# Patient Record
Sex: MALE | Race: WHITE | NOT HISPANIC OR LATINO | URBAN - METROPOLITAN AREA
[De-identification: names, ages, dates, MRNs, and addresses within clinical notes are randomized per-mention and may not be internally consistent; named-entity substitution may affect disease eponyms.]

---

## 2017-02-23 ENCOUNTER — ALLSCRIPTS OFFICE VISIT (OUTPATIENT)
Dept: OTHER | Facility: OTHER | Age: 62
End: 2017-02-23

## 2017-02-23 DIAGNOSIS — E11.9 TYPE 2 DIABETES MELLITUS WITHOUT COMPLICATIONS (HCC): ICD-10-CM

## 2017-02-23 DIAGNOSIS — E66.01 MORBID (SEVERE) OBESITY DUE TO EXCESS CALORIES (HCC): ICD-10-CM

## 2017-02-23 DIAGNOSIS — G47.33 OBSTRUCTIVE SLEEP APNEA: ICD-10-CM

## 2017-02-23 DIAGNOSIS — E07.9 DISORDER OF THYROID: ICD-10-CM

## 2017-02-23 DIAGNOSIS — I10 ESSENTIAL (PRIMARY) HYPERTENSION: ICD-10-CM

## 2017-03-17 ENCOUNTER — GENERIC CONVERSION - ENCOUNTER (OUTPATIENT)
Dept: OTHER | Facility: OTHER | Age: 62
End: 2017-03-17

## 2017-03-28 ENCOUNTER — GENERIC CONVERSION - ENCOUNTER (OUTPATIENT)
Dept: OTHER | Facility: OTHER | Age: 62
End: 2017-03-28

## 2017-04-28 ENCOUNTER — ALLSCRIPTS OFFICE VISIT (OUTPATIENT)
Dept: OTHER | Facility: OTHER | Age: 62
End: 2017-04-28

## 2017-05-16 ENCOUNTER — ANESTHESIA EVENT (OUTPATIENT)
Dept: GASTROENTEROLOGY | Facility: HOSPITAL | Age: 62
End: 2017-05-16
Payer: COMMERCIAL

## 2017-05-16 RX ORDER — ASPIRIN 81 MG/1
81 TABLET ORAL DAILY
COMMUNITY

## 2017-05-16 RX ORDER — LISINOPRIL 10 MG/1
10 TABLET ORAL DAILY
COMMUNITY
End: 2018-07-20 | Stop reason: SDUPTHER

## 2017-05-16 RX ORDER — SIMVASTATIN 20 MG
20 TABLET ORAL
COMMUNITY
End: 2018-09-13 | Stop reason: SDUPTHER

## 2017-05-16 RX ORDER — SILDENAFIL 50 MG/1
50 TABLET, FILM COATED ORAL DAILY PRN
COMMUNITY
End: 2018-07-03 | Stop reason: SDUPTHER

## 2017-05-16 RX ORDER — LEVOTHYROXINE SODIUM 0.05 MG/1
50 TABLET ORAL DAILY
Status: ON HOLD | COMMUNITY
End: 2017-05-17 | Stop reason: DRUGHIGH

## 2017-05-16 RX ORDER — OXYCODONE HYDROCHLORIDE AND ACETAMINOPHEN 5; 325 MG/1; MG/1
1 TABLET ORAL EVERY 4 HOURS PRN
COMMUNITY
End: 2018-07-03

## 2017-05-16 RX ORDER — GLIMEPIRIDE 4 MG/1
4 TABLET ORAL 2 TIMES DAILY
COMMUNITY
End: 2018-06-27 | Stop reason: SDUPTHER

## 2017-05-17 ENCOUNTER — ANESTHESIA (OUTPATIENT)
Dept: GASTROENTEROLOGY | Facility: HOSPITAL | Age: 62
End: 2017-05-17
Payer: COMMERCIAL

## 2017-05-17 ENCOUNTER — HOSPITAL ENCOUNTER (OUTPATIENT)
Facility: HOSPITAL | Age: 62
Setting detail: OUTPATIENT SURGERY
Discharge: HOME | End: 2017-05-17
Attending: SURGERY | Admitting: SURGERY
Payer: COMMERCIAL

## 2017-05-17 VITALS
HEIGHT: 70 IN | BODY MASS INDEX: 40.8 KG/M2 | HEART RATE: 81 BPM | DIASTOLIC BLOOD PRESSURE: 85 MMHG | OXYGEN SATURATION: 97 % | RESPIRATION RATE: 16 BRPM | WEIGHT: 285 LBS | TEMPERATURE: 97.9 F | SYSTOLIC BLOOD PRESSURE: 164 MMHG

## 2017-05-17 DIAGNOSIS — E66.01 MORBID (SEVERE) OBESITY DUE TO EXCESS CALORIES (HCC): ICD-10-CM

## 2017-05-17 PROCEDURE — 88305 TISSUE EXAM BY PATHOLOGIST: CPT | Performed by: SURGERY

## 2017-05-17 PROCEDURE — 88342 IMHCHEM/IMCYTCHM 1ST ANTB: CPT | Performed by: SURGERY

## 2017-05-17 RX ORDER — SODIUM CHLORIDE 9 MG/ML
125 INJECTION, SOLUTION INTRAVENOUS CONTINUOUS
Status: DISCONTINUED | OUTPATIENT
Start: 2017-05-17 | End: 2017-05-17 | Stop reason: HOSPADM

## 2017-05-17 RX ORDER — LEVOTHYROXINE SODIUM 0.07 MG/1
75 TABLET ORAL DAILY
COMMUNITY
End: 2018-06-27 | Stop reason: SDUPTHER

## 2017-05-17 RX ORDER — PROPOFOL 10 MG/ML
INJECTION, EMULSION INTRAVENOUS AS NEEDED
Status: DISCONTINUED | OUTPATIENT
Start: 2017-05-17 | End: 2017-05-17 | Stop reason: SURG

## 2017-05-17 RX ORDER — FENOFIBRATE 160 MG/1
160 TABLET ORAL DAILY
COMMUNITY
End: 2018-06-27 | Stop reason: SDUPTHER

## 2017-05-17 RX ORDER — ONDANSETRON 2 MG/ML
4 INJECTION INTRAMUSCULAR; INTRAVENOUS EVERY 6 HOURS PRN
Status: DISCONTINUED | OUTPATIENT
Start: 2017-05-17 | End: 2017-05-17 | Stop reason: HOSPADM

## 2017-05-17 RX ADMIN — SODIUM CHLORIDE: 0.9 INJECTION, SOLUTION INTRAVENOUS at 13:15

## 2017-05-17 RX ADMIN — PROPOFOL 200 MG: 10 INJECTION, EMULSION INTRAVENOUS at 13:27

## 2017-10-18 ENCOUNTER — ALLSCRIPTS OFFICE VISIT (OUTPATIENT)
Dept: OTHER | Facility: OTHER | Age: 62
End: 2017-10-18

## 2017-10-18 ENCOUNTER — TRANSCRIBE ORDERS (OUTPATIENT)
Dept: ADMINISTRATIVE | Facility: HOSPITAL | Age: 62
End: 2017-10-18

## 2017-10-18 DIAGNOSIS — Z01.810 ENCOUNTER FOR PREPROCEDURAL CARDIOVASCULAR EXAMINATION: ICD-10-CM

## 2017-10-18 DIAGNOSIS — E78.5 HYPERLIPIDEMIA: ICD-10-CM

## 2017-10-18 DIAGNOSIS — Z01.810 PRE-OPERATIVE CARDIOVASCULAR EXAMINATION: Primary | ICD-10-CM

## 2017-10-19 NOTE — PROGRESS NOTES
Assessment  1  Preop cardiovascular exam (V72 81) (Z01 810)  2  Benign essential hypertension (401 1) (I10)  3  Hyperlipidemia (272 4) (E78 5)    Plan  1  Follow-up PRN Evaluation and Treatment  Follow-up  Status: Complete  Done:   74WMX2324  2  NM MYOCARDIAL PERFUSION SPECT (RX STRESS AND/OR REST); Status:Need   Information - Financial Authorization; Requested for:18Oct2017;   3  EKG/ECG- POC; Status:Complete;   Done: 50ZRW3457    Discussion/Summary  Patient Clearance: Surgical Clearance:1  He is at a LOW TO MODERATE risk from a cardiovascular standpoint at this time without any additional cardiac testing  Reevaluation needed, if he should present with symptoms prior to surgery/procedure1   Comments:1   Normal stress test1   Discussion Summary:   1  Preop cardiac clearance: Resting EKG is normal  check stress testing given his diabetes and dyspnea,   Hypertension: His BP is well controlled on current medical therapy  Hyperlipidemia: He is maintained on fenofibrate and simvastatin  Counseling Documentation With Imm: The patient was counseled regarding diagnostic results,-- instructions for management,-- risk factor reductions,-- impressions  total time of encounter was 40 minutes-- and-- 25 minutes was spent counseling  1 Amended By: Saira Hahn; Oct 26 2017 3:20 PM EST    Chief Complaint  Chief Complaint Free Text Note Form: Preop clearance for bariatric surgery with Dr Keke Murphy  History of Present Illness  Pre-Op Visit (Brief): The patient is being seen for a preoperative visit-- and-- bariatric surgery  Surgical Risk Assessment:   HPI: consult for preop cardiac clearance   year old male with HTN, diabetes and hyperlipidemia  DM is reasonably controlled  blood pressure has been well controlled  He has stable with moderate dyspnea, no angina, no orthopnea, no PND, and no edema  No hx of CAD or CHF  Hypertension (Follow-Up): The patient presents for follow-up of essential hypertension   The patient states he has been stable with his blood pressure control since the last visit  Symptoms: denies impaired vision,-- stable dyspnea,-- denies chest pain,-- denies intermittent leg claudication-- and-- denies lower extremity edema  Review of Systems  Cardiology Male ROS:     Cardiac: No complaints of chest pain, no palpitations, no fainiting  Skin: No complaints of nonhealing sores or skin rash  Genitourinary: No complaints of recurrent urinary tract infections, frequent urination at night, difficult urination, blood in urine, kidney stones, loss of bladder control, no kidney or prostate problems, no erectile dysfunction  Psychological: No complaints of feeling depressed, anxiety, panic attacks, or difficulty concentrating  General: No complaints of trouble sleeping, lack of energy, fatigue, appetite changes, weight changes, fever, frequent infections, or night sweats  Respiratory: shortness of breath  HEENT: No complaints of serious problems, hearing problems, nose problems, throat problems, or snoring  Gastrointestinal: No complaints of liver problems, nausea, vomiting, heartburn, constipation, bloody stools, diarrhea, problems swallowing, adbominal pain, or rectal bleeding  Hematologic: No complaints of bleeding disorders, anemia, blood clots, or excessive brusing  Neurological: No complaints of numbness, tingling, dizziness, weakness, seizures, headaches, syncope or fainting, AM fatigue, daytime sleepiness, no witnessed apnea episodes  Musculoskeletal: No complaints of arthritis, back pain, or painfull swelling  ROS Reviewed:   ROS reviewed  Active Problems  1  Back pain (724 5) (M54 9)  2  Benign essential hypertension (401 1) (I10)  3  Blood tests prior to treatment or procedure (V72 63) (Z01 812)  4  Diabetes mellitus (250 00) (E11 9)  5  Morbid obesity due to excess calories (278 01) (E66 01)  6  Obstructive sleep apnea on CPAP (327 23,V46 8) (G47 33,Z99 89)  7  Thyroid disease (246 9) (E07 9)    Past Medical History  1  No pertinent past medical history  Active Problems And Past Medical History Reviewed: The active problems and past medical history were reviewed and updated today  Surgical History  1  History of Tonsillectomy  Surgical History Reviewed: The surgical history was reviewed and updated today  Family History  Mother   1  Family history of   2  Family history of lung cancer (V16 1) (Z80 1)  Father   3  Family history of   Family History Reviewed: The family history was reviewed and updated today  Social History   · Former smoker (H77 28) (L25 735)   · No illicit drug use   · Social alcohol use (Z78 9)  Social History Reviewed: The social history was reviewed and updated today  Current Meds  1  Aspirin 81 MG CAPS; Therapy: (Recorded:40Kva5817) to Recorded  2  Fenofibrate 160 MG Oral Tablet; TAKE 1 TABLET DAILY; Therapy: (Recorded:65Sms3405) to Recorded  3  Glimepiride 4 MG Oral Tablet; TAKE 1 TABLET TWICE DAILY; Therapy: (Recorded:35Icw1832) to Recorded  4  Hydrocodone-Acetaminophen 5-325 MG Oral Tablet; Therapy: (Recorded:71Lwg6967) to Recorded  5  Kombiglyze XR 2 5-1000 MG Oral Tablet Extended Release 24 Hour; Take 1 tablet twice   daily; Therapy: (Recorded:27Qwj6548) to Recorded  6  Levothyroxine Sodium 75 MCG Oral Tablet; take 1 tablet by mouth daily; Therapy: (Recorded:57Tny8427) to Recorded  7  Lisinopril 10 MG Oral Tablet; TAKE 1 TABLET DAILY; Therapy: (Recorded:08Prm7244) to Recorded  8  Simvastatin 20 MG Oral Tablet; TAKE 1 TABLET AT BEDTIME; Therapy: (Recorded:46Kut1845) to Recorded  9  Viagra 50 MG Oral Tablet; Therapy: (Recorded:18Wxr2975) to Recorded  Medication List Reviewed: The medication list was reviewed and updated today  Allergies  1   No Known Drug Allergies    Vitals  Signs   Recorded: 59ZYM8420 02:00PM   Heart Rate: 90  Systolic: 345, RUE, Sitting  Diastolic: 74, RUE, Sitting  BP Cuff Size: Large  Height: 5 ft 8 in  Weight: 289 lb   BMI Calculated: 43 94  BSA Calculated: 2 39    Physical Exam    Constitutional   General appearance: No acute distress, well appearing and well nourished  Eyes   Conjunctiva and Sclera examination: Conjunctiva pink, sclera anicteric  Ears, Nose, Mouth, and Throat - Oropharynx: Clear, nares are clear, mucous membranes are moist    Neck   Neck and thyroid: Normal, supple, trachea midline, no thyromegaly  Pulmonary   Respiratory effort: No increased work of breathing or signs of respiratory distress  Auscultation of lungs: Clear to auscultation, no rales, no rhonchi, no wheezing, good air movement  Cardiovascular   Auscultation of heart: Normal rate and rhythm, normal S1 and S2, no murmurs  Carotid pulses: Normal, 2+ bilaterally  Peripheral vascular exam: Normal pulses throughout, no tenderness, erythema or swelling  Pedal pulses: Normal, 2+ bilaterally  Examination of extremities for edema and/or varicosities: Normal     Abdomen   Abdomen: Non-tender and no distention  Liver and spleen: No hepatomegaly or splenomegaly  Musculoskeletal Gait and station: Normal gait  -- Digits and nails: Normal without clubbing or cyanosis  -- Inspection/palpation of joints, bones, and muscles: Normal, ROM normal     Skin - Skin and subcutaneous tissue: Normal without rashes or lesions  Skin is warm and well perfused, normal turgor  Neurologic - Cranial nerves: II - XII intact  -- Speech: Normal     Psychiatric - Orientation to person, place, and time: Normal -- Mood and affect: Normal       Results/Data  ECG Report: A 12 lead ECG was performed and was normal    Rhythm and rate:  ventricular rate is 90 beats per minute  -- normal sinus rhythm        Signatures   Electronically signed by : MELISA Greenwood ; Oct 18 2017  2:58PM EST                       (Author)    Electronically signed by : MELISA Greenwood ; Oct 26 2017  3:20PM EST (Author)

## 2017-10-24 ENCOUNTER — HOSPITAL ENCOUNTER (OUTPATIENT)
Dept: NON INVASIVE DIAGNOSTICS | Facility: CLINIC | Age: 62
Discharge: HOME | End: 2017-10-24
Payer: COMMERCIAL

## 2017-10-24 DIAGNOSIS — Z01.810 PRE-OPERATIVE CARDIOVASCULAR EXAMINATION: ICD-10-CM

## 2017-10-24 LAB
CHEST PAIN STATEMENT: NORMAL
MAX DIASTOLIC BP: 82 MMHG
MAX HEART RATE: 105 BPM
MAX PREDICTED HEART RATE: 159 BPM
MAX. SYSTOLIC BP: 158 MMHG
PROTOCOL NAME: NORMAL
REASON FOR TERMINATION: NORMAL
TARGET HR FORMULA: NORMAL
TEST INDICATION: NORMAL
TIME IN EXERCISE PHASE: 180 S

## 2017-10-24 PROCEDURE — 78452 HT MUSCLE IMAGE SPECT MULT: CPT

## 2017-10-24 PROCEDURE — 93017 CV STRESS TEST TRACING ONLY: CPT

## 2017-10-24 PROCEDURE — A9502 TC99M TETROFOSMIN: HCPCS

## 2017-10-24 RX ADMIN — REGADENOSON 0.4 MG: 0.08 INJECTION, SOLUTION INTRAVENOUS at 10:12

## 2017-10-26 ENCOUNTER — GENERIC CONVERSION - ENCOUNTER (OUTPATIENT)
Dept: OTHER | Facility: OTHER | Age: 62
End: 2017-10-26

## 2018-01-11 NOTE — MISCELLANEOUS
Provider Comments  Provider Comments:     Dear Ana Maria Mi had a scheduled appointment at our office for 03/17/2017 but were unable to keep  We attempted to call you back but were unable to reach you  Please give our office a call if you wish to reschedule      Sincerely,     [facility]          Signatures   Electronically signed by : Kassie Apodaca, ; Mar 17 2017  9:21AM EST                       (Author)

## 2018-01-11 NOTE — PROGRESS NOTES
History of Present Illness  Bariatric Behavioral Health Evaluation [facility]:   He is here today because: Increase health, increase mobility and prevent family health issues  He is seeking a Bariatric surgery evaluation  He researched this option for: 3 years  He realizes the post-op requirements Yes, patient has researched procedure; patient has acquittances with bariaric surgery  His Psychiatric/Psychological diagnosis: He does not have an outpatient counselor  He does not have the counselor's number  He does not have a Psychiatrist  He does not have the Psychiatrist's number  He has not had Inpatient Treatment  (None reported )  Family Constellation: Family Constellation: Mother:  @ 79years old; cancer  Father: [de-identified] 79years old; stroke  Siblings: 2 brothers and 1 sister  Spouse:  44 years; spouse supports surgery  Children: 2 children  He lives withhis wife   Domestic Violence: has not happened  Abuse History: (None reported )   Physical/psychological assessment Appearance: appropriate   Sociability: average  Affect: appropriate  Mood: calm  Thought Process: coherent  Speech: normal  Content: no impairment  The patient was oriented to person, oriented to place, oriented to time and normal memory   normal attention span  no decreased concentration ability  normal judgment  His emotional insight was: good  His intellectual insight was: good  Risk assessment: Symptoms:  no suicidal ideation, no suicide plan, no suicide attempt, no homicidal thoughts, no hopelessness, no helplessness, no feelings of despair, no anxiety, no depressed mood, no loss of interests, no anhedonia and no sense of isolation  The patient is currently asymptomatic  Associated symptoms:  no aggressive behavior, no high risk behavior, no racing thoughts, no periods of excess energy, no periods of euphoria, no delusions, no command hallucinations, no auditory hallucinations and no visual hallucinations   No associated symptoms are reported  The patient is not currently being treated for this problem  Pertinent medical history:  chronic pain, but no depression, no seasonal affective disorder, no premenstrual dysphoric disorder, no postpartum depression, no anxiety disorder, no bipolar disorder, no post traumatic stress disorder, no eating disorder, no personality disorder, no schizophrenia, no chronic headaches, no dementia, no malignancy and no HIV infection  Risk factors:  financial stress and job loss, but no bereavement, no relationship problems, no social isolation, no access to lethal means, no alcohol abuse, no substance abuse, no physical abuse, no sexual abuse, no emotional abuse, no bullying, no previous suicide attempt, no recent psychiatric hospitalization, no recent family suicide and no recent friend suicide  No risk factors have been identified  Family history:  no suicide, no depression, no bipolar disorder and no substance abuse  He was previously evaluated by me  Sexual history: He is not pregnant  He does not have a history of   He does not have a history of miscarriage  He does not have a history of hypersexuality  Recommendations: He is recommended for surgery  He states adequate knowledge of nutrition, exercise, and behavior modification  The Following Ratings are based on my: Obsevation of this individual over the last  Risk of Harm to Self or Others: The following are demographic risk factors associated with harm to self: , [place], or   The following are demographic risk factors associated with harm to others: male and unemployed  (None reported )  Recent Specific Risk Factors: The patient is currently asymptomatic  No associated symptoms are reported  Summary and Recommendations:   Low: No thoughts or occasional thoughts of suicide, but no intent or actions   Self inflicted scratches, abrasions, or other self- injurious of behavior where medical attention is typically not warranted  No elements of homicidality or occasional thoughts but no plan, intent, or actions  Active Problems    1  Back pain (724 5) (M54 9)   2  Benign essential hypertension (401 1) (I10)   3  Diabetes mellitus (250 00) (E11 9)   4  Obstructive sleep apnea on CPAP (327 23) (G47 33)   5  Thyroid disease (246 9) (E07 9)    Surgical History    1  History of Tonsillectomy    Family History  Mother    1  Family history of    2  Family history of lung cancer (V16 1) (Z80 1)  Father    3  Family history of     Social History    · Former smoker (V13 04) (O94 252)   · No illicit drug use   · Social alcohol use (Z78 9)    Current Meds   1  Aspirin 81 MG CAPS; Therapy: (Recorded:72Lwc2211) to Recorded   2  Glimepiride 4 MG Oral Tablet; Therapy: (Recorded:56Hpe9423) to Recorded   3  Hydrocodone-Acetaminophen 5-325 MG Oral Tablet; Therapy: (Recorded:12Suj6918) to Recorded   4  Kombiglyze XR 5-1000 MG Oral Tablet Extended Release 24 Hour; Therapy: (Recorded:47Lak2523) to Recorded   5  Levothyroxine Sodium 50 MCG Oral Tablet; Therapy: (Recorded:68Xqx6910) to Recorded   6  Lisinopril 10 MG Oral Tablet; Therapy: (Recorded:25Kxt8403) to Recorded   7  Simvastatin 20 MG Oral Tablet; Therapy: (Recorded:58Kxy0333) to Recorded   8  Viagra 50 MG Oral Tablet; Therapy: (Recorded:73Sgo6765) to Recorded    Allergies    1  No Known Drug Allergies    Vitals  Signs   Recorded: 10AFR5906 08:58AM   Height: 5 ft 8 5 in  Weight: 289 lb   BMI Calculated: 43 3  BSA Calculated: 2 4     Note   Note:   Completed Behavioral Health Assessment  Provided patient, education as needed  Patient denies to Damar I diagnosis   Patient will work on the following; increase physical activity and cut food into small pieces  Patient is knowledgeable of pre and post op requirements and meets criteria for [facility]' bariatric surgery program  Patient is referred to physician        Future Appointments    Date/Time Provider Specialty Site   01/20/2017 10:45 AM MELISA Ellington  General Surgery [facility]   02/23/2017 09:30 AM MELISA Ordonez  Bariatric Medicine [place] [id number] [facility]     Signatures   Electronically signed by : TRINA PorterLCSW; Dec 19 2016  9:50AM EST                       (Author)    Electronically signed by :  MELISA Garcia ; Jan 12 2017  1:23PM EST

## 2018-01-12 NOTE — PROGRESS NOTES
Discussion/Summary  Discussion Summary:   Assess: Pt here for monthly weigh in  Pt has been maintaining his weight  Pt does not have any significant changes in diagnosis or medication  Patient is doing the following activity/exercise: he has been walking with his wife 1 5 miles 3 to 4 times per week  He joined [employer] and plans to go to the gym several times per week  He has been unemployed since last April and is starting to struggle financially, so he was not as focused on preparing on surgery as he should have been  He is refocusing and trying to get back on track with eating and activity Per 24 hour recall: B: 2 egss with 1/2 English muffin OR cottage cheese and fruit OR PB with 1/2 English muffin L: Eating on the run, frequently eating out- fried chicken, etc D: Meat, vegetable starch, sometimes cottage cheese and fruit Nutrition Diagnosis: Obesity related to sedentary lifestyle and excess energy intake as evidenced by BMI Intervention: Reviewed work flow  Provided with template for what pcp should write in letter of support  Patient will schedule cardiology, attended support group  Recently had blood work done by Davian Bautista which he will forward to our program  Reviewed use of baritastic and provided with goals - 1800 calories, 20% protein, 50% carb and 30% fat  Encouraged completion of lesson plan number 2 and 3 in bariatric booklet  Pt was receptive and verbalized understanding  Pt will work on the following goals: Activity /exercise goal: either walk or go to [employer] - 3 to 4 x per week Food journal in baritastic 1800 calories or less Schedule cardiology appointment and get pcp letter have labs forwarded to our office Monitoring/evaluation: Pt will lose 2 to 4 pounds by next appointment Follow up scheduled for : one month with surgeon  Active Problems    1  Back pain (724 5) (M54 9)   2  Benign essential hypertension (401 1) (I10)   3   Blood tests prior to treatment or procedure (A18 63) (X72 730)   4  Diabetes mellitus (250 00) (E11 9)   5  Morbid obesity due to excess calories (278 01) (E66 01)   6  Obstructive sleep apnea on CPAP (327 23,V46 8) (G47 33,Z99 89)   7  Thyroid disease (246 9) (E07 9)    Past Medical History    1  No pertinent past medical history    Surgical History    1  History of Tonsillectomy    Family History  Mother    1  Family history of    2  Family history of lung cancer (V16 1) (Z80 1)  Father    3  Family history of     Social History    · Former smoker (G15 69) (Z11 725)   · No illicit drug use   · Social alcohol use (Z78 9)    Current Meds   1  Aspirin 81 MG CAPS; Therapy: (Recorded:87Xeh0167) to Recorded   2  Glimepiride 4 MG Oral Tablet; Therapy: (Recorded:39Vqx1221) to Recorded   3  Hydrocodone-Acetaminophen 5-325 MG Oral Tablet; Therapy: (Recorded:08Rlc0047) to Recorded   4  Kombiglyze XR 5-1000 MG Oral Tablet Extended Release 24 Hour; Therapy: (Recorded:90Www3578) to Recorded   5  Levothyroxine Sodium 50 MCG Oral Tablet; Therapy: (Recorded:15Nnd2942) to Recorded   6  Lisinopril 10 MG Oral Tablet; Therapy: (Recorded:70Vhl0172) to Recorded   7  Simvastatin 20 MG Oral Tablet; Therapy: (Recorded:66Fyv0078) to Recorded   8  Viagra 50 MG Oral Tablet; Therapy: (Recorded:59Ido6164) to Recorded    Allergies    1  No Known Drug Allergies    Vitals  Signs   Recorded: 80JAJ2785 03:25PM   Height: 5 ft 8 5 in  Weight: 289 lb 8 0 oz  BMI Calculated: 43 38  BSA Calculated: 2 4    Future Appointments    Date/Time Provider Specialty Site   2017 01:45 PM MELISA Claros  General Surgery [facility]     Signatures   Electronically signed by : MATTHEW Penn; Mar 28 2017  4:42PM EST                       (Author)    Electronically signed by :  MELISA Murillo Si ; Mar 29 2017  8:22AM EST

## 2018-01-12 NOTE — PROGRESS NOTES
History of Present Illness  Bariatric MNT [facility] Surgery Screening Preop [facility]:   the appointment lasted: 60 minutes  His surgeon is Dr Damon Tidwell  The patient was present at the session  The diagnosis/reason for the appointment is: He has Grade III Obesity with a BMI of 43 3  He has the following comorbidities: diabetes type 2, hypertension and JOSE RAUL, hypothyroid  Labs: Hollie Malone He was reminded to have his labs drawn   He does not need to monitor his glucose  Relationship to food: He eats when he is bored, grazes and eats large portions  He knows the difference between being comfortably full and uncomfortably full and knows the difference between being stuffed and comfortably full  He felt/thought they felt his best at 180 pounds he last weighed this when he was 22  He did not complete a food journal 24-Hour Food Recall Breakfast frequently skips breakfast  Lunch: leftovers- sometimes skips  Dinner: eats out frequently, 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- eats at [employer] such as [place], etc  He drinks 6-8 cups of water daily He drinks 4 caffienated beverages daily His motivators are:wants to be healthy for [facility]  His obesity/being overweight is related to excess energy intake and sedentary activity level and is evidenced by: BMI 43 3  Knowledge Deficit Prior to Education  He is new to the diet  Barriers to education:  He has no barriers to education  Medical Nutrition Therapy Intervention: Daily Food /Exercise Diary, Lifestyle /Behavior Modification Techniques, Basic Pathophysiology of Obesity, Label Reading, Checklist of the Overview of Lesson Plans and Surgical changes to Stomach/GI  Area's Reviewed: Lifestyle [place]ie [employer] Modification Techniques, Lesson Plans in [place] and Pre- surgery goals /rules  Brief Review of Vitamins, diet progression for post-op and Pathophysiology of Obesity       His comprehension of the presented material was good His receptivity to the presented material was good  His motivation was good  Provided: Nutrition Guidelines for Gastric Surgery, Bariatric Program Pre- Surgery Nutrition and Goals  Goals: His set goal for physical activity is track steps , 5000 to 6000 steps per day days a week  Review [employer] in [place]   Post Surgery: He will adhere to the diet progression: remain hydrated, consume adequate protein; and take vitamins as outlined in guidelines  Patient is a 62year old who is here for nutritional evaluation for weight loss surgery  I reviewed co-morbidities and medications prior to session  He first recalls having problems with weight gain as a child   He has dieted in the past with variable success but would regain the weight back  (refer to diet history for details)  For his personal pre-op goals he will: eat 3 meals per day, and decrease his portions by eating off of a smaller plate  He will complete all 6 lesson plans in his bariatric booklet  He was instructed on the importance of consistent vitamin and mineral intake after his surgery to prevent deficiencies  He currently does not take any vitamins or minerals   Recommended that he take an adult multivitamin/mineral supplement plus 2000 IU of vitamin D3 Reviewed importance of support after surgery and discussed the web forum, pep rally and support group  Patient states adequate knowledge of nutrition, exercise and behavior modification required for long term success  Pt  is recommended for surgery and is aware that he will be required to follow the 2 weeks pre operative liver shrinking diet prior to surgery  Pt is also understands that he needs to implement lifestyle changes in preparation for surgery  Patient is aware that he has 3 months of weigh ins required by his insurance  Recommendations: He was provided contact information for any questions  He is recommended for surgery   He states adequate knowledge of nutrition, exercise, and behavior modification  He will attend a Team Meeting approximately 2-3 weeks prior to surgery  Active Problems    1  Back pain (724 5) (M54 9)   2  Benign essential hypertension (401 1) (I10)   3  Diabetes mellitus (250 00) (E11 9)   4  Obstructive sleep apnea on CPAP (327 23) (G47 33)   5  Thyroid disease (246 9) (E07 9)    Surgical History    1  History of Tonsillectomy    Family History  Mother    1  Family history of    2  Family history of lung cancer (V16 1) (Z80 1)  Father    3  Family history of     Social History    · Former smoker (V45 99) (N55 422)   · No illicit drug use   · Social alcohol use (Z78 9)    Current Meds   1  Aspirin 81 MG CAPS; Therapy: (Recorded:72Giy6817) to Recorded   2  Glimepiride 4 MG Oral Tablet; Therapy: (Recorded:78Pnh3232) to Recorded   3  Hydrocodone-Acetaminophen 5-325 MG Oral Tablet; Therapy: (Recorded:56Stb8998) to Recorded   4  Kombiglyze XR 5-1000 MG Oral Tablet Extended Release 24 Hour; Therapy: (Recorded:00Cyr1327) to Recorded   5  Levothyroxine Sodium 50 MCG Oral Tablet; Therapy: (Recorded:43Txl6804) to Recorded   6  Lisinopril 10 MG Oral Tablet; Therapy: (Recorded:03Lpy3693) to Recorded   7  Simvastatin 20 MG Oral Tablet; Therapy: (Recorded:10Vfz5448) to Recorded   8  Viagra 50 MG Oral Tablet; Therapy: (Recorded:65Rpb7308) to Recorded    Allergies    1  No Known Drug Allergies    Future Appointments    Date/Time Provider Specialty Site   2017 10:45 AM Dionicia Severin, M D  General Surgery [facility]   2017 09:30 AM [facility] Pulliam  [facility]     Signatures   Electronically signed by : MATTHEW Harris; Dec 19 2016  3:07PM EST                       (Author)    Electronically signed by :  MELISA Wilcox ; 2017  1:23PM EST

## 2018-01-13 VITALS
WEIGHT: 291 LBS | BODY MASS INDEX: 43.1 KG/M2 | TEMPERATURE: 98.3 F | SYSTOLIC BLOOD PRESSURE: 122 MMHG | HEART RATE: 80 BPM | RESPIRATION RATE: 20 BRPM | HEIGHT: 69 IN | DIASTOLIC BLOOD PRESSURE: 70 MMHG

## 2018-01-13 VITALS — HEIGHT: 69 IN | BODY MASS INDEX: 42.88 KG/M2 | WEIGHT: 289.5 LBS

## 2018-01-13 VITALS
SYSTOLIC BLOOD PRESSURE: 124 MMHG | BODY MASS INDEX: 43.8 KG/M2 | WEIGHT: 289 LBS | HEART RATE: 90 BPM | HEIGHT: 68 IN | DIASTOLIC BLOOD PRESSURE: 74 MMHG

## 2018-01-14 VITALS
HEIGHT: 69 IN | WEIGHT: 288.6 LBS | SYSTOLIC BLOOD PRESSURE: 138 MMHG | BODY MASS INDEX: 42.75 KG/M2 | HEART RATE: 80 BPM | RESPIRATION RATE: 16 BRPM | TEMPERATURE: 97.3 F | DIASTOLIC BLOOD PRESSURE: 80 MMHG

## 2018-01-16 NOTE — RESULT NOTES
Verified Results  * NM MYOCARDIAL PERFUSION SPECT (STRESS AND/OR REST) [id number] 08:23AM Kasey Anne     Test Name Result Flag Reference   NM MYOCARDIAL PERFUSION SPECT (STRESS AND/OR REST) (Report)     [address]   [phone]     Rest/Stress Gated SPECT Myocardial Perfusion Imaging After Regadenoson     Patient: Jordon Dey   MR number: [id number]   Account number: [de-identified]   : 1955   Age: 65 years   Gender: Male   Status: Outpatient   Location: [address] [place] [address] and [facility] [place]   Height: 70 in   Weight: 289 lb   BP: 137/ 72 mmHg     Allergies: NO KNOWN ALLERGIES     Diagnosis: V72 81 - PREOP CARDIOVSCLR EXAM     Referring Physician: Radha Hairston MD   Primary Physician: Quirino Parra MD   Technician: Deanna Carlos   RN: Morenita Mejia RN   Group: [facility]   Report Prepared by[de-identified] Morenita Mejia RN   Interpreting Physician: Aura De Leon MD     INDICATIONS: Detection of coronary artery disease  HISTORY: The patient is a 65year old  male  Chest pain status: no chest pain  Coronary artery disease risk factors: dyslipidemia, hypertension, and diabetes mellitus  Cardiovascular history: none significant  Co-morbidity:   obesity  Medications: an ACE inhibitor/ARB, aspirin, a lipid lowering agent, diabetic medications, and thyroid medications  PHYSICAL EXAM: Baseline physical exam screening: no wheezes audible  REST ECG: Normal sinus rhythm  Normal baseline ECG  PROCEDURE: The study was performed at the [address]  A regadenoson infusion pharmacologic stress test was performed  Gated SPECT myocardial perfusion imaging was performed after stress  Systolic blood pressure was   137 mmHg, at the start of the study  Diastolic blood pressure was 72 mmHg, at the start of the study  The heart rate was 80 bpm, at the start of the study  IV double checked  Regadenoson protocol:   HR bpm SBP mmHg DBP mmHg Symptoms   Baseline 80 137 72 none   Immediate 105 158 82 flushing, nausea   1 min 85 144 74 subsiding   No medications or fluids given  STRESS SUMMARY: Duration of pharmacologic stress was 3 min and 0 sec  Maximal heart rate during stress was 105 bpm ( 66 % of maximal predicted heart rate)  The rate-pressure product for the peak heart rate and blood pressure was 48921  There was no chest pain during stress  The stress test was terminated due to protocol completion  Pre oxygen saturation: 98 %  Peak oxygen saturation: 98 %  There were no stress arrhythmias or conduction abnormalities  The stress ECG was   non-diagnostic  ISOTOPE ADMINISTRATION:   Resting isotope administration Stress isotope administration   Agent Tetrofosmin Tetrofosmin   Dose 15 7 mCi 49 mCi   Date 10/24/2017 10/24/2017   Injection time 08:35 10:10   Injection-image interval 51 min 55 min     The radiopharmaceutical was injected at the peak effect of pharmacologic stress  MYOCARDIAL PERFUSION IMAGING:   The image quality was good  Rotating projection images reveal mild diaphragmatic attenuation  Left ventricular size was normal  The TID ratio was 1 05  PERFUSION DEFECTS:   - There was a small to moderate, mildly severe, fixed myocardial perfusion defect of the entire inferior wall definitely due to diaphragm attenuation  GATED SPECT:   The calculated left ventricular ejection fraction was 54 %  There was no left ventricular regional abnormality  SUMMARY:   - Stress results: Target heart rate was not achieved  There was no chest pain during stress  - ECG conclusions: The stress ECG was non-diagnostic    - Perfusion imaging: There was a small to moderate, mildly severe, fixed myocardial perfusion defect of the entire inferior wall definitely due to diaphragm attenuation    - Gated SPECT: The calculated left ventricular ejection fraction was 54 %   There was no left ventricular regional abnormality  IMPRESSIONS: Normal study after pharmacologic vasodilation  Inferior fixed defect due to diaphragmatic attenuation       Prepared and signed by     Rasta Dudley MD   Signed 10/24/2017 14:56:25

## 2018-01-17 NOTE — CONSULTS
Chief Complaint  Chief Complaint Free Text Note Form: Preop clearance for bariatric surgery with Dr Emily Wray  History of Present Illness  Pre-Op Visit (Brief): The patient is being seen for a preoperative visit and bariatric surgery  Surgical Risk Assessment:   HPI: consult for preop cardiac clearance     65year old male with HTN, diabetes and hyperlipidemia  DM is reasonably controlled  blood pressure has been well controlled  He has stable with moderate dyspnea, no angina, no orthopnea, no PND, and no edema  No hx of CAD or CHF  Hypertension (Follow-Up): The patient presents for follow-up of essential hypertension  The patient states he has been stable with his blood pressure control since the last visit  Symptoms: denies impaired vision, stable dyspnea, denies chest pain, denies intermittent leg claudication and denies lower extremity edema  Review of Systems  Cardiology Male ROS:     Cardiac: No complaints of chest pain, no palpitations, no fainiting  Skin: No complaints of nonhealing sores or skin rash  Genitourinary: No complaints of recurrent urinary tract infections, frequent urination at night, difficult urination, blood in urine, kidney stones, loss of bladder control, no kidney or prostate problems, no erectile dysfunction  Psychological: No complaints of feeling depressed, anxiety, panic attacks, or difficulty concentrating  General: No complaints of trouble sleeping, lack of energy, fatigue, appetite changes, weight changes, fever, frequent infections, or night sweats  Respiratory: shortness of breath  HEENT: No complaints of serious problems, hearing problems, nose problems, throat problems, or snoring  Gastrointestinal: No complaints of liver problems, nausea, vomiting, heartburn, constipation, bloody stools, diarrhea, problems swallowing, adbominal pain, or rectal bleeding  Hematologic: No complaints of bleeding disorders, anemia, blood clots, or excessive brusing  Neurological: No complaints of numbness, tingling, dizziness, weakness, seizures, headaches, syncope or fainting, AM fatigue, daytime sleepiness, no witnessed apnea episodes  Musculoskeletal: No complaints of arthritis, back pain, or painfull swelling  ROS Reviewed:   ROS reviewed  Active Problems    1  Back pain (724 5) (M54 9)   2  Benign essential hypertension (401 1) (I10)   3  Blood tests prior to treatment or procedure (V72 63) (Z01 812)   4  Diabetes mellitus (250 00) (E11 9)   5  Morbid obesity due to excess calories (278 01) (E66 01)   6  Obstructive sleep apnea on CPAP (327 23,V46 8) (G47 33,Z99 89)   7  Thyroid disease (246 9) (E07 9)    Past Medical History    1  No pertinent past medical history  Active Problems And Past Medical History Reviewed: The active problems and past medical history were reviewed and updated today  Surgical History    1  History of Tonsillectomy  Surgical History Reviewed: The surgical history was reviewed and updated today  Family History    1  Family history of    2  Family history of lung cancer (V16 1) (Z80 1)    3  Family history of   Family History Reviewed: The family history was reviewed and updated today  Social History    · Former smoker (E94 42) (F41 733)   · No illicit drug use   · Social alcohol use (Z78 9)  Social History Reviewed: The social history was reviewed and updated today  Current Meds   1  Aspirin 81 MG CAPS; Therapy: (Recorded:00Xat5309) to Recorded   2  Fenofibrate 160 MG Oral Tablet; TAKE 1 TABLET DAILY; Therapy: (Recorded:00Kkk1606) to Recorded   3  Glimepiride 4 MG Oral Tablet; TAKE 1 TABLET TWICE DAILY; Therapy: (Recorded:38Vzz8229) to Recorded   4  Hydrocodone-Acetaminophen 5-325 MG Oral Tablet; Therapy: (Recorded:91Vwr9821) to Recorded   5  Kombiglyze XR 2 5-1000 MG Oral Tablet Extended Release 24 Hour; Take 1 tablet twice   daily; Therapy: (Recorded:10Its7282) to Recorded   6  Levothyroxine Sodium 75 MCG Oral Tablet; take 1 tablet by mouth daily; Therapy: (Recorded:78Lsi8729) to Recorded   7  Lisinopril 10 MG Oral Tablet; TAKE 1 TABLET DAILY; Therapy: (Recorded:63Qcn9264) to Recorded   8  Simvastatin 20 MG Oral Tablet; TAKE 1 TABLET AT BEDTIME; Therapy: (Recorded:65Mth5651) to Recorded   9  Viagra 50 MG Oral Tablet; Therapy: (Recorded:65Vho2412) to Recorded  Medication List Reviewed: The medication list was reviewed and updated today  Allergies    1  No Known Drug Allergies    Vitals  Signs   Recorded: 31CXN4251 02:00PM   Heart Rate: 90  Systolic: 536, RUE, Sitting  Diastolic: 74, RUE, Sitting  BP Cuff Size: Large  Height: 5 ft 8 in  Weight: 289 lb   BMI Calculated: 43 94  BSA Calculated: 2 39    Physical Exam    Constitutional   General appearance: No acute distress, well appearing and well nourished  Eyes   Conjunctiva and Sclera examination: Conjunctiva pink, sclera anicteric  Ears, Nose, Mouth, and Throat - Oropharynx: Clear, nares are clear, mucous membranes are moist    Neck   Neck and thyroid: Normal, supple, trachea midline, no thyromegaly  Pulmonary   Respiratory effort: No increased work of breathing or signs of respiratory distress  Auscultation of lungs: Clear to auscultation, no rales, no rhonchi, no wheezing, good air movement  Cardiovascular   Auscultation of heart: Normal rate and rhythm, normal S1 and S2, no murmurs  Carotid pulses: Normal, 2+ bilaterally  Peripheral vascular exam: Normal pulses throughout, no tenderness, erythema or swelling  Pedal pulses: Normal, 2+ bilaterally  Examination of extremities for edema and/or varicosities: Normal     Abdomen   Abdomen: Non-tender and no distention  Liver and spleen: No hepatomegaly or splenomegaly  Musculoskeletal Gait and station: Normal gait  Digits and nails: Normal without clubbing or cyanosis   Inspection/palpation of joints, bones, and muscles: Normal, ROM normal     Skin - Skin and subcutaneous tissue: Normal without rashes or lesions  Skin is warm and well perfused, normal turgor  Neurologic - Cranial nerves: II - XII intact  Speech: Normal     Psychiatric - Orientation to person, place, and time: Normal  Mood and affect: Normal       Results/Data  ECG Report: A 12 lead ECG was performed and was normal    Rhythm and rate:  ventricular rate is 90 beats per minute  normal sinus rhythm  Assessment    1  Preop cardiovascular exam (V72 81) (Z01 810)   2  Benign essential hypertension (401 1) (I10)   3  Hyperlipidemia (272 4) (E78 5)    Plan    1  Follow-up PRN Evaluation and Treatment  Follow-up  Status: Complete  Done:   [id number]    2  NM MYOCARDIAL PERFUSION SPECT (RX STRESS AND/OR REST); Status:Need   Information - Financial Authorization; Requested for:18Oct2017;     3  EKG/ECG- POC; Status:Complete;   Done: 32MOH9600    Discussion/Summary  Patient Clearance: Surgical Clearance: He is at a LOW TO MODERATE risk from a cardiovascular standpoint at this time without any additional cardiac testing  Reevaluation needed, if he should present with symptoms prior to surgery/procedure  Comments:  Normal stress test    Discussion Summary:   1  Preop cardiac clearance: Resting EKG is normal  check stress testing given his diabetes and dyspnea,     2  Hypertension: His BP is well controlled on current medical therapy  3  Hyperlipidemia: He is maintained on fenofibrate and simvastatin  Counseling Documentation With Imm: The patient was counseled regarding diagnostic results, instructions for management, risk factor reductions, impressions  total time of encounter was 40 minutes and 25 minutes was spent counseling        Signatures   Electronically signed by : MELISA Pastrana ; Oct 18 2017  2:58PM EST                       (Author)    Electronically signed by : MELISA Pastrana ; Oct 26 2017  3:20PM EST                       (Author)

## 2018-01-22 ENCOUNTER — GENERIC CONVERSION - ENCOUNTER (OUTPATIENT)
Dept: OTHER | Facility: OTHER | Age: 63
End: 2018-01-22

## 2018-01-24 NOTE — PROGRESS NOTES
History of Present Illness  Bariatric Behavioral Health Evaluation [facility]:   He is here today because: Increase health, increase mobility and prevent family health issues  He is seeking a Bariatric surgery evaluation  He researched this option for: 1 year  He realizes the post-op requirements Yes, patient has researched procedure; patient has acquaintances with bariatric surgery  His Psychiatric/Psychological diagnosis: He does not have an outpatient counselor  He does not have the counselor's number  He does not have a Psychiatrist  He does not have the Psychiatrist's number  He has not had Inpatient Treatment  (None reported )  Family Constellation: Family Constellation: Mother:  @ 63's  Father:  @ 76years old  Siblings: 1 sister and 2 brothers  Spouse:  36 years; spouse supports surgery  Children: 2 children  He lives withhis spouse   Domestic Violence: has not happened  Abuse History: (None reported )   Physical/psychological assessment Appearance: appropriate   Sociability: average  Affect: appropriate  Mood: calm  Thought Process: coherent  Speech: normal  Content: no impairment  The patient was oriented to person, oriented to place, oriented to time and normal memory   normal attention span  no decreased concentration ability  normal judgment  His emotional insight was: good  His intellectual insight was: good  Risk assessment: Symptoms:  no suicidal ideation, no suicide plan, no suicide attempt, no homicidal thoughts, no hopelessness, no helplessness, no feelings of despair, no anxiety, no depressed mood, no loss of interests, no anhedonia and no sense of isolation  The patient is currently asymptomatic  Associated symptoms:  no aggressive behavior, no high risk behavior, no racing thoughts, no periods of excess energy, no periods of euphoria, no delusions, no command hallucinations, no auditory hallucinations and no visual hallucinations  No associated symptoms are reported   The patient is not currently being treated for this problem  Pertinent medical history:  no depression, no seasonal affective disorder, no premenstrual dysphoric disorder, no postpartum depression, no anxiety disorder, no bipolar disorder, no post traumatic stress disorder, no eating disorder, no personality disorder, no schizophrenia, no chronic pain, no chronic headaches, no dementia, no malignancy and no HIV infection  Risk factors:  no bereavement, no financial stress, no relationship problems, no job loss, no social isolation, no access to lethal means, no alcohol abuse, no substance abuse, no physical abuse, no sexual abuse, no emotional abuse, no bullying, no previous suicide attempt, no recent psychiatric hospitalization, no recent family suicide and no recent friend suicide  No risk factors have been identified  Family history:  no suicide, no depression, no bipolar disorder and no substance abuse  He was previously evaluated by me  Sexual history: He is not pregnant  He does not have a history of   He does not have a history of miscarriage  He does not have a history of hypersexuality  He does not have a history of STD's  Recommendations: He is recommended for surgery  He states adequate knowledge of nutrition, exercise, and behavior modification  The Following Ratings are based on my: Obsevation of this individual over the last  Risk of Harm to Self or Others: The following are demographic risk factors associated with harm to self: , [place], or   The following are demographic risk factors associated with harm to others: male  (None -reported)  Recent Specific Risk Factors: The patient is currently asymptomatic  No associated symptoms are reported  Summary and Recommendations:   Low: No thoughts or occasional thoughts of suicide, but no intent or actions   Self inflicted scratches, abrasions, or other self- injurious of behavior where medical attention is typically not warranted  No elements of homicidality or occasional thoughts but no plan, intent, or actions  Active Problems    1  Back pain (724 5) (M54 9)   2  Benign essential hypertension (401 1) (I10)   3  Blood tests prior to treatment or procedure (V72 63) (Z01 812)   4  Diabetes mellitus (250 00) (E11 9)   5  Hyperlipidemia (272 4) (E78 5)   6  Morbid obesity due to excess calories (278 01) (E66 01)   7  Obstructive sleep apnea on CPAP (327 23,V46 8) (G47 33,Z99 89)   8  Preop cardiovascular exam (V72 81) (Z01 810)   9  Thyroid disease (246 9) (E07 9)    Past Medical History    1  No pertinent past medical history    Surgical History    1  History of Tonsillectomy    Family History  Mother    1  Family history of    2  Family history of lung cancer (V16 1) (Z80 1)  Father    3  Family history of     Social History    · Former smoker (O33 55) (A41 010)   · No illicit drug use   · Social alcohol use (Z78 9)    Current Meds   1  Aspirin 81 MG CAPS; Therapy: (Recorded:20Hpg8966) to Recorded   2  Fenofibrate 160 MG Oral Tablet; TAKE 1 TABLET DAILY; Therapy: (Recorded:70Pdi2851) to Recorded   3  Glimepiride 4 MG Oral Tablet; TAKE 1 TABLET TWICE DAILY; Therapy: (Recorded:2017) to Recorded   4  Hydrocodone-Acetaminophen 5-325 MG Oral Tablet; Therapy: (Recorded:52Qii0440) to Recorded   5  Kombiglyze XR 2 5-1000 MG Oral Tablet Extended Release 24 Hour; Take 1 tablet twice   daily; Therapy: (Recorded:78Bgr1425) to Recorded   6  Levothyroxine Sodium 75 MCG Oral Tablet; take 1 tablet by mouth daily; Therapy: (Recorded:20Cat6030) to Recorded   7  Lisinopril 10 MG Oral Tablet; TAKE 1 TABLET DAILY; Therapy: (Recorded:24Gpu4055) to Recorded   8  Simvastatin 20 MG Oral Tablet; TAKE 1 TABLET AT BEDTIME; Therapy: (Recorded:90Xkb6358) to Recorded   9  Viagra 50 MG Oral Tablet (Sildenafil Citrate); Therapy: (Recorded:01Gde1857) to Recorded    Allergies    1   No Known Drug Allergies    Vitals  Signs   Recorded: 24ORV9413 02:19PM   Height: 5 ft 9 in  Weight: 290 lb 2 oz  BMI Calculated: 42 84  BSA Calculated: 2 42     Note   Note:   Completed [address] [place] [address]  Provided patient education as needed  Patient denies to [place] I diagnosis  Patient meets criteria for [facility]'s bariatric surgery program and is therefore referred to physician  PABLO MENA LCSW      Signatures   Electronically signed by : ABRAHAM Zuluaga; Jan 22 2018  2:47PM EST                       (Author)    Electronically signed by :  MELISA Irving ; Jan 23 2018  9:03AM EST

## 2018-01-24 NOTE — PROGRESS NOTES
History of Present Illness  Bariatric MNT [facility] Screening Preop [facility]:     He was on time  the appointment lasted: 45 minutes  His surgeon is Dr Ephraim Painting  The patient was present at the session  The diagnosis/reason for the appointment is: He has Grade III Obesity with a BMI of 43 5  He has the following comorbidities: diabetes type 2, mixed hyperlipidemia, hypertension and JOSE RAUL  Labs: Marcelle Jenny He was reminded to have his labs drawn   He does not need to monitor his glucose  Exercise Frequency:  He does not exercise  Relationship to food: He eats when he is bored, grazes and eats large portions  He knows the difference between being comfortably full and uncomfortably full and knows the difference between being stuffed and comfortably full  He felt/thought they felt his best at 180 pounds he last weighed this when he was 25years of age  He did not complete a food journal 24-Hour Food Recall Breakfast 6:30 am - sometimes 1/2 English muffin with coffee OR cereal and milk Or skip breakfast    9am snack: PB crackers  Lunch: 12 noon, pack left overs, sometimes soup and sandwich  Dinner: lean protein, vegetables, sometimes a starch  Snacks: snacks on desserts, cakes/pies but has decreased how often he eats these foods  Eats sugar free jello sometimes for evening snack  He drinks 6-8 cups of water daily He drinks 2 caffienated beverages daily His motivators are:wants resolution of diabetes  His obesity/being overweight is related to excess energy intake and sedentary lifestyle  and is evidenced by: BMI 43 5  Knowledge Deficit Prior to Education  He previously educated 12/2016  Barriers to education:  He has no barriers to education  Medical Nutrition Therapy Intervention: Individualized Meal Plan, Daily Food /Exercise Diary, Lifestyle /Behavior Modification Techniques, Basic Pathophysiology of Obesity, Label Reading, Checklist of the Overview of Lesson Plans and Surgical changes to Stomach/GI  Area's Reviewed: Lifestyle Migue Ledesma Modification Techniques, [facility] [place] [facility] [place], [employer] ( hand out for [employer]) and Pre- surgery goals Chel Kumar  Brief Review of Vitamins, diet progression for post-op and Pathophysiology of Obesity  Provided: Nutrition Guidelines for Gastric Surgery, Food Journaling Application handout, Bariatric Program Pre- Surgery Nutrition and Goals  Goals: His set goal for physical activity is walk , for 10 minutes, 3-4 days a week  Review [employer] in [place]   Post Surgery: He will adhere to the diet progression: remain hydrated, consume adequate protein; and take vitamins as outlined in guidelines  Patient is a 65year old who is here for nutritional evaluation for weight loss surgery  I reviewed co-morbidities and medications prior to session  Patient completed the program last year, but had issues with his insurance approval and is here today to be re evaluated  He first recalls having problems with weight gain as a young adult   He has dieted in the past with variable success but would regain the weight back  (refer to diet history for details)  For his personal pre-op goals he will: consistently eat 3 meals per day, include protein at each meal and keep a food log on baritastic He will complete all 6 lesson plans in his bariatric booklet  He was instructed on the importance of consistent vitamin and mineral intake after his surgery to prevent deficiencies  He currently takes an adult multivitamin and mineral  Recommended that he take an adult multivitamin/mineral supplement plus 2000 IU of vitamin D3 Reviewed importance of support after surgery and discussed the web forum, pep rally and support group  Patient states adequate knowledge of nutrition, exercise and behavior modification required for long term success   Pt  is recommended for surgery and is aware that he will be required to follow the 2 weeks pre operative liver shrinking diet prior to surgery  Pt is also understands that he needs to implement lifestyle changes in preparation for surgery  Patient provided with samples of Bariatric products to try prior to surgery  Recommendations: He was provided contact information for any questions  He is recommended for surgery  He states adequate knowledge of nutrition, exercise, and behavior modification  He will attend a Team Meeting approximately 2-3 weeks prior to surgery  Active Problems    1  Back pain (724 5) (M54 9)   2  Benign essential hypertension (401 1) (I10)   3  Blood tests prior to treatment or procedure (V72 63) (Z01 812)   4  Diabetes mellitus (250 00) (E11 9)   5  Hyperlipidemia (272 4) (E78 5)   6  Morbid obesity due to excess calories (278 01) (E66 01)   7  Obstructive sleep apnea on CPAP (327 23,V46 8) (G47 33,Z99 89)   8  Preop cardiovascular exam (V72 81) (Z01 810)   9  Thyroid disease (246 9) (E07 9)    Past Medical History    1  No pertinent past medical history    Surgical History    1  History of Tonsillectomy    Family History  Mother    1  Family history of    2  Family history of lung cancer (V16 1) (Z80 1)  Father    3  Family history of     Social History    · Former smoker (X03 25) (P38 892)   · No illicit drug use   · Social alcohol use (Z78 9)    Current Meds   1  Aspirin 81 MG CAPS; Therapy: (Recorded:2017) to Recorded   2  Fenofibrate 160 MG Oral Tablet; TAKE 1 TABLET DAILY; Therapy: (Recorded:2017) to Recorded   3  Glimepiride 4 MG Oral Tablet; TAKE 1 TABLET TWICE DAILY; Therapy: (Recorded:[id number]) to Recorded   4  Hydrocodone-Acetaminophen 5-325 MG Oral Tablet; Therapy: (Recorded:[id number]) to Recorded   5  Kombiglyze XR 2 5-1000 MG Oral Tablet Extended Release 24 Hour; Take 1 tablet twice   daily; Therapy: (Recorded:[id number]) to Recorded   6  Levothyroxine Sodium 75 MCG Oral Tablet; take 1 tablet by mouth daily; Therapy: (Recorded:[id number]) to Recorded   7   Lisinopril 10 MG Oral Tablet; TAKE 1 TABLET DAILY; Therapy: (Recorded:[id number]) to Recorded   8  Simvastatin 20 MG Oral Tablet; TAKE 1 TABLET AT BEDTIME; Therapy: (Recorded:[id number]) to Recorded   9  Viagra 50 MG Oral Tablet (Sildenafil Citrate); Therapy: (Recorded:[id number]) to Recorded    Allergies    1  No Known Drug Allergies    Signatures   Electronically signed by : MATTHEW Rahman; Jan 22 2018  4:14PM EST                       (Author)    Electronically signed by :  MELISA Briscoe ; Jan 23 2018  9:03AM EST

## 2018-04-04 ENCOUNTER — OFFICE VISIT (OUTPATIENT)
Dept: INTERNAL MEDICINE CLINIC | Facility: CLINIC | Age: 63
End: 2018-04-04
Payer: COMMERCIAL

## 2018-04-04 VITALS
RESPIRATION RATE: 18 BRPM | HEART RATE: 71 BPM | HEIGHT: 69 IN | WEIGHT: 286.4 LBS | BODY MASS INDEX: 42.42 KG/M2 | TEMPERATURE: 97.1 F | SYSTOLIC BLOOD PRESSURE: 144 MMHG | OXYGEN SATURATION: 98 % | DIASTOLIC BLOOD PRESSURE: 80 MMHG

## 2018-04-04 DIAGNOSIS — G47.33 OBSTRUCTIVE SLEEP APNEA SYNDROME: ICD-10-CM

## 2018-04-04 DIAGNOSIS — E11.42 TYPE 2 DIABETES MELLITUS WITH DIABETIC POLYNEUROPATHY, WITHOUT LONG-TERM CURRENT USE OF INSULIN (HCC): Primary | ICD-10-CM

## 2018-04-04 DIAGNOSIS — I10 ESSENTIAL HYPERTENSION: ICD-10-CM

## 2018-04-04 DIAGNOSIS — E03.9 HYPOTHYROIDISM, UNSPECIFIED TYPE: ICD-10-CM

## 2018-04-04 PROBLEM — G47.30 SLEEP APNEA: Status: ACTIVE | Noted: 2018-04-04

## 2018-04-04 PROBLEM — E11.9 DIABETES MELLITUS (HCC): Status: ACTIVE | Noted: 2018-04-04

## 2018-04-04 LAB — SL AMB POCT HEMOGLOBIN AIC: 9.9

## 2018-04-04 PROCEDURE — 99203 OFFICE O/P NEW LOW 30 MIN: CPT | Performed by: INTERNAL MEDICINE

## 2018-04-04 PROCEDURE — 83036 HEMOGLOBIN GLYCOSYLATED A1C: CPT | Performed by: INTERNAL MEDICINE

## 2018-04-04 RX ORDER — METFORMIN HYDROCHLORIDE 500 MG/1
1000 TABLET, EXTENDED RELEASE ORAL 2 TIMES DAILY WITH MEALS
Qty: 60 TABLET | Refills: 3 | Status: SHIPPED | OUTPATIENT
Start: 2018-04-04 | End: 2018-08-10 | Stop reason: SDUPTHER

## 2018-04-04 NOTE — PROGRESS NOTES
Assessment/Plan:     Diagnoses and all orders for this visit:    Type 2 diabetes mellitus with diabetic polyneuropathy, without long-term current use of insulin (HCC)  Comments:  elevated A1C, pt reports BS doing well, not checked in days however  convert kombiglyze to formulary, check BS BID and call me with BS readings next week  Orders:  -     POCT hemoglobin A1c  -     metFORMIN (GLUCOPHAGE-XR) 500 mg 24 hr tablet; Take 2 tablets (1,000 mg total) by mouth 2 (two) times a day with meals  -     sitaGLIPtin (JANUVIA) 50 mg tablet; Take 1 tablet (50 mg total) by mouth daily  -     Comprehensive metabolic panel; Future  -     Microalbumin / creatinine urine ratio  -     Lipid Panel with Direct LDL reflex; Future  -     CBC and differential    Essential hypertension  Comments:  BP elevated, continue ACEI and re-check at f/u visit in 3 weeks    Hypothyroidism, unspecified type  Comments:  taking TRH, no side effects  Due for TFTs  Orders:  -     TSH, 3rd generation with T4 reflex    Obstructive sleep apnea syndrome  Comments:  doesn't use CPAP, advised to have his equipment checked by DME supplier          Subjective:      Patient ID: Mahogany Bartlett is a 59 y o  male  HPI    New to practice here to establish care  Reviewed meds with Andre Connor today  Hx of DM - takes amaryl, kombiglyze for DM & reports BS In AM are in 'low 100s'  Had low BS readings in past but none now  HGA1C in office is 9 9%, cannot take kombiglyze due to insurance issues  No elevated BS reading at home but hasn't checked for 'a few days'  Ran out of kombiglyze 1 week ago  Has adeqaute supply of all of his other medications  No BW in sometime  Sees bariatric surgeon in hopes of having surgery this year(was denied coverage last year by insurance)  Hx of JOSE RAUL but doesn't use CPAP, had air coming out of his right orbit(lacrimal gland area)when he had CPAP on  Denies any other complaints      Past Medical History:   Diagnosis Date    BPH (benign prostatic hyperplasia)     CPAP (continuous positive airway pressure) dependence     Diabetes mellitus ([facility] [place] [address] )     Disease of thyroid gland     Sleep apnea      Vitals:    04/04/18 1607 04/04/18 1636   BP: 150/78 144/80   Pulse: 71    Resp: 18    Temp: (!) 97 1 °F (36 2 °C)    SpO2: 98%    Weight: 130 kg (286 lb 6 4 oz)    Height: 5' 9" (1 753 m)      Body mass index is 42 29 kg/m²  Current Outpatient Prescriptions:     aspirin (ECOTRIN LOW STRENGTH) 81 mg EC tablet, Take 81 mg by mouth daily, Disp: , Rfl:     fenofibrate (TRIGLIDE) 160 MG tablet, Take 160 mg by mouth daily, Disp: , Rfl:     glimepiride (AMARYL) 4 mg tablet, Take 4 mg by mouth 2 (two) times a day  , Disp: , Rfl:     levothyroxine 75 mcg tablet, Take 75 mcg by mouth daily, Disp: , Rfl:     lisinopril (ZESTRIL) 10 mg tablet, Take 10 mg by mouth daily, Disp: , Rfl:     oxyCODONE-acetaminophen (PERCOCET) 5-325 mg per tablet, Take 1 tablet by mouth every 4 (four) hours as needed for moderate pain, Disp: , Rfl:     sildenafil (VIAGRA) 50 MG tablet, Take 50 mg by mouth daily as needed for erectile dysfunction, Disp: , Rfl:     simvastatin (ZOCOR) 20 mg tablet, Take 20 mg by mouth daily at bedtime, Disp: , Rfl:     metFORMIN (GLUCOPHAGE-XR) 500 mg 24 hr tablet, Take 2 tablets (1,000 mg total) by mouth 2 (two) times a day with meals, Disp: 60 tablet, Rfl: 3    sitaGLIPtin (JANUVIA) 50 mg tablet, Take 1 tablet (50 mg total) by mouth daily, Disp: 30 tablet, Rfl: 3  Allergies   Allergen Reactions    Penicillins      SYNCOPE, but has taken amoxicillin/augmentin in past         Review of Systems   Constitutional: Negative for fever  HENT: Negative for congestion  Respiratory: Negative for shortness of breath  Cardiovascular: Negative for chest pain  Gastrointestinal: Negative for abdominal pain  Genitourinary: Negative for difficulty urinating     Musculoskeletal: Positive for arthralgias (low back - herniated disc in past which occ flares)  Neurological: Negative for headaches  Psychiatric/Behavioral: Negative for dysphoric mood  Objective:      /80   Pulse 71   Temp (!) 97 1 °F (36 2 °C)   Resp 18   Ht 5' 9" (1 753 m)   Wt 130 kg (286 lb 6 4 oz)   SpO2 98%   BMI 42 29 kg/m²          Physical Exam   Constitutional: He is oriented to person, place, and time  He appears well-developed and well-nourished  Morbidly obese   HENT:   Head: Normocephalic and atraumatic  Right Ear: External ear normal    Left Ear: External ear normal    Eyes: Conjunctivae are normal  Pupils are equal, round, and reactive to light  Cardiovascular: Normal rate, regular rhythm and normal heart sounds  No murmur heard  Pulmonary/Chest: Effort normal and breath sounds normal  He has no wheezes  He has no rales  Abdominal: Soft  Bowel sounds are normal  There is no tenderness  Musculoskeletal: He exhibits no edema  Neurological: He is alert and oriented to person, place, and time  Psychiatric: He has a normal mood and affect  His behavior is normal    Vitals reviewed

## 2018-04-04 NOTE — PATIENT INSTRUCTIONS
1  Fasting blood work  2  Stop kombiglyze and start metformin XR 1,000mg twice a day with 50 mg [place] and [place] once a day  3  Monitor blood sugars and call me with readings next week/tuesday April 10th  4  Return in 3 weeks  5  Transfer previous records    Basic Carbohydrate Counting   AMBULATORY CARE:   Carbohydrate counting  is a way to plan your meals by counting the amount of carbohydrate in foods  Carbohydrates are the sugars, starches, and fiber found in fruit, grains, vegetables, and milk products  Carbohydrates increase your blood sugar levels  Carbohydrate counting can help you eat the right amount of carbohydrate to keep your blood sugar levels under control  What you need to know about planning meals using carbohydrate counting:  · A dietitian or healthcare provider will help you develop a healthy meal plan that works best for you  You will be taught how much carbohydrate to eat or drink for each meal and snack  Your meal plan will be based on your age, weight, usual food intake, and physical activity level  If you have diabetes, it will also include your blood sugar levels and diabetes medicine  Once you know how much carbohydrate you should eat, you can decide what type of food you want to eat  · You will need to know what foods contain carbohydrate and how much they contain  Keep track of the amount of carbohydrate in meals and snacks in order to follow your meal plan  Do not avoid carbohydrates or skip meals  Your blood sugar may fall too low if you do not eat enough carbohydrate or you skip meals  Foods that contain carbohydrate:   · Breads:  Each serving of food listed below contains about 15 g of carbohydrate   ¨ 1 slice of bread (1 ounce) or 1 flour or corn tortilla (6 inch)    ¨ ½ of a hamburger bun or ¼ of a large bagel (about 1 ounce)    ¨ 1 pancake (about 4 inches across and ¼ inch thick)    · Cereals and grains:  Serving sizes of ready-to-eat cereals vary   Look at the serving size and the total carbohydrate amount listed on the food label  Each serving of food listed below contains about 15 g of carbohydrate   ¨ ¾ cup of dry, unsweetened, ready-to-eat cereal or ¼ cup of low-fat granola     ¨ ½ cup of oatmeal or other cooked cereal     ¨ ? cup of cooked rice or pasta    · Starchy vegetables and beans:  Each serving of food listed below contains about 15 g of carbohydrate   ¨ ½ cup of corn, green peas, sweet potatoes, or mashed potatoes    ¨ ¼ of a large baked potato    ¨ ½ cup of beans, lentils, and peas (garbanzo, amador, kidney, white, split, black-eyed)    · Crackers and snacks:  Each serving of food listed below contains about 15 g of carbohydrate   ¨ 3 albert cracker squares or 8 animal crackers     ¨ 6 saltine-type crackers    ¨ 3 cups of popcorn or ¾ ounce of pretzels, potato chips, or tortilla chips    · Fruit:  Each serving of food listed below contains about 15 g of carbohydrate   ¨ 1 small (4 ounce) piece of fresh fruit or ¾ to 1 cup of fresh fruit    ¨ ½ cup of canned or frozen fruit, packed in natural juice    ¨ ½ cup (4 ounces) of unsweetened fruit juice    ¨ 2 tablespoons of dried fruit    · Desserts or sugary foods:  Each serving of food listed below contains about 15 g of carbohydrate   ¨ 2-inch square unfrosted cake or brownie     ¨ 2 small cookies    ¨ ½ cup of ice cream, frozen yogurt, or nondairy frozen yogurt    ¨ ¼ cup of sherbet or sorbet    ¨ 1 tablespoon of regular syrup, jam, or jelly    ¨ 2 tablespoons of light syrup    · Milk and yogurt:  Foods from the milk group contain about 12 g of carbohydrate per serving  ¨ 1 cup of fat-free or low-fat milk    ¨ 1 cup of soy milk    ¨ ? cup of fat-free, yogurt sweetened with artificial sweetener    · Non-starchy vegetables:  Each serving contains about 5 g of carbohydrate   Three servings of non-starch vegetables count as 1 carbohydrate serving  ¨ ½ cup of cooked vegetables or 1 cup of raw vegetables   This includes beets, broccoli, cabbage, cauliflower, cucumber, mushrooms, tomatoes, and zucchini    ¨ ½ cup of vegetable juice  How to use carbohydrate counting to plan meals:   · Count carbohydrate amounts using serving sizes:      ¨ Pasta dinner example: You plan to have pasta, tossed salad, and an 8-ounce glass of milk  Your healthcare provider tells you that you may have 4 carbohydrate servings for dinner  One carbohydrate serving of pasta is ? cup  One cup of pasta will equal 3 carbohydrate servings  An 8-ounce glass of milk will count as 1 carbohydrate serving  These amounts of food would equal 4 carbohydrate servings  One cup of tossed salad does not count toward your carbohydrate servings  · Count carbohydrate amounts using food labels:  Find the total amount of carbohydrate in a packaged food by reading the food label  Food labels tell you the serving size of the food and the total carbohydrate amount in each serving  Find the serving size on the food label and then decide how many servings you will eat  Multiply the number of servings you plan to eat by the carbohydrate amount per serving  ¨ Granola bar snack example: Your meal plan allows you to have 2 carbohydrate servings (30 grams) of carbohydrate for a snack  You plan to eat 1 package of granola bars, which contains 2 bars  According to the food label, the serving size of food in this package is 1 bar  Each serving (1 bar) contains 25 grams of carbohydrate  The total amount of carbohydrate in this package of granola bars would be 50 g  Based on your meal plan, you should eat only 1 bar  Follow up with your healthcare provider as directed:  Write down your questions so you remember to ask them during your visits  © 2017 [employer]  Information is for End User's use only and may not be sold, redistributed or otherwise used for commercial purposes   All illustrations and images included in CareNotes® are the copyrighted property of [employer]  or [employer]  The above information is an  only  It is not intended as medical advice for individual conditions or treatments  Talk to your doctor, nurse or pharmacist before following any medical regimen to see if it is safe and effective for you

## 2018-04-24 ENCOUNTER — OFFICE VISIT (OUTPATIENT)
Dept: INTERNAL MEDICINE CLINIC | Facility: CLINIC | Age: 63
End: 2018-04-24
Payer: COMMERCIAL

## 2018-04-24 VITALS
WEIGHT: 289 LBS | BODY MASS INDEX: 42.8 KG/M2 | RESPIRATION RATE: 16 BRPM | DIASTOLIC BLOOD PRESSURE: 84 MMHG | HEIGHT: 69 IN | SYSTOLIC BLOOD PRESSURE: 134 MMHG | OXYGEN SATURATION: 98 % | HEART RATE: 72 BPM

## 2018-04-24 DIAGNOSIS — E11.42 TYPE 2 DIABETES MELLITUS WITH DIABETIC POLYNEUROPATHY, WITHOUT LONG-TERM CURRENT USE OF INSULIN (HCC): ICD-10-CM

## 2018-04-24 DIAGNOSIS — N52.9 ERECTILE DYSFUNCTION, UNSPECIFIED ERECTILE DYSFUNCTION TYPE: ICD-10-CM

## 2018-04-24 DIAGNOSIS — M54.41 CHRONIC MIDLINE LOW BACK PAIN WITH RIGHT-SIDED SCIATICA: Primary | ICD-10-CM

## 2018-04-24 DIAGNOSIS — G89.29 CHRONIC MIDLINE LOW BACK PAIN WITH RIGHT-SIDED SCIATICA: Primary | ICD-10-CM

## 2018-04-24 DIAGNOSIS — R09.82 POSTNASAL DRIP: ICD-10-CM

## 2018-04-24 PROCEDURE — 82948 REAGENT STRIP/BLOOD GLUCOSE: CPT | Performed by: INTERNAL MEDICINE

## 2018-04-24 PROCEDURE — 99214 OFFICE O/P EST MOD 30 MIN: CPT | Performed by: INTERNAL MEDICINE

## 2018-04-24 RX ORDER — BLOOD-GLUCOSE METER
KIT MISCELLANEOUS 2 TIMES DAILY
Qty: 1 EACH | Refills: 0 | Status: SHIPPED | OUTPATIENT
Start: 2018-04-24 | End: 2021-09-28

## 2018-04-24 RX ORDER — FLUTICASONE PROPIONATE 50 MCG
2 SPRAY, SUSPENSION (ML) NASAL DAILY
Qty: 16 G | Refills: 2 | Status: SHIPPED | OUTPATIENT
Start: 2018-04-24 | End: 2019-03-28

## 2018-04-24 RX ORDER — LANCETS 33 GAUGE
EACH MISCELLANEOUS 2 TIMES DAILY
Qty: 100 EACH | Refills: 3 | Status: SHIPPED | OUTPATIENT
Start: 2018-04-24 | End: 2021-09-28

## 2018-04-24 RX ORDER — SILDENAFIL CITRATE 20 MG/1
40 TABLET ORAL DAILY
Qty: 20 TABLET | Refills: 1 | Status: SHIPPED | OUTPATIENT
Start: 2018-04-24 | End: 2020-05-21 | Stop reason: SDUPTHER

## 2018-04-24 RX ORDER — HYDROCODONE BITARTRATE AND ACETAMINOPHEN 5; 325 MG/1; MG/1
1 TABLET ORAL DAILY PRN
Qty: 30 TABLET | Refills: 0 | Status: SHIPPED | OUTPATIENT
Start: 2018-04-24 | End: 2018-07-20 | Stop reason: SDUPTHER

## 2018-04-24 NOTE — PROGRESS NOTES
Assessment/Plan:       Diagnoses and all orders for this visit:    Chronic midline low back pain with right-sided sciatica  Comments:  takes hydrocodone with relief and no SE  [place] reviewed & rx re-ordered  Orders:  -     HYDROcodone-acetaminophen (NORCO) 5-325 mg per tablet; Take 1 tablet by mouth daily as needed for pain Max Daily Amount: 1 tablet    Type 2 diabetes mellitus with diabetic polyneuropathy, without long-term current use of insulin (HCC)  Comments:  not checking BS, A1C 9 9% at last OV   new glucometer ordered and emphasized importance of checking BS & call with home readings next week  Orders:  -     POCT blood glucose  -     glucose blood (ONE TOUCH ULTRA TEST) test strip; 1 each by Other route 2 (two) times a day Use as instructed  -     ONETOUCH DELICA LANCETS 55O MISC; by Does not apply route 2 (two) times a day  -     Blood Glucose Monitoring Suppl (ONE TOUCH ULTRA MINI) w/Device KIT; by Does not apply route 2 (two) times a day    Postnasal drip  Comments:  with occ cough, continue zyrtec and add flonase, avoiding allergens discussed  Orders:  -     fluticasone (FLONASE) 50 mcg/act nasal spray; 2 sprays into each nostril daily    Erectile dysfunction, unspecified erectile dysfunction type  Comments:  take viagra with relief & no side effects, rx reordered  Orders:  -     sildenafil (REVATIO) 20 mg tablet; Take 2 tablets (40 mg total) by mouth daily    Other orders  -     sitaGLIPtin-metFORMIN (JANUMET)  MG per tablet; Take 1 tablet by mouth          Subjective:      Patient ID: Maya Borges is a 59 y o  male  HPI    Here for follow up, reviewed meds with patient  Made medication change for diabetes per insurance formulary but Liliam Mcclure is not checking his BS and hasn't completed BW yet  No Side effects from rx  Having more low back pain lately due to doing more yard work and activity this spring    Takes hydrocodone 1 tablet every few days with relief of pain, prescribed by prior PCP and running low, needs refill  occ has radicular pain down legs, hx of disc problems in lower back from past   Reports his home glucometer is not working & needs new rx  Having occ cough and postnasal drip, no fever, sore throat, chills or SOB  Taking OTC antihistamine with limited benefit  denies any other complaints    Past Medical History:   Diagnosis Date    BPH (benign prostatic hyperplasia)     CPAP (continuous positive airway pressure) dependence     [facility] ([facility] [address] )     Disease of thyroid gland     Sleep apnea      Vitals:    04/24/18 1558   BP: 134/84   Pulse: 72   Resp: 16   SpO2: 98%   Weight: 131 kg (289 lb)   Height: 5' 9" (1 753 m)     Body mass index is 42 68 kg/m²      Current Outpatient Prescriptions:     aspirin (ECOTRIN LOW STRENGTH) 81 mg EC tablet, Take 81 mg by mouth daily, Disp: , Rfl:     fenofibrate (TRIGLIDE) 160 MG tablet, Take 160 mg by mouth daily, Disp: , Rfl:     glimepiride (AMARYL) 4 mg tablet, Take 4 mg by mouth 2 (two) times a day  , Disp: , Rfl:     levothyroxine 75 mcg tablet, Take 75 mcg by mouth daily, Disp: , Rfl:     lisinopril (ZESTRIL) 10 mg tablet, Take 10 mg by mouth daily, Disp: , Rfl:     metFORMIN (GLUCOPHAGE-XR) 500 mg 24 hr tablet, Take 2 tablets (1,000 mg total) by mouth 2 (two) times a day with meals, Disp: 60 tablet, Rfl: 3    oxyCODONE-acetaminophen (PERCOCET) 5-325 mg per tablet, Take 1 tablet by mouth every 4 (four) hours as needed for moderate pain, Disp: , Rfl:     sildenafil (VIAGRA) 50 MG tablet, Take 50 mg by mouth daily as needed for erectile dysfunction, Disp: , Rfl:     simvastatin (ZOCOR) 20 mg tablet, Take 20 mg by mouth daily at bedtime, Disp: , Rfl:     sitaGLIPtin (JANUVIA) 50 mg tablet, Take 1 tablet (50 mg total) by mouth daily, Disp: 30 tablet, Rfl: 3    sitaGLIPtin-metFORMIN (JANUMET)  MG per tablet, Take 1 tablet by mouth, Disp: , Rfl:     Blood Glucose Monitoring Suppl (ONE TOUCH ULTRA MINI) w/Device KIT, by Does not apply route 2 (two) times a day, Disp: 1 each, Rfl: 0    fluticasone (FLONASE) 50 mcg/act nasal spray, 2 sprays into each nostril daily, Disp: 16 g, Rfl: 2    glucose blood (ONE TOUCH ULTRA TEST) test strip, 1 each by Other route 2 (two) times a day Use as instructed, Disp: 100 each, Rfl: 3    HYDROcodone-acetaminophen (NORCO) 5-325 mg per tablet, Take 1 tablet by mouth daily as needed for pain Max Daily Amount: 1 tablet, Disp: 30 tablet, Rfl: 0    ONETOUCH DELICA LANCETS 24O MISC, by Does not apply route 2 (two) times a day, Disp: 100 each, Rfl: 3    sildenafil (REVATIO) 20 mg tablet, Take 2 tablets (40 mg total) by mouth daily, Disp: 20 tablet, Rfl: 1  Allergies   Allergen Reactions    Penicillins      SYNCOPE, but has taken amoxicillin/augmentin in past         Review of Systems   Constitutional: Negative for fever  HENT: Negative for congestion  Respiratory: Negative for shortness of breath  Cardiovascular: Negative for chest pain  Gastrointestinal: Negative for abdominal pain  Genitourinary: Negative for difficulty urinating  Musculoskeletal: Positive for back pain  Neurological: Negative for headaches  Psychiatric/Behavioral: Negative for dysphoric mood  Objective:      /84   Pulse 72   Resp 16   Ht 5' 9" (1 753 m)   Wt 131 kg (289 lb)   SpO2 98%   BMI 42 68 kg/m²          Physical Exam   Constitutional: He is oriented to person, place, and time  He appears well-developed and well-nourished  HENT:   Head: Normocephalic and atraumatic  Right Ear: External ear normal    Left Ear: External ear normal    Mouth/Throat: Oropharynx is clear and moist    Eyes: Conjunctivae are normal  Pupils are equal, round, and reactive to light  Cardiovascular: Normal rate, regular rhythm and normal heart sounds  No murmur heard  Pulmonary/Chest: Effort normal and breath sounds normal  He has no wheezes  He has no rales  Abdominal: Soft   Bowel sounds are normal  There is no tenderness  Musculoskeletal: He exhibits no edema  Neurological: He is alert and oriented to person, place, and time  Psychiatric: He has a normal mood and affect  His behavior is normal    Vitals reviewed

## 2018-04-24 NOTE — PATIENT INSTRUCTIONS
1  Blood work  2  Check blood sugars twice a day  3  Call me with blood sugar readings next week - VERY importance  4  Return in 4 weeks    Basic Carbohydrate Counting   AMBULATORY CARE:   Carbohydrate counting  is a way to plan your meals by counting the amount of carbohydrate in foods  Carbohydrates are the sugars, starches, and fiber found in fruit, grains, vegetables, and milk products  Carbohydrates increase your blood sugar levels  Carbohydrate counting can help you eat the right amount of carbohydrate to keep your blood sugar levels under control  What you need to know about planning meals using carbohydrate counting:  · A dietitian or healthcare provider will help you develop a healthy meal plan that works best for you  You will be taught how much carbohydrate to eat or drink for each meal and snack  Your meal plan will be based on your age, weight, usual food intake, and physical activity level  If you have diabetes, it will also include your blood sugar levels and diabetes medicine  Once you know how much carbohydrate you should eat, you can decide what type of food you want to eat  · You will need to know what foods contain carbohydrate and how much they contain  Keep track of the amount of carbohydrate in meals and snacks in order to follow your meal plan  Do not avoid carbohydrates or skip meals  Your blood sugar may fall too low if you do not eat enough carbohydrate or you skip meals  Foods that contain carbohydrate:   · Breads:  Each serving of food listed below contains about 15 g of carbohydrate   ¨ 1 slice of bread (1 ounce) or 1 flour or corn tortilla (6 inch)    ¨ ½ of a hamburger bun or ¼ of a large bagel (about 1 ounce)    ¨ 1 pancake (about 4 inches across and ¼ inch thick)    · Cereals and grains:  Serving sizes of ready-to-eat cereals vary  Look at the serving size and the total carbohydrate amount listed on the food label   Each serving of food listed below contains about 15 g of carbohydrate   ¨ ¾ cup of dry, unsweetened, ready-to-eat cereal or ¼ cup of low-fat granola     ¨ ½ cup of oatmeal or other cooked cereal     ¨ ? cup of cooked rice or pasta    · Starchy vegetables and beans:  Each serving of food listed below contains about 15 g of carbohydrate   ¨ ½ cup of corn, green peas, sweet potatoes, or mashed potatoes    ¨ ¼ of a large baked potato    ¨ ½ cup of beans, lentils, and peas (garbanzo, amador, kidney, white, split, black-eyed)    · Crackers and snacks:  Each serving of food listed below contains about 15 g of carbohydrate   ¨ 3 albert cracker squares or 8 animal crackers     ¨ 6 saltine-type crackers    ¨ 3 cups of popcorn or ¾ ounce of pretzels, potato chips, or tortilla chips    · Fruit:  Each serving of food listed below contains about 15 g of carbohydrate   ¨ 1 small (4 ounce) piece of fresh fruit or ¾ to 1 cup of fresh fruit    ¨ ½ cup of canned or frozen fruit, packed in natural juice    ¨ ½ cup (4 ounces) of unsweetened fruit juice    ¨ 2 tablespoons of dried fruit    · Desserts or sugary foods:  Each serving of food listed below contains about 15 g of carbohydrate   ¨ 2-inch square unfrosted cake or brownie     ¨ 2 small cookies    ¨ ½ cup of ice cream, frozen yogurt, or nondairy frozen yogurt    ¨ ¼ cup of sherbet or sorbet    ¨ 1 tablespoon of regular syrup, jam, or jelly    ¨ 2 tablespoons of light syrup    · Milk and yogurt:  Foods from the milk group contain about 12 g of carbohydrate per serving  ¨ 1 cup of fat-free or low-fat milk    ¨ 1 cup of soy milk    ¨ ? cup of fat-free, yogurt sweetened with artificial sweetener    · Non-starchy vegetables:  Each serving contains about 5 g of carbohydrate   Three servings of non-starch vegetables count as 1 carbohydrate serving  ¨ ½ cup of cooked vegetables or 1 cup of raw vegetables   This includes beets, broccoli, cabbage, cauliflower, cucumber, mushrooms, tomatoes, and zucchini    ¨ ½ cup of vegetable juice  How to use carbohydrate counting to plan meals:   · Count carbohydrate amounts using serving sizes:      ¨ Pasta dinner example: You plan to have pasta, tossed salad, and an 8-ounce glass of milk  Your healthcare provider tells you that you may have 4 carbohydrate servings for dinner  One carbohydrate serving of pasta is ? cup  One cup of pasta will equal 3 carbohydrate servings  An 8-ounce glass of milk will count as 1 carbohydrate serving  These amounts of food would equal 4 carbohydrate servings  One cup of tossed salad does not count toward your carbohydrate servings  · Count carbohydrate amounts using food labels:  Find the total amount of carbohydrate in a packaged food by reading the food label  Food labels tell you the serving size of the food and the total carbohydrate amount in each serving  Find the serving size on the food label and then decide how many servings you will eat  Multiply the number of servings you plan to eat by the carbohydrate amount per serving  ¨ Granola bar snack example: Your meal plan allows you to have 2 carbohydrate servings (30 grams) of carbohydrate for a snack  You plan to eat 1 package of granola bars, which contains 2 bars  According to the food label, the serving size of food in this package is 1 bar  Each serving (1 bar) contains 25 grams of carbohydrate  The total amount of carbohydrate in this package of granola bars would be 50 g  Based on your meal plan, you should eat only 1 bar  Follow up with your healthcare provider as directed:  Write down your questions so you remember to ask them during your visits  © 2017 [employer] Information is for End User's use only and may not be sold, redistributed or otherwise used for commercial purposes  All illustrations and images included in CareNotes® are the copyrighted property of [employer]  or [employer]  The above information is an  only   It is not intended as medical advice for individual conditions or treatments  Talk to your doctor, nurse or pharmacist before following any medical regimen to see if it is safe and effective for you

## 2018-06-27 DIAGNOSIS — E11.42 TYPE 2 DIABETES MELLITUS WITH DIABETIC POLYNEUROPATHY, WITHOUT LONG-TERM CURRENT USE OF INSULIN (HCC): Primary | ICD-10-CM

## 2018-06-27 DIAGNOSIS — E03.9 HYPOTHYROIDISM, UNSPECIFIED TYPE: ICD-10-CM

## 2018-06-27 DIAGNOSIS — E78.2 MIXED HYPERLIPIDEMIA: ICD-10-CM

## 2018-06-27 PROBLEM — E78.5 HYPERLIPIDEMIA: Status: ACTIVE | Noted: 2017-10-18

## 2018-06-27 RX ORDER — LEVOTHYROXINE SODIUM 0.07 MG/1
75 TABLET ORAL DAILY
Qty: 30 TABLET | Refills: 1 | Status: SHIPPED | OUTPATIENT
Start: 2018-06-27 | End: 2018-09-13 | Stop reason: SDUPTHER

## 2018-06-27 RX ORDER — FENOFIBRATE 160 MG/1
160 TABLET ORAL DAILY
Qty: 30 TABLET | Refills: 1 | Status: SHIPPED | OUTPATIENT
Start: 2018-06-27 | End: 2018-08-03 | Stop reason: SDUPTHER

## 2018-06-27 RX ORDER — GLIMEPIRIDE 4 MG/1
4 TABLET ORAL 2 TIMES DAILY
Qty: 60 TABLET | Refills: 1 | Status: SHIPPED | OUTPATIENT
Start: 2018-06-27 | End: 2018-09-14 | Stop reason: SDUPTHER

## 2018-07-03 ENCOUNTER — OFFICE VISIT (OUTPATIENT)
Dept: INTERNAL MEDICINE CLINIC | Facility: CLINIC | Age: 63
End: 2018-07-03
Payer: COMMERCIAL

## 2018-07-03 VITALS
OXYGEN SATURATION: 98 % | TEMPERATURE: 98 F | RESPIRATION RATE: 18 BRPM | SYSTOLIC BLOOD PRESSURE: 124 MMHG | DIASTOLIC BLOOD PRESSURE: 80 MMHG | HEIGHT: 69 IN | BODY MASS INDEX: 42.12 KG/M2 | WEIGHT: 284.4 LBS | HEART RATE: 78 BPM

## 2018-07-03 DIAGNOSIS — I10 BENIGN ESSENTIAL HYPERTENSION: Primary | ICD-10-CM

## 2018-07-03 DIAGNOSIS — N52.9 ERECTILE DYSFUNCTION, UNSPECIFIED ERECTILE DYSFUNCTION TYPE: ICD-10-CM

## 2018-07-03 DIAGNOSIS — E03.9 HYPOTHYROIDISM, UNSPECIFIED TYPE: ICD-10-CM

## 2018-07-03 DIAGNOSIS — E11.65 TYPE 2 DIABETES MELLITUS WITH HYPERGLYCEMIA, WITHOUT LONG-TERM CURRENT USE OF INSULIN (HCC): ICD-10-CM

## 2018-07-03 DIAGNOSIS — B35.1 ONYCHOMYCOSIS OF TOENAIL: ICD-10-CM

## 2018-07-03 DIAGNOSIS — E78.2 MIXED HYPERLIPIDEMIA: ICD-10-CM

## 2018-07-03 DIAGNOSIS — B35.3 TINEA PEDIS OF BOTH FEET: ICD-10-CM

## 2018-07-03 DIAGNOSIS — Z11.59 NEED FOR HEPATITIS C SCREENING TEST: ICD-10-CM

## 2018-07-03 DIAGNOSIS — B35.4 TINEA CORPORIS: ICD-10-CM

## 2018-07-03 PROBLEM — G47.33 OSA (OBSTRUCTIVE SLEEP APNEA): Status: ACTIVE | Noted: 2018-04-04

## 2018-07-03 PROCEDURE — 99215 OFFICE O/P EST HI 40 MIN: CPT | Performed by: INTERNAL MEDICINE

## 2018-07-03 RX ORDER — SILDENAFIL 50 MG/1
50 TABLET, FILM COATED ORAL DAILY PRN
Qty: 10 TABLET | Refills: 0 | Status: SHIPPED | OUTPATIENT
Start: 2018-07-03 | End: 2018-11-20 | Stop reason: SDUPTHER

## 2018-07-03 RX ORDER — KETOCONAZOLE 20 MG/G
CREAM TOPICAL DAILY
Qty: 60 G | Refills: 1 | Status: SHIPPED | OUTPATIENT
Start: 2018-07-03

## 2018-07-03 NOTE — PROGRESS NOTES
Assessment/Plan:     Diagnoses and all orders for this visit:    Benign essential hypertension  Comments:  BP controlled, c/w ACEI    Mixed hyperlipidemia  Comments:  c/w statin, re-check FLP -> overdue    Type 2 diabetes mellitus with hyperglycemia, without long-term current use of insulin (HCC)  Comments:  not checking BS, overdue for BW, missing doses of DM meds  Hx of medical non-compliance, emphasized importance of treating DM properly    Need for hepatitis C screening test  Comments:  never had before, ordered  Orders:  -     Hepatitis C antibody; Future    Erectile dysfunction, unspecified erectile dysfunction type  Comments:  has taken viagra in past and without SE  Orders:  -     sildenafil (VIAGRA) 50 MG tablet; Take 1 tablet (50 mg total) by mouth daily as needed for erectile dysfunction    Hypothyroidism, unspecified type  Comments:  c/w TRH and re-check TFTs    Tinea pedis of both feet  Comments:  topical ketoconazole for 6 weeks and podiatry eval  Orders:  -     Ambulatory referral to Podiatry; Future  -     ketoconazole (NIZORAL) 2 % cream; Apply topically daily    Tinea corporis  Comments:  ketoconazole for 2 weeks to right forearm  Orders:  -     ketoconazole (NIZORAL) 2 % cream; Apply topically daily    Onychomycosis of toenail  Comments:  treat tinea pedis and pt requests podiatry eval, ref provided  Orders:  -     Ambulatory referral to Podiatry; Future          Subjective:      Patient ID: Jefferson Soni is a 59 y o  male  HPI    Here for follow up  Here with wife during appt  Not checking BS, misses 30-35% of DM med doses throughout the week  Hasn't completed BW yet, ordered 4/2018  Last HGA1C was 9 9%  Emphasized importance of medical compliance, pt verbalized understanding and will work to improve this  Having rash on feet b/l with onychomycosis of toenails, requests podiatry eval   Also with occ itchy rash on legs and elsewhere, occurs every summer and is not painful    He sweats a lot in summer, declines to treat with oral steroid 2nd to diabetes  No other complaints  Past Medical History:   Diagnosis Date    BPH (benign prostatic hyperplasia)     CPAP (continuous positive airway pressure) dependence     Sleep apnea      Vitals:    07/03/18 1819   BP: 124/80   Pulse: 78   Resp: 18   Temp: 98 °F (36 7 °C)   SpO2: 98%   Weight: 129 kg (284 lb 6 4 oz)   Height: 5' 9" (1 753 m)     Body mass index is 42 kg/m²      Current Outpatient Prescriptions:     aspirin (ECOTRIN LOW STRENGTH) 81 mg EC tablet, Take 81 mg by mouth daily, Disp: , Rfl:     Blood Glucose Monitoring Suppl (ONE TOUCH ULTRA MINI) w/Device KIT, by Does not apply route 2 (two) times a day, Disp: 1 each, Rfl: 0    fenofibrate (TRIGLIDE) 160 MG tablet, Take 1 tablet (160 mg total) by mouth daily for 90 days, Disp: 30 tablet, Rfl: 1    glimepiride (AMARYL) 4 mg tablet, Take 1 tablet (4 mg total) by mouth 2 (two) times a day, Disp: 60 tablet, Rfl: 1    glucose blood (ONE TOUCH ULTRA TEST) test strip, 1 each by Other route 2 (two) times a day Use as instructed, Disp: 100 each, Rfl: 3    HYDROcodone-acetaminophen (NORCO) 5-325 mg per tablet, Take 1 tablet by mouth daily as needed for pain Max Daily Amount: 1 tablet, Disp: 30 tablet, Rfl: 0    levothyroxine 75 mcg tablet, Take 1 tablet (75 mcg total) by mouth daily, Disp: 30 tablet, Rfl: 1    lisinopril (ZESTRIL) 10 mg tablet, Take 10 mg by mouth daily, Disp: , Rfl:     metFORMIN (GLUCOPHAGE-XR) 500 mg 24 hr tablet, Take 2 tablets (1,000 mg total) by mouth 2 (two) times a day with meals (Patient taking differently: Take 500 mg by mouth 2 (two) times a day with meals  ), Disp: 60 tablet, Rfl: 3    ONETOUCH DELICA LANCETS 02O MISC, by Does not apply route 2 (two) times a day, Disp: 100 each, Rfl: 3    sildenafil (REVATIO) 20 mg tablet, Take 2 tablets (40 mg total) by mouth daily, Disp: 20 tablet, Rfl: 1    simvastatin (ZOCOR) 20 mg tablet, Take 20 mg by mouth daily at bedtime, Disp: , Rfl:     sitaGLIPtin (JANUVIA) 50 mg tablet, Take 1 tablet (50 mg total) by mouth daily, Disp: 30 tablet, Rfl: 3    fluticasone (FLONASE) 50 mcg/act nasal spray, 2 sprays into each nostril daily, Disp: 16 g, Rfl: 2    ketoconazole (NIZORAL) 2 % cream, Apply topically daily, Disp: 60 g, Rfl: 1    sildenafil (VIAGRA) 50 MG tablet, Take 1 tablet (50 mg total) by mouth daily as needed for erectile dysfunction, Disp: 10 tablet, Rfl: 0  Allergies   Allergen Reactions    Penicillins      SYNCOPE, but has taken amoxicillin/augmentin in past         Review of Systems   Constitutional: Negative for fever  HENT: Negative for congestion  Eyes: Negative for visual disturbance  Respiratory: Negative for shortness of breath  Cardiovascular: Negative for chest pain  Gastrointestinal: Negative for abdominal pain  Genitourinary: Negative for difficulty urinating  Musculoskeletal: Negative for arthralgias  Skin: Positive for rash  Neurological: Negative for headaches  Psychiatric/Behavioral: Negative for dysphoric mood  Objective:      /80   Pulse 78   Temp 98 °F (36 7 °C)   Resp 18   Ht 5' 9" (1 753 m)   Wt 129 kg (284 lb 6 4 oz)   SpO2 98%   BMI 42 00 kg/m²          Physical Exam   Constitutional: He appears well-developed and well-nourished  +obese   HENT:   Head: Normocephalic and atraumatic  Mouth/Throat: Oropharynx is clear and moist    Eyes: Conjunctivae are normal  Pupils are equal, round, and reactive to light  Cardiovascular: Normal rate, regular rhythm and normal heart sounds  No murmur heard  Pulmonary/Chest: Effort normal and breath sounds normal  He has no wheezes  He has no rales  Abdominal: Soft  Bowel sounds are normal  There is no tenderness  Musculoskeletal: He exhibits no edema  Neurological: He is alert  Skin: Rash (tinea pedis b/l with onychomycosis of toenails b/l) noted  Psychiatric: He has a normal mood and affect   His behavior is normal    Vitals reviewed        Lab Results   Component Value Date    HGBA1C 9 9 04/04/2018

## 2018-07-03 NOTE — PATIENT INSTRUCTIONS
1  Fasting blood work  2  Check blood sugars twice a day: morning OR before dinner(goal<130) & 2 hours after meals(goal <150)  3  Podiatry appointment  4  Ketoconazole cream to feet for 6 weeks and to right forearm for 2 weeks    Athlete's Foot   AMBULATORY CARE:   Athlete's foot  is a foot infection caused by a fungus  Common signs and symptoms include the following:   · Cracks or blisters    · Redness, swelling, itching, or burning    · Scaly or peeling skin    · Bad smelling feet    · Thick, dark skin on the bottoms or sides of your feet    · Thick, abnormal toenails  Seek care immediately if:   · You have a fever or chills  · You have red streaks going up your leg  Contact your healthcare provider if:   · Your infection spreads or you have a rash on other parts of your body  · Your infection is not better in 14 days or completely gone in 90 days  · The skin on your foot or leg is red and hot  · You have questions or concerns about your condition or care  Treatment:  Athlete's foot is usually treated with an antifungal medicine  This medicine may be given as a cream or pill  You may need a doctor's order for this medicine  Take the medicine until it is gone, even if your feet look like they are healed  Prevent the spread of athlete's foot:   · Prevent the spread of this infection to other parts of your body  When you shower, dry your groin area and other parts of your body before you dry your feet  · Keep your feet clean and dry  Wash your feet each day and dry them well, especially between your toes  After your feet are dry, put powder on your feet and between your toes  Wear clean cotton or wool socks each day  Put your socks on first so you do not spread the infection to other areas of your body  Wear sandals, canvas tennis shoes, or other shoes that allow air to flow to your feet  This helps keep your feet dry  Do not use shoes that are tight, or made of plastic or rubber       · Soak your feet in an astringent (drying) solution as directed  if you have blisters  You may need to do this for 20 to 30 minutes, 2 times each day to help dry out the blisters  · Wear shoes in public areas  Wear shower shoes or sandals in warm, damp areas  This includes shower stalls, near swimming pools, and locker rooms  Do not share socks or shoes  Do not use public swimming pools  Follow up with your healthcare provider as directed:  Write down your questions so you remember to ask them during your visits  © 2017 [facility] Information is for End User's use only and may not be sold, redistributed or otherwise used for commercial purposes  All illustrations and images included in CareNotes® are the copyrighted property of [employer]  or [employer]  The above information is an  only  It is not intended as medical advice for individual conditions or treatments  Talk to your doctor, nurse or pharmacist before following any medical regimen to see if it is safe and effective for you

## 2018-07-03 NOTE — PROGRESS NOTES
Diabetic Foot Exam    Patient's shoes and socks removed  Right Foot/Ankle   Right Foot Inspection  Skin Exam: skin normal, skin intact and dry skin no warmth, no callus, no erythema, no maceration, no abnormal color, no pre-ulcer, no ulcer and no callus                          Toe Exam: ROM and strength within normal limits  Sensory       Monofilament testing: intact  Vascular    The right DP pulse is 2+  The right PT pulse is 2+  Left Foot/Ankle  Left Foot Inspection  Skin Exam: skin normal, skin intact and dry skinno warmth, no erythema, no maceration, normal color, no pre-ulcer, no ulcer and no callus                         Toe Exam: ROM and strength within normal limits                   Sensory       Monofilament: intact  Vascular    The left DP pulse is 2+  The left PT pulse is 2+  Assign Risk Category:  No deformity present; No loss of protective sensation;  No weak pulses       Risk: 0

## 2018-07-09 ENCOUNTER — TELEPHONE (OUTPATIENT)
Dept: INTERNAL MEDICINE CLINIC | Facility: CLINIC | Age: 63
End: 2018-07-09

## 2018-07-09 PROBLEM — N52.8 OTHER MALE ERECTILE DYSFUNCTION: Status: ACTIVE | Noted: 2018-07-09

## 2018-07-12 NOTE — TELEPHONE ENCOUNTER
Pharmacy states patient already picked up sildenafil generic 50 mg on 7/6, quantity of 7 tablets  Paid Out of pocket  Pharmacy states PCP can enter in code (DAW1) so patient can receive brand name only for his next refill  States there is also another order in for sildenafil 20 mg (from 4/24/18)  Pharmacy would like to know if you would like that canceled

## 2018-07-12 NOTE — TELEPHONE ENCOUNTER
Pharmacy states under generic it states it still needs a PA  Pharmacy would like to know if the PA was approved for the brand name Viagra  If so, will need to put in a new order for brand name and they will be able to put Rx through

## 2018-07-12 NOTE — TELEPHONE ENCOUNTER
Prior Dashawnisaiah was approved for brand name, 50mg dose, which is the dose I ordered on 7/3 to pharmacy    pls notify pharmacy    thx

## 2018-07-12 NOTE — TELEPHONE ENCOUNTER
rec'd message that viagra has been approved    pls notify pharmacy & they can contact Millicent Rizvi

## 2018-07-12 NOTE — TELEPHONE ENCOUNTER
Pharmacy notified  States there is 3 tablets left of the sildenafil 50 mg  Patient picked up the 7 tablets on 7/6

## 2018-07-20 DIAGNOSIS — M54.41 CHRONIC MIDLINE LOW BACK PAIN WITH RIGHT-SIDED SCIATICA: ICD-10-CM

## 2018-07-20 DIAGNOSIS — I10 BENIGN ESSENTIAL HYPERTENSION: Primary | ICD-10-CM

## 2018-07-20 DIAGNOSIS — G89.29 CHRONIC MIDLINE LOW BACK PAIN WITH RIGHT-SIDED SCIATICA: ICD-10-CM

## 2018-07-20 PROCEDURE — 4010F ACE/ARB THERAPY RXD/TAKEN: CPT | Performed by: INTERNAL MEDICINE

## 2018-07-20 RX ORDER — HYDROCODONE BITARTRATE AND ACETAMINOPHEN 5; 325 MG/1; MG/1
1 TABLET ORAL DAILY PRN
Qty: 30 TABLET | Refills: 0 | Status: CANCELLED | OUTPATIENT
Start: 2018-07-20

## 2018-07-20 RX ORDER — LISINOPRIL 10 MG/1
10 TABLET ORAL DAILY
Qty: 90 TABLET | Refills: 0 | Status: SHIPPED | OUTPATIENT
Start: 2018-07-20 | End: 2019-05-16 | Stop reason: SDUPTHER

## 2018-07-20 NOTE — TELEPHONE ENCOUNTER
I am out of office & cannot refill hydrocodone till next week when I return    Is Sascha Hernandez checking his blood sugars? If yes, what readings is he getting at home? When rx re-ordered next week, his insurance formulary has changed and rx will be for hydrocodone 2 5mg dose which should work for his chronic back pain and with less side effects        Please remind pt to complete BW that was ordered in nearly 4 mos ago or he will have to re-schedule his appt with me(currently 8/3)

## 2018-07-20 NOTE — TELEPHONE ENCOUNTER
Patient notified  States he has enough hydrocodone to last him until next week when PCP returns  States his sugars have been on avg AM-  and PM- 130's-141  The highest it has been was 171-PM     States he has been on the road a lot and has been eating out a lot more than usual  So sugars have been slightly higher than usual     Patient states he will get BW done tomorrow

## 2018-07-21 ENCOUNTER — APPOINTMENT (OUTPATIENT)
Dept: LAB | Facility: CLINIC | Age: 63
End: 2018-07-21
Payer: COMMERCIAL

## 2018-07-21 DIAGNOSIS — Z11.59 NEED FOR HEPATITIS C SCREENING TEST: ICD-10-CM

## 2018-07-21 DIAGNOSIS — E11.42 TYPE 2 DIABETES MELLITUS WITH DIABETIC POLYNEUROPATHY, WITHOUT LONG-TERM CURRENT USE OF INSULIN (HCC): ICD-10-CM

## 2018-07-21 DIAGNOSIS — E11.65 TYPE 2 DIABETES MELLITUS WITH HYPERGLYCEMIA, WITHOUT LONG-TERM CURRENT USE OF INSULIN (HCC): ICD-10-CM

## 2018-07-21 LAB
ALBUMIN SERPL BCP-MCNC: 3.9 G/DL (ref 3.5–5)
ALP SERPL-CCNC: 60 U/L (ref 46–116)
ALT SERPL W P-5'-P-CCNC: 34 U/L (ref 12–78)
ANION GAP SERPL CALCULATED.3IONS-SCNC: 6 MMOL/L (ref 4–13)
AST SERPL W P-5'-P-CCNC: 23 U/L (ref 5–45)
BASOPHILS # BLD AUTO: 0.03 THOUSANDS/ΜL (ref 0–0.1)
BASOPHILS NFR BLD AUTO: 0 % (ref 0–1)
BILIRUB SERPL-MCNC: 0.4 MG/DL (ref 0.2–1)
BUN SERPL-MCNC: 16 MG/DL (ref 5–25)
CALCIUM SERPL-MCNC: 9.1 MG/DL (ref 8.3–10.1)
CHLORIDE SERPL-SCNC: 104 MMOL/L (ref 100–108)
CHOLEST SERPL-MCNC: 161 MG/DL (ref 50–200)
CO2 SERPL-SCNC: 28 MMOL/L (ref 21–32)
CREAT SERPL-MCNC: 1.05 MG/DL (ref 0.6–1.3)
CREAT UR-MCNC: 191 MG/DL
EOSINOPHIL # BLD AUTO: 0.31 THOUSAND/ΜL (ref 0–0.61)
EOSINOPHIL NFR BLD AUTO: 4 % (ref 0–6)
ERYTHROCYTE [DISTWIDTH] IN BLOOD BY AUTOMATED COUNT: 12.7 % (ref 11.6–15.1)
GFR SERPL CREATININE-BSD FRML MDRD: 76 ML/MIN/1.73SQ M
GLUCOSE P FAST SERPL-MCNC: 114 MG/DL (ref 65–99)
HCT VFR BLD AUTO: 45.4 % (ref 36.5–49.3)
HCV AB SER QL: NORMAL
HDLC SERPL-MCNC: 39 MG/DL (ref 40–60)
HGB BLD-MCNC: 15 G/DL (ref 12–17)
LDLC SERPL CALC-MCNC: 86 MG/DL (ref 0–100)
LYMPHOCYTES # BLD AUTO: 2.38 THOUSANDS/ΜL (ref 0.6–4.47)
LYMPHOCYTES NFR BLD AUTO: 31 % (ref 14–44)
MCH RBC QN AUTO: 29.7 PG (ref 26.8–34.3)
MCHC RBC AUTO-ENTMCNC: 33 G/DL (ref 31.4–37.4)
MCV RBC AUTO: 90 FL (ref 82–98)
MICROALBUMIN UR-MCNC: 625 MG/L (ref 0–20)
MICROALBUMIN/CREAT 24H UR: 327 MG/G CREATININE (ref 0–30)
MONOCYTES # BLD AUTO: 0.45 THOUSAND/ΜL (ref 0.17–1.22)
MONOCYTES NFR BLD AUTO: 6 % (ref 4–12)
NEUTROPHILS # BLD AUTO: 4.41 THOUSANDS/ΜL (ref 1.85–7.62)
NEUTS SEG NFR BLD AUTO: 58 % (ref 43–75)
PLATELET # BLD AUTO: 217 THOUSANDS/UL (ref 149–390)
PMV BLD AUTO: 9.6 FL (ref 8.9–12.7)
POTASSIUM SERPL-SCNC: 4.1 MMOL/L (ref 3.5–5.3)
PROT SERPL-MCNC: 7.1 G/DL (ref 6.4–8.2)
RBC # BLD AUTO: 5.05 MILLION/UL (ref 3.88–5.62)
SODIUM SERPL-SCNC: 138 MMOL/L (ref 136–145)
TRIGL SERPL-MCNC: 179 MG/DL
TSH SERPL DL<=0.05 MIU/L-ACNC: 3.1 UIU/ML (ref 0.36–3.74)
WBC # BLD AUTO: 7.58 THOUSAND/UL (ref 4.31–10.16)

## 2018-07-21 PROCEDURE — 82043 UR ALBUMIN QUANTITATIVE: CPT | Performed by: INTERNAL MEDICINE

## 2018-07-21 PROCEDURE — 80053 COMPREHEN METABOLIC PANEL: CPT

## 2018-07-21 PROCEDURE — 80061 LIPID PANEL: CPT

## 2018-07-21 PROCEDURE — 86803 HEPATITIS C AB TEST: CPT

## 2018-07-21 PROCEDURE — 84443 ASSAY THYROID STIM HORMONE: CPT | Performed by: INTERNAL MEDICINE

## 2018-07-21 PROCEDURE — 83036 HEMOGLOBIN GLYCOSYLATED A1C: CPT

## 2018-07-21 PROCEDURE — 3062F POS MACROALBUMINURIA REV: CPT | Performed by: INTERNAL MEDICINE

## 2018-07-21 PROCEDURE — 82570 ASSAY OF URINE CREATININE: CPT | Performed by: INTERNAL MEDICINE

## 2018-07-21 PROCEDURE — 36415 COLL VENOUS BLD VENIPUNCTURE: CPT | Performed by: INTERNAL MEDICINE

## 2018-07-21 PROCEDURE — 85025 COMPLETE CBC W/AUTO DIFF WBC: CPT | Performed by: INTERNAL MEDICINE

## 2018-07-22 DIAGNOSIS — E11.65 TYPE 2 DIABETES MELLITUS WITH HYPERGLYCEMIA, WITHOUT LONG-TERM CURRENT USE OF INSULIN (HCC): Primary | ICD-10-CM

## 2018-07-22 LAB
EST. AVERAGE GLUCOSE BLD GHB EST-MCNC: 226 MG/DL
HBA1C MFR BLD: 9.5 % (ref 4.2–6.3)

## 2018-07-23 RX ORDER — HYDROCODONE BITARTRATE, ACETAMINOPHEN 2.5; 325 MG/1; MG/1
1 TABLET ORAL DAILY PRN
Qty: 30 TABLET | Refills: 0 | Status: SHIPPED | OUTPATIENT
Start: 2018-07-23 | End: 2018-07-30

## 2018-07-23 NOTE — TELEPHONE ENCOUNTER
Hydrocodone rx ready to be picked up    Also, BW Is back   Raphael's A1C is high at 9 5%(goal <7%)  There is protein in urine due to high blood sugars  Diana Martins should start a medication to help protect his kidneys known as losartan due to this  Hep C test is negative    Is he doing better with his DM medication/no missed doses?

## 2018-07-23 NOTE — TELEPHONE ENCOUNTER
Pt notified and states he would like more information regarding the losartan(what are the drawbacks and would this be only temporary?)  States he is doing pretty good with the DM medications, forgot 1 day but otherwise took some a little later  Otherwise home BS are getting better   Will pick Rx tomorrow

## 2018-07-27 ENCOUNTER — TELEPHONE (OUTPATIENT)
Dept: INTERNAL MEDICINE CLINIC | Facility: CLINIC | Age: 63
End: 2018-07-27

## 2018-07-27 NOTE — TELEPHONE ENCOUNTER
Patient called and went to  his Hydrocodone at the pharmacy  They do not carry 2 5-325mgs      Please advise

## 2018-07-27 NOTE — TELEPHONE ENCOUNTER
Got a message that insurance won't cover 5mg dose    I can change the rx back to 5mg but it may not be covered    If he would like to make this change, needs to return 2 5mg dose written prescription to office and  new 5mg prescription    thx

## 2018-07-27 NOTE — TELEPHONE ENCOUNTER
The pharmacy can order rx or they can look into another [facility] pharmacy that carries the medication    Or he can try calling [facility] pharmacy here at [address]

## 2018-07-30 ENCOUNTER — TELEPHONE (OUTPATIENT)
Dept: INTERNAL MEDICINE CLINIC | Facility: CLINIC | Age: 63
End: 2018-07-30

## 2018-07-30 DIAGNOSIS — G89.29 CHRONIC MIDLINE LOW BACK PAIN WITH RIGHT-SIDED SCIATICA: ICD-10-CM

## 2018-07-30 DIAGNOSIS — M54.41 CHRONIC MIDLINE LOW BACK PAIN WITH RIGHT-SIDED SCIATICA: ICD-10-CM

## 2018-07-30 RX ORDER — HYDROCODONE BITARTRATE AND ACETAMINOPHEN 5; 325 MG/1; MG/1
1 TABLET ORAL DAILY PRN
Qty: 30 TABLET | Refills: 0 | Status: SHIPPED | OUTPATIENT
Start: 2018-07-30 | End: 2018-11-26 | Stop reason: SDUPTHER

## 2018-07-30 NOTE — TELEPHONE ENCOUNTER
rx ordered    pls notify patient but he cannot  new rx without dropping off hydrocodone 2 5mg dose rx first    thx

## 2018-07-30 NOTE — TELEPHONE ENCOUNTER
Pt calling to have Rx changed to correct dosage  Would like the 5 mg - even if he has to pay out-of-pocket  Will  today if ready and exchange for old script

## 2018-07-31 NOTE — TELEPHONE ENCOUNTER
Patient notified  States he will come by later today and drop of hydrocodone 2 5 mg Rx and then  new Rx

## 2018-08-03 ENCOUNTER — OFFICE VISIT (OUTPATIENT)
Dept: INTERNAL MEDICINE CLINIC | Facility: CLINIC | Age: 63
End: 2018-08-03
Payer: COMMERCIAL

## 2018-08-03 VITALS
HEIGHT: 69 IN | HEART RATE: 75 BPM | SYSTOLIC BLOOD PRESSURE: 128 MMHG | BODY MASS INDEX: 42.39 KG/M2 | OXYGEN SATURATION: 98 % | TEMPERATURE: 99.5 F | RESPIRATION RATE: 18 BRPM | DIASTOLIC BLOOD PRESSURE: 78 MMHG | WEIGHT: 286.2 LBS

## 2018-08-03 DIAGNOSIS — E78.2 MIXED HYPERLIPIDEMIA: ICD-10-CM

## 2018-08-03 DIAGNOSIS — I10 BENIGN ESSENTIAL HYPERTENSION: ICD-10-CM

## 2018-08-03 DIAGNOSIS — E11.65 TYPE 2 DIABETES MELLITUS WITH HYPERGLYCEMIA, WITHOUT LONG-TERM CURRENT USE OF INSULIN (HCC): Primary | ICD-10-CM

## 2018-08-03 PROCEDURE — 99213 OFFICE O/P EST LOW 20 MIN: CPT | Performed by: INTERNAL MEDICINE

## 2018-08-03 RX ORDER — FENOFIBRATE 160 MG/1
160 TABLET ORAL DAILY
Qty: 90 TABLET | Refills: 1 | Status: SHIPPED | OUTPATIENT
Start: 2018-08-03 | End: 2019-03-28

## 2018-08-03 NOTE — PROGRESS NOTES
Assessment/Plan:       Diagnoses and all orders for this visit:    Type 2 diabetes mellitus with hyperglycemia, without long-term current use of insulin (HCC)  Comments:  not checking BS often enough but improved, c/w amaryl, januvia/metformin  call or email me with BS readings next week  DM educator ref provided  Orders:  -     Ambulatory referral to medical nutrition therapy for diabetes; Future  -     Ambulatory referral to Diabetic Education; Future    Mixed hyperlipidemia  Comments:  c/w statin & fibrate, no side effects -> rx re-ordered  Orders:  -     fenofibrate (TRIGLIDE) 160 MG tablet; Take 1 tablet (160 mg total) by mouth daily    Benign essential hypertension  Comments:  BP controlled, c/w ACEI for now/no changes today          Subjective:      Patient ID: Mandy Parmar is a 59 y o  male  HPI    Here for follow up  Checking BS but only in AM and readings are in 80s-120s  No low BS readings and more compliant with medications for DM  Would like to meet with diabetes educator and nutritionist   Understands risks of uncontrolled diabetes  Will check blood sugars more often in future  Takes hydrocodone prn for low back pain with relief and no side effects  We discussed no etoh and no driving or operating heavy equipment while taking hydrocodone  No other complaints  Past Medical History:   Diagnosis Date    BPH (benign prostatic hyperplasia)     CPAP (continuous positive airway pressure) dependence     Sleep apnea      Vitals:    08/03/18 1556   BP: 128/78   Pulse: 75   Resp: 18   Temp: 99 5 °F (37 5 °C)   TempSrc: Oral   SpO2: 98%   Weight: 130 kg (286 lb 3 2 oz)   Height: 5' 9" (1 753 m)     Body mass index is 42 26 kg/m²      Current Outpatient Prescriptions:     aspirin (ECOTRIN LOW STRENGTH) 81 mg EC tablet, Take 81 mg by mouth daily, Disp: , Rfl:     Blood Glucose Monitoring Suppl (ONE TOUCH ULTRA MINI) w/Device KIT, by Does not apply route 2 (two) times a day, Disp: 1 each, Rfl: 0    fluticasone (FLONASE) 50 mcg/act nasal spray, 2 sprays into each nostril daily, Disp: 16 g, Rfl: 2    glimepiride (AMARYL) 4 mg tablet, Take 1 tablet (4 mg total) by mouth 2 (two) times a day, Disp: 60 tablet, Rfl: 1    glucose blood (ONE TOUCH ULTRA TEST) test strip, 1 each by Other route 2 (two) times a day Use as instructed, Disp: 100 each, Rfl: 3    HYDROcodone-acetaminophen (NORCO) 5-325 mg per tablet, Take 1 tablet by mouth daily as needed for pain Max Daily Amount: 1 tablet, Disp: 30 tablet, Rfl: 0    ketoconazole (NIZORAL) 2 % cream, Apply topically daily, Disp: 60 g, Rfl: 1    levothyroxine 75 mcg tablet, Take 1 tablet (75 mcg total) by mouth daily, Disp: 30 tablet, Rfl: 1    lisinopril (ZESTRIL) 10 mg tablet, Take 1 tablet (10 mg total) by mouth daily, Disp: 90 tablet, Rfl: 0    metFORMIN (GLUCOPHAGE-XR) 500 mg 24 hr tablet, Take 2 tablets (1,000 mg total) by mouth 2 (two) times a day with meals (Patient taking differently: Take 500 mg by mouth 2 (two) times a day with meals  ), Disp: 60 tablet, Rfl: 3    ONETOUCH DELICA LANCETS 29X MISC, by Does not apply route 2 (two) times a day, Disp: 100 each, Rfl: 3    sildenafil (REVATIO) 20 mg tablet, Take 2 tablets (40 mg total) by mouth daily, Disp: 20 tablet, Rfl: 1    sildenafil (VIAGRA) 50 MG tablet, Take 1 tablet (50 mg total) by mouth daily as needed for erectile dysfunction, Disp: 10 tablet, Rfl: 0    simvastatin (ZOCOR) 20 mg tablet, Take 20 mg by mouth daily at bedtime, Disp: , Rfl:     sitaGLIPtin (JANUVIA) 50 mg tablet, Take 1 tablet (50 mg total) by mouth daily, Disp: 30 tablet, Rfl: 3    fenofibrate (TRIGLIDE) 160 MG tablet, Take 1 tablet (160 mg total) by mouth daily, Disp: 90 tablet, Rfl: 1  Allergies   Allergen Reactions    Penicillins      SYNCOPE, but has taken amoxicillin/augmentin in past         Review of Systems   Constitutional: Negative for fever  HENT: Negative for congestion  Eyes: Negative for visual disturbance  Respiratory: Negative for shortness of breath  Cardiovascular: Negative for chest pain  Gastrointestinal: Negative for abdominal pain  Genitourinary: Negative for difficulty urinating  Musculoskeletal: Positive for back pain  Neurological: Negative for headaches  Psychiatric/Behavioral: Negative for dysphoric mood  Objective:      /78   Pulse 75   Temp 99 5 °F (37 5 °C) (Oral)   Resp 18   Ht 5' 9" (1 753 m)   Wt 130 kg (286 lb 3 2 oz)   SpO2 98%   BMI 42 26 kg/m²          Physical Exam   Constitutional: He appears well-developed and well-nourished  HENT:   Head: Normocephalic and atraumatic  Mouth/Throat: Oropharynx is clear and moist    Eyes: Conjunctivae are normal  Pupils are equal, round, and reactive to light  Cardiovascular: Normal rate, regular rhythm and normal heart sounds  No murmur heard  Pulmonary/Chest: Effort normal and breath sounds normal  He has no wheezes  He has no rales  Abdominal: Soft  Bowel sounds are normal  There is no tenderness  Musculoskeletal: He exhibits no edema  Neurological: He is alert  Psychiatric: He has a normal mood and affect  His behavior is normal    Vitals reviewed        Lab Results   Component Value Date    HGBA1C 9 5 (H) 07/21/2018

## 2018-08-03 NOTE — PATIENT INSTRUCTIONS
1  email or call me with blood sugars next week  2  Check blood sugars, best times to check --> in morning, before meals, 2 hours after meals & bedtime  3  Return in 6 weeks  4  Nutritionist appointment  5  Call me if finger bothers you -> x-ray and ortho appointment    Trigger Finger   WHAT YOU NEED TO KNOW:   Trigger finger is when your finger or thumb gets stuck in a bent position and snaps, pops, or clicks when you straighten it  DISCHARGE INSTRUCTIONS:   Medicines:   · NSAIDs:  These medicines decrease pain and swelling  NSAIDs can be bought without a doctor's order  Ask which medicine is right for you and how much to take  Take as directed  NSAIDs can cause stomach bleeding or kidney problems if not taken correctly  · Take your medicine as directed  Contact your healthcare provider if you think your medicine is not helping or if you have side effects  Tell him or her if you are allergic to any medicine  Keep a list of the medicines, vitamins, and herbs you take  Include the amounts, and when and why you take them  Bring the list or the pill bottles to follow-up visits  Carry your medicine list with you in case of an emergency  Follow up with your healthcare provider or hand specialist as directed:  Write down your questions so you remember to ask them during your visits  Physical therapy:  A physical therapist teaches you exercises to help improve movement and strength, and to decrease pain  Splint:  You may need to wear a splint to keep your finger straight for up to 6 weeks  This will help your finger joints rest and prevent you from bending your finger while you sleep  Wound care:  When you are allowed to bathe, carefully wash the incision with soap and water  Dry the area and put on a new, clean bandage as directed  Change your bandage any time it gets wet or dirty     Contact your healthcare provider or hand specialist if:   · Your symptoms do not go away or they return, even after treatment  · The pain, swelling, or stiffness interferes with your daily activities  · You have more trouble moving your finger  · Your finger is tingling  · You have questions or concerns about your condition or care  Seek care immediately or call 911 if:   · You cannot move your finger at all  · Your finger is numb  © 2017 [employer] Information is for End User's use only and may not be sold, redistributed or otherwise used for commercial purposes  All illustrations and images included in CareNotes® are the copyrighted property of [employer]  or [employer]  The above information is an  only  It is not intended as medical advice for individual conditions or treatments  Talk to your doctor, nurse or pharmacist before following any medical regimen to see if it is safe and effective for you

## 2018-08-10 DIAGNOSIS — E11.42 TYPE 2 DIABETES MELLITUS WITH DIABETIC POLYNEUROPATHY, WITHOUT LONG-TERM CURRENT USE OF INSULIN (HCC): ICD-10-CM

## 2018-08-10 RX ORDER — METFORMIN HYDROCHLORIDE 500 MG/1
TABLET, EXTENDED RELEASE ORAL
Qty: 180 TABLET | Refills: 1 | Status: SHIPPED | OUTPATIENT
Start: 2018-08-10 | End: 2018-11-20 | Stop reason: SDUPTHER

## 2018-09-02 ENCOUNTER — HOSPITAL ENCOUNTER (EMERGENCY)
Facility: HOSPITAL | Age: 63
Discharge: HOME | End: 2018-09-02
Attending: EMERGENCY MEDICINE | Admitting: EMERGENCY MEDICINE
Payer: COMMERCIAL

## 2018-09-02 ENCOUNTER — APPOINTMENT (EMERGENCY)
Dept: ULTRASOUND IMAGING | Facility: HOSPITAL | Age: 63
End: 2018-09-02
Payer: COMMERCIAL

## 2018-09-02 VITALS
RESPIRATION RATE: 18 BRPM | SYSTOLIC BLOOD PRESSURE: 130 MMHG | WEIGHT: 286.3 LBS | OXYGEN SATURATION: 96 % | TEMPERATURE: 98.9 F | DIASTOLIC BLOOD PRESSURE: 64 MMHG | HEART RATE: 94 BPM | BODY MASS INDEX: 42.28 KG/M2

## 2018-09-02 DIAGNOSIS — L03.90 CELLULITIS: Primary | ICD-10-CM

## 2018-09-02 LAB
BACTERIA UR QL AUTO: ABNORMAL /HPF
BILIRUB UR QL STRIP: NEGATIVE
CLARITY UR: CLEAR
COLOR UR: YELLOW
GLUCOSE UR STRIP-MCNC: ABNORMAL MG/DL
HGB UR QL STRIP.AUTO: NEGATIVE
KETONES UR STRIP-MCNC: NEGATIVE MG/DL
LEUKOCYTE ESTERASE UR QL STRIP: NEGATIVE
NITRITE UR QL STRIP: NEGATIVE
NON-SQ EPI CELLS URNS QL MICRO: ABNORMAL /HPF
PH UR STRIP.AUTO: 5.5 [PH] (ref 4.5–8)
PROT UR STRIP-MCNC: ABNORMAL MG/DL
RBC #/AREA URNS AUTO: ABNORMAL /HPF
SP GR UR STRIP.AUTO: 1.02 (ref 1–1.03)
UROBILINOGEN UR QL STRIP.AUTO: 0.2 E.U./DL
WBC #/AREA URNS AUTO: ABNORMAL /HPF

## 2018-09-02 PROCEDURE — 99284 EMERGENCY DEPT VISIT MOD MDM: CPT

## 2018-09-02 PROCEDURE — 81001 URINALYSIS AUTO W/SCOPE: CPT

## 2018-09-02 PROCEDURE — 76870 US EXAM SCROTUM: CPT

## 2018-09-02 RX ORDER — CLINDAMYCIN HYDROCHLORIDE 150 MG/1
300 CAPSULE ORAL EVERY 6 HOURS
Qty: 80 CAPSULE | Refills: 0 | Status: SHIPPED | OUTPATIENT
Start: 2018-09-02 | End: 2018-09-12

## 2018-09-02 RX ORDER — ONDANSETRON 4 MG/1
4 TABLET, ORALLY DISINTEGRATING ORAL ONCE
Status: DISCONTINUED | OUTPATIENT
Start: 2018-09-02 | End: 2018-09-03 | Stop reason: HOSPADM

## 2018-09-02 RX ORDER — CLINDAMYCIN HYDROCHLORIDE 150 MG/1
300 CAPSULE ORAL ONCE
Status: COMPLETED | OUTPATIENT
Start: 2018-09-02 | End: 2018-09-02

## 2018-09-02 RX ADMIN — CLINDAMYCIN HYDROCHLORIDE 300 MG: 150 CAPSULE ORAL at 22:32

## 2018-09-02 NOTE — ED PROVIDER NOTES
History  Chief Complaint   Patient presents with    Groin Swelling     sent from [facility] for ultrasound to r/o infection of testicles,  +nausea     Patient referred to the emergency department from a local urgent care facility for evaluation of scrotal swelling with concern about infection  Patient noticed this morning when he woke up that his scrotum was significantly enlarged and swollen and erythematous  He denies trauma fall or injury  Denies fever chills  Denies abdominal back pain  Denies urinary symptoms  States he has some nausea but no vomiting or diarrhea  He also denies penile or meatal discharge  Prior to Admission Medications   Prescriptions Last Dose Informant Patient Reported? Taking?    Blood Glucose Monitoring Suppl (ONE TOUCH ULTRA MINI) w/Device KIT  Self No No   Sig: by Does not apply route 2 (two) times a day   HYDROcodone-acetaminophen (NORCO) 5-325 mg per tablet  Self No No   Sig: Take 1 tablet by mouth daily as needed for pain Max Daily Amount: 1 tablet   ONETOUCH DELICA LANCETS 87Y MISC  Self No No   Sig: by Does not apply route 2 (two) times a day   aspirin (ECOTRIN LOW STRENGTH) 81 mg EC tablet  Self Yes No   Sig: Take 81 mg by mouth daily   fenofibrate (TRIGLIDE) 160 MG tablet   No No   Sig: Take 1 tablet (160 mg total) by mouth daily   fluticasone (FLONASE) 50 mcg/act nasal spray  Self No No   Si sprays into each nostril daily   glimepiride (AMARYL) 4 mg tablet  Self No No   Sig: Take 1 tablet (4 mg total) by mouth 2 (two) times a day   glucose blood (ONE TOUCH ULTRA TEST) test strip  Self No No   Si each by Other route 2 (two) times a day Use as instructed   ketoconazole (NIZORAL) 2 % cream  Self No No   Sig: Apply topically daily   levothyroxine 75 mcg tablet  Self No No   Sig: Take 1 tablet (75 mcg total) by mouth daily   lisinopril (ZESTRIL) 10 mg tablet  Self No No   Sig: Take 1 tablet (10 mg total) by mouth daily   metFORMIN (GLUCOPHAGE-XR) 500 mg 24 hr tablet   No No   Sig: TAKE 2 TABLETS(1000 MG TOTAL) BY MOUTH TWICE DAILY WITH MEALS   sildenafil (REVATIO) 20 mg tablet  Self No No   Sig: Take 2 tablets (40 mg total) by mouth daily   sildenafil (VIAGRA) 50 MG tablet  Self No No   Sig: Take 1 tablet (50 mg total) by mouth daily as needed for erectile dysfunction   simvastatin (ZOCOR) 20 mg tablet  Self Yes No   Sig: Take 20 mg by mouth daily at bedtime   sitaGLIPtin (JANUVIA) 50 mg tablet  Self No No   Sig: Take 1 tablet (50 mg total) by mouth daily      Facility-Administered Medications: None       Past Medical History:   Diagnosis Date    BPH (benign prostatic hyperplasia)     CPAP (continuous positive airway pressure) dependence     Diabetes mellitus (HCC)     Disease of thyroid gland     Hyperlipidemia     Hypertension     Sleep apnea        Past Surgical History:   Procedure Laterality Date    COLONOSCOPY      2016    GA EGD TRANSORAL BIOPSY SINGLE/MULTIPLE N/A 5/17/2017    Procedure: ESOPHAGOGASTRODUODENOSCOPY (EGD) with bx;  Surgeon: Tita Ramirez MD;  Location: [facility]; Service: Bariatrics    TONSILLECTOMY         Family History   Problem Relation Age of Onset    Lung cancer Mother     Diabetes Father     Alcohol abuse Neg Hx     Substance Abuse Neg Hx     Mental illness Neg Hx     Depression Neg Hx      I have reviewed and agree with the history as documented  Social History   Substance Use Topics    Smoking status: Former Smoker     Packs/day: 2 00     Years: 0 00     Types: Cigarettes     Quit date: 1980    Smokeless tobacco: Never Used    Alcohol use Yes      Comment: social        Review of Systems   Constitutional: Negative  Negative for activity change, appetite change, chills, diaphoresis, fatigue and fever  HENT: Negative  Negative for congestion, drooling, rhinorrhea, sinus pain, sinus pressure, trouble swallowing and voice change  Eyes: Negative  Negative for photophobia and visual disturbance  Respiratory: Negative  Negative for chest tightness, shortness of breath, wheezing and stridor  Cardiovascular: Negative  Negative for chest pain, palpitations and leg swelling  Gastrointestinal: Negative  Negative for abdominal pain, blood in stool, constipation, diarrhea, nausea and vomiting  Endocrine: Negative  Genitourinary: Positive for scrotal swelling  Negative for decreased urine volume, difficulty urinating, discharge, dysuria, flank pain, frequency, genital sores, hematuria, penile pain, penile swelling, testicular pain and urgency  Musculoskeletal: Negative  Negative for back pain, gait problem, neck pain and neck stiffness  Skin: Negative  Negative for rash and wound  Allergic/Immunologic: Negative  Neurological: Negative  Negative for dizziness, seizures, syncope, weakness, light-headedness and headaches  Hematological: Negative  Does not bruise/bleed easily  Psychiatric/Behavioral: Negative  Negative for confusion  Physical Exam  Physical Exam   Constitutional: He is oriented to person, place, and time  He appears well-developed and well-nourished  HENT:   Head: Normocephalic and atraumatic  Eyes: Conjunctivae and EOM are normal  Pupils are equal, round, and reactive to light  Neck: Normal range of motion  Neck supple  Cardiovascular: Normal rate, regular rhythm, normal heart sounds and intact distal pulses  Pulmonary/Chest: Effort normal and breath sounds normal  No respiratory distress  He has no wheezes  He has no rales  He exhibits no tenderness  Abdominal: Soft  Bowel sounds are normal  He exhibits no distension and no mass  There is no tenderness  There is no rebound and no guarding  No hernia  Genitourinary: Penis normal  No penile tenderness  Genitourinary Comments: Normal exam of the penis  His scrotum is significantly enlarged with erythema and obvious fluid within the scrotum  His testicles appear of normal in size and are nontender  There are no open lesions  No penile discharge  Musculoskeletal: Normal range of motion  He exhibits no edema, tenderness or deformity  Neurological: He is alert and oriented to person, place, and time  He has normal reflexes  He displays normal reflexes  No cranial nerve deficit or sensory deficit  He exhibits normal muscle tone  Coordination normal    Skin: Skin is warm and dry  No rash noted  Psychiatric: He has a normal mood and affect  His behavior is normal  Judgment and thought content normal    Nursing note and vitals reviewed        Vital Signs  ED Triage Vitals   Temperature Pulse Respirations Blood Pressure SpO2   09/02/18 1850 09/02/18 1850 09/02/18 1850 09/02/18 1850 09/02/18 1850   98 9 °F (37 2 °C) 88 20 122/58 98 %      Temp Source Heart Rate Source Patient Position - Orthostatic VS BP Location FiO2 (%)   09/02/18 1850 09/02/18 2226 09/02/18 1850 09/02/18 1850 --   Oral Monitor Sitting Left arm       Pain Score       09/02/18 1850       No Pain           Vitals:    09/02/18 1850 09/02/18 2226   BP: 122/58 130/64   Pulse: 88 94   Patient Position - Orthostatic VS: Sitting Sitting       Visual Acuity      ED Medications  Medications   ondansetron (ZOFRAN-ODT) dispersible tablet 4 mg (not administered)   clindamycin (CLEOCIN) capsule 300 mg (not administered)       Diagnostic Studies  Results Reviewed     Procedure Component Value Units Date/Time    Urine Microscopic [[id number]]  (Abnormal) Collected:  09/02/18 2053    Lab Status:  Final result Specimen:  Urine from Urine, Clean Catch Updated:  09/02/18 2123     RBC, UA None Seen /hpf      WBC, UA 0-1 (A) /hpf      Epithelial Cells None Seen /hpf      Bacteria, UA None Seen /hpf     ED Urine Macroscopic [[id number]]  (Abnormal) Collected:  09/02/18 2053    Lab Status:  Final result Specimen:  Urine Updated:  09/02/18 2042     Color, UA Yellow     Clarity, UA Clear     pH, UA 5 5     Leukocytes, UA Negative     Nitrite, UA Negative     Protein,  (2+) (A) mg/dl      Glucose,  (1/2%) (A) mg/dl      Ketones, UA Negative mg/dl      Urobilinogen, UA 0 2 E U /dl      Bilirubin, UA Negative     Blood, UA Negative     Specific Gravity, UA 1 025    Narrative:       CLINITEK RESULT                 US scrotum and testicles   Final Result by Jonah Lainez MD (09/02 2147)       Symmetric testes and epididymides with symmetric testicular flow bilaterally  Scrotal wall thickening/edema and complex bilateral hydroceles are noted          Workstation performed: [id number]                    Procedures  Procedures       Phone Contacts  ED Phone Contact    ED Course  ED Course as of Sep 02 2229   Julita Pitch Sep 02, 2018   2226 This case was discussed with Dale Lagunas of Urology who was comfortable with outpatient oral antibiotics and subsequent follow-up  I discussed with the patient signs and symptoms that would require return to the emergency department  MDM  CritCare Time    Disposition  Final diagnoses:   Cellulitis     Time reflects when diagnosis was documented in both MDM as applicable and the Disposition within this note     Time User Action Codes Description Comment    9/2/2018 10:27 PM Tabatha Simon [[id number]] Cellulitis       ED Disposition     ED Disposition Condition Comment    Discharge  Lan Vanda discharge to home/self care      Condition at discharge: Stable        Follow-up Information     Follow up With Specialties Details Why Contact Info    Dilip Zavaleta MD Urology Schedule an appointment as soon as possible for a visit  [address] [place] [id number]  [phone]            Patient's Medications   Discharge Prescriptions    CLINDAMYCIN (CLEOCIN) 150 MG CAPSULE    Take 2 capsules (300 mg total) by mouth every 6 (six) hours for 10 days       Start Date: 9/2/2018  End Date: 9/12/2018       Order Dose: 300 mg       Quantity: 80 capsule    Refills: 0     No discharge procedures on file     ED Provider  Electronically Signed by           Evelin Munoz MD  09/02/18 6421

## 2018-09-03 NOTE — DISCHARGE INSTRUCTIONS
Cellulitis   WHAT YOU NEED TO KNOW:   What is cellulitis? Cellulitis is a skin infection caused by bacteria  Cellulitis usually appears on the legs and feet, arms and hands, or face  What increases my risk for cellulitis? · An injury that breaks the skin, such as a bite, scratch, or cut    · Sores or injuries exposed to hot tub water or water in ponds, streams, or oceans    · Shared belongings, such as towels or exercise equipment    · Drugs that are injected    · A weak immune system or diabetes    · Lymphedema, chronic venous insufficiency, peripheral vascular disease, or deep vein thrombosis  What are the signs and symptoms of cellulitis? · A red, warm, swollen area on your skin    · Pain when the area is touched    · Bumps or blisters (abscess) that may drain pus    · Bumpy, raised skin that feels like an orange peel  How is cellulitis diagnosed? Your healthcare provider may know you have cellulitis by looking at and feeling your skin  Tell him or her how long you have had symptoms, and if anything helps decrease your symptoms  Tell your healthcare provider if you have ever had a cellulitis infection  He or she may not know which kind of bacteria caused your cellulitis  You may  need any of the following tests:  · Blood tests  may show which kind of bacteria is causing your infection  Blood tests may also show if the infection is in your blood  · A sample of tissue or fluid from your infected skin  may show what is causing your infection  The sample may also show if your infection is caused by another kind of skin disorder  · An x-ray, ultrasound, CT, or MRI  may show if the infection has spread  You may be given contrast liquid to help the infection show up better in the pictures  Tell the healthcare provider if you have ever had an allergic reaction to contrast liquid  Do not enter the MRI room with anything metal  Metal can cause serious injury   Tell the healthcare provider if you have any metal in or on your body  How is cellulitis treated? Treatment may decrease symptoms, stop the infection from spreading, and cure the infection  Treatment depends on how severe your cellulitis is  Cellulitis may go away on its own  You may  instead need antibiotics to help treat the bacterial infection  Your healthcare provider may draw a [place] around the edges of your cellulitis  If your cellulitis spreads, your healthcare provider will see it outside of the [place]  How can I manage my symptoms? · Elevate the area above the level of your heart  as often as you can  This will help decrease swelling and pain  Prop the area on pillows or blankets to keep it elevated comfortably  · Clean the area daily until the wound scabs over  Gently wash the area with soap and water  Pat dry  Use dressings as directed  · Place cool or warm, wet cloths on the area as directed  Use clean cloths and clean water  Leave it on the area until the cloth is room temperature  Pat the area dry with a clean, dry cloth  The cloths may help decrease pain  How can I prevent cellulitis? · Do not scratch bug bites or areas of injury  You increase your risk for cellulitis by scratching these areas  · Do not share personal items, such as towels, clothing, and razors  · Clean exercise equipment  with germ-killing  before and after you use it  · Wash your hands often  Use soap and water  Wash your hands after you use the bathroom, change a child's diapers, or sneeze  Wash your hands before you prepare or eat food  Use lotion to prevent dry, cracked skin  · Wear pressure stockings as directed  You may be told to wear the stockings if you have peripheral edema  Peripheral edema is swelling in your legs  The stockings improve blood flow and decrease swelling  · Treat athlete's foot  This can help prevent the spread of a bacterial skin infection    Call 911 if:   · You have sudden trouble breathing or chest pain  When should I seek immediate care? · Your wound gets larger and more painful  · You feel a crackling under your skin when you touch it  · You have purple dots or bumps on your skin, or you see bleeding under your skin  · You have new swelling and pain in your legs  · The red, warm, swollen area gets larger  · You see red streaks coming from the infected area  When should I contact my healthcare provider? · You have a fever  · Your fever or pain does not go away or gets worse  · The area does not get smaller after 2 days of antibiotics  · You have questions or concerns about your condition or care  CARE AGREEMENT:   You have the right to help plan your care  Learn about your health condition and how it may be treated  Discuss treatment options with your caregivers to decide what care you want to receive  You always have the right to refuse treatment  The above information is an  only  It is not intended as medical advice for individual conditions or treatments  Talk to your doctor, nurse or pharmacist before following any medical regimen to see if it is safe and effective for you  © 2017 [facility] Information is for End User's use only and may not be sold, redistributed or otherwise used for commercial purposes  All illustrations and images included in CareNotes® are the copyrighted property of [employer]  or [employer]  Cellulitis   AMBULATORY CARE:   Cellulitis  is a skin infection caused by bacteria  Cellulitis usually appears on the legs and feet, arms and hands, or face  Common signs and symptoms include the following:   · A red, warm, swollen area on your skin    · Pain when the area is touched    · Bumps or blisters (abscess) that may drain pus    · Bumpy, raised skin that feels like an orange peel  Call 911 if:   · You have sudden trouble breathing or chest pain      Seek care immediately if:   · Your wound gets larger and more painful  · You feel a crackling under your skin when you touch it  · You have purple dots or bumps on your skin, or you see bleeding under your skin  · You have new swelling and pain in your legs  · The red, warm, swollen area gets larger  · You see red streaks coming from the infected area  Contact your healthcare provider if:   · You have a fever  · Your fever or pain does not go away or gets worse  · The area does not get smaller after 2 days of antibiotics  · Your skin is flaking or peeling off  · You have questions or concerns about your condition or care  Treatment for cellulitis  may decrease symptoms, stop the infection from spreading, and cure the infection  Treatment depends on how severe your cellulitis is  Cellulitis may go away on its own  You may  instead need antibiotics to help treat the bacterial infection and medicines for pain  Your healthcare provider may draw a [place] around the edges of your cellulitis  If your cellulitis spreads, your healthcare provider will see it outside of the [place]  Manage your symptoms:   · Elevate the area above the level of your heart  as often as you can  This will help decrease swelling and pain  Prop the area on pillows or blankets to keep it elevated comfortably  · Clean the area daily until the wound scabs over  Gently wash the area with soap and water  Pat dry  Use dressings as directed  · Place cool or warm, wet cloths on the area as directed  Use clean cloths and clean water  Leave it on the area until the cloth is room temperature  Pat the area dry with a clean, dry cloth  The cloths may help decrease pain  Prevent cellulitis:   · Do not scratch bug bites or areas of injury  You increase your risk for cellulitis by scratching these areas  · Do not share personal items, such as towels, clothing, and razors       · Clean exercise equipment  with germ-killing  before and after you use it     · Wash your hands often  Use soap and water  Wash your hands after you use the bathroom, change a child's diapers, or sneeze  Wash your hands before you prepare or eat food  Use lotion to prevent dry, cracked skin  · Wear pressure stockings as directed  You may be told to wear the stockings if you have peripheral edema  The stockings improve blood flow and decrease swelling  · Treat athlete's foot  This can help prevent the spread of a bacterial skin infection  Follow up with your healthcare provider within 3 days, or as directed: Your healthcare provider will check if your cellulitis is getting better  You may need different medicine  Write down your questions so you remember to ask them during your visits  © 2017 [facility] Information is for End User's use only and may not be sold, redistributed or otherwise used for commercial purposes  All illustrations and images included in CareNotes® are the copyrighted property of [employer]  or [employer]  The above information is an  only  It is not intended as medical advice for individual conditions or treatments  Talk to your doctor, nurse or pharmacist before following any medical regimen to see if it is safe and effective for you

## 2018-09-05 DIAGNOSIS — E11.42 TYPE 2 DIABETES MELLITUS WITH DIABETIC POLYNEUROPATHY, WITHOUT LONG-TERM CURRENT USE OF INSULIN (HCC): ICD-10-CM

## 2018-09-05 RX ORDER — SITAGLIPTIN 50 MG/1
TABLET, FILM COATED ORAL
Qty: 90 TABLET | Refills: 1 | Status: SHIPPED | OUTPATIENT
Start: 2018-09-05 | End: 2018-11-27 | Stop reason: SDUPTHER

## 2018-09-05 NOTE — TELEPHONE ENCOUNTER
pls call Blas Barrios to find out what his home BS readings have been over last 1 week:    Morning? Before dinner? 2 hrs after meals? Also, is he feeling better since ER visit?  Did he schedule an appt with urologist?

## 2018-09-06 NOTE — TELEPHONE ENCOUNTER
Okay, clindamycin can cause stomach upset  He can try taking an over the counter probiotic while he is taking clindamycin to see if this helps    if he wants to be seen -> let me know    Also, he can try using a discount card(we have at the office) to lower cost of Januvia   can pick it up or we can mail to him    thx

## 2018-09-06 NOTE — TELEPHONE ENCOUNTER
Blood sugars look better but I have kevin taking [place] and [place] not [place] was ordered for 90 day supply    If he is SOB from abx, he should stop taking  Can he come see me today?     He also needs to see urology regardless, due to having scrotal swelling and hydroceles

## 2018-09-06 NOTE — TELEPHONE ENCOUNTER
Patient states BS readings in:    Morning- 107-120  Before dinner- 110-150    Yesterday AM- 107    States he is not taking sugars 2 hours after meals  Patient states he is out of jardiance and was told by [employer] it may need a PA  Would like to know if he can get a 90 day supply? Will call pharmacy and find out more info about PA  States he went to ER Sunday night for skin infection around scrotum area and filled up with fluid  As well as drainage/discharge on pimple (back area)  States he also is experiencing side effects from Clindamycin given during ER visit- not eating well, nausea, some sob  Patient states he is not better because of side effects to clindamycin and would like the medicine to clear up infection before he schedules appt with Urology

## 2018-09-06 NOTE — TELEPHONE ENCOUNTER
Patient notified  Patient states he double checked and states it was [place] and [place] not jardiance  Patient states he does not have SOB now and will not be able to come in today  States the only sx he is feeling now is nausea and not eating well  Patient states he will call Urology and make an appt right away  *Will call pharmacy and see if Januvia needs a PA

## 2018-09-06 NOTE — TELEPHONE ENCOUNTER
[address] [address] [address]  States Gloria Little is covered by pt insurance but will be $120 for the 90 day supply

## 2018-09-07 NOTE — TELEPHONE ENCOUNTER
Patient notified  States he has been eating Activia probiotic yogurt but feel it has not been helping  States he feels fine besides the nausea from the clindamycin  Patient would like Januvia discount cards  States he can pick them up from office

## 2018-09-07 NOTE — TELEPHONE ENCOUNTER
He can try taking clindamycin at same/300mg dose but every 8 hours(instead of every 6 hours) for 10 days to see if this helps nausea   as long as he follows up with urology

## 2018-09-13 DIAGNOSIS — E11.65 TYPE 2 DIABETES MELLITUS WITH HYPERGLYCEMIA, WITHOUT LONG-TERM CURRENT USE OF INSULIN (HCC): Primary | ICD-10-CM

## 2018-09-13 DIAGNOSIS — E03.9 HYPOTHYROIDISM, UNSPECIFIED TYPE: ICD-10-CM

## 2018-09-13 DIAGNOSIS — E78.2 MIXED HYPERLIPIDEMIA: ICD-10-CM

## 2018-09-13 RX ORDER — LEVOTHYROXINE SODIUM 0.07 MG/1
75 TABLET ORAL DAILY
Qty: 90 TABLET | Refills: 1 | Status: SHIPPED | OUTPATIENT
Start: 2018-09-13 | End: 2019-01-02 | Stop reason: SDUPTHER

## 2018-09-13 RX ORDER — SIMVASTATIN 20 MG
20 TABLET ORAL
Qty: 90 TABLET | Refills: 1 | Status: SHIPPED | OUTPATIENT
Start: 2018-09-13 | End: 2019-03-28

## 2018-09-14 DIAGNOSIS — E11.42 TYPE 2 DIABETES MELLITUS WITH DIABETIC POLYNEUROPATHY, WITHOUT LONG-TERM CURRENT USE OF INSULIN (HCC): ICD-10-CM

## 2018-09-14 RX ORDER — GLIMEPIRIDE 4 MG/1
4 TABLET ORAL 2 TIMES DAILY
Qty: 60 TABLET | Refills: 0 | Status: SHIPPED | OUTPATIENT
Start: 2018-09-14 | End: 2018-11-20 | Stop reason: SDUPTHER

## 2018-09-17 ENCOUNTER — OFFICE VISIT (OUTPATIENT)
Dept: INTERNAL MEDICINE CLINIC | Facility: CLINIC | Age: 63
End: 2018-09-17
Payer: COMMERCIAL

## 2018-09-17 VITALS
TEMPERATURE: 98 F | BODY MASS INDEX: 41.83 KG/M2 | WEIGHT: 282.4 LBS | DIASTOLIC BLOOD PRESSURE: 70 MMHG | RESPIRATION RATE: 18 BRPM | HEART RATE: 84 BPM | SYSTOLIC BLOOD PRESSURE: 114 MMHG | OXYGEN SATURATION: 98 % | HEIGHT: 69 IN

## 2018-09-17 DIAGNOSIS — G89.29 CHRONIC BILATERAL LOW BACK PAIN WITH RIGHT-SIDED SCIATICA: ICD-10-CM

## 2018-09-17 DIAGNOSIS — E03.9 HYPOTHYROIDISM, UNSPECIFIED TYPE: ICD-10-CM

## 2018-09-17 DIAGNOSIS — E11.65 TYPE 2 DIABETES MELLITUS WITH HYPERGLYCEMIA, WITHOUT LONG-TERM CURRENT USE OF INSULIN (HCC): Primary | ICD-10-CM

## 2018-09-17 DIAGNOSIS — M54.41 CHRONIC BILATERAL LOW BACK PAIN WITH RIGHT-SIDED SCIATICA: ICD-10-CM

## 2018-09-17 DIAGNOSIS — I10 BENIGN ESSENTIAL HYPERTENSION: ICD-10-CM

## 2018-09-17 DIAGNOSIS — M51.26 LUMBAGO DUE TO DISPLACEMENT OF INTERVERTEBRAL DISC: ICD-10-CM

## 2018-09-17 DIAGNOSIS — E66.01 OBESITY, MORBID, BMI 40.0-49.9 (HCC): ICD-10-CM

## 2018-09-17 DIAGNOSIS — Z23 FLU VACCINE NEED: ICD-10-CM

## 2018-09-17 LAB — SL AMB POCT GLUCOSE BLD: 215

## 2018-09-17 PROCEDURE — 90471 IMMUNIZATION ADMIN: CPT | Performed by: INTERNAL MEDICINE

## 2018-09-17 PROCEDURE — 3008F BODY MASS INDEX DOCD: CPT | Performed by: INTERNAL MEDICINE

## 2018-09-17 PROCEDURE — 82948 REAGENT STRIP/BLOOD GLUCOSE: CPT | Performed by: INTERNAL MEDICINE

## 2018-09-17 PROCEDURE — 3078F DIAST BP <80 MM HG: CPT | Performed by: INTERNAL MEDICINE

## 2018-09-17 PROCEDURE — 99214 OFFICE O/P EST MOD 30 MIN: CPT | Performed by: INTERNAL MEDICINE

## 2018-09-17 PROCEDURE — 3074F SYST BP LT 130 MM HG: CPT | Performed by: INTERNAL MEDICINE

## 2018-09-17 PROCEDURE — 90682 RIV4 VACC RECOMBINANT DNA IM: CPT | Performed by: INTERNAL MEDICINE

## 2018-09-17 RX ORDER — BLOOD-GLUCOSE CONTROL, NORMAL
EACH MISCELLANEOUS AS NEEDED
Qty: 1 EACH | Refills: 1 | Status: SHIPPED | OUTPATIENT
Start: 2018-09-17

## 2018-09-17 NOTE — PROGRESS NOTES
Assessment/Plan:     Diagnoses and all orders for this visit:    Type 2 diabetes mellitus with hyperglycemia, without long-term current use of insulin (HCC)  Comments:  re-calibrate BS meter and re-check A1C next week  if still elevated -> t/c increase januvia vs addition of lantus  Orders:  -     POCT blood glucose  -     Hemoglobin A1C; Future  -     Blood Glucose Calibration (OT ULTRA/FASTTK CNTRL SOLN) SOLN; by In Vitro route as needed (to calibrate blood sugar meter)    Benign essential hypertension  Comments:  BP controlled, c/w ACEI    Hypothyroidism, unspecified type  Comments:  TSH is 3, c/w Thyroid med and at current dose    Obesity, morbid, BMI 40 0-49 9 ([facility] [address] )    Flu vaccine need  -     influenza vaccine, 2067-2750, quadrivalent, recombinant, PF, 0 5 mL, for patients 18 yr+ (FLUBLOK)    Lumbago due to displacement of intervertebral disc  Comments:  aquatic therapy, ref provided  Orders:  -     Ambulatory referral to Physical Therapy; Future    Chronic bilateral low back pain with right-sided sciatica  -     Ambulatory referral to Physical Therapy; Future          Subjective:      Patient ID: Mandy Parmar is a 59 y o  male  HPI    Here for follow up  Reviewed meds with patient  Feeling better from scrotal swelling/pain - s/p ER eval and clindamycin abx course  Doesn't want to see urologist   Ranjana Knowles lower dose of abx (Q8 hours) per my advice and had less GI side effects  Blood sugars have gone up over last 1 5 weeks since having  infection/symptoms, was 80-120s in AM before this  Now they are in higher 100s(pt did not clarify exact numbers)  Eating snacks at work including pretzels and declines to see DM educator  Not exercising, 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[employer])  Went to Aquatic therapy in past and helped low back pain  BS checked in office was 215(post lunch ~2 hours)  No other complaints      Past Medical History:   Diagnosis Date    BPH (benign prostatic hyperplasia)     CPAP (continuous positive airway pressure) dependence     Diabetes mellitus ([facility] [address] )     Disease of thyroid gland     Hyperlipidemia     Hypertension     Sleep apnea      Vitals:    09/17/18 1558   BP: 114/70   Pulse: 84   Resp: 18   Temp: 98 °F (36 7 °C)   SpO2: 98%   Weight: 128 kg (282 lb 6 4 oz)   Height: 5' 9" (1 753 m)     Body mass index is 41 7 kg/m²      Current Outpatient Prescriptions:     aspirin (ECOTRIN LOW STRENGTH) 81 mg EC tablet, Take 81 mg by mouth daily, Disp: , Rfl:     Blood Glucose Monitoring Suppl (ONE TOUCH ULTRA MINI) w/Device KIT, by Does not apply route 2 (two) times a day, Disp: 1 each, Rfl: 0    fenofibrate (TRIGLIDE) 160 MG tablet, Take 1 tablet (160 mg total) by mouth daily, Disp: 90 tablet, Rfl: 1    glimepiride (AMARYL) 4 mg tablet, Take 1 tablet (4 mg total) by mouth 2 (two) times a day, Disp: 60 tablet, Rfl: 0    glucose blood (ONE TOUCH ULTRA TEST) test strip, 1 each by Other route 2 (two) times a day Use as instructed, Disp: 100 each, Rfl: 3    HYDROcodone-acetaminophen (NORCO) 5-325 mg per tablet, Take 1 tablet by mouth daily as needed for pain Max Daily Amount: 1 tablet, Disp: 30 tablet, Rfl: 0    JANUVIA 50 MG tablet, TAKE 1 TABLET BY MOUTH ONCE DAILY, Disp: 90 tablet, Rfl: 1    ketoconazole (NIZORAL) 2 % cream, Apply topically daily, Disp: 60 g, Rfl: 1    levothyroxine 75 mcg tablet, Take 1 tablet (75 mcg total) by mouth daily, Disp: 90 tablet, Rfl: 1    lisinopril (ZESTRIL) 10 mg tablet, Take 1 tablet (10 mg total) by mouth daily, Disp: 90 tablet, Rfl: 0    metFORMIN (GLUCOPHAGE-XR) 500 mg 24 hr tablet, TAKE 2 TABLETS(1000 MG TOTAL) BY MOUTH TWICE DAILY WITH MEALS, Disp: 180 tablet, Rfl: 1    ONETOUCH DELICA LANCETS 78Y MISC, by Does not apply route 2 (two) times a day, Disp: 100 each, Rfl: 3    sildenafil (VIAGRA) 50 MG tablet, Take 1 tablet (50 mg total) by mouth daily as needed for erectile dysfunction, Disp: 10 tablet, Rfl: 0   simvastatin (ZOCOR) 20 mg tablet, Take 1 tablet (20 mg total) by mouth daily at bedtime, Disp: 90 tablet, Rfl: 1    Blood Glucose Calibration (OT ULTRA/FASTTK CNTRL SOLN) SOLN, by In Vitro route as needed (to calibrate blood sugar meter), Disp: 1 each, Rfl: 1    fluticasone (FLONASE) 50 mcg/act nasal spray, 2 sprays into each nostril daily (Patient not taking: Reported on 9/17/2018 ), Disp: 16 g, Rfl: 2    sildenafil (REVATIO) 20 mg tablet, Take 2 tablets (40 mg total) by mouth daily (Patient not taking: Reported on 9/17/2018 ), Disp: 20 tablet, Rfl: 1  Allergies   Allergen Reactions    Penicillins      SYNCOPE, but has taken amoxicillin/augmentin in past         Review of Systems   Constitutional: Negative for fever  HENT: Negative for congestion  Eyes: Negative for visual disturbance  Respiratory: Negative for shortness of breath  Cardiovascular: Negative for chest pain  Gastrointestinal: Negative for abdominal pain  Genitourinary: Negative for dysuria  Musculoskeletal: Negative for arthralgias  Neurological: Negative for headaches  Psychiatric/Behavioral: Negative for dysphoric mood  Objective:      /70   Pulse 84   Temp 98 °F (36 7 °C)   Resp 18   Ht 5' 9" (1 753 m)   Wt 128 kg (282 lb 6 4 oz)   SpO2 98%   BMI 41 70 kg/m²          Physical Exam   Constitutional: He appears well-developed and well-nourished  +obese   HENT:   Head: Normocephalic and atraumatic  Mouth/Throat: Oropharynx is clear and moist    Cardiovascular: Normal rate, regular rhythm and normal heart sounds  No murmur heard  Pulmonary/Chest: Effort normal and breath sounds normal  He has no wheezes  He has no rales  Abdominal: Soft  Bowel sounds are normal  There is no tenderness  Musculoskeletal: He exhibits no edema  SLR neg for LBP b/l, no spinal but mild paraspinal tenderness in lumbar musculature b/l   Neurological: He is alert     Motor 5/5 LE b/l   Psychiatric: He has a normal mood and affect  His behavior is normal    Vitals reviewed

## 2018-10-17 LAB
LEFT EYE DIABETIC RETINOPATHY: NORMAL
RIGHT EYE DIABETIC RETINOPATHY: NORMAL

## 2018-11-20 DIAGNOSIS — E11.42 TYPE 2 DIABETES MELLITUS WITH DIABETIC POLYNEUROPATHY, WITHOUT LONG-TERM CURRENT USE OF INSULIN (HCC): ICD-10-CM

## 2018-11-20 DIAGNOSIS — N52.9 ERECTILE DYSFUNCTION, UNSPECIFIED ERECTILE DYSFUNCTION TYPE: ICD-10-CM

## 2018-11-20 RX ORDER — GLIMEPIRIDE 4 MG/1
4 TABLET ORAL 2 TIMES DAILY
Qty: 180 TABLET | Refills: 1 | Status: SHIPPED | OUTPATIENT
Start: 2018-11-20 | End: 2019-03-26 | Stop reason: SDUPTHER

## 2018-11-20 RX ORDER — METFORMIN HYDROCHLORIDE 500 MG/1
1000 TABLET, EXTENDED RELEASE ORAL
Qty: 180 TABLET | Refills: 1 | Status: SHIPPED | OUTPATIENT
Start: 2018-11-20 | End: 2019-01-03

## 2018-11-20 RX ORDER — SILDENAFIL 50 MG/1
50 TABLET, FILM COATED ORAL DAILY PRN
Qty: 10 TABLET | Refills: 2 | Status: SHIPPED | OUTPATIENT
Start: 2018-11-20 | End: 2019-06-11 | Stop reason: SDUPTHER

## 2018-11-26 DIAGNOSIS — M54.41 CHRONIC MIDLINE LOW BACK PAIN WITH RIGHT-SIDED SCIATICA: ICD-10-CM

## 2018-11-26 DIAGNOSIS — G89.29 CHRONIC MIDLINE LOW BACK PAIN WITH RIGHT-SIDED SCIATICA: ICD-10-CM

## 2018-11-26 RX ORDER — HYDROCODONE BITARTRATE AND ACETAMINOPHEN 5; 325 MG/1; MG/1
1 TABLET ORAL DAILY PRN
Qty: 30 TABLET | Refills: 0 | Status: SHIPPED | OUTPATIENT
Start: 2018-11-26 | End: 2019-03-28 | Stop reason: SDUPTHER

## 2018-11-27 ENCOUNTER — TELEPHONE (OUTPATIENT)
Dept: INTERNAL MEDICINE CLINIC | Facility: CLINIC | Age: 63
End: 2018-11-27

## 2018-11-27 ENCOUNTER — APPOINTMENT (OUTPATIENT)
Dept: LAB | Facility: CLINIC | Age: 63
End: 2018-11-27
Payer: COMMERCIAL

## 2018-11-27 ENCOUNTER — TRANSCRIBE ORDERS (OUTPATIENT)
Dept: LAB | Facility: CLINIC | Age: 63
End: 2018-11-27

## 2018-11-27 ENCOUNTER — OFFICE VISIT (OUTPATIENT)
Dept: INTERNAL MEDICINE CLINIC | Facility: CLINIC | Age: 63
End: 2018-11-27
Payer: COMMERCIAL

## 2018-11-27 VITALS
RESPIRATION RATE: 18 BRPM | SYSTOLIC BLOOD PRESSURE: 134 MMHG | TEMPERATURE: 97.9 F | OXYGEN SATURATION: 98 % | BODY MASS INDEX: 41.65 KG/M2 | HEART RATE: 76 BPM | WEIGHT: 281.2 LBS | HEIGHT: 69 IN | DIASTOLIC BLOOD PRESSURE: 90 MMHG

## 2018-11-27 DIAGNOSIS — M54.41 CHRONIC MIDLINE LOW BACK PAIN WITH RIGHT-SIDED SCIATICA: ICD-10-CM

## 2018-11-27 DIAGNOSIS — E11.65 TYPE 2 DIABETES MELLITUS WITH HYPERGLYCEMIA, WITHOUT LONG-TERM CURRENT USE OF INSULIN (HCC): ICD-10-CM

## 2018-11-27 DIAGNOSIS — E78.2 MIXED HYPERLIPIDEMIA: ICD-10-CM

## 2018-11-27 DIAGNOSIS — G89.29 CHRONIC MIDLINE LOW BACK PAIN WITH RIGHT-SIDED SCIATICA: ICD-10-CM

## 2018-11-27 DIAGNOSIS — E11.65 TYPE 2 DIABETES MELLITUS WITH HYPERGLYCEMIA, WITHOUT LONG-TERM CURRENT USE OF INSULIN (HCC): Primary | ICD-10-CM

## 2018-11-27 DIAGNOSIS — I10 BENIGN ESSENTIAL HYPERTENSION: ICD-10-CM

## 2018-11-27 LAB
ANION GAP SERPL CALCULATED.3IONS-SCNC: 10 MMOL/L (ref 4–13)
BUN SERPL-MCNC: 19 MG/DL (ref 5–25)
CALCIUM SERPL-MCNC: 9.5 MG/DL (ref 8.3–10.1)
CHLORIDE SERPL-SCNC: 100 MMOL/L (ref 100–108)
CO2 SERPL-SCNC: 25 MMOL/L (ref 21–32)
CREAT SERPL-MCNC: 0.98 MG/DL (ref 0.6–1.3)
GFR SERPL CREATININE-BSD FRML MDRD: 82 ML/MIN/1.73SQ M
GLUCOSE SERPL-MCNC: 287 MG/DL (ref 65–140)
POTASSIUM SERPL-SCNC: 4.7 MMOL/L (ref 3.5–5.3)
SL AMB POCT HEMOGLOBIN AIC: 10.7
SODIUM SERPL-SCNC: 135 MMOL/L (ref 136–145)

## 2018-11-27 PROCEDURE — 3046F HEMOGLOBIN A1C LEVEL >9.0%: CPT | Performed by: INTERNAL MEDICINE

## 2018-11-27 PROCEDURE — 99214 OFFICE O/P EST MOD 30 MIN: CPT | Performed by: INTERNAL MEDICINE

## 2018-11-27 PROCEDURE — 3008F BODY MASS INDEX DOCD: CPT | Performed by: INTERNAL MEDICINE

## 2018-11-27 PROCEDURE — 36415 COLL VENOUS BLD VENIPUNCTURE: CPT

## 2018-11-27 PROCEDURE — 83036 HEMOGLOBIN GLYCOSYLATED A1C: CPT | Performed by: INTERNAL MEDICINE

## 2018-11-27 PROCEDURE — 80048 BASIC METABOLIC PNL TOTAL CA: CPT

## 2018-11-27 PROCEDURE — 1036F TOBACCO NON-USER: CPT | Performed by: INTERNAL MEDICINE

## 2018-11-27 RX ORDER — HYDROCODONE BITARTRATE AND ACETAMINOPHEN 5; 325 MG/1; MG/1
1 TABLET ORAL DAILY PRN
Qty: 30 TABLET | Refills: 0 | Status: CANCELLED | OUTPATIENT
Start: 2018-11-27

## 2018-11-27 RX ORDER — SILDENAFIL 50 MG/1
TABLET, FILM COATED ORAL
COMMUNITY
Start: 2018-11-20 | End: 2022-01-31 | Stop reason: SDUPTHER

## 2018-11-27 NOTE — PATIENT INSTRUCTIONS
1  Increase januvia to 100mg once a day  2  Continue glimepiride and metformin twice a day  3  Diabetic nutritionist  4  You need to start insulin/lantus once a day  5   Return in 2 weeks with home blood sugar readings    Your A1C is 10 7%

## 2018-11-27 NOTE — TELEPHONE ENCOUNTER
----- Message from Oneida Rosa DO sent at 11/27/2018  5:36 PM EST -----  BW is back and electrolytes/kidney function look okay  Blood sugar is 287    Continue with januvia dose increase & our current treatment plan, thx

## 2018-11-27 NOTE — PROGRESS NOTES
Assessment/Plan:     Diagnoses and all orders for this visit:    Type 2 diabetes mellitus with hyperglycemia, without long-term current use of insulin ([facility])  Comments:  blood sugars going up, non-compliance with diet and treatment plan(declines to start insulin)  increase januvia for now, BW today and f/u in 2 wks  Orders:  -     POCT hemoglobin A1c  -     sitaGLIPtin (JANUVIA) 100 mg tablet; Take 1 tablet (100 mg total) by mouth daily  -     Basic metabolic panel; Future  -     Ambulatory referral to medical nutrition therapy for diabetes; Future    Chronic midline low back pain with right-sided sciatica  Comments:  hydrocodone refilled, [place]  if pain worsens -> pain management eval(has had steroid injections in back in past)    Mixed hyperlipidemia  Comments:  taking statin/fenofibrate and no SE   t/c switching to atorvastatin or crestor monotherapy once blood sugars controlled    Benign essential hypertension  Comments:  BP borderline elevated, c/w ACEI and re-check at f/u visit in 2 weeks  Orders:  -     Basic metabolic panel; Future    Other orders  -     sildenafil (VIAGRA) 50 MG tablet;   -     Cancel: HYDROcodone-acetaminophen (NORCO) 5-325 mg per tablet; Take 1 tablet by mouth daily as needed for pain Max Daily Amount: 1 tablet      advised to see DM educator as well, ref provided  Spent >25 mins with patient including >50% of time counseling/coordination of care      Subjective:      Patient ID: Julio Barrios is a 59 y o  male  HPI    Here for follow up, reviewed meds with Tyesha Li today  He ran out of glimepiride last week, didn't contact pharmacy for refill(ordered promptly on 11/20) & resumed last night  Blood sugars off glimepiride has been in low 200s in morning and evening, but while taking rx have still been elevated in 180s  Did not complete his BW, has been busy with work and 2nd job, eating more fast foods    A1C completed today has gone up and is 10 7%    He was educated on use of insulin(lantus) pen today but declines to use  He requests refill of hydrocodone for LBP with sciatica with flares occ with prolonged standing/walking  He understands role of obesity has on back pain and [place] as well today  We discussed risk/benefit of uncontrolled DM including risk of heart disease/heart attack/hospitalization or other complications and he verbalized understanding 'I'm not doing what I should be doing '  No other complaints  Past Medical History:   Diagnosis Date    BPH (benign prostatic hyperplasia)     CPAP (continuous positive airway pressure) dependence     Diabetes mellitus ([facility] [address] )     Disease of thyroid gland     Hyperlipidemia     Hypertension     Sleep apnea      Vitals:    11/27/18 1559   BP: 134/90   Pulse: 76   Resp: 18   Temp: 97 9 °F (36 6 °C)   TempSrc: Oral   SpO2: 98%   Weight: 128 kg (281 lb 3 2 oz)   Height: 5' 9" (1 753 m)     Body mass index is 41 53 kg/m²      Current Outpatient Prescriptions:     aspirin (ECOTRIN LOW STRENGTH) 81 mg EC tablet, Take 81 mg by mouth daily, Disp: , Rfl:     Blood Glucose Calibration (OT ULTRA/FASTTK CNTRL SOLN) SOLN, by In Vitro route as needed (to calibrate blood sugar meter), Disp: 1 each, Rfl: 1    Blood Glucose Monitoring Suppl (ONE TOUCH ULTRA MINI) w/Device KIT, by Does not apply route 2 (two) times a day, Disp: 1 each, Rfl: 0    fenofibrate (TRIGLIDE) 160 MG tablet, Take 1 tablet (160 mg total) by mouth daily, Disp: 90 tablet, Rfl: 1    fluticasone (FLONASE) 50 mcg/act nasal spray, 2 sprays into each nostril daily, Disp: 16 g, Rfl: 2    glimepiride (AMARYL) 4 mg tablet, Take 1 tablet (4 mg total) by mouth 2 (two) times a day, Disp: 180 tablet, Rfl: 1    glucose blood (ONE TOUCH ULTRA TEST) test strip, 1 each by Other route 2 (two) times a day Use as instructed, Disp: 100 each, Rfl: 3    HYDROcodone-acetaminophen (NORCO) 5-325 mg per tablet, Take 1 tablet by mouth daily as needed for pain Earliest Fill Date: 11/26/18 Max Daily Amount: 1 tablet, Disp: 30 tablet, Rfl: 0    ketoconazole (NIZORAL) 2 % cream, Apply topically daily, Disp: 60 g, Rfl: 1    levothyroxine 75 mcg tablet, Take 1 tablet (75 mcg total) by mouth daily, Disp: 90 tablet, Rfl: 1    lisinopril (ZESTRIL) 10 mg tablet, Take 1 tablet (10 mg total) by mouth daily, Disp: 90 tablet, Rfl: 0    metFORMIN (GLUCOPHAGE-XR) 500 mg 24 hr tablet, Take 2 tablets (1,000 mg total) by mouth daily with breakfast, Disp: 180 tablet, Rfl: 1    ONETOUCH DELICA LANCETS 09I MISC, by Does not apply route 2 (two) times a day, Disp: 100 each, Rfl: 3    sildenafil (REVATIO) 20 mg tablet, Take 2 tablets (40 mg total) by mouth daily, Disp: 20 tablet, Rfl: 1    sildenafil (VIAGRA) 50 MG tablet, Take 1 tablet (50 mg total) by mouth daily as needed for erectile dysfunction, Disp: 10 tablet, Rfl: 2    simvastatin (ZOCOR) 20 mg tablet, Take 1 tablet (20 mg total) by mouth daily at bedtime, Disp: 90 tablet, Rfl: 1    sitaGLIPtin (JANUVIA) 100 mg tablet, Take 1 tablet (100 mg total) by mouth daily, Disp: 90 tablet, Rfl: 1    sildenafil (VIAGRA) 50 MG tablet, , Disp: , Rfl:   Allergies   Allergen Reactions    Penicillins      SYNCOPE, but has taken amoxicillin/augmentin in past         Review of Systems   Constitutional: Negative for fever  HENT: Negative for congestion  Eyes: Negative for visual disturbance  Respiratory: Negative for shortness of breath  Cardiovascular: Negative for chest pain  Gastrointestinal: Negative for abdominal pain  Genitourinary: Negative for dysuria  Musculoskeletal: Positive for back pain  Skin: Negative for rash  Neurological: Negative for headaches  Psychiatric/Behavioral: Negative for dysphoric mood           Objective:      /90   Pulse 76   Temp 97 9 °F (36 6 °C) (Oral)   Resp 18   Ht 5' 9" (1 753 m)   Wt 128 kg (281 lb 3 2 oz)   SpO2 98%   BMI 41 53 kg/m²          Physical Exam   Constitutional: He appears well-developed and well-nourished  +obese   HENT:   Head: Normocephalic and atraumatic  Mouth/Throat: Oropharynx is clear and moist    Eyes: Pupils are equal, round, and reactive to light  Conjunctivae are normal    Cardiovascular: Normal rate, regular rhythm and normal heart sounds  No murmur heard  Pulmonary/Chest: Effort normal and breath sounds normal  He has no wheezes  He has no rales  Abdominal: Soft  Bowel sounds are normal  There is no tenderness  Musculoskeletal: He exhibits no edema  Neurological: He is alert  Psychiatric: He has a normal mood and affect  His behavior is normal    Vitals reviewed          Results for orders placed or performed in visit on 11/27/18   POCT hemoglobin A1c   Result Value Ref Range    Hemoglobin A1C 10 7

## 2018-11-30 ENCOUNTER — TELEPHONE (OUTPATIENT)
Dept: INTERNAL MEDICINE CLINIC | Facility: CLINIC | Age: 63
End: 2018-11-30

## 2018-11-30 NOTE — TELEPHONE ENCOUNTER
Monday BS over 200 got to 250-260 in the afternoon , took medication Monday afternoon that was prescribed and BS started going down    Middle of the week Morning 170-180 afternoon went down   Today Morning 120 afternoon 180    Said he would call again next week

## 2018-12-11 ENCOUNTER — OFFICE VISIT (OUTPATIENT)
Dept: INTERNAL MEDICINE CLINIC | Facility: CLINIC | Age: 63
End: 2018-12-11
Payer: COMMERCIAL

## 2018-12-11 VITALS
HEART RATE: 76 BPM | SYSTOLIC BLOOD PRESSURE: 130 MMHG | BODY MASS INDEX: 42.06 KG/M2 | TEMPERATURE: 96.8 F | HEIGHT: 69 IN | RESPIRATION RATE: 18 BRPM | OXYGEN SATURATION: 98 % | WEIGHT: 284 LBS | DIASTOLIC BLOOD PRESSURE: 80 MMHG

## 2018-12-11 DIAGNOSIS — E66.01 OBESITY, MORBID, BMI 40.0-49.9 (HCC): ICD-10-CM

## 2018-12-11 DIAGNOSIS — E11.65 TYPE 2 DIABETES MELLITUS WITH HYPERGLYCEMIA, WITHOUT LONG-TERM CURRENT USE OF INSULIN (HCC): Primary | ICD-10-CM

## 2018-12-11 DIAGNOSIS — E78.2 MIXED HYPERLIPIDEMIA: ICD-10-CM

## 2018-12-11 DIAGNOSIS — I10 BENIGN ESSENTIAL HYPERTENSION: ICD-10-CM

## 2018-12-11 PROCEDURE — 1036F TOBACCO NON-USER: CPT | Performed by: INTERNAL MEDICINE

## 2018-12-11 PROCEDURE — 3079F DIAST BP 80-89 MM HG: CPT | Performed by: INTERNAL MEDICINE

## 2018-12-11 PROCEDURE — 3008F BODY MASS INDEX DOCD: CPT | Performed by: INTERNAL MEDICINE

## 2018-12-11 PROCEDURE — 99213 OFFICE O/P EST LOW 20 MIN: CPT | Performed by: INTERNAL MEDICINE

## 2018-12-11 PROCEDURE — 3075F SYST BP GE 130 - 139MM HG: CPT | Performed by: INTERNAL MEDICINE

## 2018-12-11 RX ORDER — ATORVASTATIN CALCIUM 20 MG/1
20 TABLET, FILM COATED ORAL DAILY
Qty: 90 TABLET | Refills: 0 | Status: SHIPPED | OUTPATIENT
Start: 2018-12-11 | End: 2018-12-31 | Stop reason: SDUPTHER

## 2018-12-11 NOTE — PROGRESS NOTES
Assessment/Plan:     Diagnoses and all orders for this visit:    Type 2 diabetes mellitus with hyperglycemia, without long-term current use of insulin ([facility])  Comments:  c/w januvia, amaryl, metformin and DM diet  F/u in 2 mos and for A1C    Benign essential hypertension  Comments:  BP stable, c/w ACEI    Obesity, morbid, BMI 40 0-49 9 ([facility])  Comments:  weight loss counseling, DM diet discussed    Mixed hyperlipidemia  Comments:  stop zocor and fenofibrate and start atorvastatin 20mg  printed rx handed to patient to check different pharmacies for pricing  Orders:  -     atorvastatin (LIPITOR) 20 mg tablet; Take 1 tablet (20 mg total) by mouth daily          Subjective:      Patient ID: Iwona Cuellar is a 59 y o  male  HPI     Here for follow up  BP doing better today and Hilary Avina increased dose of his [facility] and [employer] and Blood sugars improved after 1 week of doing this  He modified his diet as well and is trying to follow DM diet, avoiding restaurant or fast foods  Blood sugars have been in 90-120s in AM and 120s after meals  No low or high BS readings otherwise  Understands benefit of better BS control for DM and would like to avoid insulin or non-insulin injectable medications for DM  He has been taking zocor and fenofibrate for high chol/DM/senthil TG  His last TG were 179 in July  He has not had complications from elev TG in past and was related to elevated BS before  We discussed SE of statin and fibrate including muscle aches/joint pains and he would like to try generic atorvastatin instead of the combination therapy  No other complaints      Past Medical History:   Diagnosis Date    BPH (benign prostatic hyperplasia)     CPAP (continuous positive airway pressure) dependence     [facility] mellitus ([facility] [address] [facility]     Disease of thyroid gland     Hyperlipidemia     Hypertension     Sleep apnea      Vitals:    12/11/18 1604   BP: 130/80   Pulse: 76   Resp: 18   Temp: (!) 96 8 °F (36 °C)   TempSrc: Oral SpO2: 98%   Weight: 129 kg (284 lb)   Height: 5' 9" (1 753 m)     Body mass index is 41 94 kg/m²      Current Outpatient Prescriptions:     aspirin (ECOTRIN LOW STRENGTH) 81 mg EC tablet, Take 81 mg by mouth daily, Disp: , Rfl:     Blood Glucose Calibration (OT ULTRA/FASTTK CNTRL SOLN) SOLN, by In Vitro route as needed (to calibrate blood sugar meter), Disp: 1 each, Rfl: 1    Blood Glucose Monitoring Suppl (ONE TOUCH ULTRA MINI) w/Device KIT, by Does not apply route 2 (two) times a day, Disp: 1 each, Rfl: 0    fenofibrate (TRIGLIDE) 160 MG tablet, Take 1 tablet (160 mg total) by mouth daily, Disp: 90 tablet, Rfl: 1    fluticasone (FLONASE) 50 mcg/act nasal spray, 2 sprays into each nostril daily, Disp: 16 g, Rfl: 2    glimepiride (AMARYL) 4 mg tablet, Take 1 tablet (4 mg total) by mouth 2 (two) times a day, Disp: 180 tablet, Rfl: 1    glucose blood (ONE TOUCH ULTRA TEST) test strip, 1 each by Other route 2 (two) times a day Use as instructed, Disp: 100 each, Rfl: 3    HYDROcodone-acetaminophen (NORCO) 5-325 mg per tablet, Take 1 tablet by mouth daily as needed for pain Earliest Fill Date: 11/26/18 Max Daily Amount: 1 tablet, Disp: 30 tablet, Rfl: 0    ketoconazole (NIZORAL) 2 % cream, Apply topically daily, Disp: 60 g, Rfl: 1    levothyroxine 75 mcg tablet, Take 1 tablet (75 mcg total) by mouth daily, Disp: 90 tablet, Rfl: 1    lisinopril (ZESTRIL) 10 mg tablet, Take 1 tablet (10 mg total) by mouth daily, Disp: 90 tablet, Rfl: 0    metFORMIN (GLUCOPHAGE-XR) 500 mg 24 hr tablet, Take 2 tablets (1,000 mg total) by mouth daily with breakfast, Disp: 180 tablet, Rfl: 1    ONETOUCH DELICA LANCETS 11K MISC, by Does not apply route 2 (two) times a day, Disp: 100 each, Rfl: 3    sildenafil (REVATIO) 20 mg tablet, Take 2 tablets (40 mg total) by mouth daily, Disp: 20 tablet, Rfl: 1    sildenafil (VIAGRA) 50 MG tablet, Take 1 tablet (50 mg total) by mouth daily as needed for erectile dysfunction, Disp: 10 tablet, Rfl: 2    sildenafil (VIAGRA) 50 MG tablet, , Disp: , Rfl:     simvastatin (ZOCOR) 20 mg tablet, Take 1 tablet (20 mg total) by mouth daily at bedtime, Disp: 90 tablet, Rfl: 1    sitaGLIPtin (JANUVIA) 100 mg tablet, Take 1 tablet (100 mg total) by mouth daily, Disp: 90 tablet, Rfl: 1    atorvastatin (LIPITOR) 20 mg tablet, Take 1 tablet (20 mg total) by mouth daily, Disp: 90 tablet, Rfl: 0  Allergies   Allergen Reactions    Penicillins      SYNCOPE, but has taken amoxicillin/augmentin in past         Review of Systems   Constitutional: Negative for fever  HENT: Negative for congestion  Eyes: Negative for visual disturbance  Respiratory: Negative for shortness of breath  Cardiovascular: Negative for chest pain  Gastrointestinal: Negative for abdominal pain  Genitourinary: Negative for dysuria  Musculoskeletal: Positive for back pain (but stable & with as needed pain medication)  Skin: Negative for rash  Neurological: Negative for headaches  Psychiatric/Behavioral: Negative for dysphoric mood  Objective:      /80   Pulse 76   Temp (!) 96 8 °F (36 °C) (Oral)   Resp 18   Ht 5' 9" (1 753 m)   Wt 129 kg (284 lb)   SpO2 98%   BMI 41 94 kg/m²          Physical Exam   Constitutional: He appears well-developed and well-nourished  +obese   HENT:   Head: Normocephalic and atraumatic  Eyes: Pupils are equal, round, and reactive to light  Conjunctivae are normal    Cardiovascular: Normal rate, regular rhythm and normal heart sounds  No murmur heard  Pulmonary/Chest: Effort normal and breath sounds normal  He has no wheezes  He has no rales  Abdominal: Soft  Bowel sounds are normal  There is no tenderness  Musculoskeletal: He exhibits no edema  Neurological: He is alert  Psychiatric: He has a normal mood and affect  His behavior is normal    Vitals reviewed          Lab Results   Component Value Date    HGBA1C 10 7 11/27/2018

## 2018-12-31 DIAGNOSIS — E78.2 MIXED HYPERLIPIDEMIA: ICD-10-CM

## 2018-12-31 RX ORDER — ATORVASTATIN CALCIUM 20 MG/1
20 TABLET, FILM COATED ORAL DAILY
Qty: 90 TABLET | Refills: 0 | Status: SHIPPED | OUTPATIENT
Start: 2018-12-31 | End: 2019-04-11 | Stop reason: SDUPTHER

## 2019-01-02 DIAGNOSIS — E11.65 TYPE 2 DIABETES MELLITUS WITH HYPERGLYCEMIA, WITHOUT LONG-TERM CURRENT USE OF INSULIN (HCC): ICD-10-CM

## 2019-01-02 DIAGNOSIS — E03.9 HYPOTHYROIDISM, UNSPECIFIED TYPE: ICD-10-CM

## 2019-01-02 RX ORDER — LEVOTHYROXINE SODIUM 0.07 MG/1
75 TABLET ORAL DAILY
Qty: 90 TABLET | Refills: 0 | Status: SHIPPED | OUTPATIENT
Start: 2019-01-02 | End: 2019-08-13 | Stop reason: SDUPTHER

## 2019-01-03 ENCOUNTER — TELEPHONE (OUTPATIENT)
Dept: INTERNAL MEDICINE CLINIC | Facility: CLINIC | Age: 64
End: 2019-01-03

## 2019-01-03 DIAGNOSIS — E11.65 TYPE 2 DIABETES MELLITUS WITH HYPERGLYCEMIA, WITHOUT LONG-TERM CURRENT USE OF INSULIN (HCC): Primary | ICD-10-CM

## 2019-01-03 NOTE — TELEPHONE ENCOUNTER
Please let patient know Madrigal Kirill was not covered by patient's insurance  Ask patient if he would like to start kombiglyze again?

## 2019-01-03 NOTE — TELEPHONE ENCOUNTER
Pt here at office and [facility] to pharmacy now    Plan - rx sent

## 2019-02-13 ENCOUNTER — TELEPHONE (OUTPATIENT)
Dept: INTERNAL MEDICINE CLINIC | Facility: CLINIC | Age: 64
End: 2019-02-13

## 2019-02-13 NOTE — TELEPHONE ENCOUNTER
Notified pharmacy who states patient needs to bring in new insurance card prior to processing this       Notified patient who states he will take card in and have this done

## 2019-02-13 NOTE — TELEPHONE ENCOUNTER
[facility]             Sildenafil (viagra) medication is approved by insurance     pls notify pharmacy & they can contact kevin when rx refilled     thx

## 2019-03-26 DIAGNOSIS — G89.29 CHRONIC MIDLINE LOW BACK PAIN WITH RIGHT-SIDED SCIATICA: ICD-10-CM

## 2019-03-26 DIAGNOSIS — M54.41 CHRONIC MIDLINE LOW BACK PAIN WITH RIGHT-SIDED SCIATICA: ICD-10-CM

## 2019-03-26 DIAGNOSIS — E11.42 TYPE 2 DIABETES MELLITUS WITH DIABETIC POLYNEUROPATHY, WITHOUT LONG-TERM CURRENT USE OF INSULIN (HCC): ICD-10-CM

## 2019-03-26 RX ORDER — HYDROCODONE BITARTRATE AND ACETAMINOPHEN 5; 325 MG/1; MG/1
1 TABLET ORAL DAILY PRN
Qty: 30 TABLET | Refills: 0 | Status: CANCELLED | OUTPATIENT
Start: 2019-03-26

## 2019-03-26 RX ORDER — GLIMEPIRIDE 4 MG/1
4 TABLET ORAL 2 TIMES DAILY
Qty: 180 TABLET | Refills: 0 | Status: SHIPPED | OUTPATIENT
Start: 2019-03-26 | End: 2019-06-11 | Stop reason: SDUPTHER

## 2019-03-26 NOTE — TELEPHONE ENCOUNTER
Glimepiride patient is out of and gets sent to [facility]     Hydrocodone gets sent to [facility]

## 2019-03-26 NOTE — TELEPHONE ENCOUNTER
Pt missed his last 2 appts    Glimepiride ordered but needs to be seen before can refill hydrocodone    pls schedule    thx

## 2019-03-28 ENCOUNTER — OFFICE VISIT (OUTPATIENT)
Dept: INTERNAL MEDICINE CLINIC | Facility: CLINIC | Age: 64
End: 2019-03-28
Payer: COMMERCIAL

## 2019-03-28 VITALS
OXYGEN SATURATION: 97 % | HEART RATE: 85 BPM | SYSTOLIC BLOOD PRESSURE: 124 MMHG | BODY MASS INDEX: 42.06 KG/M2 | TEMPERATURE: 98.2 F | HEIGHT: 69 IN | WEIGHT: 284 LBS | RESPIRATION RATE: 18 BRPM | DIASTOLIC BLOOD PRESSURE: 74 MMHG

## 2019-03-28 DIAGNOSIS — E66.01 OBESITY, MORBID, BMI 40.0-49.9 (HCC): ICD-10-CM

## 2019-03-28 DIAGNOSIS — I10 BENIGN ESSENTIAL HYPERTENSION: ICD-10-CM

## 2019-03-28 DIAGNOSIS — E78.2 MIXED HYPERLIPIDEMIA: ICD-10-CM

## 2019-03-28 DIAGNOSIS — G89.29 CHRONIC MIDLINE LOW BACK PAIN WITH RIGHT-SIDED SCIATICA: ICD-10-CM

## 2019-03-28 DIAGNOSIS — E03.9 HYPOTHYROIDISM, UNSPECIFIED TYPE: ICD-10-CM

## 2019-03-28 DIAGNOSIS — E11.65 TYPE 2 DIABETES MELLITUS WITH HYPERGLYCEMIA, WITHOUT LONG-TERM CURRENT USE OF INSULIN (HCC): Primary | ICD-10-CM

## 2019-03-28 DIAGNOSIS — M54.41 CHRONIC MIDLINE LOW BACK PAIN WITH RIGHT-SIDED SCIATICA: ICD-10-CM

## 2019-03-28 LAB — SL AMB POCT HEMOGLOBIN AIC: 10.3 (ref ?–6.5)

## 2019-03-28 PROCEDURE — 3074F SYST BP LT 130 MM HG: CPT | Performed by: INTERNAL MEDICINE

## 2019-03-28 PROCEDURE — 3078F DIAST BP <80 MM HG: CPT | Performed by: INTERNAL MEDICINE

## 2019-03-28 PROCEDURE — 99215 OFFICE O/P EST HI 40 MIN: CPT | Performed by: INTERNAL MEDICINE

## 2019-03-28 PROCEDURE — 3046F HEMOGLOBIN A1C LEVEL >9.0%: CPT | Performed by: INTERNAL MEDICINE

## 2019-03-28 PROCEDURE — 83036 HEMOGLOBIN GLYCOSYLATED A1C: CPT | Performed by: INTERNAL MEDICINE

## 2019-03-28 RX ORDER — HYDROCODONE BITARTRATE AND ACETAMINOPHEN 5; 325 MG/1; MG/1
1 TABLET ORAL DAILY PRN
Qty: 30 TABLET | Refills: 0 | Status: SHIPPED | OUTPATIENT
Start: 2019-03-28 | End: 2019-04-17 | Stop reason: SDUPTHER

## 2019-03-28 NOTE — PATIENT INSTRUCTIONS
1  Diabetes educator and nutritionist appointment  2  Medication refilled  3  CALL ME WITH BLOOD SUGAR READINGS TOMORROW  4  Check blood sugars in morning or before dinner(goal<130) and 2 hours after meals(goal<150)  5   WE WILL NEED TO START INSULIN -> SUCH AS GLARGINE ONCE A DAY    A1C is 10 3%

## 2019-03-28 NOTE — PROGRESS NOTES
Assessment/Plan:     Diagnoses and all orders for this visit:    Type 2 diabetes mellitus with hyperglycemia, without long-term current use of insulin ([facility])  Comments:  BS quite high, pt not checking/missing doses of medications  discussed adherence and need for insulin(see HPI)  call tmrw with BS readings & f/u in 1 week  Orders:  -     POCT hemoglobin A1c  -     Ambulatory referral to Diabetic Education; Future  -     Ambulatory referral to medical nutrition therapy for diabetes; Future  -     Ambulatory referral to Weight Management; Future  -     glucose blood ([employer] ADVANCED GLUCOSE TEST) test strip; 1 each by Other route 2 (two) times a day    Benign essential hypertension  Comments:  BP doing well, c/w ACEI    Hypothyroidism, unspecified type  Comments:  taking TRH & last TSH at goal, re-check TFTs with next BW    Obesity, morbid, BMI 40 0-49 9 ([facility] [address] )  Comments:  weight loss counseling discussed and weight management ref provided  Orders:  -     Ambulatory referral to Weight Management; Future    Mixed hyperlipidemia  Comments:  taking statin and no SE, c/w rx    Chronic midline low back pain with right-sided sciatica  Comments:  [place] refilled  pain medication agreement reviewed and signed today  Orders:  -     HYDROcodone-acetaminophen (NORCO) 5-325 mg per tablet; Take 1 tablet by mouth daily as needed for painMax Daily Amount: 1 tablet      He agreed to consider insulin, call me tmrw with BS readings and was educated on use of glargine(lantus) pen  [place] today and pain contract discussed/signed  Pt agreed to return in 1 week for follow up as well and take his medications as prescribed  Spent >40 mins with patient including >50% of time counseling with the above and on pathophysiology of diabetes and for coordination of care    Robert Hartley was scheduling for bariatric surgery 2-3 years ago but near the time of surgery, his insurance denied coverage of the procedure and it was canceled  He wants to go back to weight management clinic to discuss this again  Subjective:      Patient ID: Jesi Cancino is a 62 y o  male  HPI    Here for follow up, overall feeling well  He has not been checking his BS over last 1 week, ran out of glimepiride 1 week ago and called for refill 3/26(sent same day)  Taking hydrocodone prn for back pain, we discussed pain medication agreement and Janet Pollard agreed to sign this today  Working 2 jobs, last A1C was 10+%, most recent BS readings in 100-120s in AM and over 200 after meals  Janet Pollard reports not eating the 'right foods'  He refuses to take insulin at this time and wants to work on his diet and losing weight  Near end of the appt, wife arrived and we discussed the above again including need for insulin due to high BS(A1C >10% again today)  We discussed risk of not complying with treatment plan including neuropathy, kidney damage, vision loss, hospitalization or worse outcome  Past Medical History:   Diagnosis Date    BPH (benign prostatic hyperplasia)     CPAP (continuous positive airway pressure) dependence     Diabetes mellitus (HCC)     Disease of thyroid gland     Hyperlipidemia     Hypertension     Sleep apnea      Vitals:    03/28/19 1558   BP: 124/74   Pulse: 85   Resp: 18   Temp: 98 2 °F (36 8 °C)   TempSrc: Oral   SpO2: 97%   Weight: 129 kg (284 lb)   Height: 5' 9" (1 753 m)     Body mass index is 41 94 kg/m²      Current Outpatient Medications:     aspirin (ECOTRIN LOW STRENGTH) 81 mg EC tablet, Take 81 mg by mouth daily, Disp: , Rfl:     atorvastatin (LIPITOR) 20 mg tablet, Take 1 tablet (20 mg total) by mouth daily, Disp: 90 tablet, Rfl: 0    Blood Glucose Calibration (OT ULTRA/FASTTK CNTRL SOLN) SOLN, by In Vitro route as needed (to calibrate blood sugar meter), Disp: 1 each, Rfl: 1    Blood Glucose Monitoring Suppl (ONE TOUCH ULTRA MINI) w/Device KIT, by Does not apply route 2 (two) times a day, Disp: 1 each, Rfl: 0    glimepiride (AMARYL) 4 mg tablet, Take 1 tablet (4 mg total) by mouth 2 (two) times a day, Disp: 180 tablet, Rfl: 0    glucose blood (ONE TOUCH ULTRA TEST) test strip, 1 each by Other route 2 (two) times a day Use as instructed, Disp: 100 each, Rfl: 3    HYDROcodone-acetaminophen (NORCO) 5-325 mg per tablet, Take 1 tablet by mouth daily as needed for painMax Daily Amount: 1 tablet, Disp: 30 tablet, Rfl: 0    ketoconazole (NIZORAL) 2 % cream, Apply topically daily, Disp: 60 g, Rfl: 1    levothyroxine 75 mcg tablet, Take 1 tablet (75 mcg total) by mouth daily, Disp: 90 tablet, Rfl: 0    lisinopril (ZESTRIL) 10 mg tablet, Take 1 tablet (10 mg total) by mouth daily, Disp: 90 tablet, Rfl: 0    ONETOUCH DELICA LANCETS 18Z MISC, by Does not apply route 2 (two) times a day, Disp: 100 each, Rfl: 3    SAXagliptin-MetFORMIN ER 2 5-1000 MG TB24, Take 1 tablet by mouth every 12 (twelve) hours, Disp: 180 tablet, Rfl: 1    sildenafil (REVATIO) 20 mg tablet, Take 2 tablets (40 mg total) by mouth daily, Disp: 20 tablet, Rfl: 1    sildenafil (VIAGRA) 50 MG tablet, Take 1 tablet (50 mg total) by mouth daily as needed for erectile dysfunction, Disp: 10 tablet, Rfl: 2    sildenafil (VIAGRA) 50 MG tablet, , Disp: , Rfl:     glucose blood ([employer]S ADVANCED GLUCOSE TEST) test strip, 1 each by Other route 2 (two) times a day, Disp: 100 each, Rfl: 3  Allergies   Allergen Reactions    Penicillins      SYNCOPE, but has taken amoxicillin/augmentin in past         Review of Systems   Constitutional: Negative for fever  HENT: Negative for congestion  Eyes: Negative for visual disturbance  Respiratory: Negative for shortness of breath  Cardiovascular: Negative for chest pain  Gastrointestinal: Negative for abdominal pain  Genitourinary: Negative for dysuria  Musculoskeletal: Positive for back pain  Skin: Negative for rash  Neurological: Negative for headaches     Psychiatric/Behavioral: Negative for dysphoric mood  Objective:      /74   Pulse 85   Temp 98 2 °F (36 8 °C) (Oral)   Resp 18   Ht 5' 9" (1 753 m)   Wt 129 kg (284 lb)   SpO2 97%   BMI 41 94 kg/m²          Physical Exam   Constitutional: He appears well-developed and well-nourished  HENT:   Head: Normocephalic and atraumatic  Cardiovascular: Normal rate, regular rhythm and normal heart sounds  No murmur heard  Pulmonary/Chest: Effort normal and breath sounds normal  He has no wheezes  He has no rales  Abdominal: Soft  Bowel sounds are normal  There is no tenderness  Musculoskeletal: He exhibits no edema  Neurological: He is alert  Psychiatric: He has a normal mood and affect  His behavior is normal    Vitals reviewed        Results for orders placed or performed in visit on 03/28/19   POCT hemoglobin A1c   Result Value Ref Range    Hemoglobin A1C 10 3 (A) 6 5

## 2019-03-29 ENCOUNTER — TELEPHONE (OUTPATIENT)
Dept: INTERNAL MEDICINE CLINIC | Facility: CLINIC | Age: 64
End: 2019-03-29

## 2019-04-04 ENCOUNTER — OFFICE VISIT (OUTPATIENT)
Dept: INTERNAL MEDICINE CLINIC | Facility: CLINIC | Age: 64
End: 2019-04-04
Payer: COMMERCIAL

## 2019-04-04 VITALS
HEART RATE: 71 BPM | SYSTOLIC BLOOD PRESSURE: 126 MMHG | DIASTOLIC BLOOD PRESSURE: 82 MMHG | WEIGHT: 283.8 LBS | BODY MASS INDEX: 42.03 KG/M2 | RESPIRATION RATE: 18 BRPM | TEMPERATURE: 98.5 F | HEIGHT: 69 IN | OXYGEN SATURATION: 98 %

## 2019-04-04 DIAGNOSIS — S46.912A SHOULDER STRAIN, LEFT, INITIAL ENCOUNTER: ICD-10-CM

## 2019-04-04 DIAGNOSIS — E11.65 TYPE 2 DIABETES MELLITUS WITH HYPERGLYCEMIA, WITHOUT LONG-TERM CURRENT USE OF INSULIN (HCC): Primary | ICD-10-CM

## 2019-04-04 PROCEDURE — 3008F BODY MASS INDEX DOCD: CPT | Performed by: INTERNAL MEDICINE

## 2019-04-04 PROCEDURE — 1036F TOBACCO NON-USER: CPT | Performed by: INTERNAL MEDICINE

## 2019-04-04 PROCEDURE — 99213 OFFICE O/P EST LOW 20 MIN: CPT | Performed by: INTERNAL MEDICINE

## 2019-04-11 DIAGNOSIS — E78.2 MIXED HYPERLIPIDEMIA: ICD-10-CM

## 2019-04-12 RX ORDER — ATORVASTATIN CALCIUM 20 MG/1
TABLET, FILM COATED ORAL
Qty: 90 TABLET | Refills: 0 | Status: SHIPPED | OUTPATIENT
Start: 2019-04-12 | End: 2019-10-18 | Stop reason: SDUPTHER

## 2019-04-17 ENCOUNTER — TELEPHONE (OUTPATIENT)
Dept: INTERNAL MEDICINE CLINIC | Facility: CLINIC | Age: 64
End: 2019-04-17

## 2019-04-17 DIAGNOSIS — M54.41 CHRONIC MIDLINE LOW BACK PAIN WITH RIGHT-SIDED SCIATICA: ICD-10-CM

## 2019-04-17 DIAGNOSIS — G89.29 CHRONIC MIDLINE LOW BACK PAIN WITH RIGHT-SIDED SCIATICA: ICD-10-CM

## 2019-04-17 RX ORDER — HYDROCODONE BITARTRATE AND ACETAMINOPHEN 5; 325 MG/1; MG/1
1 TABLET ORAL DAILY PRN
Qty: 30 TABLET | Refills: 0 | Status: SHIPPED | OUTPATIENT
Start: 2019-04-17 | End: 2019-06-11 | Stop reason: SDUPTHER

## 2019-05-16 DIAGNOSIS — I10 BENIGN ESSENTIAL HYPERTENSION: ICD-10-CM

## 2019-05-16 RX ORDER — LISINOPRIL 10 MG/1
TABLET ORAL
Qty: 90 TABLET | Refills: 0 | Status: SHIPPED | OUTPATIENT
Start: 2019-05-16 | End: 2019-08-13 | Stop reason: SDUPTHER

## 2019-06-04 ENCOUNTER — CLINICAL SUPPORT (OUTPATIENT)
Dept: INTERNAL MEDICINE CLINIC | Facility: CLINIC | Age: 64
End: 2019-06-04
Payer: COMMERCIAL

## 2019-06-04 DIAGNOSIS — E11.65 TYPE 2 DIABETES MELLITUS WITH HYPERGLYCEMIA, WITHOUT LONG-TERM CURRENT USE OF INSULIN (HCC): Primary | ICD-10-CM

## 2019-06-04 LAB — SL AMB POCT HEMOGLOBIN AIC: 9.4 (ref ?–6.5)

## 2019-06-04 PROCEDURE — 83036 HEMOGLOBIN GLYCOSYLATED A1C: CPT

## 2019-06-11 DIAGNOSIS — M54.41 CHRONIC MIDLINE LOW BACK PAIN WITH RIGHT-SIDED SCIATICA: ICD-10-CM

## 2019-06-11 DIAGNOSIS — E11.65 TYPE 2 DIABETES MELLITUS WITH HYPERGLYCEMIA, WITHOUT LONG-TERM CURRENT USE OF INSULIN (HCC): ICD-10-CM

## 2019-06-11 DIAGNOSIS — G89.29 CHRONIC MIDLINE LOW BACK PAIN WITH RIGHT-SIDED SCIATICA: ICD-10-CM

## 2019-06-11 DIAGNOSIS — E11.42 TYPE 2 DIABETES MELLITUS WITH DIABETIC POLYNEUROPATHY, WITHOUT LONG-TERM CURRENT USE OF INSULIN (HCC): ICD-10-CM

## 2019-06-11 DIAGNOSIS — N52.9 ERECTILE DYSFUNCTION, UNSPECIFIED ERECTILE DYSFUNCTION TYPE: ICD-10-CM

## 2019-06-11 RX ORDER — GLIMEPIRIDE 4 MG/1
4 TABLET ORAL 2 TIMES DAILY
Qty: 180 TABLET | Refills: 0 | Status: SHIPPED | OUTPATIENT
Start: 2019-06-11 | End: 2019-07-09 | Stop reason: SDUPTHER

## 2019-06-11 RX ORDER — HYDROCODONE BITARTRATE AND ACETAMINOPHEN 5; 325 MG/1; MG/1
1 TABLET ORAL DAILY PRN
Qty: 30 TABLET | Refills: 0 | Status: SHIPPED | OUTPATIENT
Start: 2019-06-11 | End: 2019-08-13 | Stop reason: SDUPTHER

## 2019-06-11 RX ORDER — HYDROCODONE BITARTRATE AND ACETAMINOPHEN 5; 325 MG/1; MG/1
1 TABLET ORAL DAILY PRN
Qty: 30 TABLET | Refills: 0 | Status: CANCELLED | OUTPATIENT
Start: 2019-06-11

## 2019-06-11 RX ORDER — SILDENAFIL 50 MG/1
50 TABLET, FILM COATED ORAL DAILY PRN
Qty: 10 TABLET | Refills: 2 | Status: SHIPPED | OUTPATIENT
Start: 2019-06-11 | End: 2019-11-13 | Stop reason: SDUPTHER

## 2019-07-09 DIAGNOSIS — E11.42 TYPE 2 DIABETES MELLITUS WITH DIABETIC POLYNEUROPATHY, WITHOUT LONG-TERM CURRENT USE OF INSULIN (HCC): ICD-10-CM

## 2019-07-09 RX ORDER — GLIMEPIRIDE 4 MG/1
4 TABLET ORAL 2 TIMES DAILY
Qty: 180 TABLET | Refills: 0 | Status: SHIPPED | OUTPATIENT
Start: 2019-07-09 | End: 2019-11-07 | Stop reason: SDUPTHER

## 2019-07-24 ENCOUNTER — TELEPHONE (OUTPATIENT)
Dept: INTERNAL MEDICINE CLINIC | Facility: CLINIC | Age: 64
End: 2019-07-24

## 2019-07-24 NOTE — TELEPHONE ENCOUNTER
----- Message from Hayley Hinojosa DO sent at 7/24/2019  2:20 PM EDT -----  Elia Sandoval    Please send no show letter #2 for Ness Sánchez, missed his appt yesterday    thx

## 2019-08-13 ENCOUNTER — OFFICE VISIT (OUTPATIENT)
Dept: INTERNAL MEDICINE CLINIC | Facility: CLINIC | Age: 64
End: 2019-08-13
Payer: COMMERCIAL

## 2019-08-13 VITALS
TEMPERATURE: 96.9 F | BODY MASS INDEX: 42.51 KG/M2 | WEIGHT: 287 LBS | DIASTOLIC BLOOD PRESSURE: 78 MMHG | RESPIRATION RATE: 16 BRPM | SYSTOLIC BLOOD PRESSURE: 128 MMHG | HEART RATE: 71 BPM | HEIGHT: 69 IN | OXYGEN SATURATION: 98 %

## 2019-08-13 DIAGNOSIS — M54.41 CHRONIC MIDLINE LOW BACK PAIN WITH RIGHT-SIDED SCIATICA: ICD-10-CM

## 2019-08-13 DIAGNOSIS — E66.01 OBESITY, MORBID, BMI 40.0-49.9 (HCC): ICD-10-CM

## 2019-08-13 DIAGNOSIS — E11.65 TYPE 2 DIABETES MELLITUS WITH HYPERGLYCEMIA, WITHOUT LONG-TERM CURRENT USE OF INSULIN (HCC): Primary | ICD-10-CM

## 2019-08-13 DIAGNOSIS — E03.9 HYPOTHYROIDISM, UNSPECIFIED TYPE: ICD-10-CM

## 2019-08-13 DIAGNOSIS — G89.29 CHRONIC MIDLINE LOW BACK PAIN WITH RIGHT-SIDED SCIATICA: ICD-10-CM

## 2019-08-13 DIAGNOSIS — I10 BENIGN ESSENTIAL HYPERTENSION: ICD-10-CM

## 2019-08-13 DIAGNOSIS — E78.2 MIXED HYPERLIPIDEMIA: ICD-10-CM

## 2019-08-13 PROCEDURE — 1036F TOBACCO NON-USER: CPT | Performed by: NURSE PRACTITIONER

## 2019-08-13 PROCEDURE — 3074F SYST BP LT 130 MM HG: CPT | Performed by: NURSE PRACTITIONER

## 2019-08-13 PROCEDURE — 3008F BODY MASS INDEX DOCD: CPT | Performed by: NURSE PRACTITIONER

## 2019-08-13 PROCEDURE — 4010F ACE/ARB THERAPY RXD/TAKEN: CPT | Performed by: INTERNAL MEDICINE

## 2019-08-13 PROCEDURE — 99214 OFFICE O/P EST MOD 30 MIN: CPT | Performed by: NURSE PRACTITIONER

## 2019-08-13 RX ORDER — LISINOPRIL 10 MG/1
10 TABLET ORAL DAILY
Qty: 90 TABLET | Refills: 1 | Status: SHIPPED | OUTPATIENT
Start: 2019-08-13 | End: 2019-11-14 | Stop reason: SDUPTHER

## 2019-08-13 RX ORDER — SIMVASTATIN 20 MG
TABLET ORAL
COMMUNITY
Start: 2018-04-09 | End: 2019-08-13

## 2019-08-13 RX ORDER — HYDROCODONE BITARTRATE AND ACETAMINOPHEN 5; 325 MG/1; MG/1
1 TABLET ORAL DAILY PRN
Qty: 30 TABLET | Refills: 0 | Status: SHIPPED | OUTPATIENT
Start: 2019-08-13 | End: 2019-08-13 | Stop reason: SDUPTHER

## 2019-08-13 RX ORDER — HYDROCODONE BITARTRATE AND ACETAMINOPHEN 5; 325 MG/1; MG/1
1 TABLET ORAL DAILY PRN
Qty: 30 TABLET | Refills: 0 | Status: SHIPPED | OUTPATIENT
Start: 2019-08-13 | End: 2019-10-18 | Stop reason: SDUPTHER

## 2019-08-13 RX ORDER — FENOFIBRATE 160 MG/1
TABLET ORAL
COMMUNITY
Start: 2018-06-26 | End: 2019-08-13

## 2019-08-13 RX ORDER — LEVOTHYROXINE SODIUM 0.07 MG/1
75 TABLET ORAL DAILY
Qty: 90 TABLET | Refills: 0 | Status: SHIPPED | OUTPATIENT
Start: 2019-08-13 | End: 2020-01-10 | Stop reason: SDUPTHER

## 2019-08-13 NOTE — ASSESSMENT & PLAN NOTE
BMI Counseling: Body mass index is 42 38 kg/m²  Discussed the patient's BMI with him  The BMI is above average  BMI counseling and education was provided to the patient  Nutrition recommendations include reducing portion sizes, decreasing overall calorie intake, 3-5 servings of fruits/vegetables daily, reducing fast food intake, consuming healthier snacks, decreasing soda and/or juice intake and moderation in carbohydrate intake  Exercise recommendations include exercising 3-5 times per week

## 2019-08-13 NOTE — PROGRESS NOTES
Assessment/Plan:    Type 2 diabetes mellitus with hyperglycemia, without long-term current use of insulin (HCC)  Recheck A1C  continue saxagliptin-metformin and amaryl   Foot exam at next visit  Due for urine microalbumin     Hypothyroid  Recheck TFT's   On levothyroxine 75 mcg    Benign essential hypertension  Controlled  Continue lisinopril    Chronic midline low back pain with right-sided sciatica  norco sparingly , refilled today, PDMP checked and appropriate    Obesity, morbid, BMI 40 0-49 9 ([facility] [address] )  BMI Counseling: Body mass index is 42 38 kg/m²  Discussed the patient's BMI with him  The BMI is above average  BMI counseling and education was provided to the patient  Nutrition recommendations include reducing portion sizes, decreasing overall calorie intake, 3-5 servings of fruits/vegetables daily, reducing fast food intake, consuming healthier snacks, decreasing soda and/or juice intake and moderation in carbohydrate intake  Exercise recommendations include exercising 3-5 times per week  Mixed hyperlipidemia  Recheck lipid panel   On statin        Diagnoses and all orders for this visit:    Type 2 diabetes mellitus with hyperglycemia, without long-term current use of insulin (HCC)  -     Hemoglobin A1C; Future  -     Comprehensive metabolic panel; Future  -     Microalbumin / creatinine urine ratio    Benign essential hypertension  -     lisinopril (ZESTRIL) 10 mg tablet; Take 1 tablet (10 mg total) by mouth daily    Mixed hyperlipidemia  -     Lipid Panel with Direct LDL reflex; Future    Chronic midline low back pain with right-sided sciatica  -     Discontinue: HYDROcodone-acetaminophen (NORCO) 5-325 mg per tablet; Take 1 tablet by mouth daily as needed for painMax Daily Amount: 1 tablet  -     HYDROcodone-acetaminophen (NORCO) 5-325 mg per tablet;  Take 1 tablet by mouth daily as needed for painMax Daily Amount: 1 tablet    Hypothyroidism, unspecified type  -     TSH, 3rd generation with Free T4 reflex; Future  -     levothyroxine 75 mcg tablet; Take 1 tablet (75 mcg total) by mouth daily    Obesity, morbid, BMI 40 0-49 9 ([facility])    Other orders  -     Discontinue: fenofibrate (TRIGLIDE) 160 MG tablet; TAKE 1 TABLET(160 MG) BY MOUTH DAILY  -     Discontinue: simvastatin (ZOCOR) 20 mg tablet; TAKE ONE TABLET BY MOUTH ONCE DAILY          Subjective:      Patient ID: Hugh Novoa is a 62 y o  male  Here for follow up  Maria D Fairbanks is doing well, no concerns today     Diabetes- FBS , 200's throughout the day  Mostly higher if he doesn't watch what he eats    Chronic back pain, takes vicodin occasionally     Hypertension, blood pressure good in office today, compliant with medication     Hyperlipidemia , compliant with statin, denies myalgias  Denies chest pain or shortness of breath       The following portions of the patient's history were reviewed and updated as appropriate: allergies, current medications, past family history, past medical history, past social history, past surgical history and problem list     Review of Systems   Constitutional: Negative for activity change, appetite change, fatigue and unexpected weight change  Eyes: Negative for visual disturbance  Respiratory: Negative for cough, chest tightness, shortness of breath and wheezing  Cardiovascular: Negative for chest pain, palpitations and leg swelling  Gastrointestinal: Negative for abdominal pain, blood in stool, constipation and diarrhea  Genitourinary: Negative for difficulty urinating  Musculoskeletal: Negative for arthralgias  Skin: Negative for rash  Neurological: Negative for dizziness, weakness, light-headedness and headaches  Psychiatric/Behavioral: Negative for sleep disturbance           Objective:      /78   Pulse 71   Temp (!) 96 9 °F (36 1 °C)   Resp 16   Ht 5' 9" (1 753 m)   Wt 130 kg (287 lb)   SpO2 98%   BMI 42 38 kg/m²          Physical Exam   Constitutional: He is oriented to person, place, and time  He appears well-developed and well-nourished  HENT:   Head: Normocephalic and atraumatic  Mouth/Throat: Oropharynx is clear and moist    Eyes: Pupils are equal, round, and reactive to light  Conjunctivae are normal    Neck: No thyromegaly present  Cardiovascular: Normal rate, regular rhythm, normal heart sounds and intact distal pulses  Pulmonary/Chest: Effort normal and breath sounds normal    Abdominal: Soft  Bowel sounds are normal    Musculoskeletal: Normal range of motion  He exhibits edema  Mild pitting edema bilateral lower extremities    Lymphadenopathy:     He has no cervical adenopathy  Neurological: He is alert and oriented to person, place, and time  Skin: Skin is warm and dry  Psychiatric: He has a normal mood and affect  His behavior is normal    Vitals reviewed

## 2019-08-13 NOTE — ASSESSMENT & PLAN NOTE
Recheck A1C  continue saxagliptin-metformin and amaryl   Foot exam at next visit  Due for urine microalbumin

## 2019-09-11 DIAGNOSIS — E11.42 TYPE 2 DIABETES MELLITUS WITH DIABETIC POLYNEUROPATHY, WITHOUT LONG-TERM CURRENT USE OF INSULIN (HCC): ICD-10-CM

## 2019-09-11 NOTE — TELEPHONE ENCOUNTER
Patient called and states he got new insurance under [employer]  States he was told by [address] that he will need a PA on his Saxagliptin-Metformin ER 2 5-100 mg  Can call [phone] to start PA  Ok to start PA?

## 2019-09-12 NOTE — TELEPHONE ENCOUNTER
PA approved  PA approval #: [id number]    States if patient goes to [facility] he can only get 30 days supply at a time  If patient goes to [facility] he can get a 90 day supply

## 2019-09-12 NOTE — TELEPHONE ENCOUNTER
Left message for patient to call back  Please relay message below      Unsure if patient would like Saxagliptin to go to [facility] or [facility]

## 2019-09-16 NOTE — TELEPHONE ENCOUNTER
I spoke w/ pt  He would like med sent to [place]  They also called to day for a script for this med for #180

## 2019-09-23 ENCOUNTER — TELEPHONE (OUTPATIENT)
Dept: INTERNAL MEDICINE CLINIC | Facility: CLINIC | Age: 64
End: 2019-09-23

## 2019-09-23 NOTE — TELEPHONE ENCOUNTER
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 I can give him a note for this week, he does not need to be seen  Let me know if that's ok and I'll put it in for him

## 2019-09-23 NOTE — TELEPHONE ENCOUNTER
Patient notified states they only gave him 3 days worth of Prednisone and only hs a day and a half left  Patient does not need a note for work, just needs to know how long he should be out for? 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

## 2019-09-23 NOTE — TELEPHONE ENCOUNTER
He should stay out of work this week  This is viral and takes awhile to run its course  Its best he doesn't have an extended period on steroids due to being diabetic and it could raise his sugars  If his wife is concerned about his blisters, he can schedule an appointment to be evaluated

## 2019-09-23 NOTE — TELEPHONE ENCOUNTER
Patient was seen at [address] over the weekend and they state patient may have hand, foot and mouth disease  They did give him magic mouthwash and prednisone  He would like to know if he needs to be seen in order to get a doctors note and how long will be out of work

## 2019-09-27 ENCOUNTER — TELEPHONE (OUTPATIENT)
Dept: INTERNAL MEDICINE CLINIC | Facility: CLINIC | Age: 64
End: 2019-09-27

## 2019-09-27 NOTE — TELEPHONE ENCOUNTER
He should be ok as long as he's been fever free  I would avoid kissing, sharing utensils, or too close of contact especially with any open areas on the skin  It can be contagious for 7-10 days

## 2019-09-27 NOTE — TELEPHONE ENCOUNTER
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

## 2019-10-18 DIAGNOSIS — E78.2 MIXED HYPERLIPIDEMIA: ICD-10-CM

## 2019-10-18 DIAGNOSIS — G89.29 CHRONIC MIDLINE LOW BACK PAIN WITH RIGHT-SIDED SCIATICA: ICD-10-CM

## 2019-10-18 DIAGNOSIS — M54.41 CHRONIC MIDLINE LOW BACK PAIN WITH RIGHT-SIDED SCIATICA: ICD-10-CM

## 2019-10-18 RX ORDER — ATORVASTATIN CALCIUM 20 MG/1
20 TABLET, FILM COATED ORAL DAILY
Qty: 90 TABLET | Refills: 0 | Status: SHIPPED | OUTPATIENT
Start: 2019-10-18 | End: 2020-03-12

## 2019-10-21 RX ORDER — HYDROCODONE BITARTRATE AND ACETAMINOPHEN 5; 325 MG/1; MG/1
1 TABLET ORAL DAILY PRN
Qty: 30 TABLET | Refills: 0 | Status: SHIPPED | OUTPATIENT
Start: 2019-10-21 | End: 2020-01-10 | Stop reason: SDUPTHER

## 2019-11-07 DIAGNOSIS — E11.42 TYPE 2 DIABETES MELLITUS WITH DIABETIC POLYNEUROPATHY, WITHOUT LONG-TERM CURRENT USE OF INSULIN (HCC): ICD-10-CM

## 2019-11-07 RX ORDER — GLIMEPIRIDE 4 MG/1
4 TABLET ORAL 2 TIMES DAILY
Qty: 180 TABLET | Refills: 0 | Status: SHIPPED | OUTPATIENT
Start: 2019-11-07 | End: 2020-05-21 | Stop reason: SDUPTHER

## 2019-11-13 ENCOUNTER — OFFICE VISIT (OUTPATIENT)
Dept: INTERNAL MEDICINE CLINIC | Facility: CLINIC | Age: 64
End: 2019-11-13
Payer: COMMERCIAL

## 2019-11-13 VITALS
SYSTOLIC BLOOD PRESSURE: 128 MMHG | WEIGHT: 283 LBS | RESPIRATION RATE: 16 BRPM | HEART RATE: 97 BPM | OXYGEN SATURATION: 97 % | HEIGHT: 69 IN | BODY MASS INDEX: 41.92 KG/M2 | DIASTOLIC BLOOD PRESSURE: 80 MMHG | TEMPERATURE: 97.9 F

## 2019-11-13 DIAGNOSIS — Z11.4 SCREENING FOR HIV (HUMAN IMMUNODEFICIENCY VIRUS): ICD-10-CM

## 2019-11-13 DIAGNOSIS — N52.9 ERECTILE DYSFUNCTION, UNSPECIFIED ERECTILE DYSFUNCTION TYPE: ICD-10-CM

## 2019-11-13 DIAGNOSIS — E66.01 OBESITY, MORBID, BMI 40.0-49.9 (HCC): ICD-10-CM

## 2019-11-13 DIAGNOSIS — E11.42 TYPE 2 DIABETES MELLITUS WITH DIABETIC POLYNEUROPATHY, WITHOUT LONG-TERM CURRENT USE OF INSULIN (HCC): Primary | ICD-10-CM

## 2019-11-13 DIAGNOSIS — I10 BENIGN ESSENTIAL HYPERTENSION: ICD-10-CM

## 2019-11-13 DIAGNOSIS — E78.2 MIXED HYPERLIPIDEMIA: ICD-10-CM

## 2019-11-13 DIAGNOSIS — Z23 NEED FOR VACCINATION: ICD-10-CM

## 2019-11-13 LAB
CREAT UR-MCNC: 59.5 MG/DL
MICROALBUMIN UR-MCNC: 984 MG/L (ref 0–20)
MICROALBUMIN/CREAT 24H UR: 1654 MG/G CREATININE (ref 0–30)
SL AMB POCT HEMOGLOBIN AIC: 9.5 (ref ?–6.5)

## 2019-11-13 PROCEDURE — 83036 HEMOGLOBIN GLYCOSYLATED A1C: CPT

## 2019-11-13 PROCEDURE — 82043 UR ALBUMIN QUANTITATIVE: CPT | Performed by: NURSE PRACTITIONER

## 2019-11-13 PROCEDURE — 90471 IMMUNIZATION ADMIN: CPT

## 2019-11-13 PROCEDURE — 3062F POS MACROALBUMINURIA REV: CPT

## 2019-11-13 PROCEDURE — 99214 OFFICE O/P EST MOD 30 MIN: CPT

## 2019-11-13 PROCEDURE — 3046F HEMOGLOBIN A1C LEVEL >9.0%: CPT

## 2019-11-13 PROCEDURE — 1036F TOBACCO NON-USER: CPT

## 2019-11-13 PROCEDURE — 82570 ASSAY OF URINE CREATININE: CPT | Performed by: NURSE PRACTITIONER

## 2019-11-13 PROCEDURE — 90682 RIV4 VACC RECOMBINANT DNA IM: CPT

## 2019-11-13 RX ORDER — SILDENAFIL 50 MG/1
50 TABLET, FILM COATED ORAL DAILY PRN
Qty: 10 TABLET | Refills: 2 | Status: SHIPPED | OUTPATIENT
Start: 2019-11-13 | End: 2021-10-05 | Stop reason: SDUPTHER

## 2019-11-13 NOTE — PROGRESS NOTES
Assessment/Plan:    Type 2 diabetes mellitus with hyperglycemia, without long-term current use of insulin (HCC)    Lab Results   Component Value Date    HGBA1C 9 5 (A) 11/13/2019   A1C done in office today, no change from previous  Not sure if this is due to not taking his medications the last few weeks  Recommend to add basal insulin or once weekly injection like ozempic  Patient does not wish to add anything at this time and would like to take his medications correctly and see weight management for weight loss options  Discussed risks of uncontrolled glucose levels including heart attack  Also strongly advised him to get blood work done to check his kidneys, which he has not done in about 1 year  Foot exam done today  Eye exam done by Dr Lubna Lepe, will obtain records  Urine collected in office     Benign essential hypertension  Sub optimal control  Again might be due to lack of medication compliance over the last few weeks  Continue current medications    Mixed hyperlipidemia  On statin   Check lipid panel     Obesity, morbid, BMI 40 0-49 9 ([facility] [address] )  BMI Counseling: Body mass index is 41 79 kg/m²  The BMI is above normal  Patient referred to weight management due to patient being morbidly obese  Diagnoses and all orders for this visit:    Type 2 diabetes mellitus with diabetic polyneuropathy, without long-term current use of insulin (HCC)  -     POCT hemoglobin A1c  -     Microalbumin / creatinine urine ratio  -     Cancel: Hemoglobin A1C; Future  -     Comprehensive metabolic panel; Future  -     Hemoglobin A1C; Future  -     Comprehensive metabolic panel; Future    Benign essential hypertension    Mixed hyperlipidemia  -     Lipid Panel with Direct LDL reflex; Future    Obesity, morbid, BMI 40 0-49 9 ([facility] [address] )  -     Ambulatory referral to Weight Management;  Future    Need for vaccination  -     influenza vaccine, 2829-1112, quadrivalent, recombinant, PF, 0 5 mL, for patients 18 yr+ (FLUBLOK)    Screening for HIV (human immunodeficiency virus)  -     HIV 1/2 AG-AB combo; Future    Erectile dysfunction, unspecified erectile dysfunction type  -     sildenafil (VIAGRA) 50 MG tablet; Take 1 tablet (50 mg total) by mouth daily as needed for erectile dysfunction          Subjective:      Patient ID: Nisha Cardenas is a 62 y o  male  Here today for follow up   Clementina Sands is doing ok   He does admit to not taking his medications correctly over the last few weeks because of issues with his pharmacy and refills   He has not been watching his diet   His blood sugars at home have been "all over the place"   He is interested in weight loss surgery and requesting referral   He was scheduled for surgery 2 years ago but had issues with his insurance                 The following portions of the patient's history were reviewed and updated as appropriate: allergies, current medications, past family history, past medical history, past social history, past surgical history and problem list     Review of Systems   Constitutional: Negative for activity change, appetite change, fatigue and unexpected weight change  Eyes: Negative for visual disturbance  Respiratory: Negative for cough, chest tightness, shortness of breath and wheezing  Cardiovascular: Negative for chest pain, palpitations and leg swelling  Gastrointestinal: Negative for abdominal pain, blood in stool, constipation and diarrhea  Genitourinary: Negative for difficulty urinating  Musculoskeletal: Positive for back pain  Skin: Negative for rash  Neurological: Negative for dizziness, weakness, light-headedness and headaches  Psychiatric/Behavioral: Negative for sleep disturbance  Objective:      /80   Pulse 97   Temp 97 9 °F (36 6 °C)   Resp 16   Ht 5' 9" (1 753 m)   Wt 128 kg (283 lb)   SpO2 97%   BMI 41 79 kg/m²          Physical Exam   Constitutional: He is oriented to person, place, and time   He appears well-developed and well-nourished  HENT:   Head: Normocephalic and atraumatic  Right Ear: External ear normal    Left Ear: External ear normal    Mouth/Throat: Oropharynx is clear and moist    Eyes: Pupils are equal, round, and reactive to light  Conjunctivae are normal    Neck: No thyromegaly present  Cardiovascular: Normal rate, regular rhythm, normal heart sounds and intact distal pulses  Pulmonary/Chest: Effort normal and breath sounds normal    Abdominal: Soft  Bowel sounds are normal    Musculoskeletal: Normal range of motion  He exhibits no edema  Lymphadenopathy:     He has no cervical adenopathy  Neurological: He is alert and oriented to person, place, and time  Skin: Skin is warm and dry  Psychiatric: He has a normal mood and affect  His behavior is normal    Vitals reviewed

## 2019-11-13 NOTE — ASSESSMENT & PLAN NOTE
Sub optimal control  Again might be due to lack of medication compliance over the last few weeks  Continue current medications

## 2019-11-13 NOTE — PROGRESS NOTES
Diabetic Foot Exam    Patient's shoes and socks removed  Right Foot/Ankle   Right Foot Inspection  Skin Exam: skin normal and skin intact no dry skin, no warmth, no callus, no erythema, no maceration, no abnormal color, no pre-ulcer, no ulcer and no callus                          Toe Exam: ROM and strength within normal limits  Sensory   Vibration: intact  Proprioception: intact   Monofilament testing: diminished  Vascular  Capillary refills: < 3 seconds  The right DP pulse is 2+  The right PT pulse is 2+  Left Foot/Ankle  Left Foot Inspection  Skin Exam: skin normal and skin intactno dry skin, no warmth, no erythema, no maceration, normal color, no pre-ulcer, no ulcer and no callus                         Toe Exam: ROM and strength within normal limits                   Sensory   Vibration: intact  Proprioception: intact  Monofilament: diminished  Vascular  Capillary refills: < 3 seconds  The left DP pulse is 2+  The left PT pulse is 2+  Assign Risk Category:  No deformity present; Loss of protective sensation;  No weak pulses       Risk: 1

## 2019-11-13 NOTE — ASSESSMENT & PLAN NOTE
BMI Counseling: Body mass index is 41 79 kg/m²  The BMI is above normal  Patient referred to weight management due to patient being morbidly obese

## 2019-11-13 NOTE — ASSESSMENT & PLAN NOTE
Lab Results   Component Value Date    HGBA1C 9 5 (A) 11/13/2019   A1C done in office today, no change from previous  Not sure if this is due to not taking his medications the last few weeks  Recommend to add basal insulin or once weekly injection like ozempic  Patient does not wish to add anything at this time and would like to take his medications correctly and see weight management for weight loss options  Discussed risks of uncontrolled glucose levels including heart attack  Also strongly advised him to get blood work done to check his kidneys, which he has not done in about 1 year     Foot exam done today  Eye exam done by Dr Bridget Kidd, will obtain records  Urine collected in office

## 2019-11-14 DIAGNOSIS — I10 BENIGN ESSENTIAL HYPERTENSION: ICD-10-CM

## 2019-11-14 DIAGNOSIS — R80.9 MICROALBUMINURIA: Primary | ICD-10-CM

## 2019-11-14 PROCEDURE — 4010F ACE/ARB THERAPY RXD/TAKEN: CPT | Performed by: INTERNAL MEDICINE

## 2019-11-14 RX ORDER — LISINOPRIL 20 MG/1
20 TABLET ORAL DAILY
Qty: 90 TABLET | Refills: 0 | Status: SHIPPED | OUTPATIENT
Start: 2019-11-14 | End: 2020-09-28 | Stop reason: SDUPTHER

## 2020-01-10 ENCOUNTER — OFFICE VISIT (OUTPATIENT)
Dept: INTERNAL MEDICINE CLINIC | Facility: CLINIC | Age: 65
End: 2020-01-10
Payer: COMMERCIAL

## 2020-01-10 VITALS
BODY MASS INDEX: 42.27 KG/M2 | RESPIRATION RATE: 18 BRPM | SYSTOLIC BLOOD PRESSURE: 138 MMHG | DIASTOLIC BLOOD PRESSURE: 88 MMHG | TEMPERATURE: 98.3 F | WEIGHT: 285.4 LBS | OXYGEN SATURATION: 98 % | HEIGHT: 69 IN | HEART RATE: 88 BPM

## 2020-01-10 DIAGNOSIS — J06.9 ACUTE UPPER RESPIRATORY INFECTION: Primary | ICD-10-CM

## 2020-01-10 DIAGNOSIS — M54.41 CHRONIC MIDLINE LOW BACK PAIN WITH RIGHT-SIDED SCIATICA: ICD-10-CM

## 2020-01-10 DIAGNOSIS — G89.29 CHRONIC MIDLINE LOW BACK PAIN WITH RIGHT-SIDED SCIATICA: ICD-10-CM

## 2020-01-10 DIAGNOSIS — E03.9 HYPOTHYROIDISM, UNSPECIFIED TYPE: ICD-10-CM

## 2020-01-10 PROCEDURE — 99213 OFFICE O/P EST LOW 20 MIN: CPT | Performed by: NURSE PRACTITIONER

## 2020-01-10 PROCEDURE — 1036F TOBACCO NON-USER: CPT | Performed by: NURSE PRACTITIONER

## 2020-01-10 PROCEDURE — 3079F DIAST BP 80-89 MM HG: CPT | Performed by: NURSE PRACTITIONER

## 2020-01-10 PROCEDURE — 3008F BODY MASS INDEX DOCD: CPT | Performed by: NURSE PRACTITIONER

## 2020-01-10 PROCEDURE — 3075F SYST BP GE 130 - 139MM HG: CPT | Performed by: NURSE PRACTITIONER

## 2020-01-10 RX ORDER — AZITHROMYCIN 250 MG/1
TABLET, FILM COATED ORAL
Qty: 6 TABLET | Refills: 0 | Status: SHIPPED | OUTPATIENT
Start: 2020-01-10 | End: 2020-01-14

## 2020-01-10 RX ORDER — HYDROCODONE BITARTRATE AND ACETAMINOPHEN 5; 325 MG/1; MG/1
1 TABLET ORAL DAILY PRN
Qty: 30 TABLET | Refills: 0 | Status: SHIPPED | OUTPATIENT
Start: 2020-01-10 | End: 2020-03-13 | Stop reason: SDUPTHER

## 2020-01-10 RX ORDER — LEVOTHYROXINE SODIUM 0.07 MG/1
75 TABLET ORAL DAILY
Qty: 90 TABLET | Refills: 0 | Status: SHIPPED | OUTPATIENT
Start: 2020-01-10 | End: 2020-05-13

## 2020-01-10 RX ORDER — HYDROCODONE BITARTRATE AND ACETAMINOPHEN 5; 325 MG/1; MG/1
1 TABLET ORAL DAILY PRN
Qty: 30 TABLET | Refills: 0 | Status: CANCELLED | OUTPATIENT
Start: 2020-01-10

## 2020-01-10 RX ORDER — BENZONATATE 100 MG/1
100 CAPSULE ORAL 3 TIMES DAILY PRN
Qty: 30 CAPSULE | Refills: 0 | Status: SHIPPED | OUTPATIENT
Start: 2020-01-10 | End: 2021-02-09

## 2020-01-10 RX ORDER — LEVOTHYROXINE SODIUM 0.07 MG/1
75 TABLET ORAL DAILY
Qty: 90 TABLET | Refills: 0 | Status: CANCELLED | OUTPATIENT
Start: 2020-01-10

## 2020-01-10 NOTE — PROGRESS NOTES
Assessment/Plan:    Take antibiotic as prescribed  Increase oral fluids  Tessalon as needed for cough  Start flonase 1 spray in each nostril daily  Call if not improving over the next week  Diagnoses and all orders for this visit:    Acute upper respiratory infection  -     azithromycin (ZITHROMAX) 250 mg tablet; Take 2 tablets daily on day 1 then 1 tablet daily for the next 4 days  -     benzonatate (TESSALON PERLES) 100 mg capsule; Take 1 capsule (100 mg total) by mouth 3 (three) times a day as needed for cough    Chronic midline low back pain with right-sided sciatica  Comments:  pain agreement signed at today's visit   Orders:  -     HYDROcodone-acetaminophen ([address]) 5-325 mg per tablet; Take 1 tablet by mouth daily as needed for painMax Daily Amount: 1 tablet    Hypothyroidism, unspecified type  -     levothyroxine 75 mcg tablet; Take 1 tablet (75 mcg total) by mouth daily    Other orders  -     Cancel: HYDROcodone-acetaminophen (NORCO) 5-325 mg per tablet; Take 1 tablet by mouth daily as needed for painMax Daily Amount: 1 tablet  -     Cancel: levothyroxine 75 mcg tablet; Take 1 tablet (75 mcg total) by mouth daily          Subjective:      Patient ID: Phoenix Ramirez is a 62 y o  male  Here today for cough and post nasal drip   Started 3 weeks ago   Willard Beth has had a productive cough with yellow sputum along with post nasal drip   He denies any fevers, sore throat, nasal congestion, wheezing/shortness of breath  Cough has become more productive recently   He has tried nyquil, sudafed, mucinex, and saline nasal spray           The following portions of the patient's history were reviewed and updated as appropriate: allergies, current medications, past family history, past medical history, past social history, past surgical history and problem list     Review of Systems   Constitutional: Positive for fatigue  Negative for activity change, appetite change, chills and fever     HENT: Positive for postnasal drip  Negative for congestion, ear pain, rhinorrhea, sinus pressure and sore throat  Respiratory: Positive for cough  Negative for chest tightness, shortness of breath and wheezing  Cardiovascular: Negative for chest pain, palpitations and leg swelling  Gastrointestinal: Negative for abdominal pain, diarrhea, nausea and vomiting  Musculoskeletal: Negative for myalgias  Skin: Negative for rash  Neurological: Negative for dizziness, weakness, light-headedness and headaches  Objective:      /88   Pulse 88   Temp 98 3 °F (36 8 °C)   Resp 18   Ht 5' 9" (1 753 m)   Wt 129 kg (285 lb 6 4 oz)   SpO2 98%   BMI 42 15 kg/m²          Physical Exam   Constitutional: He appears well-developed and well-nourished  HENT:   Head: Normocephalic  Right Ear: External ear normal    Left Ear: External ear normal    Mouth/Throat: Oropharynx is clear and moist    Eyes: Pupils are equal, round, and reactive to light  Cardiovascular: Normal rate, regular rhythm and normal heart sounds  Pulmonary/Chest: Effort normal and breath sounds normal  No respiratory distress  Lymphadenopathy:     He has no cervical adenopathy  Neurological: He is alert  Skin: Skin is warm and dry  Psychiatric: He has a normal mood and affect  His behavior is normal    Vitals reviewed

## 2020-02-04 DIAGNOSIS — E11.42 TYPE 2 DIABETES MELLITUS WITH DIABETIC POLYNEUROPATHY, WITHOUT LONG-TERM CURRENT USE OF INSULIN (HCC): ICD-10-CM

## 2020-02-04 NOTE — TELEPHONE ENCOUNTER
Pt  Called  He said he has been waiting for his Comiglide? Metformin? From his Pharmacy to be filled  Pharmacy said they called us to say that this needs auth  I called [facility] and they said they faxed something yesterday and will fax it again

## 2020-02-10 DIAGNOSIS — E11.42 TYPE 2 DIABETES MELLITUS WITH DIABETIC POLYNEUROPATHY, WITHOUT LONG-TERM CURRENT USE OF INSULIN (HCC): Primary | ICD-10-CM

## 2020-02-10 NOTE — TELEPHONE ENCOUNTER
Patient insurance states they will cover Janumet XR  Pended refill request     Sample is ready for   Pt wife will be coming by office  Re-scheduled appt

## 2020-02-11 ENCOUNTER — TELEPHONE (OUTPATIENT)
Dept: INTERNAL MEDICINE CLINIC | Facility: CLINIC | Age: 65
End: 2020-02-11

## 2020-02-11 NOTE — TELEPHONE ENCOUNTER
Patient said go ahead and do the 90 day supply for now and they will look into getting a coupon for it       Patient also states [facility] called him and they did fill the metformin and insurance covered it

## 2020-02-11 NOTE — TELEPHONE ENCOUNTER
Spoke with pharmacy:    Clomiglide XR 2 2-1,000 was called into the pharmacy, ran through insurance and patient was left with a $70 copay for a 30 day supply  Spoke with patient:    Patient used a coupon for the Clomiglide that he has been using for years and picked up the medication for $0       Advised patient to make a decision weather he wanted to stay with the clomiglide or switch to the [place] which was called to [facility] yesterday  ( was only called in because we were told comiglide was not covered)      Patient is going to go to [employer] after work discuss pricing with them and then make a decision on which medication he will stick with  Patient also confirmed he has appointment with PCP next wetong

## 2020-02-11 NOTE — TELEPHONE ENCOUNTER
Left message for pt to cb  Please let patient know I called [address] to make sure his Janumet did not need an [facility]  Pharmacy states it did not need an auth but the co payment would be $90 for 90 days  30 days supply would be $45  Ask him if 90 days is okay? If not well have to call back and change order, relay to PCP

## 2020-03-11 DIAGNOSIS — E78.2 MIXED HYPERLIPIDEMIA: ICD-10-CM

## 2020-03-12 DIAGNOSIS — I10 BENIGN ESSENTIAL HYPERTENSION: ICD-10-CM

## 2020-03-12 RX ORDER — ATORVASTATIN CALCIUM 20 MG/1
TABLET, FILM COATED ORAL
Qty: 90 TABLET | Refills: 0 | Status: SHIPPED | OUTPATIENT
Start: 2020-03-12 | End: 2020-03-16

## 2020-03-12 RX ORDER — LISINOPRIL 10 MG/1
TABLET ORAL
Qty: 90 TABLET | Refills: 0 | Status: SHIPPED | OUTPATIENT
Start: 2020-03-12 | End: 2020-05-21 | Stop reason: SDUPTHER

## 2020-03-13 DIAGNOSIS — M54.41 CHRONIC MIDLINE LOW BACK PAIN WITH RIGHT-SIDED SCIATICA: ICD-10-CM

## 2020-03-13 DIAGNOSIS — E11.42 TYPE 2 DIABETES MELLITUS WITH DIABETIC POLYNEUROPATHY, WITHOUT LONG-TERM CURRENT USE OF INSULIN (HCC): ICD-10-CM

## 2020-03-13 DIAGNOSIS — G89.29 CHRONIC MIDLINE LOW BACK PAIN WITH RIGHT-SIDED SCIATICA: ICD-10-CM

## 2020-03-13 RX ORDER — HYDROCODONE BITARTRATE AND ACETAMINOPHEN 5; 325 MG/1; MG/1
1 TABLET ORAL DAILY PRN
Qty: 30 TABLET | Refills: 0 | Status: SHIPPED | OUTPATIENT
Start: 2020-03-13 | End: 2020-05-21 | Stop reason: SDUPTHER

## 2020-03-14 DIAGNOSIS — E78.2 MIXED HYPERLIPIDEMIA: ICD-10-CM

## 2020-03-16 RX ORDER — ATORVASTATIN CALCIUM 20 MG/1
TABLET, FILM COATED ORAL
Qty: 90 TABLET | Refills: 0 | Status: SHIPPED | OUTPATIENT
Start: 2020-03-16 | End: 2020-05-21 | Stop reason: SDUPTHER

## 2020-05-13 DIAGNOSIS — E03.9 HYPOTHYROIDISM, UNSPECIFIED TYPE: ICD-10-CM

## 2020-05-13 RX ORDER — LEVOTHYROXINE SODIUM 0.07 MG/1
TABLET ORAL
Qty: 90 TABLET | Refills: 0 | Status: SHIPPED | OUTPATIENT
Start: 2020-05-13 | End: 2020-09-10 | Stop reason: SDUPTHER

## 2020-05-21 DIAGNOSIS — I10 BENIGN ESSENTIAL HYPERTENSION: ICD-10-CM

## 2020-05-21 DIAGNOSIS — E78.2 MIXED HYPERLIPIDEMIA: ICD-10-CM

## 2020-05-21 DIAGNOSIS — G89.29 CHRONIC MIDLINE LOW BACK PAIN WITH RIGHT-SIDED SCIATICA: ICD-10-CM

## 2020-05-21 DIAGNOSIS — N52.9 ERECTILE DYSFUNCTION, UNSPECIFIED ERECTILE DYSFUNCTION TYPE: ICD-10-CM

## 2020-05-21 DIAGNOSIS — M54.41 CHRONIC MIDLINE LOW BACK PAIN WITH RIGHT-SIDED SCIATICA: ICD-10-CM

## 2020-05-21 DIAGNOSIS — E11.42 TYPE 2 DIABETES MELLITUS WITH DIABETIC POLYNEUROPATHY, WITHOUT LONG-TERM CURRENT USE OF INSULIN (HCC): ICD-10-CM

## 2020-05-21 PROCEDURE — 4010F ACE/ARB THERAPY RXD/TAKEN: CPT | Performed by: NURSE PRACTITIONER

## 2020-05-21 RX ORDER — HYDROCODONE BITARTRATE AND ACETAMINOPHEN 5; 325 MG/1; MG/1
1 TABLET ORAL DAILY PRN
Qty: 30 TABLET | Refills: 0 | Status: SHIPPED | OUTPATIENT
Start: 2020-05-21 | End: 2020-08-05 | Stop reason: SDUPTHER

## 2020-05-21 RX ORDER — LISINOPRIL 10 MG/1
10 TABLET ORAL DAILY
Qty: 90 TABLET | Refills: 0 | Status: SHIPPED | OUTPATIENT
Start: 2020-05-21 | End: 2021-02-09

## 2020-05-21 RX ORDER — GLIMEPIRIDE 4 MG/1
4 TABLET ORAL 2 TIMES DAILY
Qty: 180 TABLET | Refills: 0 | Status: SHIPPED | OUTPATIENT
Start: 2020-05-21 | End: 2020-08-17 | Stop reason: SDUPTHER

## 2020-05-21 RX ORDER — SILDENAFIL CITRATE 20 MG/1
40 TABLET ORAL DAILY
Qty: 20 TABLET | Refills: 1 | Status: SHIPPED | OUTPATIENT
Start: 2020-05-21 | End: 2020-05-27 | Stop reason: SDUPTHER

## 2020-05-21 RX ORDER — ATORVASTATIN CALCIUM 20 MG/1
20 TABLET, FILM COATED ORAL DAILY
Qty: 90 TABLET | Refills: 0 | Status: SHIPPED | OUTPATIENT
Start: 2020-05-21 | End: 2020-09-28 | Stop reason: SDUPTHER

## 2020-05-22 ENCOUNTER — TELEPHONE (OUTPATIENT)
Dept: INTERNAL MEDICINE CLINIC | Facility: CLINIC | Age: 65
End: 2020-05-22

## 2020-05-27 DIAGNOSIS — N52.9 ERECTILE DYSFUNCTION, UNSPECIFIED ERECTILE DYSFUNCTION TYPE: ICD-10-CM

## 2020-05-27 RX ORDER — SILDENAFIL CITRATE 20 MG/1
40 TABLET ORAL DAILY
Qty: 20 TABLET | Refills: 1 | Status: SHIPPED | OUTPATIENT
Start: 2020-05-27 | End: 2020-12-15

## 2020-08-05 DIAGNOSIS — G89.29 CHRONIC MIDLINE LOW BACK PAIN WITH RIGHT-SIDED SCIATICA: ICD-10-CM

## 2020-08-05 DIAGNOSIS — M54.41 CHRONIC MIDLINE LOW BACK PAIN WITH RIGHT-SIDED SCIATICA: ICD-10-CM

## 2020-08-05 RX ORDER — HYDROCODONE BITARTRATE AND ACETAMINOPHEN 5; 325 MG/1; MG/1
1 TABLET ORAL DAILY PRN
Qty: 30 TABLET | Refills: 0 | Status: SHIPPED | OUTPATIENT
Start: 2020-08-05 | End: 2020-09-28 | Stop reason: SDUPTHER

## 2020-08-05 NOTE — TELEPHONE ENCOUNTER
Patient states he could really use this  They spent hour getting water out of their basement and his back is killing him

## 2020-08-17 DIAGNOSIS — E11.42 TYPE 2 DIABETES MELLITUS WITH DIABETIC POLYNEUROPATHY, WITHOUT LONG-TERM CURRENT USE OF INSULIN (HCC): ICD-10-CM

## 2020-08-17 RX ORDER — GLIMEPIRIDE 4 MG/1
4 TABLET ORAL 2 TIMES DAILY
Qty: 180 TABLET | Refills: 0 | Status: SHIPPED | OUTPATIENT
Start: 2020-08-17 | End: 2020-11-16

## 2020-08-17 NOTE — TELEPHONE ENCOUNTER
Just an FYI-  Patient lost his job and will not longer have insurance  He is waiting to see what his wifes part time job offers  He will no longer be able to afford janument  He has been taking metformin since he has left overs      He is aware you are out of the office

## 2020-09-10 ENCOUNTER — TELEPHONE (OUTPATIENT)
Dept: INTERNAL MEDICINE CLINIC | Facility: CLINIC | Age: 65
End: 2020-09-10

## 2020-09-10 DIAGNOSIS — E03.9 HYPOTHYROIDISM, UNSPECIFIED TYPE: ICD-10-CM

## 2020-09-10 RX ORDER — LEVOTHYROXINE SODIUM 0.07 MG/1
75 TABLET ORAL DAILY
Qty: 30 TABLET | Refills: 0 | Status: SHIPPED | OUTPATIENT
Start: 2020-09-10 | End: 2020-09-28 | Stop reason: SDUPTHER

## 2020-09-10 NOTE — TELEPHONE ENCOUNTER
Patient called he can not afford the janumet  He does have some metformin left  Can he start back on the metformin?

## 2020-09-10 NOTE — TELEPHONE ENCOUNTER
Yes he can start back on metformin but he should get blood work done, it has been almost a year, and schedule a follow up, he is overdue

## 2020-09-10 NOTE — TELEPHONE ENCOUNTER
Scheduled f/u appt with PCP on 9/28  Pt would like to know if metformin will still be sent after completing BW?

## 2020-09-23 ENCOUNTER — APPOINTMENT (OUTPATIENT)
Dept: LAB | Facility: CLINIC | Age: 65
End: 2020-09-23
Payer: COMMERCIAL

## 2020-09-23 ENCOUNTER — TRANSCRIBE ORDERS (OUTPATIENT)
Dept: LAB | Facility: CLINIC | Age: 65
End: 2020-09-23

## 2020-09-23 DIAGNOSIS — E11.42 TYPE 2 DIABETES MELLITUS WITH DIABETIC POLYNEUROPATHY, WITHOUT LONG-TERM CURRENT USE OF INSULIN (HCC): ICD-10-CM

## 2020-09-23 LAB
EST. AVERAGE GLUCOSE BLD GHB EST-MCNC: 252 MG/DL
HBA1C MFR BLD: 10.4 %

## 2020-09-23 PROCEDURE — 36415 COLL VENOUS BLD VENIPUNCTURE: CPT

## 2020-09-23 PROCEDURE — 83036 HEMOGLOBIN GLYCOSYLATED A1C: CPT

## 2020-09-28 ENCOUNTER — OFFICE VISIT (OUTPATIENT)
Dept: INTERNAL MEDICINE CLINIC | Facility: CLINIC | Age: 65
End: 2020-09-28

## 2020-09-28 VITALS
OXYGEN SATURATION: 98 % | WEIGHT: 280 LBS | TEMPERATURE: 97 F | SYSTOLIC BLOOD PRESSURE: 126 MMHG | DIASTOLIC BLOOD PRESSURE: 80 MMHG | BODY MASS INDEX: 42.44 KG/M2 | HEIGHT: 68 IN | HEART RATE: 70 BPM

## 2020-09-28 DIAGNOSIS — G89.29 CHRONIC MIDLINE LOW BACK PAIN WITH RIGHT-SIDED SCIATICA: ICD-10-CM

## 2020-09-28 DIAGNOSIS — I10 BENIGN ESSENTIAL HYPERTENSION: ICD-10-CM

## 2020-09-28 DIAGNOSIS — R80.9 MICROALBUMINURIA: ICD-10-CM

## 2020-09-28 DIAGNOSIS — E78.2 MIXED HYPERLIPIDEMIA: ICD-10-CM

## 2020-09-28 DIAGNOSIS — E03.9 HYPOTHYROIDISM, UNSPECIFIED TYPE: ICD-10-CM

## 2020-09-28 DIAGNOSIS — E11.42 TYPE 2 DIABETES MELLITUS WITH DIABETIC POLYNEUROPATHY, WITHOUT LONG-TERM CURRENT USE OF INSULIN (HCC): Primary | ICD-10-CM

## 2020-09-28 DIAGNOSIS — M54.41 CHRONIC MIDLINE LOW BACK PAIN WITH RIGHT-SIDED SCIATICA: ICD-10-CM

## 2020-09-28 PROCEDURE — 99214 OFFICE O/P EST MOD 30 MIN: CPT | Performed by: NURSE PRACTITIONER

## 2020-09-28 RX ORDER — TIZANIDINE HYDROCHLORIDE 2 MG/1
2 CAPSULE, GELATIN COATED ORAL
Qty: 30 CAPSULE | Refills: 0 | Status: SHIPPED | OUTPATIENT
Start: 2020-09-28 | End: 2021-02-09

## 2020-09-28 RX ORDER — HYDROCODONE BITARTRATE AND ACETAMINOPHEN 5; 325 MG/1; MG/1
1 TABLET ORAL DAILY PRN
Qty: 30 TABLET | Refills: 0 | Status: SHIPPED | OUTPATIENT
Start: 2020-09-28 | End: 2020-11-16 | Stop reason: SDUPTHER

## 2020-09-28 RX ORDER — LEVOTHYROXINE SODIUM 0.07 MG/1
75 TABLET ORAL DAILY
Qty: 30 TABLET | Refills: 0 | Status: SHIPPED | OUTPATIENT
Start: 2020-09-28 | End: 2020-11-16

## 2020-09-28 NOTE — PROGRESS NOTES
Assessment/Plan:    Type 2 diabetes mellitus with diabetic polyneuropathy, without long-term current use of insulin ([facility])    Lab Results   Component Value Date    HGBA1C 10 4 (H) 09/23/2020   recently restarted on Glimepiride and metformin  Briefly discussed adding insulin, possibly lantus, but he would like to check the price on it first   Advised to get labs asap to check renal function  Again reviewed risks of uncontrolled diabetes, including heart attack or stroke  Hypothyroid  Overdue for labs, advised to get thyroid levels checked asap      Benign essential hypertension  Stable  Continue lisinopril, check labs  Chronic midline low back pain with right-sided sciatica  Try muscle relaxer at HS  Stop using aleve, overdue for labs, check asap  Norco sparingly  Refilled today  PDMP appropriate  Mixed hyperlipidemia  On statin  Overdue for lipids  Diagnoses and all orders for this visit:    Type 2 diabetes mellitus with diabetic polyneuropathy, without long-term current use of insulin ([facility])  -     Basic metabolic panel; Future    Benign essential hypertension    Mixed hyperlipidemia  -     Lipid Panel with Direct LDL reflex; Future    Chronic midline low back pain with right-sided sciatica  -     HYDROcodone-acetaminophen (NORCO) 5-325 mg per tablet; Take 1 tablet by mouth daily as needed for painMax Daily Amount: 1 tablet  -     TiZANidine (ZANAFLEX) 2 MG capsule; Take 1 capsule (2 mg total) by mouth daily at bedtime as needed for muscle spasms    Hypothyroidism, unspecified type  -     levothyroxine 75 mcg tablet; Take 1 tablet (75 mcg total) by mouth daily  -     TSH, 3rd generation with Free T4 reflex; Future          Subjective:      Patient ID: Iwona Cuellar is a 60 y o  male  Here today for follow up  Hilary Avina recently lost his insurance  Because of this he stopped taking his diabetes medications  His most recent A1C was 10 4%   He started back on glimepiride recently and found some metformin that he has been taking  He's unsure of the dose  He states he hasn't been able to get his labs done because of cost      His blood sugars have improved since starting on medication  FBS around   He continues to have chronic right low back pain that radiates down his leg  He has been taking aleve with some relief  He uses norco when it is severe  He would like to start physical therapy once he gets insurance because that has helped in the past      He denies any chest pain or shortness of breath  He has no other concerns today  The following portions of the patient's history were reviewed and updated as appropriate: allergies, current medications, past family history, past medical history, past social history, past surgical history and problem list     Review of Systems   Constitutional: Negative for activity change, appetite change, fatigue, fever and unexpected weight change  Eyes: Negative for visual disturbance  Respiratory: Negative for cough, chest tightness, shortness of breath and wheezing  Cardiovascular: Negative for chest pain, palpitations and leg swelling  Gastrointestinal: Negative for abdominal pain, blood in stool, constipation and diarrhea  Genitourinary: Negative for difficulty urinating  Musculoskeletal: Positive for arthralgias and back pain  Skin: Negative for rash  Neurological: Negative for dizziness, weakness, light-headedness and headaches  Psychiatric/Behavioral: Negative for sleep disturbance  Objective:      /80   Pulse 70   Temp (!) 97 °F (36 1 °C)   Ht 5' 8" (1 727 m)   Wt 127 kg (280 lb)   SpO2 98%   BMI 42 57 kg/m²          Physical Exam  Vitals signs reviewed  Constitutional:       Appearance: Normal appearance  He is well-developed  HENT:      Head: Normocephalic and atraumatic        Right Ear: Tympanic membrane, ear canal and external ear normal       Left Ear: Tympanic membrane, ear canal and external ear normal    Eyes:      Conjunctiva/sclera: Conjunctivae normal       Pupils: Pupils are equal, round, and reactive to light  Neck:      Thyroid: No thyromegaly  Cardiovascular:      Rate and Rhythm: Normal rate and regular rhythm  Pulses: Normal pulses  Heart sounds: Normal heart sounds  Pulmonary:      Effort: Pulmonary effort is normal       Breath sounds: Normal breath sounds  Abdominal:      General: Bowel sounds are normal       Palpations: Abdomen is soft  Musculoskeletal: Normal range of motion  General: No swelling  Lymphadenopathy:      Cervical: No cervical adenopathy  Skin:     General: Skin is warm and dry  Neurological:      Mental Status: He is alert and oriented to person, place, and time     Psychiatric:         Behavior: Behavior normal

## 2020-09-28 NOTE — ASSESSMENT & PLAN NOTE
Lab Results   Component Value Date    HGBA1C 10 4 (H) 09/23/2020   recently restarted on Glimepiride and metformin  Briefly discussed adding insulin, possibly lantus, but he would like to check the price on it first   Advised to get labs asap to check renal function  Again reviewed risks of uncontrolled diabetes, including heart attack or stroke

## 2020-09-28 NOTE — ASSESSMENT & PLAN NOTE
Try muscle relaxer at HS  Stop using aleve, overdue for labs, check asap  Norco sparingly  Refilled today  PDMP appropriate

## 2020-09-29 RX ORDER — LISINOPRIL 20 MG/1
20 TABLET ORAL DAILY
Qty: 90 TABLET | Refills: 0 | Status: SHIPPED | OUTPATIENT
Start: 2020-09-29 | End: 2020-12-30

## 2020-09-29 RX ORDER — ATORVASTATIN CALCIUM 20 MG/1
20 TABLET, FILM COATED ORAL DAILY
Qty: 90 TABLET | Refills: 0 | Status: SHIPPED | OUTPATIENT
Start: 2020-09-29 | End: 2020-12-30

## 2020-10-01 ENCOUNTER — TELEPHONE (OUTPATIENT)
Dept: INTERNAL MEDICINE CLINIC | Facility: CLINIC | Age: 65
End: 2020-10-01

## 2020-10-02 ENCOUNTER — APPOINTMENT (OUTPATIENT)
Dept: LAB | Facility: CLINIC | Age: 65
End: 2020-10-02
Payer: COMMERCIAL

## 2020-10-02 DIAGNOSIS — E78.2 MIXED HYPERLIPIDEMIA: ICD-10-CM

## 2020-10-02 DIAGNOSIS — E11.42 TYPE 2 DIABETES MELLITUS WITH DIABETIC POLYNEUROPATHY, WITHOUT LONG-TERM CURRENT USE OF INSULIN (HCC): ICD-10-CM

## 2020-10-02 LAB
ANION GAP SERPL CALCULATED.3IONS-SCNC: 4 MMOL/L (ref 4–13)
BUN SERPL-MCNC: 11 MG/DL (ref 5–25)
CALCIUM SERPL-MCNC: 9.3 MG/DL (ref 8.3–10.1)
CHLORIDE SERPL-SCNC: 102 MMOL/L (ref 100–108)
CHOLEST SERPL-MCNC: 154 MG/DL (ref 50–200)
CO2 SERPL-SCNC: 29 MMOL/L (ref 21–32)
CREAT SERPL-MCNC: 0.98 MG/DL (ref 0.6–1.3)
GFR SERPL CREATININE-BSD FRML MDRD: 81 ML/MIN/1.73SQ M
GLUCOSE P FAST SERPL-MCNC: 179 MG/DL (ref 65–99)
HDLC SERPL-MCNC: 38 MG/DL
LDLC SERPL CALC-MCNC: 79 MG/DL (ref 0–100)
POTASSIUM SERPL-SCNC: 4.6 MMOL/L (ref 3.5–5.3)
SODIUM SERPL-SCNC: 135 MMOL/L (ref 136–145)
TRIGL SERPL-MCNC: 187 MG/DL

## 2020-10-02 PROCEDURE — 80061 LIPID PANEL: CPT

## 2020-10-02 PROCEDURE — 80048 BASIC METABOLIC PNL TOTAL CA: CPT

## 2020-10-02 PROCEDURE — 36415 COLL VENOUS BLD VENIPUNCTURE: CPT

## 2020-10-05 DIAGNOSIS — E11.42 TYPE 2 DIABETES MELLITUS WITH DIABETIC POLYNEUROPATHY, WITHOUT LONG-TERM CURRENT USE OF INSULIN (HCC): Primary | ICD-10-CM

## 2020-11-13 DIAGNOSIS — M54.41 CHRONIC MIDLINE LOW BACK PAIN WITH RIGHT-SIDED SCIATICA: ICD-10-CM

## 2020-11-13 DIAGNOSIS — E03.9 HYPOTHYROIDISM, UNSPECIFIED TYPE: ICD-10-CM

## 2020-11-13 DIAGNOSIS — G89.29 CHRONIC MIDLINE LOW BACK PAIN WITH RIGHT-SIDED SCIATICA: ICD-10-CM

## 2020-11-13 DIAGNOSIS — E11.42 TYPE 2 DIABETES MELLITUS WITH DIABETIC POLYNEUROPATHY, WITHOUT LONG-TERM CURRENT USE OF INSULIN (HCC): ICD-10-CM

## 2020-11-16 DIAGNOSIS — M54.41 CHRONIC MIDLINE LOW BACK PAIN WITH RIGHT-SIDED SCIATICA: ICD-10-CM

## 2020-11-16 DIAGNOSIS — G89.29 CHRONIC MIDLINE LOW BACK PAIN WITH RIGHT-SIDED SCIATICA: ICD-10-CM

## 2020-11-16 RX ORDER — HYDROCODONE BITARTRATE AND ACETAMINOPHEN 5; 325 MG/1; MG/1
1 TABLET ORAL DAILY PRN
Qty: 30 TABLET | Refills: 0 | Status: SHIPPED | OUTPATIENT
Start: 2020-11-16 | End: 2020-11-16 | Stop reason: SDUPTHER

## 2020-11-16 RX ORDER — LEVOTHYROXINE SODIUM 0.07 MG/1
TABLET ORAL
Qty: 30 TABLET | Refills: 3 | Status: SHIPPED | OUTPATIENT
Start: 2020-11-16 | End: 2021-04-01

## 2020-11-16 RX ORDER — HYDROCODONE BITARTRATE AND ACETAMINOPHEN 5; 325 MG/1; MG/1
1 TABLET ORAL DAILY PRN
Qty: 30 TABLET | Refills: 0 | Status: SHIPPED | OUTPATIENT
Start: 2020-11-16 | End: 2020-12-30 | Stop reason: SDUPTHER

## 2020-11-16 RX ORDER — GLIMEPIRIDE 4 MG/1
TABLET ORAL
Qty: 180 TABLET | Refills: 0 | Status: SHIPPED | OUTPATIENT
Start: 2020-11-16 | End: 2021-03-17 | Stop reason: SDUPTHER

## 2020-12-14 DIAGNOSIS — N52.9 ERECTILE DYSFUNCTION, UNSPECIFIED ERECTILE DYSFUNCTION TYPE: ICD-10-CM

## 2020-12-15 RX ORDER — SILDENAFIL CITRATE 20 MG/1
TABLET ORAL
Qty: 20 TABLET | Refills: 1 | Status: SHIPPED | OUTPATIENT
Start: 2020-12-15 | End: 2021-05-27 | Stop reason: SDUPTHER

## 2020-12-26 DIAGNOSIS — R80.9 MICROALBUMINURIA: ICD-10-CM

## 2020-12-26 DIAGNOSIS — I10 BENIGN ESSENTIAL HYPERTENSION: ICD-10-CM

## 2020-12-26 DIAGNOSIS — E78.2 MIXED HYPERLIPIDEMIA: ICD-10-CM

## 2020-12-26 DIAGNOSIS — E11.42 TYPE 2 DIABETES MELLITUS WITH DIABETIC POLYNEUROPATHY, WITHOUT LONG-TERM CURRENT USE OF INSULIN (HCC): ICD-10-CM

## 2020-12-30 DIAGNOSIS — M54.41 CHRONIC MIDLINE LOW BACK PAIN WITH RIGHT-SIDED SCIATICA: ICD-10-CM

## 2020-12-30 DIAGNOSIS — G89.29 CHRONIC MIDLINE LOW BACK PAIN WITH RIGHT-SIDED SCIATICA: ICD-10-CM

## 2020-12-30 RX ORDER — LISINOPRIL 20 MG/1
TABLET ORAL
Qty: 90 TABLET | Refills: 0 | Status: SHIPPED | OUTPATIENT
Start: 2020-12-30 | End: 2021-02-24 | Stop reason: SDUPTHER

## 2020-12-30 RX ORDER — ATORVASTATIN CALCIUM 20 MG/1
TABLET, FILM COATED ORAL
Qty: 90 TABLET | Refills: 0 | Status: SHIPPED | OUTPATIENT
Start: 2020-12-30 | End: 2021-04-16 | Stop reason: SDUPTHER

## 2020-12-30 RX ORDER — HYDROCODONE BITARTRATE AND ACETAMINOPHEN 5; 325 MG/1; MG/1
1 TABLET ORAL DAILY PRN
Qty: 30 TABLET | Refills: 0 | Status: SHIPPED | OUTPATIENT
Start: 2020-12-30 | End: 2021-02-09 | Stop reason: SDUPTHER

## 2021-02-09 ENCOUNTER — OFFICE VISIT (OUTPATIENT)
Dept: INTERNAL MEDICINE CLINIC | Facility: CLINIC | Age: 66
End: 2021-02-09
Payer: COMMERCIAL

## 2021-02-09 VITALS
HEART RATE: 89 BPM | OXYGEN SATURATION: 96 % | BODY MASS INDEX: 42.31 KG/M2 | DIASTOLIC BLOOD PRESSURE: 82 MMHG | TEMPERATURE: 97.3 F | SYSTOLIC BLOOD PRESSURE: 138 MMHG | HEIGHT: 68 IN | WEIGHT: 279.2 LBS

## 2021-02-09 DIAGNOSIS — E11.42 TYPE 2 DIABETES MELLITUS WITH DIABETIC POLYNEUROPATHY, WITHOUT LONG-TERM CURRENT USE OF INSULIN (HCC): ICD-10-CM

## 2021-02-09 DIAGNOSIS — M54.41 CHRONIC MIDLINE LOW BACK PAIN WITH RIGHT-SIDED SCIATICA: ICD-10-CM

## 2021-02-09 DIAGNOSIS — I10 BENIGN ESSENTIAL HYPERTENSION: ICD-10-CM

## 2021-02-09 DIAGNOSIS — G89.29 CHRONIC MIDLINE LOW BACK PAIN WITH RIGHT-SIDED SCIATICA: ICD-10-CM

## 2021-02-09 DIAGNOSIS — Z00.00 WELCOME TO MEDICARE PREVENTIVE VISIT: Primary | ICD-10-CM

## 2021-02-09 DIAGNOSIS — E03.9 HYPOTHYROIDISM, UNSPECIFIED TYPE: ICD-10-CM

## 2021-02-09 DIAGNOSIS — E66.01 OBESITY, MORBID, BMI 40.0-49.9 (HCC): ICD-10-CM

## 2021-02-09 DIAGNOSIS — E78.2 MIXED HYPERLIPIDEMIA: ICD-10-CM

## 2021-02-09 DIAGNOSIS — Z12.5 SCREENING FOR PROSTATE CANCER: ICD-10-CM

## 2021-02-09 DIAGNOSIS — M25.512 ACUTE PAIN OF LEFT SHOULDER: ICD-10-CM

## 2021-02-09 PROCEDURE — G0438 PPPS, INITIAL VISIT: HCPCS | Performed by: NURSE PRACTITIONER

## 2021-02-09 PROCEDURE — 3008F BODY MASS INDEX DOCD: CPT | Performed by: NURSE PRACTITIONER

## 2021-02-09 PROCEDURE — 99214 OFFICE O/P EST MOD 30 MIN: CPT | Performed by: NURSE PRACTITIONER

## 2021-02-09 RX ORDER — HYDROCODONE BITARTRATE AND ACETAMINOPHEN 5; 325 MG/1; MG/1
1 TABLET ORAL DAILY PRN
Qty: 30 TABLET | Refills: 0 | Status: SHIPPED | OUTPATIENT
Start: 2021-02-09 | End: 2021-04-15 | Stop reason: SDUPTHER

## 2021-02-09 NOTE — PROGRESS NOTES
Assessment/Plan:    Type 2 diabetes mellitus with diabetic polyneuropathy, without long-term current use of insulin ([facility])    Lab Results   Component Value Date    HGBA1C 10 4 (H) 09/23/2020   due for updated labs  For now continue glimepiride and metformin  Now that he has insurance, depending on A1C results, consider adding Januvia or Jardiance  Foot exam done today  Urine micro ordered  Eye exam in 2 weeks     Benign essential hypertension  Stable  Continue lisinopril  Chronic midline low back pain with right-sided sciatica  Hydrocodone PRN  Pain contract signed today  Check lumbar and right hip xray  Start physical therapy  If not improving, may need MRI and/or pain management  Mixed hyperlipidemia  On statin      Obesity, morbid, BMI 40 0-49 9 ([facility])  BMI Counseling: Body mass index is 42 45 kg/m²  The BMI is above normal  Nutrition recommendations include reducing portion sizes, decreasing overall calorie intake, consuming healthier snacks, decreasing soda and/or juice intake and moderation in carbohydrate intake  Exercise recommendations include exercising 3-5 times per week  Hypothyroid  Check TSH        Diagnoses and all orders for this visit:    Welcome to Medicare preventive visit    Type 2 diabetes mellitus with diabetic polyneuropathy, without long-term current use of insulin ([facility] )  -     Hemoglobin A1C; Future  -     Comprehensive metabolic panel; Future  -     Cancel: Microalbumin / creatinine urine ratio  -     Microalbumin / creatinine urine ratio    Chronic midline low back pain with right-sided sciatica  -     XR spine lumbar minimum 4 views non injury; Future  -     XR hip/pelv 2-3 vws right if performed; Future  -     Ambulatory referral to Physical Therapy; Future  -     HYDROcodone-acetaminophen (NORCO) 5-325 mg per tablet; Take 1 tablet by mouth daily as needed for painMax Daily Amount: 1 tablet    Acute pain of left shoulder  -     XR shoulder 2+ vw left;  Future  - Ambulatory referral to Physical Therapy; Future    Benign essential hypertension  -     Comprehensive metabolic panel; Future    Hypothyroidism, unspecified type  -     TSH, 3rd generation with Free T4 reflex; Future    Mixed hyperlipidemia  -     Comprehensive metabolic panel; Future    Obesity, morbid, BMI 40 0-49 9 ([facility] )    Screening for prostate cancer  -     PSA, Total Screen; Future          Subjective:      Patient ID: Casper Metcalf is a 60 y o  male  Here today for follow up  Amy Beltran is doing ok  He has complaints today of worsening right lower back pain that radiates to his right hip  He has had low back pain for the last few years with history of MRI and physical therapy  Over the last 2 months the pain is worse and affecting his sleep  He contributes this to not being as active lately  He feels better when he exercises  He has been taking hydrocodone with some relief  His blood sugars in the morning run about 160-200  Blood pressures at home about 130/80's  He also complaints of left shoulder pain  The pain started a few months ago and has been worse with recently shoveling snow  He has issues reaching up for objects  He denies radiation down his arm or numbness/tingling  The following portions of the patient's history were reviewed and updated as appropriate: allergies, current medications, past family history, past medical history, past social history, past surgical history and problem list     Review of Systems   Constitutional: Negative for activity change, appetite change, fatigue and unexpected weight change  Eyes: Negative for visual disturbance  Respiratory: Negative for cough, chest tightness and shortness of breath  Cardiovascular: Negative for chest pain, palpitations and leg swelling  Gastrointestinal: Negative for abdominal pain, constipation and diarrhea  Genitourinary: Negative for difficulty urinating     Musculoskeletal: Positive for arthralgias and back pain  Skin: Negative for rash  Neurological: Negative for dizziness, weakness, light-headedness and headaches  Psychiatric/Behavioral: Positive for sleep disturbance  From back pain           Objective:      /82   Pulse 89   Temp (!) 97 3 °F (36 3 °C)   Ht 5' 8" (1 727 m)   Wt 127 kg (279 lb 3 2 oz)   SpO2 96%   BMI 42 45 kg/m²          Physical Exam  Vitals signs reviewed  Constitutional:       Appearance: He is well-developed  HENT:      Head: Normocephalic and atraumatic  Eyes:      Conjunctiva/sclera: Conjunctivae normal       Pupils: Pupils are equal, round, and reactive to light  Neck:      Thyroid: No thyromegaly  Cardiovascular:      Rate and Rhythm: Normal rate and regular rhythm  Pulses: Normal pulses  Heart sounds: Normal heart sounds  Pulmonary:      Effort: Pulmonary effort is normal       Breath sounds: Normal breath sounds  Abdominal:      General: Bowel sounds are normal       Palpations: Abdomen is soft  Musculoskeletal:         General: No swelling  Left shoulder: He exhibits decreased range of motion and pain  He exhibits no tenderness, no deformity and normal strength  Right hip: He exhibits decreased range of motion and decreased strength  Lumbar back: He exhibits pain  He exhibits normal range of motion  Lymphadenopathy:      Cervical: No cervical adenopathy  Skin:     General: Skin is warm and dry  Neurological:      Mental Status: He is alert and oriented to person, place, and time     Psychiatric:         Behavior: Behavior normal

## 2021-02-09 NOTE — PROGRESS NOTES
Diabetic Foot Exam    Patient's shoes and socks removed  Right Foot/Ankle   Right Foot Inspection  Skin Exam: skin normal and skin intact no dry skin, no warmth, no callus, no erythema, no maceration, no abnormal color, no pre-ulcer, no ulcer and no callus                          Toe Exam: ROM and strength within normal limits  Sensory   Vibration: intact  Proprioception: intact   Monofilament testing: diminished  Vascular  Capillary refills: < 3 seconds  The right DP pulse is 2+  Left Foot/Ankle  Left Foot Inspection  Skin Exam: skin normal and skin intactno dry skin, no warmth, no erythema, no maceration, normal color, no pre-ulcer, no ulcer and no callus                         Toe Exam: ROM and strength within normal limits                   Sensory   Vibration: intact  Proprioception: intact  Monofilament: diminished  Vascular  Capillary refills: < 3 seconds  The left DP pulse is 2+  Assign Risk Category:  No deformity present; Loss of protective sensation; No weak pulses       Risk: 1       Assessment and Plan:     Problem List Items Addressed This Visit        Endocrine    Type 2 diabetes mellitus with diabetic polyneuropathy, without long-term current use of insulin ([facility] )       Lab Results   Component Value Date    HGBA1C 10 4 (H) 09/23/2020   due for updated labs  For now continue glimepiride and metformin  Now that he has insurance, depending on A1C results, consider adding Januvia or Jardiance  Foot exam done today  Urine micro ordered  Eye exam in 2 weeks          Relevant Orders    Hemoglobin A1C    Comprehensive metabolic panel    Microalbumin / creatinine urine ratio    Hypothyroid     Check TSH          Relevant Orders    TSH, 3rd generation with Free T4 reflex       Cardiovascular and Mediastinum    Benign essential hypertension     Stable  Continue lisinopril            Relevant Orders    Comprehensive metabolic panel       Nervous and Auditory    Chronic midline low back pain with right-sided sciatica     Hydrocodone PRN  Pain contract signed today  Check lumbar and right hip xray  Start physical therapy  If not improving, may need MRI and/or pain management  Relevant Medications    HYDROcodone-acetaminophen (NORCO) 5-325 mg per tablet    Other Relevant Orders    XR spine lumbar minimum 4 views non injury    XR hip/pelv 2-3 vws right if performed    Ambulatory referral to Physical Therapy       Other    Mixed hyperlipidemia     On statin           Relevant Orders    Comprehensive metabolic panel    Obesity, morbid, BMI 40 0-49 9 ([facility])     BMI Counseling: Body mass index is 42 45 kg/m²  The BMI is above normal  Nutrition recommendations include reducing portion sizes, decreasing overall calorie intake, consuming healthier snacks, decreasing soda and/or juice intake and moderation in carbohydrate intake  Exercise recommendations include exercising 3-5 times per week  Other Visit Diagnoses     Welcome to Medicare preventive visit    -  Primary    Acute pain of left shoulder        Relevant Orders    XR shoulder 2+ vw left    Ambulatory referral to Physical Therapy    Screening for prostate cancer        Relevant Orders    PSA, Total Screen           Preventive health issues were discussed with patient, and age appropriate screening tests were ordered as noted in patient's After Visit Summary  Personalized health advice and appropriate referrals for health education or preventive services given if needed, as noted in patient's After Visit Summary  History of Present Illness:     Patient presents for Welcome to Medicare visit       Patient Care Team:  German Live as PCP - General (Internal Medicine)  MD Jc Jay MD Kalvin Arrant, MD     Review of Systems:     Review of Systems   Problem List:     Patient Active Problem List   Diagnosis    Type 2 diabetes mellitus with diabetic polyneuropathy, without long-term current use of insulin ([facility] )    JOSE RAUL (obstructive sleep apnea)    Benign essential hypertension    Mixed hyperlipidemia    Periodic limb movement    Hypothyroid    Chronic midline low back pain with right-sided sciatica    Obesity, morbid, BMI 40 0-49 9 ([facility] )    Other male erectile dysfunction      Past Medical and Surgical History:     Past Medical History:   Diagnosis Date    BPH (benign prostatic hyperplasia)     CPAP (continuous positive airway pressure) dependence     Diabetes mellitus ([facility] )     Disease of thyroid gland     Hyperlipidemia     Hypertension     Sleep apnea      Past Surgical History:   Procedure Laterality Date    COLONOSCOPY      2016    MT EGD TRANSORAL BIOPSY SINGLE/MULTIPLE N/A 2017    Procedure: ESOPHAGOGASTRODUODENOSCOPY (EGD) with bx;  Surgeon: Susie Lux MD;  Location: [facility];   Service: Bariatrics    TONSILLECTOMY        Family History:     Family History   Problem Relation Age of Onset    Lung cancer Mother     Diabetes Father     Alcohol abuse Neg Hx     Substance Abuse Neg Hx     Mental illness Neg Hx     Depression Neg Hx       Social History:     E-Cigarette/Vaping    E-Cigarette Use Never User      E-Cigarette/Vaping Substances    Nicotine No     THC No     CBD No     Flavoring No     Other No     Unknown No      Social History     Socioeconomic History    Marital status: /Civil Union     Spouse name: None    Number of children: None    Years of education: None    Highest education level: None   Occupational History    None   Social Needs    Financial resource strain: None    Food insecurity     Worry: None     Inability: None    Transportation needs     Medical: None     Non-medical: None   Tobacco Use    Smoking status: Former Smoker     Packs/day: 2 00     Years: 0 00     Pack years: 0 00     Types: Cigarettes     Quit date:      Years since quittin 1    Smokeless tobacco: Never Used   Substance and Sexual Activity    Alcohol use: Yes     Comment: social    Drug use: No    Sexual activity: None   Lifestyle    Physical activity     Days per week: None     Minutes per session: None    Stress: None   Relationships    Social connections     Talks on phone: None     Gets together: None     Attends L[employer] service: None     Active member of club or organization: None     Attends meetings of clubs or organizations: None     Relationship status: None    Intimate partner violence     Fear of current or ex partner: None     Emotionally abused: None     Physically abused: None     Forced sexual activity: None   Other Topics Concern    None   Social History Narrative          Medications and Allergies:     Current Outpatient Medications   Medication Sig Dispense Refill    aspirin (ECOTRIN LOW STRENGTH) 81 mg EC tablet Take 81 mg by mouth daily      atorvastatin (LIPITOR) 20 mg tablet TAKE 1 TABLET BY MOUTH EVERY DAY 90 tablet 0    glimepiride (AMARYL) 4 mg tablet TAKE 1 TABLET BY MOUTH 2 TIMES A DAY  180 tablet 0    HYDROcodone-acetaminophen (NORCO) 5-325 mg per tablet Take 1 tablet by mouth daily as needed for painMax Daily Amount: 1 tablet 30 tablet 0    levothyroxine 75 mcg tablet TAKE 1 TABLET BY MOUTH DAILY   30 tablet 3    metFORMIN (GLUCOPHAGE) 1000 MG tablet TAKE 1 TABLET BY MOUTH TWO TIMES A DAY WITH MEALS 60 tablet 2    sildenafil (REVATIO) 20 mg tablet TAKE TWO TABLETS BY MOUTH EVERY DAY 20 tablet 1    sildenafil (VIAGRA) 50 MG tablet       sildenafil (VIAGRA) 50 MG tablet Take 1 tablet (50 mg total) by mouth daily as needed for erectile dysfunction 10 tablet 2    Blood Glucose Calibration (OT ULTRA/FASTTK CNTRL SOLN) SOLN by In Vitro route as needed (to calibrate blood sugar meter) 1 each 1    Blood Glucose Monitoring Suppl (ONE TOUCH ULTRA MINI) w/Device KIT by Does not apply route 2 (two) times a day 1 each 0    glucose blood ([employer] ADVANCED GLUCOSE TEST) test strip 1 each by Other route 2 (two) times a day 100 each 3    glucose blood (ONE TOUCH ULTRA TEST) test strip 1 each by Other route 2 (two) times a day Use as instructed 100 each 3    ketoconazole (NIZORAL) 2 % cream Apply topically daily 60 g 1    lisinopril (ZESTRIL) 20 mg tablet TAKE 1 TABLET BY MOUTH EVERY DAY 90 tablet 0    ONETOUCH DELICA LANCETS 91Q MISC by Does not apply route 2 (two) times a day 100 each 3     No current facility-administered medications for this visit  Allergies   Allergen Reactions    Penicillins      SYNCOPE, but has taken amoxicillin/augmentin in past      Immunizations:     Immunization History   Administered Date(s) Administered    INFLUENZA 01/17/2011, 09/26/2011, 10/01/2012, 10/14/2013, 10/30/2014, 09/12/2017    Influenza, injectable, quadrivalent, preservative free 0 5 mL 08/20/2020    Influenza, recombinant, quadrivalent,injectable, preservative free 09/17/2018, 11/13/2019    Pneumococcal Polysaccharide PPV23 01/17/2011    Tdap 09/12/2017      Health Maintenance:         Topic Date Due    HIV Screening  02/25/1970    Colorectal Cancer Screening  06/13/2023    Hepatitis C Screening  Completed     There are no preventive care reminders to display for this patient  Medicare Screening Tests and Risk Assessments:     Andre Moss is here for his Welcome to Medicare visit  Last Medicare Wellness visit information reviewed, patient interviewed and updates made to the record today  Health Risk Assessment:   Patient rates overall health as fair  Patient feels that their physical health rating is slightly worse  Eyesight was rated as same  Hearing was rated as same  Patient feels that their emotional and mental health rating is same  Pain experienced in the last 7 days has been a lot  Patient's pain rating has been 9/10  Patient states that he has experienced no weight loss or gain in last 6 months  Depression Screening:   PHQ-2 Score: 0      Fall Risk Screening:    In the past year, patient has experienced: history of falling in past year    Number of falls: 1  Injured during fall?: No    Feels unsteady when standing or walking?: No    Worried about falling?: No      Home Safety:  Patient does not have trouble with stairs inside or outside of their home  Patient has working smoke alarms and has working carbon monoxide detector  Home safety hazards include: none  Nutrition:   Current diet is Regular  Medications:   Patient is not currently taking any over-the-counter supplements  Patient is able to manage medications       Activities of Daily Living (ADLs)/Instrumental Activities of Daily Living (IADLs):   Walk and transfer into and out of bed and chair?: Yes  Dress and groom yourself?: Yes    Bathe or shower yourself?: Yes    Do your laundry/housekeeping?: Yes  Manage your money, pay your bills and track your expenses?: Yes  Make your own meals?: Yes    Do your own shopping?: Yes    Previous Hospitalizations:   Any hospitalizations or ED visits within the last 12 months?: No      Advance Care Planning:   Living will: No    Durable POA for healthcare: No      PREVENTIVE SCREENINGS      Cardiovascular Screening:    General: Screening Not Indicated and History Lipid Disorder      Diabetes Screening:     General: Screening Not Indicated and History Diabetes      Colorectal Cancer Screening:     General: Screening Current      Prostate Cancer Screening:    General: Risks and Benefits Discussed    Due for: PSA      Osteoporosis Screening:    General: Screening Not Indicated      Abdominal Aortic Aneurysm (AAA) Screening:    Risk factors include: tobacco use        General: Screening Not Indicated      Lung Cancer Screening:     General: Screening Not Indicated      Hepatitis C Screening:    General: Screening Current    No exam data present     Physical Exam:     /82   Pulse 89   Temp (!) 97 3 °F (36 3 °C)   Ht 5' 8" (1 727 m)   Wt 127 kg (279 lb 3 2 oz)   SpO2 96%   BMI 42 45 kg/m²     Physical Exam  Cardiovascular:      Pulses: no weak pulses          Dorsalis pedis pulses are 2+ on the right side and 2+ on the left side  Musculoskeletal:        Feet:    Feet:      Right foot:      Skin integrity: No ulcer, skin breakdown, erythema, warmth, callus or dry skin  Left foot:      Skin integrity: No ulcer, skin breakdown, erythema, warmth, callus or dry skin            [facility]

## 2021-02-09 NOTE — ASSESSMENT & PLAN NOTE
Lab Results   Component Value Date    HGBA1C 10 4 (H) 09/23/2020   due for updated labs  For now continue glimepiride and metformin  Now that he has insurance, depending on A1C results, consider adding Januvia or Jardiance     Foot exam done today  Urine micro ordered  Eye exam in 2 weeks

## 2021-02-09 NOTE — ASSESSMENT & PLAN NOTE
BMI Counseling: Body mass index is 42 45 kg/m²  The BMI is above normal  Nutrition recommendations include reducing portion sizes, decreasing overall calorie intake, consuming healthier snacks, decreasing soda and/or juice intake and moderation in carbohydrate intake  Exercise recommendations include exercising 3-5 times per week

## 2021-02-09 NOTE — ASSESSMENT & PLAN NOTE
Hydrocodone PRN  Pain contract signed today  Check lumbar and right hip xray  Start physical therapy  If not improving, may need MRI and/or pain management

## 2021-02-10 ENCOUNTER — TELEPHONE (OUTPATIENT)
Dept: INTERNAL MEDICINE CLINIC | Facility: CLINIC | Age: 66
End: 2021-02-10

## 2021-02-10 DIAGNOSIS — M54.41 CHRONIC MIDLINE LOW BACK PAIN WITH RIGHT-SIDED SCIATICA: Primary | ICD-10-CM

## 2021-02-10 DIAGNOSIS — G89.29 CHRONIC MIDLINE LOW BACK PAIN WITH RIGHT-SIDED SCIATICA: Primary | ICD-10-CM

## 2021-02-10 DIAGNOSIS — M25.512 ACUTE PAIN OF LEFT SHOULDER: ICD-10-CM

## 2021-02-10 NOTE — TELEPHONE ENCOUNTER
I put in a new referral for physical therapy and wrote in comments aqua therapy, there was no specific order for aqua therapy

## 2021-02-10 NOTE — TELEPHONE ENCOUNTER
Pt  talked [facility] today  They said they don't do aquatherapy  They told him to call [facility] -just need to put in new order adding aquatherapy

## 2021-02-11 ENCOUNTER — TELEPHONE (OUTPATIENT)
Dept: INTERNAL MEDICINE CLINIC | Facility: CLINIC | Age: 66
End: 2021-02-11

## 2021-02-11 NOTE — TELEPHONE ENCOUNTER
Left message for PT to call back     We were told that PT would need to do the Prior Auth , that we are unable to do for other departments  We can do an insurance referral with his insurance ?  Not sure if that will help

## 2021-02-15 ENCOUNTER — EVALUATION (OUTPATIENT)
Dept: PHYSICAL THERAPY | Age: 66
End: 2021-02-15
Payer: COMMERCIAL

## 2021-02-15 ENCOUNTER — TELEPHONE (OUTPATIENT)
Dept: INTERNAL MEDICINE CLINIC | Facility: CLINIC | Age: 66
End: 2021-02-15

## 2021-02-15 VITALS — DIASTOLIC BLOOD PRESSURE: 91 MMHG | SYSTOLIC BLOOD PRESSURE: 169 MMHG

## 2021-02-15 DIAGNOSIS — G89.29 CHRONIC MIDLINE LOW BACK PAIN WITHOUT SCIATICA: Primary | ICD-10-CM

## 2021-02-15 DIAGNOSIS — M25.512 ACUTE PAIN OF LEFT SHOULDER: ICD-10-CM

## 2021-02-15 DIAGNOSIS — M54.50 CHRONIC MIDLINE LOW BACK PAIN WITHOUT SCIATICA: Primary | ICD-10-CM

## 2021-02-15 PROCEDURE — 97161 PT EVAL LOW COMPLEX 20 MIN: CPT | Performed by: PHYSICAL THERAPIST

## 2021-02-15 NOTE — PROGRESS NOTES
PT Evaluation     Today's date: 2/15/2021  Patient name: Anat Vaughn  : 1955  MRN: [id number]  Referring provider: ROLAND Cuellar  Dx:   Encounter Diagnosis     ICD-10-CM    1  Chronic midline low back pain without sciatica  M54 5     G89 29    2  Acute pain of left shoulder  M25 512 Ambulatory referral to Physical Therapy                  Assessment  Assessment details: Pt presents to PT with cc of lumbar pain that has been present for >10 years  Pt has overall decrease in lumbar ROM and LE strength which is contributing to pain  Pt will benefit from skilled PT to address these limitations and improve activity tolerance with ADLs  Pt reported chest pain with movement during evaluation, which did not resolve with sitting  Pt has increased BP and shortness of breath during this time  Pt was advised to contact PCP regarding these symptoms     Impairments: abnormal coordination, abnormal gait, abnormal muscle firing, abnormal muscle tone, abnormal or restricted ROM, abnormal movement, activity intolerance, impaired physical strength, lacks appropriate home exercise program, pain with function, weight-bearing intolerance, poor posture  and poor body mechanics    Goals  In 4 weeks pt will:  -Be independent with phase I of HEP  -Increase lumbar ROM by 25%  -Increase LE strength by 1/2 grade    By discharge pt will:  -Be independent with Phase II of HEP  -Demonstrate full lumbar ROM  -Demonstrate 5/5 LE strength      Plan  Planned modality interventions: cryotherapy, electrical stimulation/Russian stimulation, TENS and thermotherapy: hydrocollator packs  Planned therapy interventions: joint mobilization, manual therapy, abdominal trunk stabilization, motor coordination training, neuromuscular re-education, muscle pump exercises, patient education, strengthening, stretching, therapeutic activities, therapeutic exercise, body mechanics training, fine motor coordination training, functional ROM exercises and home exercise program  Frequency: 2x week  Duration in weeks: 6  Plan of Care beginning date: 2021  Plan of Care expiration date: 2021  Treatment plan discussed with: patient and PTA        Subjective Evaluation    History of Present Illness  Mechanism of injury: Pt presents to PT with cc of lumbar pain that has been present for >10 years  Pt states he has lumbar and R lateral hip pain that increases with activity and prolonged positions  Pt denies N/T into R LE  Pt describes chest pain that began 2 days ago  Pt states he has had L shoulder pain for > 6 months, along with intermittent LUE N/T  Pain  Current pain ratin  At best pain ratin  At worst pain ratin    Patient Goals  Patient goals for therapy: decreased pain, increased motion, increased strength, independence with ADLs/IADLs and return to sport/leisure activities          Objective     Active Range of Motion     Additional Active Range of Motion Details  Lumbar ROM as % of normal ROM    Flex: 50  Ext:50  R ROT: 50  L ROT: 75      Strength/Myotome Testing     Left Hip   Planes of Motion   Flexion: 4  Abduction: 3+    Right Hip   Planes of Motion   Flexion: 4  Abduction: 3+    Left Knee   Extension: 4    Right Knee   Extension: 4    Left Ankle/Foot   Dorsiflexion: 3+  Plantar flexion: 3+    Right Ankle/Foot   Dorsiflexion: 3+  Plantar flexion: 3+    Tests     Lumbar     Left   Negative passive SLR  Right   Negative passive SLR  Right Hip   Negative JAZZY and FADIR                Precautions: DM    Manuals                                                                 Neuro Re-Ed                                                                                                        Ther Ex                                                                                                                     Ther Activity                                       Gait Training Modalities

## 2021-02-15 NOTE — TELEPHONE ENCOUNTER
Pt  did PT eval today for back and shoulder  While he was there he told therapist that about a week ago he rolled over in bed and got a pain in center of chest-from front to back  The pain only happens when he is getting up from lying down  It takes his breath away  After a couple minutes it goes away  It happened today at PT tae when he tried getting up from the table after lying down  His bp today was 168/30 something  Therapist adv'd pt  to see Doc asap  Virtual or ov?

## 2021-02-17 ENCOUNTER — TELEMEDICINE (OUTPATIENT)
Dept: INTERNAL MEDICINE CLINIC | Facility: CLINIC | Age: 66
End: 2021-02-17
Payer: COMMERCIAL

## 2021-02-17 DIAGNOSIS — R07.89 COSTOCHONDRAL CHEST PAIN: Primary | ICD-10-CM

## 2021-02-17 DIAGNOSIS — I10 BENIGN ESSENTIAL HYPERTENSION: ICD-10-CM

## 2021-02-17 PROCEDURE — 99213 OFFICE O/P EST LOW 20 MIN: CPT | Performed by: NURSE PRACTITIONER

## 2021-02-17 NOTE — ASSESSMENT & PLAN NOTE
Continue to monitor blood pressure  For now continue current medication, consider  increasing lisinopril if continues to be elevated

## 2021-02-17 NOTE — PROGRESS NOTES
Virtual Regular Visit      Assessment/Plan:    Problem List Items Addressed This Visit        Cardiovascular and Mediastinum    Benign essential hypertension     Continue to monitor blood pressure  For now continue current medication, consider  increasing lisinopril if continues to be elevated  Other Visit Diagnoses     Costochondral chest pain    -  Primary        Do not suspect cardiac chest pain due to symptoms provoked with activities that strain the chest muscles  Suspect costochondritis  Advised to take naproxen twice daily with food for 5-7 days along with warm compresses to the area 20 minutes 4 times daily  Reviewed s/s that warrant ER evaluation including chest pain or shortness of breath at rest or with activity  Reason for visit is No chief complaint on file  Encounter provider ROLAND Logan    Provider located at [address] [place] [phone]      Recent Visits  Date Type Provider Dept   02/15/21 Telephone [facility] Internal Med   02/11/21 Telephone [address] [place] [address] Internal Med   02/10/21 Telephone [facility] Internal Med   Showing recent visits within past 7 days and meeting all other requirements     Today's Visits  Date Type Provider Dept   [facility] [address] Internal Med   Showing today's visits and meeting all other requirements     Future Appointments  No visits were found meeting these conditions  Showing future appointments within next 150 days and meeting all other requirements        The patient was identified by name and date of birth  Stanjt Aronld was informed that this is a telemedicine visit and that the visit is being conducted through [facility] and patient was informed that this is a secure, HIPAA-compliant platform  He agrees to proceed     My office door was closed   No one else was in the room  He acknowledged consent and understanding of privacy and security of the video platform  The patient has agreed to participate and understands they can discontinue the visit at any time  Patient is aware this is a billable service  Subjective  Casper Metcalf is a 60 y o  male with complaints of lower chest and rib pain  Last week he was trying to get out of bed  He strained his chest to get up and felt a stabbing pain  He then went to physical therapy and had the same pain when trying to get up off the table  He continues to have the pain whenever he tries to get up from a lying position  It happened today when trying to lift the laundry basket  The pain goes away when he stops the activity that causes the pain  He has no pain at rest  He denies any shortness of breath or activity intolerance  He has not taken anything for the pain because it goes away quickly  The pain radiates across his ribs and sometimes to his back  His blood pressure was also elevated at physical therapy- 160/80's  He did check it once since then and it was slightly elevated 140-150/80  Past Medical History:   Diagnosis Date    BPH (benign prostatic hyperplasia)     CPAP (continuous positive airway pressure) dependence     Diabetes mellitus ([facility] [address] )     Disease of thyroid gland     Hyperlipidemia     Hypertension     Sleep apnea        Past Surgical History:   Procedure Laterality Date    COLONOSCOPY      2016    [place] EGD TRANSORAL BIOPSY SINGLE/MULTIPLE N/A 5/17/2017    Procedure: ESOPHAGOGASTRODUODENOSCOPY (EGD) with bx;  Surgeon: Cody Juarez MD;  Location: [facility];   Service: Bariatrics    TONSILLECTOMY         Current Outpatient Medications   Medication Sig Dispense Refill    aspirin (ECOTRIN LOW STRENGTH) 81 mg EC tablet Take 81 mg by mouth daily      atorvastatin (LIPITOR) 20 mg tablet TAKE 1 TABLET BY MOUTH EVERY DAY 90 tablet 0    Blood Glucose Calibration (OT ULTRA/FASTTK CNTRL SOLN) SOLN by In Vitro route as needed (to calibrate blood sugar meter) 1 each 1    Blood Glucose Monitoring Suppl (ONE TOUCH ULTRA MINI) w/Device KIT by Does not apply route 2 (two) times a day 1 each 0    glimepiride (AMARYL) 4 mg tablet TAKE 1 TABLET BY MOUTH 2 TIMES A DAY  180 tablet 0    glucose blood ([employer] ADVANCED GLUCOSE TEST) test strip 1 each by Other route 2 (two) times a day 100 each 3    glucose blood (ONE TOUCH ULTRA TEST) test strip 1 each by Other route 2 (two) times a day Use as instructed 100 each 3    HYDROcodone-acetaminophen (NORCO) 5-325 mg per tablet Take 1 tablet by mouth daily as needed for painMax Daily Amount: 1 tablet 30 tablet 0    ketoconazole (NIZORAL) 2 % cream Apply topically daily 60 g 1    levothyroxine 75 mcg tablet TAKE 1 TABLET BY MOUTH DAILY  30 tablet 3    lisinopril (ZESTRIL) 20 mg tablet TAKE 1 TABLET BY MOUTH EVERY DAY 90 tablet 0    metFORMIN (GLUCOPHAGE) 1000 MG tablet TAKE 1 TABLET BY MOUTH TWO TIMES A DAY WITH MEALS 60 tablet 2    ONETOUCH DELICA LANCETS 36Y MISC by Does not apply route 2 (two) times a day 100 each 3    sildenafil (REVATIO) 20 mg tablet TAKE TWO TABLETS BY MOUTH EVERY DAY 20 tablet 1    sildenafil (VIAGRA) 50 MG tablet       sildenafil (VIAGRA) 50 MG tablet Take 1 tablet (50 mg total) by mouth daily as needed for erectile dysfunction 10 tablet 2     No current facility-administered medications for this visit  Allergies   Allergen Reactions    Penicillins      SYNCOPE, but has taken amoxicillin/augmentin in past       Review of Systems   Constitutional: Positive for activity change  Negative for fatigue and fever  Respiratory: Negative for cough, chest tightness and shortness of breath  Cardiovascular: Negative for chest pain and leg swelling  Gastrointestinal: Negative for abdominal pain  Musculoskeletal:        Lower chest/rib pain radiates to his back    Skin: Negative for rash     Neurological: Negative for dizziness, light-headedness and headaches  Video Exam    There were no vitals filed for this visit  Physical Exam  Constitutional:       Appearance: He is well-developed  HENT:      Head: Normocephalic  Eyes:      Pupils: Pupils are equal, round, and reactive to light  Pulmonary:      Effort: Pulmonary effort is normal    Neurological:      Mental Status: He is alert and oriented to person, place, and time  Psychiatric:         Behavior: Behavior normal           I spent 11 minutes directly with the patient during this visit      VIRTUAL VISIT DISCLAIMER    Rishi Moore acknowledges that he has consented to an online visit or consultation  He understands that the online visit is based solely on information provided by him, and that, in the absence of a face-to-face physical evaluation by the physician, the diagnosis he receives is both limited and provisional in terms of accuracy and completeness  This is not intended to replace a full medical face-to-face evaluation by the physician  Rishi Moore understands and accepts these terms

## 2021-02-22 ENCOUNTER — OFFICE VISIT (OUTPATIENT)
Dept: PHYSICAL THERAPY | Age: 66
End: 2021-02-22
Payer: COMMERCIAL

## 2021-02-22 DIAGNOSIS — G89.29 CHRONIC MIDLINE LOW BACK PAIN WITHOUT SCIATICA: Primary | ICD-10-CM

## 2021-02-22 DIAGNOSIS — M54.50 CHRONIC MIDLINE LOW BACK PAIN WITHOUT SCIATICA: Primary | ICD-10-CM

## 2021-02-22 PROCEDURE — 97113 AQUATIC THERAPY/EXERCISES: CPT | Performed by: SPECIALIST/TECHNOLOGIST

## 2021-02-22 NOTE — PROGRESS NOTES
Daily Note     Today's date: 2021  Patient name: Isha Longo  : 1955  MRN: [id number]  Referring provider: ROLAND Chaudhry  Dx: No diagnosis found  Subjective: Pt reports he has been "having trouble sleeping due to pain and discomfort and he has his good and bad days " Pt reports RLE sciatic sx extending to mid posterior thigh prior to tx session  Objective: See treatment diary below    Assessment: Pt was able to perform aquatic therex assigned this visit with no exacerbation of LBP or radicular sx  Pt tolerated treatment well  Patient demonstrated fatigue post treatment, exhibited good technique with therapeutic exercises and would benefit from continued PT in pool due to increased exercise capacity vs land  Plan: Continue per plan of care  Progress treatment as tolerated         Precautions: DM    Manuals                                                                 Neuro Re-Ed                                                                                                        Ther Ex Aquatic              Piriformis/hamstring stretch 4x30"  ea            laps 5x            Side stepping 10x            Press out ball L/R/C 20x 3"            Crunches w/ball 20x            squats 20x            3 way hip 30x ea            marches 3x10            Pony bike 5'                                                   Ther Activity                                       Gait Training                                       Modalities

## 2021-02-24 ENCOUNTER — TELEMEDICINE (OUTPATIENT)
Dept: INTERNAL MEDICINE CLINIC | Facility: CLINIC | Age: 66
End: 2021-02-24
Payer: COMMERCIAL

## 2021-02-24 DIAGNOSIS — R07.89 COSTOCHONDRAL CHEST PAIN: ICD-10-CM

## 2021-02-24 DIAGNOSIS — I10 BENIGN ESSENTIAL HYPERTENSION: Primary | ICD-10-CM

## 2021-02-24 PROCEDURE — 99213 OFFICE O/P EST LOW 20 MIN: CPT | Performed by: NURSE PRACTITIONER

## 2021-02-24 PROCEDURE — 3725F SCREEN DEPRESSION PERFORMED: CPT | Performed by: NURSE PRACTITIONER

## 2021-02-24 RX ORDER — LISINOPRIL 20 MG/1
30 TABLET ORAL DAILY
Qty: 90 TABLET | Refills: 0
Start: 2021-02-24 | End: 2021-03-03

## 2021-02-24 NOTE — ASSESSMENT & PLAN NOTE
Increase lisinopril to 30 mg daily  Monitor blood pressure, follow up in 1 week or call sooner if continues to be elevated  Advised to follow a low sodium diet

## 2021-02-24 NOTE — PROGRESS NOTES
Virtual Regular Visit      Assessment/Plan:    Problem List Items Addressed This Visit        Cardiovascular and Mediastinum    Benign essential hypertension - Primary     Increase lisinopril to 30 mg daily  Monitor blood pressure, follow up in 1 week or call sooner if continues to be elevated  Advised to follow a low sodium diet  Relevant Medications    lisinopril (ZESTRIL) 20 mg tablet      Other Visit Diagnoses     Costochondral chest pain        improving with aqua therapy  Obtain labs prior to next visit  Reason for visit is   Chief Complaint   Patient presents with    BP CHECK        Encounter provider ROLAND Jenkins    Provider located at [address] [place] [phone]      Recent Visits  Date Type Provider Dept   02/17/21 Tele[facility]icine [facility]ir, [address] Internal Med   Showing recent visits within past 7 days and meeting all other requirements     Today's Visits  Date Type Provider Dept   [facility] [address] Internal Med   Showing today's visits and meeting all other requirements     Future Appointments  No visits were found meeting these conditions  Showing future appointments within next 150 days and meeting all other requirements        The patient was identified by name and date of birth  Heydi Clemente was informed that this is a telemedicine visit and that the visit is being conducted through [facility] and patient was informed that this is a secure, HIPAA-compliant platform  He agrees to proceed     My office door was closed  No one else was in the room  He acknowledged consent and understanding of privacy and security of the video platform  The patient has agreed to participate and understands they can discontinue the visit at any time  Patient is aware this is a billable service       Subjective  Heydi Clemente is a 60 y o  male follow up blood pressure  His blood pressure over the last week have been elevated, around 160-180/100  He denies any new headaches, chest pain , or shortness of breath  He has been compliant with his medications  His chest pain has improved  He still gets it occasionally if he gets out of bed the wrong way but he has been working on this in [employer] and feels it has improved  Past Medical History:   Diagnosis Date    BPH (benign prostatic hyperplasia)     CPAP (continuous positive airway pressure) dependence     Diabetes mellitus ([address] )     Disease of thyroid gland     Hyperlipidemia     Hypertension     Sleep apnea        Past Surgical History:   Procedure Laterality Date    COLONOSCOPY      2016    [place] EGD TRANSORAL BIOPSY SINGLE/MULTIPLE N/A 5/17/2017    Procedure: ESOPHAGOGASTRODUODENOSCOPY (EGD) with bx;  Surgeon: Prema Hand MD;  Location: [facility]; Service: Bariatrics    TONSILLECTOMY         Current Outpatient Medications   Medication Sig Dispense Refill    aspirin (ECOTRIN LOW STRENGTH) 81 mg EC tablet Take 81 mg by mouth daily      atorvastatin (LIPITOR) 20 mg tablet TAKE 1 TABLET BY MOUTH EVERY DAY 90 tablet 0    Blood Glucose Calibration (OT ULTRA/FASTTK CNTRL SOLN) SOLN by In Vitro route as needed (to calibrate blood sugar meter) 1 each 1    Blood Glucose Monitoring Suppl (ONE TOUCH ULTRA MINI) w/Device KIT by Does not apply route 2 (two) times a day 1 each 0    glimepiride (AMARYL) 4 mg tablet TAKE 1 TABLET BY MOUTH 2 TIMES A DAY   180 tablet 0    glucose blood ([employer]S ADVANCED GLUCOSE TEST) test strip 1 each by Other route 2 (two) times a day 100 each 3    glucose blood (ONE TOUCH ULTRA TEST) test strip 1 each by Other route 2 (two) times a day Use as instructed 100 each 3    HYDROcodone-acetaminophen (NORCO) 5-325 mg per tablet Take 1 tablet by mouth daily as needed for painMax Daily Amount: 1 tablet 30 tablet 0    ketoconazole (NIZORAL) 2 % cream Apply topically daily 60 g 1    levothyroxine 75 mcg tablet TAKE 1 TABLET BY MOUTH DAILY  30 tablet 3    lisinopril (ZESTRIL) 20 mg tablet Take 1 5 tablets (30 mg total) by mouth daily 90 tablet 0    metFORMIN (GLUCOPHAGE) 1000 MG tablet TAKE 1 TABLET BY MOUTH TWO TIMES A DAY WITH MEALS 60 tablet 2    ONETOUCH DELICA LANCETS 27Z MISC by Does not apply route 2 (two) times a day 100 each 3    sildenafil (REVATIO) 20 mg tablet TAKE TWO TABLETS BY MOUTH EVERY DAY 20 tablet 1    sildenafil (VIAGRA) 50 MG tablet       sildenafil (VIAGRA) 50 MG tablet Take 1 tablet (50 mg total) by mouth daily as needed for erectile dysfunction 10 tablet 2     No current facility-administered medications for this visit  Allergies   Allergen Reactions    Penicillins      SYNCOPE, but has taken amoxicillin/augmentin in past       Review of Systems   Constitutional: Negative for activity change, appetite change and fatigue  Respiratory: Negative for cough and shortness of breath  Cardiovascular: Negative for chest pain, palpitations and leg swelling  Neurological: Negative for dizziness, light-headedness and headaches  Video Exam    There were no vitals filed for this visit  Physical Exam  Constitutional:       Appearance: He is well-developed  HENT:      Head: Normocephalic  Eyes:      Pupils: Pupils are equal, round, and reactive to light  Pulmonary:      Effort: Pulmonary effort is normal    Neurological:      Mental Status: He is alert and oriented to person, place, and time  Psychiatric:         Behavior: Behavior normal           I spent 8 minutes directly with the patient during this visit      VIRTUAL VISIT DISCLAIMER    David Cotter acknowledges that he has consented to an online visit or consultation   He understands that the online visit is based solely on information provided by him, and that, in the absence of a face-to-face physical evaluation by the physician, the diagnosis he receives is both limited and provisional in terms of accuracy and completeness  This is not intended to replace a full medical face-to-face evaluation by the physician  Phoenix Ramirez understands and accepts these terms

## 2021-02-26 ENCOUNTER — OFFICE VISIT (OUTPATIENT)
Dept: PHYSICAL THERAPY | Age: 66
End: 2021-02-26
Payer: COMMERCIAL

## 2021-02-26 DIAGNOSIS — M54.50 CHRONIC MIDLINE LOW BACK PAIN WITHOUT SCIATICA: Primary | ICD-10-CM

## 2021-02-26 DIAGNOSIS — G89.29 CHRONIC MIDLINE LOW BACK PAIN WITHOUT SCIATICA: Primary | ICD-10-CM

## 2021-02-26 PROCEDURE — 97113 AQUATIC THERAPY/EXERCISES: CPT | Performed by: PHYSICAL THERAPIST

## 2021-02-26 NOTE — PROGRESS NOTES
Daily Note     Today's date: 2021  Patient name: Maria C Parra  : 1955  MRN: [id number]  Referring provider: ROLAND Ng  Dx:   Encounter Diagnosis     ICD-10-CM    1  Chronic midline low back pain without sciatica  M54 5     G89 29                   Subjective: Pt reports soreness/fatigue after last session      Objective: See treatment diary below      Assessment: Tolerated treatment well  Patient demonstrated fatigue post treatment, would benefit from continued PT and pt has minimal pain with exercises, but struggles with balance while doing bike on float tube  Plan: Continue per plan of care  Progress treatment as tolerated         Precautions: DM    Manuals                                                                Neuro Re-Ed                                                                                                        Ther Ex Aquatic              Piriformis/hamstring stretch 4x30"  ea 5x30"           laps 5x 5x           Side stepping 10x 10x           Press out ball L/R/C 20x 3" 5" x10 ea           Crunches w/ball 20x np           squats 20x 20x           3 way hip 30x ea 30x ea           marches 3x10            Pony bike 5' 5'                                                  Ther Activity                                       Gait Training                                       Modalities

## 2021-03-02 ENCOUNTER — APPOINTMENT (OUTPATIENT)
Dept: LAB | Facility: CLINIC | Age: 66
End: 2021-03-02
Payer: COMMERCIAL

## 2021-03-02 ENCOUNTER — OFFICE VISIT (OUTPATIENT)
Dept: PHYSICAL THERAPY | Age: 66
End: 2021-03-02
Payer: COMMERCIAL

## 2021-03-02 DIAGNOSIS — E11.42 TYPE 2 DIABETES MELLITUS WITH DIABETIC POLYNEUROPATHY, WITHOUT LONG-TERM CURRENT USE OF INSULIN (HCC): ICD-10-CM

## 2021-03-02 DIAGNOSIS — I10 BENIGN ESSENTIAL HYPERTENSION: ICD-10-CM

## 2021-03-02 DIAGNOSIS — Z12.5 SCREENING FOR PROSTATE CANCER: ICD-10-CM

## 2021-03-02 DIAGNOSIS — E03.9 HYPOTHYROIDISM, UNSPECIFIED TYPE: ICD-10-CM

## 2021-03-02 DIAGNOSIS — E78.2 MIXED HYPERLIPIDEMIA: ICD-10-CM

## 2021-03-02 DIAGNOSIS — M54.50 CHRONIC MIDLINE LOW BACK PAIN WITHOUT SCIATICA: Primary | ICD-10-CM

## 2021-03-02 DIAGNOSIS — G89.29 CHRONIC MIDLINE LOW BACK PAIN WITHOUT SCIATICA: Primary | ICD-10-CM

## 2021-03-02 DIAGNOSIS — M25.512 ACUTE PAIN OF LEFT SHOULDER: ICD-10-CM

## 2021-03-02 LAB
ALBUMIN SERPL BCP-MCNC: 3.5 G/DL (ref 3.5–5)
ALP SERPL-CCNC: 106 U/L (ref 46–116)
ALT SERPL W P-5'-P-CCNC: 32 U/L (ref 12–78)
ANION GAP SERPL CALCULATED.3IONS-SCNC: 8 MMOL/L (ref 4–13)
AST SERPL W P-5'-P-CCNC: 17 U/L (ref 5–45)
BILIRUB SERPL-MCNC: 0.52 MG/DL (ref 0.2–1)
BUN SERPL-MCNC: 17 MG/DL (ref 5–25)
CALCIUM SERPL-MCNC: 8.8 MG/DL (ref 8.3–10.1)
CHLORIDE SERPL-SCNC: 100 MMOL/L (ref 100–108)
CO2 SERPL-SCNC: 28 MMOL/L (ref 21–32)
CREAT SERPL-MCNC: 0.88 MG/DL (ref 0.6–1.3)
EST. AVERAGE GLUCOSE BLD GHB EST-MCNC: 235 MG/DL
GFR SERPL CREATININE-BSD FRML MDRD: 90 ML/MIN/1.73SQ M
GLUCOSE P FAST SERPL-MCNC: 189 MG/DL (ref 65–99)
HBA1C MFR BLD: 9.8 %
POTASSIUM SERPL-SCNC: 4.6 MMOL/L (ref 3.5–5.3)
PROT SERPL-MCNC: 6.7 G/DL (ref 6.4–8.2)
PSA SERPL-MCNC: 1.1 NG/ML (ref 0–4)
SODIUM SERPL-SCNC: 136 MMOL/L (ref 136–145)
TSH SERPL DL<=0.05 MIU/L-ACNC: 3 UIU/ML (ref 0.36–3.74)

## 2021-03-02 PROCEDURE — 80053 COMPREHEN METABOLIC PANEL: CPT

## 2021-03-02 PROCEDURE — 84443 ASSAY THYROID STIM HORMONE: CPT

## 2021-03-02 PROCEDURE — 97113 AQUATIC THERAPY/EXERCISES: CPT | Performed by: SPECIALIST/TECHNOLOGIST

## 2021-03-02 PROCEDURE — 36415 COLL VENOUS BLD VENIPUNCTURE: CPT

## 2021-03-02 PROCEDURE — 82043 UR ALBUMIN QUANTITATIVE: CPT | Performed by: NURSE PRACTITIONER

## 2021-03-02 PROCEDURE — 82570 ASSAY OF URINE CREATININE: CPT | Performed by: NURSE PRACTITIONER

## 2021-03-02 PROCEDURE — 3046F HEMOGLOBIN A1C LEVEL >9.0%: CPT | Performed by: NURSE PRACTITIONER

## 2021-03-02 PROCEDURE — G0103 PSA SCREENING: HCPCS

## 2021-03-02 PROCEDURE — 83036 HEMOGLOBIN GLYCOSYLATED A1C: CPT

## 2021-03-02 NOTE — PROGRESS NOTES
Daily Note     Today's date: 3/2/2021  Patient name: Jm Chatterjee  : 1955   MRN: [id number]  Referring provider: ROLAND Nesbitt  Dx:   Encounter Diagnosis     ICD-10-CM    1  Chronic midline low back pain without sciatica  M54 5     G89 29    2  Acute pain of left shoulder  M25 512                   Subjective: Pt reports he feels the pool is helping with symptoms he has been experiencing less pain since starting aquatic PT  Objective: See treatment diary below    Assessment:Pt demostrated increase in strength by adding resistance to therexs  Pt was able to perform all exercises w/o difficulty and no exacerbation of LBP and radicular sxs  Pt had difficulty with balance on pony/bike, would benefit from continued core strengthening to improve  Pt tolerated treatment well  Patient demonstrated fatigue post treatment, exhibited good technique with therapeutic exercises and would benefit from continued PT to improve LBP with activity  Plan: Continue per plan of care  Progress treatment as tolerated         Precautions: DM    Manuals 2/22 2/26 3/2                                                              Neuro Re-Ed                                                                                                        Ther Ex Aquatic    Royal blue wts          Piriformis/hamstring stretch 4x30"  ea 5x30" 5x30" ea          laps 5x 5x 5x          Side stepping 10x 10x 10x          Press out ball L/R/C 20x 3" 5" x10 ea 5" x10 ea          Crunches w/ball 20x np 20x          squats 20x 20x 20x          3 way hip 30x ea 30x ea 30x ea          marches 3x10  3x10          Pony bike 5' 5' 5'                                                 Ther Activity                                       Gait Training                                       Modalities

## 2021-03-03 ENCOUNTER — TELEMEDICINE (OUTPATIENT)
Dept: INTERNAL MEDICINE CLINIC | Facility: CLINIC | Age: 66
End: 2021-03-03
Payer: COMMERCIAL

## 2021-03-03 DIAGNOSIS — E11.42 TYPE 2 DIABETES MELLITUS WITH DIABETIC POLYNEUROPATHY, WITHOUT LONG-TERM CURRENT USE OF INSULIN (HCC): ICD-10-CM

## 2021-03-03 DIAGNOSIS — R80.9 MICROALBUMINURIA: Primary | ICD-10-CM

## 2021-03-03 DIAGNOSIS — I10 BENIGN ESSENTIAL HYPERTENSION: ICD-10-CM

## 2021-03-03 DIAGNOSIS — G89.29 CHRONIC MIDLINE LOW BACK PAIN WITH RIGHT-SIDED SCIATICA: ICD-10-CM

## 2021-03-03 DIAGNOSIS — M54.41 CHRONIC MIDLINE LOW BACK PAIN WITH RIGHT-SIDED SCIATICA: ICD-10-CM

## 2021-03-03 DIAGNOSIS — E11.42 TYPE 2 DIABETES MELLITUS WITH DIABETIC POLYNEUROPATHY, WITHOUT LONG-TERM CURRENT USE OF INSULIN (HCC): Primary | ICD-10-CM

## 2021-03-03 LAB
CREAT UR-MCNC: 99.3 MG/DL
MICROALBUMIN UR-MCNC: 2980 MG/L (ref 0–20)
MICROALBUMIN/CREAT 24H UR: 3001 MG/G CREATININE (ref 0–30)

## 2021-03-03 PROCEDURE — 3062F POS MACROALBUMINURIA REV: CPT | Performed by: NURSE PRACTITIONER

## 2021-03-03 PROCEDURE — 3066F NEPHROPATHY DOC TX: CPT | Performed by: NURSE PRACTITIONER

## 2021-03-03 PROCEDURE — 99214 OFFICE O/P EST MOD 30 MIN: CPT | Performed by: NURSE PRACTITIONER

## 2021-03-03 RX ORDER — FLASH GLUCOSE SENSOR
1 KIT MISCELLANEOUS
Qty: 2 EACH | Refills: 0 | Status: SHIPPED | OUTPATIENT
Start: 2021-03-03 | End: 2021-03-03 | Stop reason: SDUPTHER

## 2021-03-03 RX ORDER — FLASH GLUCOSE SENSOR
1 KIT MISCELLANEOUS
Qty: 2 EACH | Refills: 0 | Status: SHIPPED | OUTPATIENT
Start: 2021-03-03 | End: 2021-03-31 | Stop reason: SDUPTHER

## 2021-03-03 RX ORDER — LISINOPRIL 20 MG/1
40 TABLET ORAL DAILY
Qty: 90 TABLET | Refills: 0
Start: 2021-03-03 | End: 2021-03-17 | Stop reason: SDUPTHER

## 2021-03-03 RX ORDER — FLASH GLUCOSE SCANNING READER
1 EACH MISCELLANEOUS ONCE
Qty: 1 DEVICE | Refills: 0 | Status: SHIPPED | OUTPATIENT
Start: 2021-03-03 | End: 2021-03-03 | Stop reason: SDUPTHER

## 2021-03-03 RX ORDER — FLASH GLUCOSE SCANNING READER
1 EACH MISCELLANEOUS ONCE
Qty: 1 DEVICE | Refills: 0 | Status: SHIPPED | OUTPATIENT
Start: 2021-03-03 | End: 2021-03-03

## 2021-03-03 NOTE — PROGRESS NOTES
Virtual Regular Visit      Assessment/Plan:    Problem List Items Addressed This Visit        Endocrine    Type 2 diabetes mellitus with diabetic polyneuropathy, without long-term current use of insulin ([facility] [address] ) - Primary       Lab Results   Component Value Date    HGBA1C 9 8 (H) 03/02/2021   A1C improved but still not well controlled  Continue glimepiride and metformin  Start Januvia daily  Discussed weekly injectables such as ozempic or starting insulin  He would like to hold off at this time  Urine micro shows increased proteinuria, will refer to nephrology  Relevant Orders    Hemoglobin A1C    Comprehensive metabolic panel    Lipid Panel with Direct LDL reflex       Cardiovascular and Mediastinum    Benign essential hypertension     Still not well controlled  Increase lisinopril to 40 mg daily  Blood pressure check in 2 weeks          Relevant Medications    lisinopril (ZESTRIL) 20 mg tablet       Nervous and Auditory    Chronic midline low back pain with right-sided sciatica     Continue PT  Tizanidine PRN  Use norco sparingly  Avoid nsaids  Reason for visit is   Chief Complaint   Patient presents with    Virtual Regular Visit        Encounter provider ROLAND Temple    Provider located at [address] [place] [id number]      Recent Visits  Date Type Provider Dept   02/24/21 Telemedicine Zoltan Feliciano, [address] Internal Med   Showing recent visits within past 7 days and meeting all other requirements     Today's Visits  Date Type Provider Dept   03/03/21 [address], [address] Internal Med   Showing today's visits and meeting all other requirements     Future Appointments  No visits were found meeting these conditions     Showing future appointments within next 150 days and meeting all other requirements        The patient was identified by name and date of birth  Ilir Rosales was informed that this is a telemedicine visit and that the visit is being conducted through [facility] and patient was informed that this is a secure, HIPAA-compliant platform  He agrees to proceed     My office door was closed  No one else was in the room  He acknowledged consent and understanding of privacy and security of the video platform  The patient has agreed to participate and understands they can discontinue the visit at any time  Patient is aware this is a billable service  Subjective  Ilir Rosales is a 73 y o  male follow up blood pressure   His blood pressure continue to be elevated despite the increase in his lisinopril  They are still running 150-160/80's  His chest pain has resolved however he is still getting back spasms when trying to up from bed or certain positions  He has been taking naproxen  He feels the PT has been helping his back somewhat  He has been using less naproxen and norco      Past Medical History:   Diagnosis Date    BPH (benign prostatic hyperplasia)     CPAP (continuous positive airway pressure) dependence     Diabetes mellitus ([address] )     Disease of thyroid gland     Hyperlipidemia     Hypertension     Sleep apnea        Past Surgical History:   Procedure Laterality Date    COLONOSCOPY      2016    [place] EGD TRANSORAL BIOPSY SINGLE/MULTIPLE N/A 5/17/2017    Procedure: ESOPHAGOGASTRODUODENOSCOPY (EGD) with bx;  Surgeon: Shayna Marrero MD;  Location: [facility];   Service: Bariatrics    TONSILLECTOMY         Current Outpatient Medications   Medication Sig Dispense Refill    aspirin (ECOTRIN LOW STRENGTH) 81 mg EC tablet Take 81 mg by mouth daily      atorvastatin (LIPITOR) 20 mg tablet TAKE 1 TABLET BY MOUTH EVERY DAY 90 tablet 0    Blood Glucose Calibration (OT ULTRA/FASTTK CNTRL SOLN) SOLN by In Vitro route as needed (to calibrate blood sugar meter) 1 each 1    Blood Glucose Monitoring Suppl (ONE TOUCH ULTRA MINI) w/Device KIT by Does not apply route 2 (two) times a day 1 each 0    Continuous Blood Gluc  (FreeStyle Paul 14 Day [place]) LEO Use 1 application once for 1 dose 1 Device 0    Continuous Blood Gluc Sensor (FreeStyle Paul 14 Day Sensor) MISC Use 1 ampule every 14 (fourteen) days 2 each 0    glimepiride (AMARYL) 4 mg tablet TAKE 1 TABLET BY MOUTH 2 TIMES A DAY  180 tablet 0    glucose blood ([employer] ADVANCED GLUCOSE TEST) test strip 1 each by Other route 2 (two) times a day 100 each 3    glucose blood (ONE TOUCH ULTRA TEST) test strip 1 each by Other route 2 (two) times a day Use as instructed 100 each 3    HYDROcodone-acetaminophen (NORCO) 5-325 mg per tablet Take 1 tablet by mouth daily as needed for painMax Daily Amount: 1 tablet 30 tablet 0    ketoconazole (NIZORAL) 2 % cream Apply topically daily 60 g 1    levothyroxine 75 mcg tablet TAKE 1 TABLET BY MOUTH DAILY  30 tablet 3    lisinopril (ZESTRIL) 20 mg tablet Take 2 tablets (40 mg total) by mouth daily 90 tablet 0    metFORMIN (GLUCOPHAGE) 1000 MG tablet TAKE 1 TABLET BY MOUTH TWO TIMES A DAY WITH MEALS 60 tablet 2    ONETOUCH DELICA LANCETS 81B MISC by Does not apply route 2 (two) times a day 100 each 3    sildenafil (REVATIO) 20 mg tablet TAKE TWO TABLETS BY MOUTH EVERY DAY 20 tablet 1    sildenafil (VIAGRA) 50 MG tablet       sildenafil (VIAGRA) 50 MG tablet Take 1 tablet (50 mg total) by mouth daily as needed for erectile dysfunction 10 tablet 2    sitaGLIPtin (JANUVIA) 100 mg tablet Take 1 tablet (100 mg total) by mouth daily 30 tablet 1     No current facility-administered medications for this visit  Allergies   Allergen Reactions    Penicillins      SYNCOPE, but has taken amoxicillin/augmentin in past       Review of Systems   Constitutional: Negative for activity change, appetite change and fatigue  Respiratory: Negative for cough and shortness of breath      Cardiovascular: Negative for chest pain, palpitations and leg swelling  Gastrointestinal: Negative for abdominal pain  Genitourinary: Negative for difficulty urinating  Musculoskeletal: Positive for arthralgias and back pain  Neurological: Negative for dizziness, light-headedness and headaches  Video Exam    There were no vitals filed for this visit  Physical Exam  Constitutional:       Appearance: He is well-developed  HENT:      Head: Normocephalic  Eyes:      Pupils: Pupils are equal, round, and reactive to light  Pulmonary:      Effort: Pulmonary effort is normal    Neurological:      Mental Status: He is alert and oriented to person, place, and time  Psychiatric:         Behavior: Behavior normal           I spent 15 minutes directly with the patient during this visit  Reviewed labs with patient  VIRTUAL VISIT DISCLAIMER    Rendall Eisenmenger acknowledges that he has consented to an online visit or consultation  He understands that the online visit is based solely on information provided by him, and that, in the absence of a face-to-face physical evaluation by the physician, the diagnosis he receives is both limited and provisional in terms of accuracy and completeness  This is not intended to replace a full medical face-to-face evaluation by the physician  Rendall Eisenmenger understands and accepts these terms

## 2021-03-03 NOTE — ASSESSMENT & PLAN NOTE
Lab Results   Component Value Date    HGBA1C 9 8 (H) 03/02/2021   A1C improved but still not well controlled  Continue glimepiride and metformin  Start Januvia daily  Discussed weekly injectables such as ozempic or starting insulin  He would like to hold off at this time  Urine micro shows increased proteinuria, will refer to nephrology

## 2021-03-05 ENCOUNTER — OFFICE VISIT (OUTPATIENT)
Dept: PHYSICAL THERAPY | Age: 66
End: 2021-03-05
Payer: COMMERCIAL

## 2021-03-05 DIAGNOSIS — M54.50 CHRONIC MIDLINE LOW BACK PAIN WITHOUT SCIATICA: Primary | ICD-10-CM

## 2021-03-05 DIAGNOSIS — M25.512 ACUTE PAIN OF LEFT SHOULDER: ICD-10-CM

## 2021-03-05 DIAGNOSIS — G89.29 CHRONIC MIDLINE LOW BACK PAIN WITHOUT SCIATICA: Primary | ICD-10-CM

## 2021-03-05 PROCEDURE — 97113 AQUATIC THERAPY/EXERCISES: CPT | Performed by: SPECIALIST/TECHNOLOGIST

## 2021-03-05 NOTE — PROGRESS NOTES
Daily Note     Today's date: 3/5/2021  Patient name: Maria C Parra  : 1955  MRN: [id number]  Referring provider: ROLAND Ng  Dx:   Encounter Diagnosis     ICD-10-CM    1  Chronic midline low back pain without sciatica  M54 5     G89 29    2  Acute pain of left shoulder  M25 512                   Subjective: Pt reports his LBP is improving since starting the pool  Objective: See treatment diary below      Assessment: Pt able to perform therexs w/o excerebration of LBP sxs  Pt balance is improving on pool/pony demonstrating improved core stabiliy  Pt tolerated treatment well  Patient exhibited good technique with therapeutic exercises and would benefit from continued PT to strengthen LB  Plan: Continue per plan of care  Progress treatment as tolerated         Precautions: DM    Manuals 2/22 2/26 3/2 3/5                                                             Neuro Re-Ed                                                                                                        Ther Ex Aquatic    Royal blue wts Royal blue wts         Piriformis/hamstring stretch 4x30"  ea 5x30" 5x30" ea 5x30"  ea         laps 5x 5x 5x 5x         Side stepping 10x 10x 10x 10x         Press out ball L/R/C 20x 3" 5" x10 ea 5" x10 ea 5" x10 ea         Crunches w/ball 20x np 20x 20x         squats 20x 20x 20x 20x         3 way hip 30x ea 30x ea 30x ea 30x ea         marches 3x10  3x10 3x10         Pony bike 5' 5' 5' 5'                                                Ther Activity                                       Gait Training                                       Modalities

## 2021-03-08 ENCOUNTER — TELEPHONE (OUTPATIENT)
Dept: INTERNAL MEDICINE CLINIC | Facility: CLINIC | Age: 66
End: 2021-03-08

## 2021-03-08 DIAGNOSIS — I10 BENIGN ESSENTIAL HYPERTENSION: Primary | ICD-10-CM

## 2021-03-08 RX ORDER — AMLODIPINE BESYLATE 5 MG/1
5 TABLET ORAL DAILY
Qty: 30 TABLET | Refills: 0 | Status: SHIPPED | OUTPATIENT
Start: 2021-03-08 | End: 2021-03-17 | Stop reason: SDUPTHER

## 2021-03-08 NOTE — TELEPHONE ENCOUNTER
Continue lisinopril 40 mg daily  Add amlodipine 5 mg daily  Keep scheduled appointment next week, and bring in blood pressure machine to appointment

## 2021-03-09 ENCOUNTER — OFFICE VISIT (OUTPATIENT)
Dept: PHYSICAL THERAPY | Age: 66
End: 2021-03-09
Payer: COMMERCIAL

## 2021-03-09 DIAGNOSIS — M54.50 CHRONIC MIDLINE LOW BACK PAIN WITHOUT SCIATICA: Primary | ICD-10-CM

## 2021-03-09 DIAGNOSIS — G89.29 CHRONIC MIDLINE LOW BACK PAIN WITHOUT SCIATICA: Primary | ICD-10-CM

## 2021-03-09 PROCEDURE — 97113 AQUATIC THERAPY/EXERCISES: CPT | Performed by: PHYSICAL THERAPIST

## 2021-03-09 NOTE — PROGRESS NOTES
Daily Note     Today's date: 3/9/2021  Patient name: Ken Mathews  : 1955  MRN: [id number]  Referring provider: ROLAND Mata  Dx:   Encounter Diagnosis     ICD-10-CM    1  Chronic midline low back pain without sciatica  M54 5     G89 29                   Subjective: Pt reports being able to walk further than he could at IE, states he is pleased about this      Objective: See treatment diary below      Assessment: Tolerated treatment well  Patient demonstrated fatigue post treatment, would benefit from continued PT and pt remains appropriately challenged with aquatic therex      Plan: Continue per plan of care  Progress treatment as tolerated         Precautions: DM    Manuals 2/22 2/26 3/2 3/5 3/9                                                            Neuro Re-Ed                                                                                                        Ther Ex Aquatic    Royal blue wts Royal blue wts Royal blue wts        Piriformis/hamstring stretch 4x30"  ea 5x30" 5x30" ea 5x30"  ea 5x30"        laps 5x 5x 5x 5x 5x        Side stepping 10x 10x 10x 10x 10x        Press out ball L/R/C 20x 3" 5" x10 ea 5" x10 ea 5" x10 ea 5" x10 ea        Crunches w/ball 20x np 20x 20x         squats 20x 20x 20x 20x 20x        3 way hip 30x ea 30x ea 30x ea 30x ea 30x ea        marches 3x10  3x10 3x10         Pony bike 5' 5' 5' 5' 5'                                               Ther Activity                                       Gait Training                                       Modalities

## 2021-03-10 ENCOUNTER — RA CDI HCC (OUTPATIENT)
Dept: OTHER | Facility: HOSPITAL | Age: 66
End: 2021-03-10

## 2021-03-10 DIAGNOSIS — Z23 ENCOUNTER FOR IMMUNIZATION: ICD-10-CM

## 2021-03-10 NOTE — PROGRESS NOTES
[facility]  coding oppertunities          Chart reviewed, no opportunity found: CHART REVIEWED, NO OPPORTUNITY FOUND

## 2021-03-12 ENCOUNTER — OFFICE VISIT (OUTPATIENT)
Dept: PHYSICAL THERAPY | Age: 66
End: 2021-03-12
Payer: COMMERCIAL

## 2021-03-12 DIAGNOSIS — G89.29 CHRONIC MIDLINE LOW BACK PAIN WITHOUT SCIATICA: Primary | ICD-10-CM

## 2021-03-12 DIAGNOSIS — M54.50 CHRONIC MIDLINE LOW BACK PAIN WITHOUT SCIATICA: Primary | ICD-10-CM

## 2021-03-12 PROCEDURE — 97113 AQUATIC THERAPY/EXERCISES: CPT | Performed by: PHYSICAL THERAPIST

## 2021-03-12 NOTE — PROGRESS NOTES
Daily Note     Today's date: 3/12/2021  Patient name: Jm Chatterjee  : 1955  MRN: [id number]  Referring provider: ROLAND Nesbitt  Dx:   Encounter Diagnosis     ICD-10-CM    1  Chronic midline low back pain without sciatica  M54 5     G89 29                   Subjective: Pt reports some pain from doing yard work yesterday       Objective: See treatment diary below      Assessment: Tolerated treatment well  Patient demonstrated fatigue post treatment, would benefit from continued PT and pt tolerated treatment without increase in pain       Plan: Continue per plan of care  Progress treatment as tolerated         Precautions: DM    Manuals 2/22 2/26 3/2 3/5 3/9 3/12                                                           Neuro Re-Ed                                                                                                        Ther Ex Aquatic    Royal blue wts Royal blue wts Royal blue wts Royal blue wts       Piriformis/hamstring stretch 4x30"  ea 5x30" 5x30" ea 5x30"  ea 5x30" 5x30"       laps 5x 5x 5x 5x 5x 5x       Side stepping 10x 10x 10x 10x 10x 10x       Press out ball L/R/C 20x 3" 5" x10 ea 5" x10 ea 5" x10 ea 5" x10 ea 5" x10 ea       Crunches w/ball 20x np 20x 20x         squats 20x 20x 20x 20x 20x 20x       3 way hip 30x ea 30x ea 30x ea 30x ea 30x ea 30x ea       marches 3x10  3x10 3x10         Pony bike 5' 5' 5' 5' 5' 5'       Step ups      3x10                                 Ther Activity                                       Gait Training                                       Modalities

## 2021-03-15 RX ORDER — FLASH GLUCOSE SCANNING READER
EACH MISCELLANEOUS
COMMUNITY
Start: 2021-03-04

## 2021-03-16 ENCOUNTER — OFFICE VISIT (OUTPATIENT)
Dept: PHYSICAL THERAPY | Age: 66
End: 2021-03-16
Payer: COMMERCIAL

## 2021-03-16 DIAGNOSIS — G89.29 CHRONIC MIDLINE LOW BACK PAIN WITHOUT SCIATICA: Primary | ICD-10-CM

## 2021-03-16 DIAGNOSIS — M54.50 CHRONIC MIDLINE LOW BACK PAIN WITHOUT SCIATICA: Primary | ICD-10-CM

## 2021-03-16 PROCEDURE — 97113 AQUATIC THERAPY/EXERCISES: CPT | Performed by: PHYSICAL THERAPIST

## 2021-03-16 NOTE — PROGRESS NOTES
Daily Note     Today's date: 3/16/2021  Patient name: Ayana Melvin  : 1955  MRN: [id number]  Referring provider: ROLAND Wheeler  Dx:   Encounter Diagnosis     ICD-10-CM    1  Chronic midline low back pain without sciatica  M54 5     G89 29                   Subjective: Pt reports minimal changes       Objective: See treatment diary below      Assessment: Tolerated treatment well  Patient demonstrated fatigue post treatment, would benefit from continued PT and pt remains appropriately challenged with aquatic exercises  Plan: Continue per plan of care  Progress treatment as tolerated         Precautions: DM    Manuals 2/22 2/26 3/2 3/5 3/9 3/12 3/16                                                          Neuro Re-Ed                                                                                                        Ther Ex Aquatic    Royal blue wts Royal blue wts Royal blue wts Royal blue wts Royal blue wts      Piriformis/hamstring stretch 4x30"  ea 5x30" 5x30" ea 5x30"  ea 5x30" 5x30" 5x30"      laps 5x 5x 5x 5x 5x 5x 5x      Side stepping 10x 10x 10x 10x 10x 10x 10x      Press out ball L/R/C 20x 3" 5" x10 ea 5" x10 ea 5" x10 ea 5" x10 ea 5" x10 ea 5" x10 ea      Crunches w/ball 20x np 20x 20x         squats 20x 20x 20x 20x 20x 20x 20x      3 way hip 30x ea 30x ea 30x ea 30x ea 30x ea 30x ea 30x ea      marches 3x10  3x10 3x10         Pony bike 5' 5' 5' 5' 5' 5' 5'      Step ups      3x10 3x10                                Ther Activity                                       Gait Training                                       Modalities

## 2021-03-17 ENCOUNTER — TELEMEDICINE (OUTPATIENT)
Dept: INTERNAL MEDICINE CLINIC | Facility: CLINIC | Age: 66
End: 2021-03-17
Payer: COMMERCIAL

## 2021-03-17 DIAGNOSIS — E11.42 TYPE 2 DIABETES MELLITUS WITH DIABETIC POLYNEUROPATHY, WITHOUT LONG-TERM CURRENT USE OF INSULIN (HCC): ICD-10-CM

## 2021-03-17 DIAGNOSIS — G89.29 CHRONIC MIDLINE LOW BACK PAIN WITH RIGHT-SIDED SCIATICA: ICD-10-CM

## 2021-03-17 DIAGNOSIS — I10 BENIGN ESSENTIAL HYPERTENSION: Primary | ICD-10-CM

## 2021-03-17 DIAGNOSIS — M54.41 CHRONIC MIDLINE LOW BACK PAIN WITH RIGHT-SIDED SCIATICA: ICD-10-CM

## 2021-03-17 PROCEDURE — 4010F ACE/ARB THERAPY RXD/TAKEN: CPT | Performed by: NURSE PRACTITIONER

## 2021-03-17 PROCEDURE — 99214 OFFICE O/P EST MOD 30 MIN: CPT | Performed by: NURSE PRACTITIONER

## 2021-03-17 RX ORDER — LISINOPRIL 40 MG/1
40 TABLET ORAL DAILY
Qty: 90 TABLET | Refills: 1 | Status: SHIPPED | OUTPATIENT
Start: 2021-03-17 | End: 2021-06-22 | Stop reason: SDUPTHER

## 2021-03-17 RX ORDER — GLIMEPIRIDE 4 MG/1
4 TABLET ORAL 2 TIMES DAILY
Qty: 180 TABLET | Refills: 1 | Status: SHIPPED | OUTPATIENT
Start: 2021-03-17 | End: 2021-06-22 | Stop reason: SDUPTHER

## 2021-03-17 RX ORDER — AMLODIPINE BESYLATE 10 MG/1
10 TABLET ORAL DAILY
Qty: 30 TABLET | Refills: 0 | Status: SHIPPED | OUTPATIENT
Start: 2021-03-17 | End: 2021-04-16 | Stop reason: SDUPTHER

## 2021-03-17 NOTE — ASSESSMENT & PLAN NOTE
Improving with aqua therapy  Encouraged him to do some home stretching exercises daily as well as therapy

## 2021-03-17 NOTE — PROGRESS NOTES
Virtual Regular Visit      Assessment/Plan:    Problem List Items Addressed This Visit        Endocrine    Type 2 diabetes mellitus with diabetic polyneuropathy, without long-term current use of insulin ([facility] [address] )       Lab Results   Component Value Date    HGBA1C 9 8 (H) 03/02/2021   continue glimepiride and metformin, recently started Januvia  Freestyle monitoring  Follow a low carb diet  Relevant Medications    glimepiride (AMARYL) 4 mg tablet       Cardiovascular and Mediastinum    Benign essential hypertension - Primary    Relevant Medications    amLODIPine (NORVASC) 10 mg tablet    lisinopril (ZESTRIL) 40 mg tablet    Other Relevant Orders    VAS renal artery complete       Nervous and Auditory    Chronic midline low back pain with right-sided sciatica     Improving with aqua therapy  Encouraged him to do some home stretching exercises daily as well as therapy  Reason for visit is   Chief Complaint   Patient presents with    Follow-up     2 wk BP follow up         Encounter provider ROLAND Singh    Provider located at [address] [place] [address] [place] [phone]      Recent Visits  No visits were found meeting these conditions  Showing recent visits within past 7 days and meeting all other requirements     Today's Visits  Date Type Provider Dept   03/17/21 [address], [address] Internal Med   Showing today's visits and meeting all other requirements     Future Appointments  No visits were found meeting these conditions  Showing future appointments within next 150 days and meeting all other requirements        The patient was identified by name and date of birth  He Bucknermelissa was informed that this is a telemedicine visit and that the visit is being conducted through [facility] and patient was informed that this is a secure, HIPAA-compliant platform  He agrees to proceed  Jose Velásquez My office door was closed  No one else was in the room  He acknowledged consent and understanding of privacy and security of the video platform  The patient has agreed to participate and understands they can discontinue the visit at any time  Patient is aware this is a billable service  Subjective  Yoandy Sims is a 73 y o  male follow up hypertension  His blood pressures have improved but continue to be elevated  They are running around 150/80's  He denies any side effects on amlodipine  He recently got the freestyle glucose monitoring  He has been learning trends and seeing what foods cause his blood sugar to go up  He has been eating more vegetables and proteins, and less carbohydrates overall  He is progressing well in aqua therapy for his back  He did some yard work and a long walk recently which exacerbated the pain, however he does get relief when at therapy  Past Medical History:   Diagnosis Date    BPH (benign prostatic hyperplasia)     CPAP (continuous positive airway pressure) dependence     Diabetes mellitus ([address] )     Disease of thyroid gland     Hyperlipidemia     Hypertension     Sleep apnea        Past Surgical History:   Procedure Laterality Date    COLONOSCOPY      2016    [place] EGD TRANSORAL BIOPSY SINGLE/MULTIPLE N/A 5/17/2017    Procedure: ESOPHAGOGASTRODUODENOSCOPY (EGD) with bx;  Surgeon: Davey Garcia MD;  Location: [facility];   Service: Bariatrics    TONSILLECTOMY         Current Outpatient Medications   Medication Sig Dispense Refill    amLODIPine (NORVASC) 10 mg tablet Take 1 tablet (10 mg total) by mouth daily 30 tablet 0    aspirin (ECOTRIN LOW STRENGTH) 81 mg EC tablet Take 81 mg by mouth daily      atorvastatin (LIPITOR) 20 mg tablet TAKE 1 TABLET BY MOUTH EVERY DAY 90 tablet 0    Blood Glucose Calibration (OT ULTRA/FASTTK CNTRL SOLN) SOLN by In Vitro route as needed (to calibrate blood sugar meter) 1 each 1    Blood Glucose Monitoring Suppl (ONE TOUCH ULTRA MINI) w/Device KIT by Does not apply route 2 (two) times a day 1 each 0    Continuous Blood Gluc  (FreeStyle Paul 14 Day [place]) LEO USE TO CHECK BLOOD GLUCOSE      Continuous Blood Gluc Sensor (FreeStyle Paul 14 Day Sensor) MISC Use 1 ampule every 14 (fourteen) days 2 each 0    glimepiride (AMARYL) 4 mg tablet Take 1 tablet (4 mg total) by mouth 2 (two) times a day 180 tablet 1    glucose blood ([employer]S ADVANCED GLUCOSE TEST) test strip 1 each by Other route 2 (two) times a day 100 each 3    glucose blood (ONE TOUCH ULTRA TEST) test strip 1 each by Other route 2 (two) times a day Use as instructed 100 each 3    HYDROcodone-acetaminophen (NORCO) 5-325 mg per tablet Take 1 tablet by mouth daily as needed for painMax Daily Amount: 1 tablet 30 tablet 0    ketoconazole (NIZORAL) 2 % cream Apply topically daily 60 g 1    levothyroxine 75 mcg tablet TAKE 1 TABLET BY MOUTH DAILY  30 tablet 3    lisinopril (ZESTRIL) 40 mg tablet Take 1 tablet (40 mg total) by mouth daily 90 tablet 1    metFORMIN (GLUCOPHAGE) 1000 MG tablet TAKE 1 TABLET BY MOUTH TWO TIMES A DAY WITH MEALS 60 tablet 2    ONETOUCH DELICA LANCETS 72Y MISC by Does not apply route 2 (two) times a day 100 each 3    sildenafil (REVATIO) 20 mg tablet TAKE TWO TABLETS BY MOUTH EVERY DAY 20 tablet 1    sildenafil (VIAGRA) 50 MG tablet       sildenafil (VIAGRA) 50 MG tablet Take 1 tablet (50 mg total) by mouth daily as needed for erectile dysfunction 10 tablet 2    sitaGLIPtin (JANUVIA) 100 mg tablet Take 1 tablet (100 mg total) by mouth daily 30 tablet 1     No current facility-administered medications for this visit  Allergies   Allergen Reactions    Penicillins      SYNCOPE, but has taken amoxicillin/augmentin in past       Review of Systems   Constitutional: Negative for activity change, appetite change and fatigue  Respiratory: Negative for cough and shortness of breath      Cardiovascular: Negative for chest pain, palpitations and leg swelling  Gastrointestinal: Negative for abdominal pain  Genitourinary: Negative for difficulty urinating  Musculoskeletal: Positive for arthralgias and back pain  Neurological: Negative for dizziness, light-headedness and headaches  Video Exam    There were no vitals filed for this visit  Physical Exam  Constitutional:       Appearance: He is well-developed  HENT:      Head: Normocephalic  Eyes:      Pupils: Pupils are equal, round, and reactive to light  Pulmonary:      Effort: Pulmonary effort is normal    Neurological:      Mental Status: He is alert and oriented to person, place, and time  Psychiatric:         Behavior: Behavior normal           I spent 20 minutes directly with the patient during this visit      VIRTUAL VISIT DISCLAIMER    Ken Mathews acknowledges that he has consented to an online visit or consultation  He understands that the online visit is based solely on information provided by him, and that, in the absence of a face-to-face physical evaluation by the physician, the diagnosis he receives is both limited and provisional in terms of accuracy and completeness  This is not intended to replace a full medical face-to-face evaluation by the physician  Ken Mathews understands and accepts these terms

## 2021-03-17 NOTE — ASSESSMENT & PLAN NOTE
Lab Results   Component Value Date    HGBA1C 9 8 (H) 03/02/2021   continue glimepiride and metformin, recently started Januvia  Freestyle monitoring  Follow a low carb diet

## 2021-03-19 ENCOUNTER — OFFICE VISIT (OUTPATIENT)
Dept: PHYSICAL THERAPY | Age: 66
End: 2021-03-19
Payer: COMMERCIAL

## 2021-03-19 DIAGNOSIS — G89.29 CHRONIC MIDLINE LOW BACK PAIN WITHOUT SCIATICA: Primary | ICD-10-CM

## 2021-03-19 DIAGNOSIS — M54.50 CHRONIC MIDLINE LOW BACK PAIN WITHOUT SCIATICA: Primary | ICD-10-CM

## 2021-03-19 PROCEDURE — 97113 AQUATIC THERAPY/EXERCISES: CPT | Performed by: PHYSICAL THERAPIST

## 2021-03-19 NOTE — PROGRESS NOTES
Daily Note     Today's date: 3/19/2021  Patient name: Jefferson Junior  : 1955  MRN: [id number]  Referring provider: ROLAND Barahona  Dx:   Encounter Diagnosis     ICD-10-CM    1  Chronic midline low back pain without sciatica  M54 5     G89 29                   Subjective: Pt reports some increase in pain last night, but overall improvement       Objective: See treatment diary below      Assessment: Tolerated treatment well  Patient demonstrated fatigue post treatment, would benefit from continued PT and pt tolerated increase ankle weights well  Plan: Continue per plan of care  Progress treatment as tolerated         Precautions: DM    Manuals 2/22 2/26 3/2 3/5 3/9 3/12 3/16 3/19                                                         Neuro Re-Ed                                                                                                        Ther Ex Aquatic    Royal blue wts Royal blue wts Royal blue wts Royal blue wts Royal blue wts Red weights      Piriformis/hamstring stretch 4x30"  ea 5x30" 5x30" ea 5x30"  ea 5x30" 5x30" 5x30" 5x30"     laps 5x 5x 5x 5x 5x 5x 5x 5x     Side stepping 10x 10x 10x 10x 10x 10x 10x 10x     Press out ball L/R/C 20x 3" 5" x10 ea 5" x10 ea 5" x10 ea 5" x10 ea 5" x10 ea 5" x10 ea 5" x10 ea     Crunches w/ball 20x np 20x 20x         squats 20x 20x 20x 20x 20x 20x 20x 20x     3 way hip 30x ea 30x ea 30x ea 30x ea 30x ea 30x ea 30x ea 30x ea     marches 3x10  3x10 3x10         Pony bike 5' 5' 5' 5' 5' 5' 5' 5'     Step ups      3x10 3x10 3x10                               Ther Activity                                       Gait Training                                       Modalities

## 2021-03-23 ENCOUNTER — OFFICE VISIT (OUTPATIENT)
Dept: PHYSICAL THERAPY | Age: 66
End: 2021-03-23
Payer: COMMERCIAL

## 2021-03-23 DIAGNOSIS — G89.29 CHRONIC MIDLINE LOW BACK PAIN WITHOUT SCIATICA: Primary | ICD-10-CM

## 2021-03-23 DIAGNOSIS — M54.50 CHRONIC MIDLINE LOW BACK PAIN WITHOUT SCIATICA: Primary | ICD-10-CM

## 2021-03-23 DIAGNOSIS — M25.512 ACUTE PAIN OF LEFT SHOULDER: ICD-10-CM

## 2021-03-23 PROCEDURE — 97113 AQUATIC THERAPY/EXERCISES: CPT | Performed by: SPECIALIST/TECHNOLOGIST

## 2021-03-23 NOTE — PROGRESS NOTES
Daily Note     Today's date: 3/23/2021  Patient name: Tigist Darden  : 1955  MRN: [id number]  Referring provider: ROLAND Soto  Dx:   Encounter Diagnosis     ICD-10-CM    1  Chronic midline low back pain without sciatica  M54 5     G89 29    2  Acute pain of left shoulder  M25 512                   Subjective: Pt reports his LBP continues to improve, attirubutes progress to aquatic PT interventions  Objective: See treatment diary below    Assessment: Good core stability noted with therex, pt able to increase reps with p ball press downs demonstrating improved TA activation  Tolerated treatment well  Patient demonstrated fatigue post treatment, exhibited good technique with therapeutic exercises and would benefit from continued PT    Plan: Continue per plan of care  Progress treatment as tolerated         Precautions: DM    Manuals 2/22 2/26 3/2 3/5 3/9 3/12 3/16 3/19 3/23                                                        Neuro Re-Ed                                                                                                        Ther Ex Aquatic    Royal blue wts Royal blue wts Royal blue wts Royal blue wts Royal blue wts Red weights  Red Weights    Piriformis/hamstring stretch 4x30"  ea 5x30" 5x30" ea 5x30"  ea 5x30" 5x30" 5x30" 5x30" 5x30" ea    laps 5x 5x 5x 5x 5x 5x 5x 5x 5x    Side stepping 10x 10x 10x 10x 10x 10x 10x 10x 10x    Press out ball L/R/C 20x 3" w 5" x10 ea 5" x10 ea 5" x10 ea 5" x10 ea 5" x10 ea 5" x10 ea 5" x20 ea    Crunches w/ball 20x np 20x 20x         squats 20x 20x 20x 20x 20x 20x 20x 20x 30x    3 way hip 30x ea 30x ea 30x ea 30x ea 30x ea 30x ea 30x ea 30x ea 30x ea    marches 3x10  3x10 3x10         Pony bike 5' 5' 5' 5' 5' 5' 5' 5' 5'    Step ups      3x10 3x10 3x10 3x10                              Ther Activity                                       Gait Training                                       Modalities

## 2021-03-26 ENCOUNTER — OFFICE VISIT (OUTPATIENT)
Dept: PHYSICAL THERAPY | Age: 66
End: 2021-03-26
Payer: COMMERCIAL

## 2021-03-26 DIAGNOSIS — G89.29 CHRONIC MIDLINE LOW BACK PAIN WITHOUT SCIATICA: Primary | ICD-10-CM

## 2021-03-26 DIAGNOSIS — M54.50 CHRONIC MIDLINE LOW BACK PAIN WITHOUT SCIATICA: Primary | ICD-10-CM

## 2021-03-26 PROCEDURE — 97113 AQUATIC THERAPY/EXERCISES: CPT | Performed by: PHYSICAL THERAPIST

## 2021-03-26 NOTE — PROGRESS NOTES
PT Evaluation     Today's date: 3/26/2021  Patient name: Lisa Cao  : 1955  MRN: [id number]  Referring provider: ROLAND Brizuela  Dx:   Encounter Diagnosis     ICD-10-CM    1  Chronic midline low back pain without sciatica  M54 5     G89 29                   Assessment  Assessment details: Pt has made improvements in LE strength and lumbar ROM since start of PT  Pt continues to have decreased activity tolerance with ADL, particularly walking and would benefit from skilled PT in order to improve functional mobility     Impairments: abnormal coordination, abnormal gait, abnormal muscle firing, abnormal muscle tone, abnormal or restricted ROM, abnormal movement, activity intolerance, impaired physical strength, lacks appropriate home exercise program, pain with function, weight-bearing intolerance, poor posture  and poor body mechanics    Goals  In 4 weeks pt will:  -Be independent with phase I of HEP  -Increase lumbar ROM by 25%  -Increase LE strength by 1/2 grade  All met    By discharge pt will:  -Be independent with Phase II of HEP-Progressing, continue  -Demonstrate full lumbar ROM-Progressing, continue  -Demonstrate 5/5 LE strength-Progressing, continue          Plan  Planned modality interventions: cryotherapy, electrical stimulation/Russian stimulation, TENS and thermotherapy: hydrocollator packs  Planned therapy interventions: joint mobilization, manual therapy, abdominal trunk stabilization, motor coordination training, neuromuscular re-education, muscle pump exercises, patient education, strengthening, stretching, therapeutic activities, therapeutic exercise, body mechanics training, fine motor coordination training, functional ROM exercises and home exercise program  Frequency: 2x week  Duration in weeks: 6  Plan of Care beginning date: 3/26/2021  Plan of Care expiration date: 2021  Treatment plan discussed with: patient and PTA        Subjective Evaluation    History of Present Illness  Mechanism of injury: Pt reports overall decrease in pain with ADLs  Pain  Current pain ratin  At best pain ratin  At worst pain ratin    Patient Goals  Patient goals for therapy: decreased pain, increased motion, increased strength, independence with ADLs/IADLs and return to sport/leisure activities          Objective     Active Range of Motion     Additional Active Range of Motion Details  Lumbar ROM as % of normal ROM    Flex: 75  Ext:75  R ROT: 75  L ROT: 75      Strength/Myotome Testing     Left Hip   Planes of Motion   Flexion: 5  Abduction: 4    Right Hip   Planes of Motion   Flexion: 5  Abduction: 4    Left Knee   Extension: 5    Right Knee   Extension: 5    Left Ankle/Foot   Dorsiflexion: 4  Plantar flexion: 4    Right Ankle/Foot   Dorsiflexion: 4  Plantar flexion: 4    Tests     Lumbar     Left   Negative passive SLR  Right   Negative passive SLR  Right Hip   Negative JAZZY and FADIR                Precautions: DM    Manuals                                                                 Neuro Re-Ed                                                                                                        Ther Ex                                                                                                                     Ther Activity                                       Gait Training                                       Modalities

## 2021-03-30 ENCOUNTER — OFFICE VISIT (OUTPATIENT)
Dept: PHYSICAL THERAPY | Age: 66
End: 2021-03-30
Payer: COMMERCIAL

## 2021-03-30 DIAGNOSIS — G89.29 CHRONIC MIDLINE LOW BACK PAIN WITHOUT SCIATICA: Primary | ICD-10-CM

## 2021-03-30 DIAGNOSIS — M54.50 CHRONIC MIDLINE LOW BACK PAIN WITHOUT SCIATICA: Primary | ICD-10-CM

## 2021-03-30 DIAGNOSIS — M25.512 ACUTE PAIN OF LEFT SHOULDER: ICD-10-CM

## 2021-03-30 PROCEDURE — 97113 AQUATIC THERAPY/EXERCISES: CPT | Performed by: SPECIALIST/TECHNOLOGIST

## 2021-03-30 NOTE — PROGRESS NOTES
Daily Note     Today's date: 3/30/2021  Patient name: David Cotter  : 1955  MRN: [id number]  Referring provider: ROLAND Alvarado  Dx:   Encounter Diagnosis     ICD-10-CM    1  Chronic midline low back pain without sciatica  M54 5     G89 29    2  Acute pain of left shoulder  M25 512                   Subjective: Pt reports things are going well overall, able to go for longer walks recently  Objective: See treatment diary below    Assessment: Tolerated treatment well  Patient demonstrated fatigue post treatment, exhibited good technique with therapeutic exercises and would benefit from continued PT  Pt able to perform additional therex added to POC this visit without exacerbating midline LBP  Plan: Continue per plan of care  Progress treatment as tolerated         Precautions: DM      Manuals 3/16 3/19 3/23 3/30 Purple Weights                                                  Neuro Re-Ed                                                                                        Ther Ex Aquatic  Royal blue wts Red weights  Red Weights Red Weights       Piriformis/hamstring stretch 5x30" 5x30" 5x30" ea 5x30" ea       laps 5x 5x 5x 5x       Side stepping 10x 10x 10x 10x       Press down ball L/R/C 5" x10 ea 5" x10 ea 5" x20 ea 5"x20 ea       Crunches w/ball           squats 20x 20x 30x 30x       3 way hip 30x ea 30x ea 30x ea 30x ea       marches           Pony bike 5' 5' 5' 5'       Step ups 3x10 3x10 3x10 3x10       DB ext, abd, horizontal add    3x10 ea Large DB                  Ther Activity                                 Gait Training                                 Modalities

## 2021-03-31 ENCOUNTER — TELEMEDICINE (OUTPATIENT)
Dept: INTERNAL MEDICINE CLINIC | Facility: CLINIC | Age: 66
End: 2021-03-31
Payer: COMMERCIAL

## 2021-03-31 DIAGNOSIS — E03.9 HYPOTHYROIDISM, UNSPECIFIED TYPE: ICD-10-CM

## 2021-03-31 DIAGNOSIS — I10 BENIGN ESSENTIAL HYPERTENSION: Primary | ICD-10-CM

## 2021-03-31 DIAGNOSIS — G89.29 CHRONIC MIDLINE LOW BACK PAIN WITH RIGHT-SIDED SCIATICA: ICD-10-CM

## 2021-03-31 DIAGNOSIS — M54.41 CHRONIC MIDLINE LOW BACK PAIN WITH RIGHT-SIDED SCIATICA: ICD-10-CM

## 2021-03-31 DIAGNOSIS — E11.42 TYPE 2 DIABETES MELLITUS WITH DIABETIC POLYNEUROPATHY, WITHOUT LONG-TERM CURRENT USE OF INSULIN (HCC): ICD-10-CM

## 2021-03-31 PROCEDURE — 99214 OFFICE O/P EST MOD 30 MIN: CPT | Performed by: NURSE PRACTITIONER

## 2021-03-31 PROCEDURE — 1036F TOBACCO NON-USER: CPT | Performed by: NURSE PRACTITIONER

## 2021-03-31 RX ORDER — FLASH GLUCOSE SENSOR
1 KIT MISCELLANEOUS
Qty: 6 EACH | Refills: 1 | Status: SHIPPED | OUTPATIENT
Start: 2021-03-31 | End: 2021-06-22 | Stop reason: SDUPTHER

## 2021-03-31 NOTE — PROGRESS NOTES
Virtual Regular Visit      Assessment/Plan:    Problem List Items Addressed This Visit        Endocrine    Type 2 diabetes mellitus with diabetic polyneuropathy, without long-term current use of insulin ([address] )     Sugars improving  Has freestyle monitoring  Check A1C prior to next appointment  Relevant Medications    Continuous Blood Gluc Sensor (FreeStyle Paul 14 Day Sensor) MISC       Cardiovascular and Mediastinum    Benign essential hypertension - Primary     Blood pressures improved  Continue lisinopril and amlodipine at current dose  Nervous and Auditory    Chronic midline low back pain with right-sided sciatica     Continue PT/aqua therapy  Home stretching daily  Reason for visit is   Chief Complaint   Patient presents with    Hypertension     2wk f/ u         Encounter provider ROLAND Hodge    Provider located at [address] [address]      Recent Visits  No visits were found meeting these conditions  Showing recent visits within past 7 days and meeting all other requirements     Today's Visits  Date Type Provider Dept   03[facility]31/21 [facility] [address] Internal Med   Showing today's visits and meeting all other requirements     Future Appointments  No visits were found meeting these conditions  Showing future appointments within next 150 days and meeting all other requirements        The patient was identified by name and date of birth  Brisa Doreen was informed that this is a telemedicine visit and that the visit [place] [facility] and patient was informed that this is a secure, HIPAA-compliant platform  He agrees to proceed     My office door was closed  No one else was in the room  He acknowledged consent and understanding of privacy and security of the video platform   The patient has agreed to participate and understands they can discontinue the visit at any time  Patient is aware this is a billable service  Subjective  Maurice Flores is a 73 y o  male follow up hypertension  His blood pressures have improved  120-130/70-80's  He continues to have intermittent back pain  He admits to sleeping on a cough recently which he feels made it worse  He went for a walk a few weeks ago and that made it worse  He continues with PT  His sugars have been getting better overall, FBS   Past Medical History:   Diagnosis Date    BPH (benign prostatic hyperplasia)     CPAP (continuous positive airway pressure) dependence     Diabetes mellitus ([address] )     Disease of thyroid gland     Hyperlipidemia     Hypertension     Sleep apnea        Past Surgical History:   Procedure Laterality Date    COLONOSCOPY      2016    LA EGD TRANSORAL BIOPSY SINGLE/MULTIPLE N/A 5/17/2017    Procedure: ESOPHAGOGASTRODUODENOSCOPY (EGD) with bx;  Surgeon: Abebe Brown MD;  Location: [facility];   Service: Bariatrics    TONSILLECTOMY         Current Outpatient Medications   Medication Sig Dispense Refill    amLODIPine (NORVASC) 10 mg tablet Take 1 tablet (10 mg total) by mouth daily 30 tablet 0    aspirin (ECOTRIN LOW STRENGTH) 81 mg EC tablet Take 81 mg by mouth daily      atorvastatin (LIPITOR) 20 mg tablet TAKE 1 TABLET BY MOUTH EVERY DAY 90 tablet 0    Blood Glucose Calibration (OT ULTRA/FASTTK CNTRL SOLN) SOLN by In Vitro route as needed (to calibrate blood sugar meter) 1 each 1    Blood Glucose Monitoring Suppl (ONE TOUCH ULTRA MINI) w/Device KIT by Does not apply route 2 (two) times a day 1 each 0    Continuous Blood Gluc  (FreeStyle Paul 14 Day [place]) LEO USE TO CHECK BLOOD GLUCOSE      Continuous Blood Gluc Sensor (FreeStyle Paul 14 Day Sensor) MISC Use 1 ampule every 14 (fourteen) days 6 each 1    glimepiride (AMARYL) 4 mg tablet Take 1 tablet (4 mg total) by mouth 2 (two) times a day 180 tablet 1    glucose blood ([employer] ADVANCED GLUCOSE TEST) test strip 1 each by Other route 2 (two) times a day 100 each 3    glucose blood (ONE TOUCH ULTRA TEST) test strip 1 each by Other route 2 (two) times a day Use as instructed 100 each 3    HYDROcodone-acetaminophen (NORCO) 5-325 mg per tablet Take 1 tablet by mouth daily as needed for painMax Daily Amount: 1 tablet 30 tablet 0    ketoconazole (NIZORAL) 2 % cream Apply topically daily 60 g 1    levothyroxine 75 mcg tablet TAKE 1 TABLET BY MOUTH DAILY  30 tablet 3    lisinopril (ZESTRIL) 40 mg tablet Take 1 tablet (40 mg total) by mouth daily 90 tablet 1    metFORMIN (GLUCOPHAGE) 1000 MG tablet TAKE 1 TABLET BY MOUTH TWO TIMES A DAY WITH MEALS 60 tablet 2    ONETOUCH DELICA LANCETS 31Y MISC by Does not apply route 2 (two) times a day 100 each 3    sildenafil (REVATIO) 20 mg tablet TAKE TWO TABLETS BY MOUTH EVERY DAY 20 tablet 1    sildenafil (VIAGRA) 50 MG tablet       sildenafil (VIAGRA) 50 MG tablet Take 1 tablet (50 mg total) by mouth daily as needed for erectile dysfunction 10 tablet 2    sitaGLIPtin (JANUVIA) 100 mg tablet Take 1 tablet (100 mg total) by mouth daily 30 tablet 1     No current facility-administered medications for this visit  Allergies   Allergen Reactions    Penicillins      SYNCOPE, but has taken amoxicillin/augmentin in past       Review of Systems   Constitutional: Negative for activity change and appetite change  Respiratory: Negative for shortness of breath  Cardiovascular: Negative for chest pain and leg swelling  Gastrointestinal: Negative for abdominal pain  Musculoskeletal: Positive for back pain  Neurological: Negative for dizziness, light-headedness and headaches  Video Exam    There were no vitals filed for this visit  Physical Exam  Constitutional:       Appearance: He is well-developed  HENT:      Head: Normocephalic     Eyes:      Pupils: Pupils are equal, round, and reactive to light    Pulmonary:      Effort: Pulmonary effort is normal    Neurological:      Mental Status: He is alert and oriented to person, place, and time  Psychiatric:         Behavior: Behavior normal           I spent 14 minutes directly with the patient during this visit      VIRTUAL VISIT DISCLAIMER    Leoncio Hernandez acknowledges that he has consented to an online visit or consultation  He understands that the online visit is based solely on information provided by him, and that, in the absence of a face-to-face physical evaluation by the physician, the diagnosis he receives is both limited and provisional in terms of accuracy and completeness  This is not intended to replace a full medical face-to-face evaluation by the physician  Leoncio Hernandez understands and accepts these terms

## 2021-04-01 ENCOUNTER — OFFICE VISIT (OUTPATIENT)
Dept: PHYSICAL THERAPY | Age: 66
End: 2021-04-01
Payer: COMMERCIAL

## 2021-04-01 DIAGNOSIS — M54.50 CHRONIC MIDLINE LOW BACK PAIN WITHOUT SCIATICA: Primary | ICD-10-CM

## 2021-04-01 DIAGNOSIS — G89.29 CHRONIC MIDLINE LOW BACK PAIN WITHOUT SCIATICA: Primary | ICD-10-CM

## 2021-04-01 PROCEDURE — 97113 AQUATIC THERAPY/EXERCISES: CPT | Performed by: SPECIALIST/TECHNOLOGIST

## 2021-04-01 RX ORDER — LEVOTHYROXINE SODIUM 0.07 MG/1
TABLET ORAL
Qty: 30 TABLET | Refills: 3 | Status: SHIPPED | OUTPATIENT
Start: 2021-04-01 | End: 2021-04-16 | Stop reason: SDUPTHER

## 2021-04-01 NOTE — PROGRESS NOTES
Daily Note     Today's date: 2021  Patient name: Susana Coy  : 1955  MRN: [id number]  Referring provider: ROLAND Blakely  Dx:   Encounter Diagnosis     ICD-10-CM    1  Chronic midline low back pain without sciatica  M54 5     G89 29                   Subjective: Pt reports feeling like he has plateau'd       Objective: See treatment diary below      Assessment: Tolerated treatment well  Patient demonstrated fatigue post treatment, would benefit from continued PT and pt will trial land-based program in order to continue improving functional mobility and activity tolerance  Plan: Continue per plan of care  Progress treatment as tolerated         Precautions: DM      Manuals 3/16 3/19 3/23 3/30 Purple Weights                                                  Neuro Re-Ed                                                                                        Ther Ex Aquatic  Royal blue wts Red weights  Red Weights Red Weights       Piriformis/hamstring stretch 5x30" 5x30" 5x30" ea 5x30" ea       laps 5x 5x 5x 5x       Side stepping 10x 10x 10x 10x       Press down ball L/R/C 5" x10 ea 5" x10 ea 5" x20 ea 5"x20 ea       Crunches w/ball           squats 20x 20x 30x 30x       3 way hip 30x ea 30x ea 30x ea 30x ea       marches           Pony bike 5' 5' 5' 5'       Step ups 3x10 3x10 3x10 3x10       DB ext, abd, horizontal add    3x10 ea Large DB                  Ther Activity                                 Gait Training                                 Modalities

## 2021-04-02 ENCOUNTER — APPOINTMENT (OUTPATIENT)
Dept: PHYSICAL THERAPY | Age: 66
End: 2021-04-02
Payer: COMMERCIAL

## 2021-04-06 ENCOUNTER — OFFICE VISIT (OUTPATIENT)
Dept: PHYSICAL THERAPY | Age: 66
End: 2021-04-06
Payer: COMMERCIAL

## 2021-04-06 DIAGNOSIS — M54.50 CHRONIC MIDLINE LOW BACK PAIN WITHOUT SCIATICA: Primary | ICD-10-CM

## 2021-04-06 DIAGNOSIS — G89.29 CHRONIC MIDLINE LOW BACK PAIN WITHOUT SCIATICA: Primary | ICD-10-CM

## 2021-04-06 PROCEDURE — 97110 THERAPEUTIC EXERCISES: CPT | Performed by: PHYSICAL THERAPIST

## 2021-04-06 NOTE — PROGRESS NOTES
Daily Note     Today's date: 2021  Patient name: Gareld Gottron  : 1955  MRN: [id number]  Referring provider: ROLAND Jasso  Dx:   Encounter Diagnosis     ICD-10-CM    1  Chronic midline low back pain without sciatica  M54 5     G89 29                   Subjective: Pt reports walking with minimal pain over the weekend      Objective: See treatment diary below      Assessment: Tolerated treatment well  Patient demonstrated fatigue post treatment, would benefit from continued PT and tolerated first land-treatment well  Pt will continue with this goinf forward  Plan: Continue per plan of care  Progress treatment as tolerated         Precautions: DM      Manuals 4/6          Glute stretch 5x30s                                           Neuro Re-Ed                                                                                        There ex             Bike 10'          HS stretch 5x30s          Prostretch 5x30s          P ball roll out 2x10 ea          Leg press 50# 3x15                                                                                                                                                                               Ther Activity                                 Gait Training                                 Modalities

## 2021-04-07 ENCOUNTER — CONSULT (OUTPATIENT)
Dept: NEPHROLOGY | Facility: CLINIC | Age: 66
End: 2021-04-07
Payer: COMMERCIAL

## 2021-04-07 VITALS
WEIGHT: 278.6 LBS | BODY MASS INDEX: 42.22 KG/M2 | RESPIRATION RATE: 16 BRPM | DIASTOLIC BLOOD PRESSURE: 70 MMHG | HEIGHT: 68 IN | SYSTOLIC BLOOD PRESSURE: 140 MMHG

## 2021-04-07 DIAGNOSIS — R80.9 TYPE 2 DIABETES MELLITUS WITH PROTEINURIA (HCC): ICD-10-CM

## 2021-04-07 DIAGNOSIS — I10 ESSENTIAL HYPERTENSION: ICD-10-CM

## 2021-04-07 DIAGNOSIS — R80.1 PERSISTENT PROTEINURIA: Primary | ICD-10-CM

## 2021-04-07 DIAGNOSIS — N18.2 CHRONIC KIDNEY DISEASE, STAGE 2 (MILD): ICD-10-CM

## 2021-04-07 DIAGNOSIS — E66.01 OBESITY, MORBID, BMI 40.0-49.9 (HCC): ICD-10-CM

## 2021-04-07 DIAGNOSIS — E11.29 TYPE 2 DIABETES MELLITUS WITH PROTEINURIA (HCC): ICD-10-CM

## 2021-04-07 LAB
CLARITY, POC: CLEAR
COLOR, POC: YELLOW
EXT BILIRUBIN, UA: NORMAL
EXT BLOOD URINE: NORMAL
EXT GLUCOSE, UA: 2000
EXT KETONES: NORMAL
EXT NITRITE, UA: NORMAL
EXT PH, UA: 6
EXT PROTEIN, UA: 2000
EXT SPECIFIC GRAVITY, UA: 1.01
EXT UROBILINOGEN: NORMAL
WBC # BLD EST: NORMAL 10*3/UL

## 2021-04-07 PROCEDURE — 3061F NEG MICROALBUMINURIA REV: CPT | Performed by: INTERNAL MEDICINE

## 2021-04-07 PROCEDURE — 3008F BODY MASS INDEX DOCD: CPT | Performed by: INTERNAL MEDICINE

## 2021-04-07 PROCEDURE — 81002 URINALYSIS NONAUTO W/O SCOPE: CPT | Performed by: INTERNAL MEDICINE

## 2021-04-07 PROCEDURE — 3077F SYST BP >= 140 MM HG: CPT | Performed by: INTERNAL MEDICINE

## 2021-04-07 PROCEDURE — 3066F NEPHROPATHY DOC TX: CPT | Performed by: INTERNAL MEDICINE

## 2021-04-07 PROCEDURE — 3078F DIAST BP <80 MM HG: CPT | Performed by: INTERNAL MEDICINE

## 2021-04-07 PROCEDURE — 1036F TOBACCO NON-USER: CPT | Performed by: INTERNAL MEDICINE

## 2021-04-07 PROCEDURE — 99204 OFFICE O/P NEW MOD 45 MIN: CPT | Performed by: INTERNAL MEDICINE

## 2021-04-07 RX ORDER — SPIRONOLACTONE 25 MG/1
25 TABLET ORAL DAILY
Qty: 90 TABLET | Refills: 3 | Status: SHIPPED | OUTPATIENT
Start: 2021-04-07 | End: 2021-08-23

## 2021-04-07 NOTE — PROGRESS NOTES
NEPHROLOGY OUTPATIENT CONSULTATION   Dick Angela 73 y o  male MRN: [id number]  Date: 4/7/2021  Reason for consultation:   Chief Complaint   Patient presents with    Consult    Proteinuria       ASSESSMENT AND PLAN:  Persistent Proteinuria  -patient has proteinuria gradually worsening since 2018, last microalbumin creatinine ratio from March 2, 2021 was reviewed with the patient, was 3 0 g   -discussed with patient that worsening proteinuria is likely due to uncontrolled diabetes mellitus type 2 with peak A1c 10 4 in September 2020, now trending down to 9 8% with oral medication, also uncontrolled hypertension and use of NSAIDs contributing to worsening proteinuria   -discussed about avoiding NSAIDs  -recommended goal blood pressure less than 130/80  -continue lisinopril 40 mg daily which would help  Also started on spironolactone in the setting of elevated blood pressure, use of mineral corticoid receptor antagonist would also help with decreasing proteinuria   -discussed about low-potassium diet in the setting of high dose of lisinopril and use of spironolactone  List of low-potassium diet was provided, will repeat BMP in 2 weeks to monitor potassium level  Follow-up in 4 months with repeat labs  Will also monitor proteinuria and quantify proteinuria before the office visit  Primary Hypertension:   -Current medication: Amlodipine 10 mg, Lisinopril 40 mg daily      -Current blood pressure: Elevated   -Also has Sleep apnea- could be contributing to elevated BP but not tolerating the mask and so not using it  Recommend discussion with PCP and see if other masks can work  -Plan:    · Started on Spironolactone 25 mg daily, discussed about risk of hyperkalemia  During next office visit would also discuss about risk of gynecomastia if the dose is increased in future  · Renal duplex was ordered by PCP, will follow-up the results    Would still consider medical management at this time even if the renal duplex is positive  · Recommended using CPAP, recommend discussion with PCP about different kind of mask and see if a different mask would help him use CPAP machine  -Recommend 2 g sodium diet  -Recommend daily exercise and weight loss  -Discussed home monitoring of BP and maintaining a log of blood pressure   -Recommend goal BP less than 130/80  Chronic kidney disease stage 2  -renal function stable at creatinine 0 8-1 0  EGFR 80-90  In the setting of persistent proteinuria patient has chronic kidney disease stage 2  -stressed on importance of better diabetic control and blood pressure control and patient verbalized understanding, discussed about avoiding nephrotoxic    DM-2 with proteinuria  -on Januvia, Metformin, Glimipiride  - peak A1c was 10 4 in sept 2020 and now improving to 9 8 %  -recommend goal A1c less than 7 0, discussed about risk of worsening renal function and proteinuria with uncontrolled diabetes mellitus and patient verbalized understanding  -stressed on weight loss and diabetic diet  -continue follow-up with PCP and medical management of diabetes mellitus type 2 per PCP  Hyperlipidemia, mixed  -LDL 79  Triglyceride 187  -continue statins per PCP  Currently on Lipitor 20 mg daily  -stressed on low carbohydrate diet and lifestyle modification for elevated triglyceride, stressed on daily exercise  Continue follow-up with PCP and management per PCP    Obesity:  BMI more than 40  Stressed on daily exercise and weight loss, as per patient weight has been trending down  Health maintenance:  Had colonoscopy in 2013, next one is due in 2023    Diagnoses and all orders for this visit:    Persistent proteinuria  -     Ambulatory referral to Nephrology  -     spironolactone (ALDACTONE) 25 mg tablet; Take 1 tablet (25 mg total) by mouth daily  -     Basic metabolic panel; Future  -     CBC; Future  -     Magnesium; Future  -     Protein / creatinine ratio, urine;  Future  -     Urinalysis with microscopic; Future    Essential hypertension  -     spironolactone (ALDACTONE) 25 mg tablet; Take 1 tablet (25 mg total) by mouth daily  -     Basic metabolic panel; Future  -     Basic metabolic panel; Future    Chronic kidney disease, stage 2 (mild)  -     Basic metabolic panel; Future  -     Basic metabolic panel; Future  -     CBC; Future  -     Magnesium; Future  -     Protein / creatinine ratio, urine; Future  -     Urinalysis with microscopic; Future    Type 2 diabetes mellitus with proteinuria (HCC)  -     Basic metabolic panel; Future    Obesity, morbid, BMI 40 0-49 9 [facility]    Other orders  -     POCT urinalysis dipstick        Patient Instructions   Blood pressure is elevated  Started on spironolactone 25 mg daily  Continue lisinopril and amlodipine   -Recommend 2 g sodium diet  -Recommend daily exercise and weight loss  -Discussed home monitoring of BP and maintaining a log of blood pressure   -Recommend goal BP less than 130/80  Please inform me if SBP below 110 or above 140's persistently  Protein in the urine is likely due to high blood pressure, diabetes mellitus type 2 and use of NSAIDs like Aleve  Recommend avoiding NSAIDs like Aleve, ibuprofen, Motrin  Blood work in 2 weeks  Recommend low-potassium diet  Follow-up in 4 months with repeat labs          Potassium Content of Foods List   WHAT YOU NEED TO KNOW:   Potassium is a mineral that is found in most foods  Potassium helps to balance fluids and minerals in your body  It also helps your body maintain a normal blood pressure  Potassium helps your muscles contract and your nerves function normally  DISCHARGE INSTRUCTIONS:   Why you may need to change the amount of potassium you eat:   · You may need more potassium  if you have hypokalemia (low potassium levels) or high blood pressure  You may also need more potassium if you are taking diuretics  Diuretics and certain medicines cause your body to lose potassium       · You may need less potassium  in your diet if you have hyperkalemia (high potassium levels) or kidney disease  Potassium content of fruit:  The amount of potassium in milligrams (mg) contained in each fruit or serving of fruit is listed beside the item  · High-potassium foods (more than 200 mg per serving):      ? 1 medium banana (425)    ? ½ of a papaya (390)    ? ½ cup of prune juice (370)    ? ¼ cup of raisins (270)    ? 1 medium michael (325) or kiwi (240)    ? 1 small orange (240) or ½ cup of orange juice (235)    ? ½ cup of cubed cantaloupe (215) or diced honeydew melon (200)    ? 1 medium pear (200)    · Medium-potassium foods (50 to 200 mg per serving):      ? 1 medium peach (185)    ? 1 small apple or ½ cup of apple juice (150)    ? ½ cup of peaches canned in juice (120)    ? ½ cup of canned pineapple (100)    ? ½ cup of fresh, sliced strawberries (405)    ? ½ cup of watermelon (85)    · Low-potassium foods (less than 50 mg per serving):      ? ½ cup of cranberries (45) or cranberry juice cocktail (20)    ? ½ cup of nectar of papaya, michael, or pear (35)    Potassium content of vegetables:   · High-potassium foods (more than 200 mg per serving):      ? 1 medium baked potato, with skin (925)    ? 1 baked medium sweet potato, with skin (450)    ? ½ cup of tomato or vegetable juice (275), or 1 medium raw tomato (290)    ? ½ cup of mushrooms (280)    ? ½ cup of fresh brussels sprouts (250)    ? ½ cup of cooked zucchini (220) or winter squash (250)    ? ¼ of a medium avocado (245)    ? ½ cup of broccoli (230)    · Medium-potassium foods (50 to 200 mg per serving):      ? ½ cup of corn (195)    ? ½ cup of fresh or cooked carrots (180)    ? ½ cup of fresh cauliflower (150)    ? ½ cup of asparagus (155)    ? ½ cup of canned peas (90)     ? 1 cup of lettuce, all types (100)    ? ½ cup of fresh green beans (90)    ? ½ cup of frozen green beans (85)    ?  ½ cup of cucumber (80)    Potassium content of protein foods: · High-potassium foods (more than 200 mg per serving):      ? ½ cup of cooked amador beans (400) or lentils (365)    ? 1 cup of soy milk (300)    ? 3 ounces of baked or broiled salmon (319)    ? 3 ounces of roasted turkey, dark meat (250)    ? ¼ cup of sunflower seeds (241)    ? 3 ounces of cooked lean beef (224)    ? 2 tablespoons of smooth peanut butter (210)    · Medium-potassium foods (50 to 200 mg per serving):      ? 1 ounce of salted peanuts, almonds, or cashews (200)    ? 1 large egg (60 mg)    Potassium content of dairy foods:   · High-potassium foods (more than 200 mg per serving):      ? 6 ounces of yogurt (260 to 435)    ? 1 cup of nonfat, low-fat, or whole milk (350 to 380)    · Medium-potassium foods (50 to 200 mg per serving):      ? ½ cup of ricotta cheese (154)    ? ½ cup of vanilla ice cream (131)    ? ½ cup of low-fat (2%) cottage cheese (110)    · Low-potassium foods (less than 50 mg per serving):      ? 1 ounce of cheese (20 to 30)      Potassium content of grains:   · 1 slice of white bread (30)    · ½ cup of white or brown rice (50)    · ½ cup of spaghetti or macaroni (30)    · 1 flour or corn tortilla (50)    · 1 four-inch waffle (50)    Potassium content of other foods:   · 1 tablespoon of molasses (295)    · 1½ ounces of chocolate (165)    · Some salt substitutes may contain a high amount of potassium  Check the food label to find the amount of potassium it contains  © Copyright [address] Information is for End User's use only and may not be sold, redistributed or otherwise used for commercial purposes  All illustrations and images included in CareNotes® are the copyrighted property of A D A M [employer] Inc  or [employer]  The above information is an  only  It is not intended as medical advice for individual conditions or treatments   Talk to your doctor, nurse or pharmacist before following any medical regimen to see if it is safe and effective for you           HISTORY OF PRESENT ILLNESS:  Greg English is a 73y o  year old male with medical issues of  Diabetes mellitus type 2 x 10 yrs on oral pills with DN+, hypertension x 5 yrs who presents for initial consultation for  Protein in the urine /proteinuria  patient had microalbumin creatinine ratio 327 milligram/gram in July 2018 and since then has gradually worsened with last microalbumin creatinine ratio of 3 g in March 2021 and so referred to Nephrology    Last year was taking Aleeve 3 tab daily for joint pain/backpain and stopped it only two weeks ago  Has been using Tyelnol since then  renal function stable at creatinine 0 8- 1 0, last blood work from [facility]  91  reviewed  Reviewed labs and data /medical record from [place] [id number]  as well as Care everywhere  BP at home: 358'W-880'T systolic as per patient it was even higher recently and in March was placed on amlodipine and dose of lisinopril was increased to 40 mg since then blood pressure has improved but still on higher side  Denies any urinary symptoms    REVIEW OF SYSTEMS:    Review of Systems   Constitutional: Negative for chills and fever  HENT: Negative for ear pain and sore throat  Eyes: Negative for pain and visual disturbance  Respiratory: Negative for cough and shortness of breath  Cardiovascular: Negative for chest pain and palpitations  Gastrointestinal: Negative for abdominal pain and vomiting  Genitourinary: Negative for dysuria and hematuria  Musculoskeletal: Positive for arthralgias and back pain  Skin: Negative for color change and rash  Neurological: Negative for seizures and syncope  All other systems reviewed and are negative  More than 10 point review of systems were obtained and discussed in length with the patient       PAST MEDICAL HISTORY:  Past Medical History:   Diagnosis Date    BPH (benign prostatic hyperplasia)     Chronic kidney disease     CPAP (continuous positive airway pressure) dependence     [facility]     Disease of thyroid gland     Hyperlipidemia     Hypertension     Sleep apnea        PAST SURGICAL HISTORY:  Past Surgical History:   Procedure Laterality Date    COLONOSCOPY      2016    PA EGD TRANSORAL BIOPSY SINGLE/MULTIPLE N/A 2017    Procedure: ESOPHAGOGASTRODUODENOSCOPY (EGD) with bx;  Surgeon: Claudean Jubilee, MD;  Location: [facility];   Service: Bariatrics    TONSILLECTOMY         ALLERGIES:  Allergies   Allergen Reactions    Penicillins      SYNCOPE, but has taken amoxicillin/augmentin in past       SOCIAL HISTORY:  Social History     Substance and Sexual Activity   Alcohol Use Yes    Comment: social     Social History     Substance and Sexual Activity   Drug Use No     Social History     Tobacco Use   Smoking Status Former Smoker    Packs/day: 2 00    Years: 0 00    Pack years: 0 00    Types: Cigarettes    Quit date: 37    Years since quittin 2   Smokeless Tobacco Never Used       FAMILY HISTORY:  Family History   Problem Relation Age of Onset    Lung cancer Mother     Diabetes Father     Cancer Brother     Alcohol abuse Neg Hx     Substance Abuse Neg Hx     Mental illness Neg Hx     Depression Neg Hx        MEDICATIONS:    Current Outpatient Medications:     amLODIPine (NORVASC) 10 mg tablet, Take 1 tablet (10 mg total) by mouth daily, Disp: 30 tablet, Rfl: 0    aspirin (ECOTRIN LOW STRENGTH) 81 mg EC tablet, Take 81 mg by mouth daily, Disp: , Rfl:     atorvastatin (LIPITOR) 20 mg tablet, TAKE 1 TABLET BY MOUTH EVERY DAY, Disp: 90 tablet, Rfl: 0    Blood Glucose Calibration (OT ULTRA/FASTTK CNTRL SOLN) SOLN, by In Vitro route as needed (to calibrate blood sugar meter), Disp: 1 each, Rfl: 1    Blood Glucose Monitoring Suppl (ONE TOUCH ULTRA MINI) w/Device KIT, by Does not apply route 2 (two) times a day, Disp: 1 each, Rfl: 0    Continuous Blood Gluc  (FreeStyle Paul 14 Day [place]) LEO, USE TO CHECK BLOOD GLUCOSE, Disp: , Rfl:     Continuous Blood Gluc Sensor (FreeStyle Paul 14 Day Sensor) MISC, Use 1 ampule every 14 (fourteen) days, Disp: 6 each, Rfl: 1    glimepiride (AMARYL) 4 mg tablet, Take 1 tablet (4 mg total) by mouth 2 (two) times a day, Disp: 180 tablet, Rfl: 1    glucose blood (CVS ADVANCED GLUCOSE TEST) test strip, 1 each by Other route 2 (two) times a day, Disp: 100 each, Rfl: 3    glucose blood (ONE TOUCH ULTRA TEST) test strip, 1 each by Other route 2 (two) times a day Use as instructed, Disp: 100 each, Rfl: 3    HYDROcodone-acetaminophen (NORCO) 5-325 mg per tablet, Take 1 tablet by mouth daily as needed for painMax Daily Amount: 1 tablet, Disp: 30 tablet, Rfl: 0    levothyroxine 75 mcg tablet, TAKE ONE TABLET BY MOUTH EVERY DAY, Disp: 30 tablet, Rfl: 3    lisinopril (ZESTRIL) 40 mg tablet, Take 1 tablet (40 mg total) by mouth daily, Disp: 90 tablet, Rfl: 1    metFORMIN (GLUCOPHAGE) 1000 MG tablet, TAKE 1 TABLET BY MOUTH TWO TIMES A DAY WITH MEALS, Disp: 60 tablet, Rfl: 2    ONETOUCH DELICA LANCETS 13R MISC, by Does not apply route 2 (two) times a day, Disp: 100 each, Rfl: 3    sildenafil (REVATIO) 20 mg tablet, TAKE TWO TABLETS BY MOUTH EVERY DAY, Disp: 20 tablet, Rfl: 1    sildenafil (VIAGRA) 50 MG tablet, , Disp: , Rfl:     sildenafil (VIAGRA) 50 MG tablet, Take 1 tablet (50 mg total) by mouth daily as needed for erectile dysfunction, Disp: 10 tablet, Rfl: 2    sitaGLIPtin (JANUVIA) 100 mg tablet, Take 1 tablet (100 mg total) by mouth daily, Disp: 30 tablet, Rfl: 1    ketoconazole (NIZORAL) 2 % cream, Apply topically daily (Patient not taking: Reported on 4/7/2021), Disp: 60 g, Rfl: 1    spironolactone (ALDACTONE) 25 mg tablet, Take 1 tablet (25 mg total) by mouth daily, Disp: 90 tablet, Rfl: 3      PHYSICAL EXAM:  Vitals:    04/07/21 1439 04/07/21 1520   BP: 150/80 140/70   BP Location: Right arm    Patient Position: Sitting    Cuff Size: Extra-Large    Resp: 16    Weight: 126 kg (278 lb 9 6 oz)    Height: 5' 8" (1 727 m)      Body mass index is 42 36 kg/m²  Physical Exam  Constitutional:       General: He is not in acute distress  Appearance: He is well-developed  He is not diaphoretic  HENT:      Head: Normocephalic and atraumatic  Mouth/Throat:      Mouth: Mucous membranes are moist    Eyes:      General: No scleral icterus  Conjunctiva/sclera: Conjunctivae normal       Pupils: Pupils are equal, round, and reactive to light  Neck:      Musculoskeletal: Neck supple  Thyroid: No thyromegaly  Cardiovascular:      Rate and Rhythm: Normal rate and regular rhythm  Heart sounds: Normal heart sounds  No murmur  No friction rub  Pulmonary:      Effort: Pulmonary effort is normal  No respiratory distress  Breath sounds: Normal breath sounds  No wheezing or rales  Abdominal:      General: Bowel sounds are normal  There is no distension  Palpations: Abdomen is soft  Tenderness: There is no abdominal tenderness  Musculoskeletal:         General: No deformity  Right lower leg: No edema  Left lower leg: No edema  Lymphadenopathy:      Cervical: No cervical adenopathy  Skin:     Coloration: Skin is not pale  Nails: There is no clubbing  Neurological:      Mental Status: He is alert and oriented to person, place, and time  He is not disoriented  Psychiatric:         Mood and Affect: Mood normal  Mood is not anxious  Affect is not inappropriate  Behavior: Behavior normal          Thought Content:  Thought content normal            Lab Results:   Results for orders placed or performed in visit on 04/07/21   POCT urinalysis dipstick   Result Value Ref Range    Color, UA Yellow     Clarity, UA Clear     Glucose, UA (Ref: Negative) 2,000     Bilirubin, UA (Ref: Negative) neg     Ketones, UA (Ref: Negative) neg     Spec Grav, UA (Ref:1 003-1 030) 1 015     pH, UA (Ref: 4 5-8 0) 6 0     Protein, UA (Ref: Negative) 2,000 Urobilinogen, UA (Ref: 0 2- 1 0) neg     Nitrite, UA (Ref: Negative) neg      Leukocytes, UA (Ref: Negative) neg     Blood, UA (Ref: Negative) neg              Invalid input(s): ALBUMIN    Lab Results:         Invalid input(s): ALBUMIN    Laboratory Results:  Lab Results   Component Value Date    WBC 7 58 07/21/2018    HGB 15 0 07/21/2018    HCT 45 4 07/21/2018    MCV 90 07/21/2018     07/21/2018     Lab Results   Component Value Date    SODIUM 136 03/02/2021    K 4 6 03/02/2021     03/02/2021    CO2 28 03/02/2021    BUN 17 03/02/2021    CREATININE 0 88 03/02/2021    GLUC 287 (H) 11/27/2018    CALCIUM 8 8 03/02/2021     Lab Results   Component Value Date    CALCIUM 8 8 03/02/2021     No results found for: LABPROT  [unfilled]  Lab Results   Component Value Date    CALCIUM 8 8 03/02/2021     [unfilled]    Portions of the record may have been created with voice recognition software  Occasional wrong word or "sound a like" substitutions may have occurred due to the inherent limitations of voice recognition software  Read the chart carefully and recognize, using context, where substitutions have occurred

## 2021-04-07 NOTE — PATIENT INSTRUCTIONS
Blood pressure is elevated  Started on spironolactone 25 mg daily  Continue lisinopril and amlodipine   -Recommend 2 g sodium diet  -Recommend daily exercise and weight loss  -Discussed home monitoring of BP and maintaining a log of blood pressure   -Recommend goal BP less than 130/80  Please inform me if SBP below 110 or above 140's persistently  Protein in the urine is likely due to high blood pressure, diabetes mellitus type 2 and use of NSAIDs like Aleve  Recommend avoiding NSAIDs like Aleve, ibuprofen, Motrin  Blood work in 2 weeks  Recommend low-potassium diet  Follow-up in 4 months with repeat labs          Potassium Content of Foods List   WHAT YOU NEED TO KNOW:   Potassium is a mineral that is found in most foods  Potassium helps to balance fluids and minerals in your body  It also helps your body maintain a normal blood pressure  Potassium helps your muscles contract and your nerves function normally  DISCHARGE INSTRUCTIONS:   Why you may need to change the amount of potassium you eat:   · You may need more potassium  if you have hypokalemia (low potassium levels) or high blood pressure  You may also need more potassium if you are taking diuretics  Diuretics and certain medicines cause your body to lose potassium  · You may need less potassium  in your diet if you have hyperkalemia (high potassium levels) or kidney disease  Potassium content of fruit:  The amount of potassium in milligrams (mg) contained in each fruit or serving of fruit is listed beside the item  · High-potassium foods (more than 200 mg per serving):      ? 1 medium banana (425)    ? ½ of a papaya (390)    ? ½ cup of prune juice (370)    ? ¼ cup of raisins (270)    ? 1 medium michael (325) or kiwi (240)    ? 1 small orange (240) or ½ cup of orange juice (235)    ? ½ cup of cubed cantaloupe (215) or diced honeydew melon (200)    ?  1 medium pear (200)    · Medium-potassium foods (50 to 200 mg per serving):      ? 1 medium peach (185)    ? 1 small apple or ½ cup of apple juice (150)    ? ½ cup of peaches canned in juice (120)    ? ½ cup of canned pineapple (100)    ? ½ cup of fresh, sliced strawberries (286)    ? ½ cup of watermelon (85)    · Low-potassium foods (less than 50 mg per serving):      ? ½ cup of cranberries (45) or cranberry juice cocktail (20)    ? ½ cup of nectar of papaya, michael, or pear (35)    Potassium content of vegetables:   · High-potassium foods (more than 200 mg per serving):      ? 1 medium baked potato, with skin (925)    ? 1 baked medium sweet potato, with skin (450)    ? ½ cup of tomato or vegetable juice (275), or 1 medium raw tomato (290)    ? ½ cup of mushrooms (280)    ? ½ cup of fresh brussels sprouts (250)    ? ½ cup of cooked zucchini (220) or winter squash (250)    ? ¼ of a medium avocado (245)    ? ½ cup of broccoli (230)    · Medium-potassium foods (50 to 200 mg per serving):      ? ½ cup of corn (195)    ? ½ cup of fresh or cooked carrots (180)    ? ½ cup of fresh cauliflower (150)    ? ½ cup of asparagus (155)    ? ½ cup of canned peas (90)     ? 1 cup of lettuce, all types (100)    ? ½ cup of fresh green beans (90)    ? ½ cup of frozen green beans (85)    ? ½ cup of cucumber (80)    Potassium content of protein foods:   · High-potassium foods (more than 200 mg per serving):      ? ½ cup of cooked amador beans (400) or lentils (365)    ? 1 cup of soy milk (300)    ? 3 ounces of baked or broiled salmon (319)    ? 3 ounces of roasted turkey, dark meat (250)    ? ¼ cup of sunflower seeds (241)    ? 3 ounces of cooked lean beef (224)    ? 2 tablespoons of smooth peanut butter (210)    · Medium-potassium foods (50 to 200 mg per serving):      ? 1 ounce of salted peanuts, almonds, or cashews (200)    ? 1 large egg (60 mg)    Potassium content of dairy foods:   · High-potassium foods (more than 200 mg per serving):      ? 6 ounces of yogurt (260 to 435)    ?  1 cup of nonfat, low-fat, or whole milk (350 to 380)    · Medium-potassium foods (50 to 200 mg per serving):      ? ½ cup of ricotta cheese (154)    ? ½ cup of vanilla ice cream (131)    ? ½ cup of low-fat (2%) cottage cheese (110)    · Low-potassium foods (less than 50 mg per serving):      ? 1 ounce of cheese (20 to 30)      Potassium content of grains:   · 1 slice of white bread (30)    · ½ cup of white or brown rice (50)    · ½ cup of spaghetti or macaroni (30)    · 1 flour or corn tortilla (50)    · 1 four-inch waffle (50)    Potassium content of other foods:   · 1 tablespoon of molasses (295)    · 1½ ounces of chocolate (165)    · Some salt substitutes may contain a high amount of potassium  Check the food label to find the amount of potassium it contains  © Copyright [address] Information is for End User's use only and may not be sold, redistributed or otherwise used for commercial purposes  All illustrations and images included in CareNotes® are the copyrighted property of [employer]  or [facility] Mar[facility] Cheema   The above information is an  only  It is not intended as medical advice for individual conditions or treatments  Talk to your doctor, nurse or pharmacist before following any medical regimen to see if it is safe and effective for you

## 2021-04-09 ENCOUNTER — APPOINTMENT (OUTPATIENT)
Dept: PHYSICAL THERAPY | Age: 66
End: 2021-04-09
Payer: COMMERCIAL

## 2021-04-09 ENCOUNTER — HOSPITAL ENCOUNTER (OUTPATIENT)
Dept: VASCULAR ULTRASOUND | Facility: HOSPITAL | Age: 66
Discharge: HOME | End: 2021-04-09
Payer: COMMERCIAL

## 2021-04-09 DIAGNOSIS — I10 BENIGN ESSENTIAL HYPERTENSION: ICD-10-CM

## 2021-04-09 PROCEDURE — 93975 VASCULAR STUDY: CPT

## 2021-04-09 PROCEDURE — 93975 VASCULAR STUDY: CPT | Performed by: SURGERY

## 2021-04-12 ENCOUNTER — OFFICE VISIT (OUTPATIENT)
Dept: PHYSICAL THERAPY | Age: 66
End: 2021-04-12
Payer: COMMERCIAL

## 2021-04-12 DIAGNOSIS — M54.50 CHRONIC MIDLINE LOW BACK PAIN WITHOUT SCIATICA: Primary | ICD-10-CM

## 2021-04-12 DIAGNOSIS — G89.29 CHRONIC MIDLINE LOW BACK PAIN WITHOUT SCIATICA: Primary | ICD-10-CM

## 2021-04-12 PROCEDURE — 97140 MANUAL THERAPY 1/> REGIONS: CPT | Performed by: PHYSICAL THERAPIST

## 2021-04-12 PROCEDURE — 97110 THERAPEUTIC EXERCISES: CPT | Performed by: PHYSICAL THERAPIST

## 2021-04-12 NOTE — PROGRESS NOTES
Daily Note     Today's date: 2021  Patient name: Amy Moore  : 1955  MRN: [id number]  Referring provider: ROLAND Atkinson  Dx:   Encounter Diagnosis     ICD-10-CM    1  Chronic midline low back pain without sciatica  M54 5     G89 29                   Subjective: Pt reports B LE pain with prolonged sitting      Objective: See treatment diary below      Assessment: Tolerated treatment well  Patient demonstrated fatigue post treatment, would benefit from continued PT and pt tolerated all therex without increase in symptoms  Plan: Continue per plan of care  Progress treatment as tolerated         Precautions: DM      Manuals          Glute stretch 5x30s 5x30s                                          Neuro Re-Ed                                                                                        There ex             Bike 10' 15'         HS stretch 5x30s 5x30s         Prostretch 5x30s 5x30s         P ball roll out 2x10 ea 2x15         Leg press 50# 3x15 110# 3x10         Richard press out  8# 2x15                                                                                                                                                                   Ther Activity                                 Gait Training                                 Modalities

## 2021-04-13 ENCOUNTER — APPOINTMENT (OUTPATIENT)
Dept: PHYSICAL THERAPY | Age: 66
End: 2021-04-13
Payer: COMMERCIAL

## 2021-04-15 DIAGNOSIS — G89.29 CHRONIC MIDLINE LOW BACK PAIN WITH RIGHT-SIDED SCIATICA: ICD-10-CM

## 2021-04-15 DIAGNOSIS — M54.41 CHRONIC MIDLINE LOW BACK PAIN WITH RIGHT-SIDED SCIATICA: ICD-10-CM

## 2021-04-16 ENCOUNTER — OFFICE VISIT (OUTPATIENT)
Dept: PHYSICAL THERAPY | Age: 66
End: 2021-04-16
Payer: COMMERCIAL

## 2021-04-16 DIAGNOSIS — E03.9 HYPOTHYROIDISM, UNSPECIFIED TYPE: ICD-10-CM

## 2021-04-16 DIAGNOSIS — M25.512 ACUTE PAIN OF LEFT SHOULDER: ICD-10-CM

## 2021-04-16 DIAGNOSIS — M54.50 CHRONIC MIDLINE LOW BACK PAIN WITHOUT SCIATICA: Primary | ICD-10-CM

## 2021-04-16 DIAGNOSIS — E11.42 TYPE 2 DIABETES MELLITUS WITH DIABETIC POLYNEUROPATHY, WITHOUT LONG-TERM CURRENT USE OF INSULIN (HCC): ICD-10-CM

## 2021-04-16 DIAGNOSIS — G89.29 CHRONIC MIDLINE LOW BACK PAIN WITHOUT SCIATICA: Primary | ICD-10-CM

## 2021-04-16 DIAGNOSIS — E78.2 MIXED HYPERLIPIDEMIA: ICD-10-CM

## 2021-04-16 DIAGNOSIS — I10 BENIGN ESSENTIAL HYPERTENSION: ICD-10-CM

## 2021-04-16 PROCEDURE — 97140 MANUAL THERAPY 1/> REGIONS: CPT | Performed by: SPECIALIST/TECHNOLOGIST

## 2021-04-16 PROCEDURE — 97110 THERAPEUTIC EXERCISES: CPT | Performed by: SPECIALIST/TECHNOLOGIST

## 2021-04-16 RX ORDER — LEVOTHYROXINE SODIUM 0.07 MG/1
75 TABLET ORAL DAILY
Qty: 90 TABLET | Refills: 0 | Status: SHIPPED | OUTPATIENT
Start: 2021-04-16 | End: 2021-07-28 | Stop reason: SDUPTHER

## 2021-04-16 RX ORDER — AMLODIPINE BESYLATE 10 MG/1
10 TABLET ORAL DAILY
Qty: 90 TABLET | Refills: 0 | Status: SHIPPED | OUTPATIENT
Start: 2021-04-16 | End: 2021-07-21 | Stop reason: SDUPTHER

## 2021-04-16 RX ORDER — ATORVASTATIN CALCIUM 20 MG/1
20 TABLET, FILM COATED ORAL DAILY
Qty: 90 TABLET | Refills: 0 | Status: SHIPPED | OUTPATIENT
Start: 2021-04-16 | End: 2021-07-21 | Stop reason: SDUPTHER

## 2021-04-16 RX ORDER — HYDROCODONE BITARTRATE AND ACETAMINOPHEN 5; 325 MG/1; MG/1
1 TABLET ORAL DAILY PRN
Qty: 30 TABLET | Refills: 0 | Status: SHIPPED | OUTPATIENT
Start: 2021-04-16 | End: 2021-05-27 | Stop reason: SDUPTHER

## 2021-04-16 NOTE — PROGRESS NOTES
Daily Note     Today's date: 2021  Patient name: Hollie Diaz  : 1955  MRN: [id number]  Referring provider: Sumner Castleman, CRNP  Dx:   Encounter Diagnosis     ICD-10-CM    1  Chronic midline low back pain without sciatica  M54 5     G89 29    2  Acute pain of left shoulder  M25 512                   Subjective: Pt states that he feels like his LBP has been improving since transitioning to on land therapy  Objective: See treatment diary below    Assessment: Tolerated treatment well  Patient demonstrated fatigue post treatment, exhibited good technique with therapeutic exercises and would benefit from continued PT  Pt required minimal verbal cuing to maintain good core stability with core exercises and demonstrated increased awareness of posture  Pt tolerated increased reps with P ball roll out to promote lumbar flexion  Pt would benefit from continued PT to improve LBP  Plan: Continue per plan of care  Progress treatment as tolerated         Precautions: DM      Manuals 16        Glute stretch 5x30s 5x30s 5x30s                                          Neuro Re-Ed                                                                                        There ex             Bike 10' 15' 15'        HS stretch 5x30s 5x30s HEP        Prostretch 5x30s 5x30s 5x30s        P ball roll out 2x10 ea 2x15 x30 FWD  x20 L/R        Leg press 50# 3x15 110# 3x10 115# 1x10  135#  2x10        Richard press out  8# 2x15 8# 2x15        P ball press downs    x20                                                                                                                                                        Ther Activity                                 Gait Training                                 Modalities

## 2021-04-20 ENCOUNTER — OFFICE VISIT (OUTPATIENT)
Dept: PHYSICAL THERAPY | Age: 66
End: 2021-04-20
Payer: COMMERCIAL

## 2021-04-20 DIAGNOSIS — M54.50 CHRONIC MIDLINE LOW BACK PAIN WITHOUT SCIATICA: Primary | ICD-10-CM

## 2021-04-20 DIAGNOSIS — G89.29 CHRONIC MIDLINE LOW BACK PAIN WITHOUT SCIATICA: Primary | ICD-10-CM

## 2021-04-20 PROCEDURE — 97110 THERAPEUTIC EXERCISES: CPT | Performed by: PHYSICAL THERAPIST

## 2021-04-20 PROCEDURE — 97140 MANUAL THERAPY 1/> REGIONS: CPT | Performed by: PHYSICAL THERAPIST

## 2021-04-20 NOTE — PROGRESS NOTES
Daily Note     Today's date: 2021  Patient name: April Christina  : 1955  MRN: [id number]  Referring provider: ROLAND Fowler  Dx:   Encounter Diagnosis     ICD-10-CM    1  Chronic midline low back pain without sciatica  M54 5     G89 29                   Subjective: Pt reports having more good days, states activity tolerance is still poor       Objective: See treatment diary below      Assessment: Tolerated treatment well  Patient demonstrated fatigue post treatment, would benefit from continued PT and pt had overall decrease on lumbar pain following treatment      Plan: Continue per plan of care  Progress treatment as tolerated         Precautions: DM      Manuals  420       Glute stretch 5x30s 5x30s 5x30s  5x30s       Hip flex stretch    5x30s                             Neuro Re-Ed                                                                                        There ex             Bike 10' 15' 15' 15'       HS stretch 5x30s 5x30s HEP        Prostretch 5x30s 5x30s 5x30s 5x30s       P ball roll out 2x10 ea 2x15 x30 FWD  x20 L/R 2x10 L/R/C       Leg press 50# 3x15 110# 3x10 115# 1x10  135#  2x10        Richard press out  8# 2x15 8# 2x15        P ball press downs    x20  5" 2x10       Cybex knee ext    20# 3x10       cybex hip abd    40# 3x10                                                                                                                                Ther Activity                                 Gait Training                                 Modalities

## 2021-04-23 ENCOUNTER — OFFICE VISIT (OUTPATIENT)
Dept: PHYSICAL THERAPY | Age: 66
End: 2021-04-23
Payer: COMMERCIAL

## 2021-04-23 DIAGNOSIS — G89.29 CHRONIC MIDLINE LOW BACK PAIN WITHOUT SCIATICA: Primary | ICD-10-CM

## 2021-04-23 DIAGNOSIS — M25.512 ACUTE PAIN OF LEFT SHOULDER: ICD-10-CM

## 2021-04-23 DIAGNOSIS — M54.50 CHRONIC MIDLINE LOW BACK PAIN WITHOUT SCIATICA: Primary | ICD-10-CM

## 2021-04-23 PROCEDURE — 97140 MANUAL THERAPY 1/> REGIONS: CPT | Performed by: PHYSICAL THERAPIST

## 2021-04-23 PROCEDURE — 97110 THERAPEUTIC EXERCISES: CPT | Performed by: PHYSICAL THERAPIST

## 2021-04-23 NOTE — PROGRESS NOTES
Daily Note     Today's date: 2021  Patient name: Fito Rodriguez  : 1955  MRN: [id number]  Referring provider: ROLAND Henderson  Dx:   Encounter Diagnosis     ICD-10-CM    1  Chronic midline low back pain without sciatica  M54 5     G89 29    2  Acute pain of left shoulder  M25 512                   Subjective: Pt reports feeling "pretty good"      Objective: See treatment diary below      Assessment: Tolerated treatment well  Patient demonstrated fatigue post treatment, would benefit from continued PT and pt remains challenged with LE strengthening exercises  Plan: Continue per plan of care  Progress treatment as tolerated         Precautions: DM      Manuals       Glute stretch 5x30s 5x30s 5x30s  5x30s       Hip flex stretch    5x30s 5x30s                            Neuro Re-Ed                                                                                        There ex             Bike 10' 15' 15' 15' 15'      HS stretch 5x30s 5x30s HEP        Prostretch 5x30s 5x30s 5x30s 5x30s       P ball roll out 2x10 ea 2x15 x30 FWD  x20 L/R 2x10 L/R/C 2x10 L/R/C      Leg press 50# 3x15 110# 3x10 115# 1x10  135#  2x10  160# 3x15      Boise press out  8# 2x15 8# 2x15  10# 2x15      P ball press downs    x20  5" 2x10 5" 2x10      Cybex knee ext    20# 3x10 20# 3x10      cybex hip abd    40# 3x10 40# 3x10                                                                                                                               Ther Activity                                 Gait Training                                 Modalities

## 2021-04-27 ENCOUNTER — OFFICE VISIT (OUTPATIENT)
Dept: PHYSICAL THERAPY | Age: 66
End: 2021-04-27
Payer: COMMERCIAL

## 2021-04-27 DIAGNOSIS — G89.29 CHRONIC MIDLINE LOW BACK PAIN WITHOUT SCIATICA: Primary | ICD-10-CM

## 2021-04-27 DIAGNOSIS — M54.50 CHRONIC MIDLINE LOW BACK PAIN WITHOUT SCIATICA: Primary | ICD-10-CM

## 2021-04-27 DIAGNOSIS — M25.512 ACUTE PAIN OF LEFT SHOULDER: ICD-10-CM

## 2021-04-27 PROCEDURE — 97140 MANUAL THERAPY 1/> REGIONS: CPT | Performed by: SPECIALIST/TECHNOLOGIST

## 2021-04-27 PROCEDURE — 97110 THERAPEUTIC EXERCISES: CPT | Performed by: SPECIALIST/TECHNOLOGIST

## 2021-04-27 NOTE — PROGRESS NOTES
Daily Note     Today's date: 2021  Patient name: Stephanie Stuart  : 1955  MRN: [id number]  Referring provider: ROLAND Miguel Asa  Dx:   Encounter Diagnosis     ICD-10-CM    1  Chronic midline low back pain without sciatica  M54 5     G89 29    2  Acute pain of left shoulder  M25 512                   Subjective: Pt states that his back has been improving but the pain increases after he stands for long periods of time or picks up his granddaughter  Objective: See treatment diary below    Assessment: Tolerated treatment well  Patient demonstrated fatigue post treatment, exhibited good technique with therapeutic exercises and would benefit from continued PT  Pt had no increase in low back discomfort with therex assigned this visit, good core stabilization noted  Pt would benefit from continued PT to improve LBP  Plan: Continue per plan of care  Progress treatment as tolerated            Precautions: DM    Manuals      Glute stretch 5x30s 5x30s 5x30s  5x30s       Hip flex stretch    5x30s 5x30s 5x30s                           Neuro Re-Ed                                                                                        There ex             Bike 10' 15' 15' 15' 15' 15' L3     HS stretch 5x30s 5x30s HEP        Prostretch 5x30s 5x30s 5x30s 5x30s       P ball roll out 2x10 ea 2x15 x30 FWD  x20 L/R 2x10 L/R/C 2x10 L/R/C 2x10 L/R/C     Leg press 50# 3x15 110# 3x10 115# 1x10  135#  2x10  160# 3x15 160# 3x15     Richard press out  8# 2x15 8# 2x15  10# 2x15 10# 2x15     P ball press downs    x20  5" 2x10 5" 2x10 5" 3x10     Cybex knee ext    20# 3x10 20# 3x10 25# 3x10     cybex hip abd    40# 3x10 40# 3x10 60# 3x10                                                                                                                              Ther Activity                                 Gait Training                                 Modalities

## 2021-04-30 ENCOUNTER — OFFICE VISIT (OUTPATIENT)
Dept: PHYSICAL THERAPY | Age: 66
End: 2021-04-30
Payer: COMMERCIAL

## 2021-04-30 DIAGNOSIS — G89.29 CHRONIC MIDLINE LOW BACK PAIN WITHOUT SCIATICA: Primary | ICD-10-CM

## 2021-04-30 DIAGNOSIS — M54.50 CHRONIC MIDLINE LOW BACK PAIN WITHOUT SCIATICA: Primary | ICD-10-CM

## 2021-04-30 PROCEDURE — 97110 THERAPEUTIC EXERCISES: CPT | Performed by: SPECIALIST/TECHNOLOGIST

## 2021-04-30 PROCEDURE — 97140 MANUAL THERAPY 1/> REGIONS: CPT | Performed by: SPECIALIST/TECHNOLOGIST

## 2021-04-30 NOTE — PROGRESS NOTES
Daily Note     Today's date: 2021  Patient name: Elian Matt  : 1955  MRN: [id number]  Referring provider: ROLAND Archuleta  Dx:   Encounter Diagnosis     ICD-10-CM    1  Chronic midline low back pain without sciatica  M54 5     G89 29                   Subjective: Pt states that his back pain increases and decreases btw therapy sessions  Pt says that his back pain usually increases with activity (playing with his granddaughter, doing yardwork)  Pt says that he has been using OTC pain medication to manage his back pain  Objective: See treatment diary below    Assessment: Tolerated treatment well  Patient demonstrated fatigue post treatment, exhibited good technique with therapeutic exercises and would benefit from continued PT  Pt performed all exercises w/o reproduction of low back pain  Pt would benefit from continued PT to improve back pain so he can perform ADLs w/o pain  Plan: Continue per plan of care  Progress treatment as tolerated         Precautions: DM    Manuals     Glute stretch 5x30s 5x30s 5x30s  5x30s       Hip flex stretch    5x30s 5x30s 5x30s 5x30s                          Neuro Re-Ed                                                                                        There ex             Bike 10' 15' 15' 15' 15' 15' L3 15' L3    HS stretch 5x30s 5x30s HEP        Prostretch 5x30s 5x30s 5x30s 5x30s       P ball roll out 2x10 ea 2x15 x30 FWD  x20 L/R 2x10 L/R/C 2x10 L/R/C 2x10 L/R/C 3x10 L/R/C    Leg press 50# 3x15 110# 3x10 115# 1x10  135#  2x10  160# 3x15 160# 3x15 160# 3x15    [place] press out  8# 2x15 8# 2x15  10# 2x15 10# 2x15 11# 2x15    P ball press downs    x20  5" 2x10 5" 2x10 5" 3x10 5" 3x10    Cybex knee ext    20# 3x10 20# 3x10 25# 3x10 25# 3x10    cybex hip abd    40# 3x10 40# 3x10 60# 3x10 60# 3x10 Ther Activity                                 Gait Training                                 Modalities

## 2021-05-27 DIAGNOSIS — N52.9 ERECTILE DYSFUNCTION, UNSPECIFIED ERECTILE DYSFUNCTION TYPE: ICD-10-CM

## 2021-05-27 DIAGNOSIS — G89.29 CHRONIC MIDLINE LOW BACK PAIN WITH RIGHT-SIDED SCIATICA: ICD-10-CM

## 2021-05-27 DIAGNOSIS — M54.41 CHRONIC MIDLINE LOW BACK PAIN WITH RIGHT-SIDED SCIATICA: ICD-10-CM

## 2021-05-27 DIAGNOSIS — E11.42 TYPE 2 DIABETES MELLITUS WITH DIABETIC POLYNEUROPATHY, WITHOUT LONG-TERM CURRENT USE OF INSULIN (HCC): ICD-10-CM

## 2021-05-27 RX ORDER — HYDROCODONE BITARTRATE AND ACETAMINOPHEN 5; 325 MG/1; MG/1
1 TABLET ORAL DAILY PRN
Qty: 30 TABLET | Refills: 0 | Status: SHIPPED | OUTPATIENT
Start: 2021-05-27 | End: 2021-07-06 | Stop reason: SDUPTHER

## 2021-05-27 RX ORDER — SILDENAFIL CITRATE 20 MG/1
40 TABLET ORAL DAILY
Qty: 20 TABLET | Refills: 0 | Status: SHIPPED | OUTPATIENT
Start: 2021-05-27 | End: 2021-09-28

## 2021-06-22 ENCOUNTER — RA CDI HCC (OUTPATIENT)
Dept: OTHER | Facility: HOSPITAL | Age: 66
End: 2021-06-22

## 2021-06-22 DIAGNOSIS — I10 BENIGN ESSENTIAL HYPERTENSION: ICD-10-CM

## 2021-06-22 DIAGNOSIS — E11.42 TYPE 2 DIABETES MELLITUS WITH DIABETIC POLYNEUROPATHY, WITHOUT LONG-TERM CURRENT USE OF INSULIN (HCC): ICD-10-CM

## 2021-06-22 PROCEDURE — 4010F ACE/ARB THERAPY RXD/TAKEN: CPT | Performed by: NURSE PRACTITIONER

## 2021-06-22 RX ORDER — FLASH GLUCOSE SENSOR
1 KIT MISCELLANEOUS
Qty: 6 EACH | Refills: 0 | Status: SHIPPED | OUTPATIENT
Start: 2021-06-22 | End: 2021-07-06

## 2021-06-22 RX ORDER — LISINOPRIL 40 MG/1
40 TABLET ORAL DAILY
Qty: 90 TABLET | Refills: 0 | Status: SHIPPED | OUTPATIENT
Start: 2021-06-22 | End: 2021-08-23

## 2021-06-22 RX ORDER — GLIMEPIRIDE 4 MG/1
4 TABLET ORAL 2 TIMES DAILY
Qty: 180 TABLET | Refills: 0 | Status: SHIPPED | OUTPATIENT
Start: 2021-06-22 | End: 2021-09-28

## 2021-06-22 NOTE — PROGRESS NOTES
[place] [id number]  coding opportunities             Chart reviewed, (number of) suggestions sent to provider: 2     Problem listed updated   Provider Accepted, (number of) suggestions accepted: 2         Number of suggestions actually used: 0      Number of suggestions NOT actually used: 2     Patients insurance company: [employer] (Med[employer] Advantage and Commercial)   dx not on bill: e11 65, e11 22  Visit status: Patient arrived for their scheduled appointment        [place] [address]  coding opportunities             Chart reviewed, (number of) suggestions sent to provider: 2   DX:  E11 65-Type 2 diabetes mellitus with [id number]  E11 22-Type 2 diabetes mellitus with diabetic chronic kidney disease                 Patients insurance company: [employer] ([employer])

## 2021-06-24 ENCOUNTER — TELEPHONE (OUTPATIENT)
Dept: INTERNAL MEDICINE CLINIC | Facility: CLINIC | Age: 66
End: 2021-06-24

## 2021-06-24 PROBLEM — E11.65 TYPE 2 DIABETES MELLITUS WITH HYPERGLYCEMIA (HCC): Status: ACTIVE | Noted: 2021-06-24

## 2021-06-24 PROBLEM — E11.22 TYPE 2 DIABETES MELLITUS WITH DIABETIC CHRONIC KIDNEY DISEASE (HCC): Status: ACTIVE | Noted: 2021-06-24

## 2021-06-28 ENCOUNTER — APPOINTMENT (OUTPATIENT)
Dept: LAB | Facility: CLINIC | Age: 66
End: 2021-06-28
Payer: COMMERCIAL

## 2021-06-28 DIAGNOSIS — E11.42 TYPE 2 DIABETES MELLITUS WITH DIABETIC POLYNEUROPATHY, WITHOUT LONG-TERM CURRENT USE OF INSULIN (HCC): ICD-10-CM

## 2021-06-28 LAB
ALBUMIN SERPL BCP-MCNC: 3.8 G/DL (ref 3.5–5)
ALP SERPL-CCNC: 89 U/L (ref 46–116)
ALT SERPL W P-5'-P-CCNC: 28 U/L (ref 12–78)
ANION GAP SERPL CALCULATED.3IONS-SCNC: 7 MMOL/L (ref 4–13)
AST SERPL W P-5'-P-CCNC: 16 U/L (ref 5–45)
BILIRUB SERPL-MCNC: 0.32 MG/DL (ref 0.2–1)
BUN SERPL-MCNC: 31 MG/DL (ref 5–25)
CALCIUM SERPL-MCNC: 9.3 MG/DL (ref 8.3–10.1)
CHLORIDE SERPL-SCNC: 106 MMOL/L (ref 100–108)
CHOLEST SERPL-MCNC: 151 MG/DL (ref 50–200)
CO2 SERPL-SCNC: 25 MMOL/L (ref 21–32)
CREAT SERPL-MCNC: 1.1 MG/DL (ref 0.6–1.3)
EST. AVERAGE GLUCOSE BLD GHB EST-MCNC: 192 MG/DL
GFR SERPL CREATININE-BSD FRML MDRD: 70 ML/MIN/1.73SQ M
GLUCOSE P FAST SERPL-MCNC: 118 MG/DL (ref 65–99)
HBA1C MFR BLD: 8.3 %
HDLC SERPL-MCNC: 41 MG/DL
LDLC SERPL CALC-MCNC: 54 MG/DL (ref 0–100)
POTASSIUM SERPL-SCNC: 5.4 MMOL/L (ref 3.5–5.3)
PROT SERPL-MCNC: 7.2 G/DL (ref 6.4–8.2)
SODIUM SERPL-SCNC: 138 MMOL/L (ref 136–145)
TRIGL SERPL-MCNC: 282 MG/DL

## 2021-06-28 PROCEDURE — 3052F HG A1C>EQUAL 8.0%<EQUAL 9.0%: CPT | Performed by: NURSE PRACTITIONER

## 2021-06-28 PROCEDURE — 36415 COLL VENOUS BLD VENIPUNCTURE: CPT

## 2021-06-28 PROCEDURE — 80053 COMPREHEN METABOLIC PANEL: CPT

## 2021-06-28 PROCEDURE — 83036 HEMOGLOBIN GLYCOSYLATED A1C: CPT

## 2021-06-28 PROCEDURE — 80061 LIPID PANEL: CPT

## 2021-06-29 ENCOUNTER — OFFICE VISIT (OUTPATIENT)
Dept: INTERNAL MEDICINE CLINIC | Facility: CLINIC | Age: 66
End: 2021-06-29
Payer: COMMERCIAL

## 2021-06-29 ENCOUNTER — HOSPITAL ENCOUNTER (OUTPATIENT)
Dept: RADIOLOGY | Facility: HOSPITAL | Age: 66
Discharge: HOME | End: 2021-06-29
Payer: COMMERCIAL

## 2021-06-29 VITALS
DIASTOLIC BLOOD PRESSURE: 68 MMHG | OXYGEN SATURATION: 97 % | SYSTOLIC BLOOD PRESSURE: 108 MMHG | TEMPERATURE: 97.3 F | BODY MASS INDEX: 42.37 KG/M2 | WEIGHT: 279.6 LBS | HEIGHT: 68 IN | HEART RATE: 82 BPM

## 2021-06-29 DIAGNOSIS — E11.42 TYPE 2 DIABETES MELLITUS WITH DIABETIC POLYNEUROPATHY, WITHOUT LONG-TERM CURRENT USE OF INSULIN (HCC): Primary | ICD-10-CM

## 2021-06-29 DIAGNOSIS — R20.0 BILATERAL HAND NUMBNESS: ICD-10-CM

## 2021-06-29 DIAGNOSIS — E03.9 HYPOTHYROIDISM, UNSPECIFIED TYPE: ICD-10-CM

## 2021-06-29 DIAGNOSIS — M54.41 CHRONIC MIDLINE LOW BACK PAIN WITH RIGHT-SIDED SCIATICA: ICD-10-CM

## 2021-06-29 DIAGNOSIS — G89.29 CHRONIC MIDLINE LOW BACK PAIN WITH RIGHT-SIDED SCIATICA: ICD-10-CM

## 2021-06-29 DIAGNOSIS — E78.2 MIXED HYPERLIPIDEMIA: ICD-10-CM

## 2021-06-29 DIAGNOSIS — M25.512 ACUTE PAIN OF LEFT SHOULDER: ICD-10-CM

## 2021-06-29 DIAGNOSIS — L57.0 KERATOSIS: ICD-10-CM

## 2021-06-29 DIAGNOSIS — E87.5 HYPERKALEMIA: ICD-10-CM

## 2021-06-29 DIAGNOSIS — G25.81 RESTLESS LEG SYNDROME: ICD-10-CM

## 2021-06-29 DIAGNOSIS — I10 ESSENTIAL HYPERTENSION: ICD-10-CM

## 2021-06-29 DIAGNOSIS — G47.33 OSA (OBSTRUCTIVE SLEEP APNEA): ICD-10-CM

## 2021-06-29 PROCEDURE — 3078F DIAST BP <80 MM HG: CPT | Performed by: NURSE PRACTITIONER

## 2021-06-29 PROCEDURE — 1036F TOBACCO NON-USER: CPT | Performed by: NURSE PRACTITIONER

## 2021-06-29 PROCEDURE — 73502 X-RAY EXAM HIP UNI 2-3 VIEWS: CPT

## 2021-06-29 PROCEDURE — 72110 X-RAY EXAM L-2 SPINE 4/>VWS: CPT

## 2021-06-29 PROCEDURE — 99214 OFFICE O/P EST MOD 30 MIN: CPT | Performed by: NURSE PRACTITIONER

## 2021-06-29 PROCEDURE — 73030 X-RAY EXAM OF SHOULDER: CPT

## 2021-06-29 PROCEDURE — 3074F SYST BP LT 130 MM HG: CPT | Performed by: NURSE PRACTITIONER

## 2021-06-29 RX ORDER — PRAMIPEXOLE DIHYDROCHLORIDE 0.12 MG/1
0.12 TABLET ORAL
Qty: 30 TABLET | Refills: 0 | Status: SHIPPED | OUTPATIENT
Start: 2021-06-29 | End: 2021-09-28

## 2021-06-29 NOTE — PROGRESS NOTES
Assessment/Plan:    Type 2 diabetes mellitus with diabetic polyneuropathy, without long-term current use of insulin ([facility], [facility])    Lab Results   Component Value Date    HGBA1C 8 3 (H) 06/28/2021   sugars are continuing to improve  Continue metformin, Januvia, and amaryl   Referral provided for eye exam     Hypothyroid  Adequately replaced     JOSE RAUL (obstructive sleep apnea)  Referral provided for sleep medicine  Unable to tolerate CPAP in the past now with increased daytime somnolence  Essential hypertension  Well controlled  On amlodipine and lisinopril  Recently added aldactone by nephrology     Chronic midline low back pain with right-sided sciatica  Finished PT and aqua therapy, still with continued symptoms  Stop aleve and ibuprofen  Continue tylenol every 8 hours as needed, hydrocodone for severe pain  May need MRI or spine/pain referral      Mixed hyperlipidemia  On statin  Restless leg syndrome  Start Mirapex at HS  Diagnoses and all orders for this visit:    Type 2 diabetes mellitus with diabetic polyneuropathy, without long-term current use of insulin ([facility] [address] )  -     Ambulatory referral to Ophthalmology; Future  -     Comprehensive metabolic panel; Future  -     Hemoglobin A1C; Future    Essential hypertension    Chronic midline low back pain with right-sided sciatica    Bilateral hand numbness  -     EMG 2 Limb Upper Extremity; Future    Hyperkalemia  Comments:  ?possibly from aldactone, recheck BMP this week  Orders:  -     Basic metabolic panel; Future    Hypothyroidism, unspecified type    Restless leg syndrome  -     pramipexole (MIRAPEX) 0 125 mg tablet; Take 1 tablet (0 125 mg total) by mouth daily at bedtime    JOSE RAUL (obstructive sleep apnea)  -     Ambulatory referral to Sleep Medicine; Future    Mixed hyperlipidemia    Keratosis  -     Ambulatory referral to Dermatology; Future          Subjective:      Patient ID: Skip Huber is a 73 y o  male      Here today for follow up  Torsten Manning is doing ok  His wife is present for the appointment  She is concerned that his sleep apnea has gotten worse  He has not been able to tolerate any CPAP machines in the past  She feels he is falling asleep more during the day and awake more at night  He complains of constantly needing to move his legs while in bed  That also keeps him up at night  His blood pressures have been well controlled per home readings  Around 120-130/60-70's  His sugars are up and down  He has been changing his diet and experimenting with different foods  His back pain has been worse  He has been taking aleve and hydrocodone  He finished PT and aqua therapy  He initially felt it improved but now with worsening low back pain that radiates into the right hip/thigh area  The pain is worse when he is standing for long periods of time and improves with rest      He is complaining today of bilateral hand numbness  This has been occurring off and on for the last few months  The numbness occurs when he is using his hands too much and improves when he rests his arms  He denies any neck pain  The following portions of the patient's history were reviewed and updated as appropriate: allergies, current medications, past family history, past medical history, past social history, past surgical history and problem list     Review of Systems   Constitutional: Negative for activity change, appetite change, fatigue and unexpected weight change  Eyes: Negative for visual disturbance  Respiratory: Negative for cough, chest tightness, shortness of breath and wheezing  Cardiovascular: Negative for chest pain, palpitations and leg swelling  Gastrointestinal: Negative for abdominal pain, blood in stool, constipation and diarrhea  Genitourinary: Negative for difficulty urinating  Musculoskeletal: Positive for arthralgias and back pain  Skin: Negative for rash  Neurological: Positive for numbness   Negative for dizziness, weakness, light-headedness and headaches  Psychiatric/Behavioral: Positive for sleep disturbance  Objective:      /68   Pulse 82   Temp (!) 97 3 °F (36 3 °C)   Ht 5' 8" (1 727 m)   Wt 127 kg (279 lb 9 6 oz)   SpO2 97%   BMI 42 51 kg/m²          Physical Exam  Vitals reviewed  Constitutional:       Appearance: He is well-developed  HENT:      Head: Normocephalic and atraumatic  Eyes:      Conjunctiva/sclera: Conjunctivae normal       Pupils: Pupils are equal, round, and reactive to light  Cardiovascular:      Rate and Rhythm: Normal rate and regular rhythm  Pulses:           Radial pulses are 2+ on the right side and 2+ on the left side  Heart sounds: Normal heart sounds  Pulmonary:      Effort: Pulmonary effort is normal       Breath sounds: Normal breath sounds  Abdominal:      General: Bowel sounds are normal       Palpations: Abdomen is soft  Musculoskeletal:      Cervical back: Neck supple  Lumbar back: No tenderness  Decreased range of motion  Skin:     General: Skin is warm and dry  Comments: Small scaly patch on scalp    Neurological:      Mental Status: He is alert and oriented to person, place, and time  Sensory: Sensation is intact  Motor: No weakness     Psychiatric:         Behavior: Behavior normal        reviewed recent labs with patient

## 2021-06-29 NOTE — ASSESSMENT & PLAN NOTE
Finished PT and aqua therapy, still with continued symptoms  Stop aleve and ibuprofen  Continue tylenol every 8 hours as needed, hydrocodone for severe pain     May need MRI or spine/pain referral

## 2021-06-29 NOTE — ASSESSMENT & PLAN NOTE
Referral provided for sleep medicine  Unable to tolerate CPAP in the past now with increased daytime somnolence

## 2021-06-29 NOTE — ASSESSMENT & PLAN NOTE
Lab Results   Component Value Date    HGBA1C 8 3 (H) 06/28/2021   sugars are continuing to improve  Continue metformin, Januvia, and amaryl   Referral provided for eye exam

## 2021-06-30 ENCOUNTER — APPOINTMENT (OUTPATIENT)
Dept: LAB | Facility: CLINIC | Age: 66
End: 2021-06-30
Payer: COMMERCIAL

## 2021-06-30 ENCOUNTER — TELEPHONE (OUTPATIENT)
Dept: INTERNAL MEDICINE CLINIC | Facility: CLINIC | Age: 66
End: 2021-06-30

## 2021-06-30 DIAGNOSIS — E87.5 HYPERKALEMIA: ICD-10-CM

## 2021-06-30 LAB
ANION GAP SERPL CALCULATED.3IONS-SCNC: 9 MMOL/L (ref 4–13)
BUN SERPL-MCNC: 22 MG/DL (ref 5–25)
CALCIUM SERPL-MCNC: 9.2 MG/DL (ref 8.3–10.1)
CHLORIDE SERPL-SCNC: 104 MMOL/L (ref 100–108)
CO2 SERPL-SCNC: 25 MMOL/L (ref 21–32)
CREAT SERPL-MCNC: 0.94 MG/DL (ref 0.6–1.3)
GFR SERPL CREATININE-BSD FRML MDRD: 85 ML/MIN/1.73SQ M
GLUCOSE P FAST SERPL-MCNC: 132 MG/DL (ref 65–99)
POTASSIUM SERPL-SCNC: 5.4 MMOL/L (ref 3.5–5.3)
SODIUM SERPL-SCNC: 138 MMOL/L (ref 136–145)

## 2021-06-30 PROCEDURE — 80048 BASIC METABOLIC PNL TOTAL CA: CPT

## 2021-06-30 PROCEDURE — 36415 COLL VENOUS BLD VENIPUNCTURE: CPT

## 2021-07-06 DIAGNOSIS — E11.42 TYPE 2 DIABETES MELLITUS WITH DIABETIC POLYNEUROPATHY, WITHOUT LONG-TERM CURRENT USE OF INSULIN (HCC): ICD-10-CM

## 2021-07-06 DIAGNOSIS — G89.29 CHRONIC MIDLINE LOW BACK PAIN WITH RIGHT-SIDED SCIATICA: ICD-10-CM

## 2021-07-06 DIAGNOSIS — M54.41 CHRONIC MIDLINE LOW BACK PAIN WITH RIGHT-SIDED SCIATICA: ICD-10-CM

## 2021-07-06 RX ORDER — HYDROCODONE BITARTRATE AND ACETAMINOPHEN 5; 325 MG/1; MG/1
1 TABLET ORAL DAILY PRN
Qty: 30 TABLET | Refills: 0 | Status: SHIPPED | OUTPATIENT
Start: 2021-07-06 | End: 2021-08-18

## 2021-07-06 RX ORDER — FLASH GLUCOSE SENSOR
1 KIT MISCELLANEOUS
Qty: 6 EACH | Refills: 0 | Status: SHIPPED | OUTPATIENT
Start: 2021-07-06 | End: 2021-08-20 | Stop reason: SDUPTHER

## 2021-07-21 DIAGNOSIS — E11.42 TYPE 2 DIABETES MELLITUS WITH DIABETIC POLYNEUROPATHY, WITHOUT LONG-TERM CURRENT USE OF INSULIN (HCC): ICD-10-CM

## 2021-07-21 DIAGNOSIS — E78.2 MIXED HYPERLIPIDEMIA: ICD-10-CM

## 2021-07-21 DIAGNOSIS — I10 BENIGN ESSENTIAL HYPERTENSION: ICD-10-CM

## 2021-07-21 RX ORDER — ATORVASTATIN CALCIUM 20 MG/1
20 TABLET, FILM COATED ORAL DAILY
Qty: 90 TABLET | Refills: 0 | Status: SHIPPED | OUTPATIENT
Start: 2021-07-21 | End: 2021-10-22 | Stop reason: SDUPTHER

## 2021-07-21 RX ORDER — AMLODIPINE BESYLATE 10 MG/1
10 TABLET ORAL DAILY
Qty: 90 TABLET | Refills: 0 | Status: SHIPPED | OUTPATIENT
Start: 2021-07-21 | End: 2021-10-28 | Stop reason: SDUPTHER

## 2021-07-23 ENCOUNTER — TELEPHONE (OUTPATIENT)
Dept: INTERNAL MEDICINE CLINIC | Facility: CLINIC | Age: 66
End: 2021-07-23

## 2021-07-23 NOTE — TELEPHONE ENCOUNTER
Pt 's neighbor who he was talking to in his yard last Thursday unmaked just tested pos  for Covid  Pt  Is not having any symptoms  Wants to know if he should be tested for Covid

## 2021-07-28 DIAGNOSIS — E03.9 HYPOTHYROIDISM, UNSPECIFIED TYPE: ICD-10-CM

## 2021-07-30 RX ORDER — LEVOTHYROXINE SODIUM 0.07 MG/1
75 TABLET ORAL DAILY
Qty: 90 TABLET | Refills: 0 | Status: SHIPPED | OUTPATIENT
Start: 2021-07-30 | End: 2021-10-22 | Stop reason: SDUPTHER

## 2021-08-18 ENCOUNTER — TELEPHONE (OUTPATIENT)
Dept: NEPHROLOGY | Facility: CLINIC | Age: 66
End: 2021-08-18

## 2021-08-18 NOTE — TELEPHONE ENCOUNTER
Left detailed message for patient regarding labs/urine studies to be done prior to f/u appt  informed if going through [facility] lab scripts are in system if going elsewhere asked to call us so we can send accordingly

## 2021-08-19 ENCOUNTER — TELEPHONE (OUTPATIENT)
Dept: INTERNAL MEDICINE CLINIC | Facility: CLINIC | Age: 66
End: 2021-08-19

## 2021-08-19 DIAGNOSIS — E11.42 TYPE 2 DIABETES MELLITUS WITH DIABETIC POLYNEUROPATHY, WITHOUT LONG-TERM CURRENT USE OF INSULIN (HCC): ICD-10-CM

## 2021-08-19 NOTE — TELEPHONE ENCOUNTER
The sensor might not be put on correctly thus the false readings  No medication adjustment, monitor for any symptoms

## 2021-08-19 NOTE — TELEPHONE ENCOUNTER
Patient is using the [employer] Kasal 14 day system - last couple of days his blood sugar readings have been 386, 450, 500, and then down to 250  He checked his blood sugar "the old fashioned way" with a finger stick and has had normal readings in the 130's  He also got readings of 60 three times using the [employer] Kasal  He is feeling fine and doesn't think anything is wrong and thinks maybe there is something wrong with the sensor? Wants to know if we've heard of any issues with the [employer]don Kasal

## 2021-08-20 RX ORDER — FLASH GLUCOSE SENSOR
1 KIT MISCELLANEOUS
Qty: 6 EACH | Refills: 0 | Status: SHIPPED | OUTPATIENT
Start: 2021-08-20 | End: 2021-12-30

## 2021-08-20 NOTE — TELEPHONE ENCOUNTER
Spoke w/ pt  And adv'd  He replaced the sensor  His readings are now stating 73 down to "low"  This is his last sensor  Can you send new script for more Yamileth Miller 14 day sensors to [facility]  The only other thing he can think of would be a problem with his johnna

## 2021-08-23 ENCOUNTER — TELEPHONE (OUTPATIENT)
Dept: OTHER | Facility: HOSPITAL | Age: 66
End: 2021-08-23

## 2021-08-23 ENCOUNTER — TELEPHONE (OUTPATIENT)
Dept: NEPHROLOGY | Facility: CLINIC | Age: 66
End: 2021-08-23

## 2021-08-23 ENCOUNTER — APPOINTMENT (OUTPATIENT)
Dept: LAB | Facility: CLINIC | Age: 66
End: 2021-08-23
Payer: COMMERCIAL

## 2021-08-23 ENCOUNTER — HOSPITAL ENCOUNTER (EMERGENCY)
Facility: HOSPITAL | Age: 66
Discharge: HOME | End: 2021-08-23
Attending: EMERGENCY MEDICINE | Admitting: EMERGENCY MEDICINE
Payer: COMMERCIAL

## 2021-08-23 VITALS
RESPIRATION RATE: 16 BRPM | OXYGEN SATURATION: 98 % | HEART RATE: 84 BPM | TEMPERATURE: 98.9 F | DIASTOLIC BLOOD PRESSURE: 60 MMHG | SYSTOLIC BLOOD PRESSURE: 131 MMHG

## 2021-08-23 DIAGNOSIS — N18.2 CHRONIC KIDNEY DISEASE, STAGE 2 (MILD): ICD-10-CM

## 2021-08-23 DIAGNOSIS — R80.9 TYPE 2 DIABETES MELLITUS WITH PROTEINURIA (HCC): ICD-10-CM

## 2021-08-23 DIAGNOSIS — E11.29 TYPE 2 DIABETES MELLITUS WITH PROTEINURIA (HCC): ICD-10-CM

## 2021-08-23 DIAGNOSIS — E87.5 HYPERKALEMIA: ICD-10-CM

## 2021-08-23 DIAGNOSIS — R80.1 PERSISTENT PROTEINURIA: ICD-10-CM

## 2021-08-23 DIAGNOSIS — I10 ESSENTIAL HYPERTENSION: ICD-10-CM

## 2021-08-23 DIAGNOSIS — E87.5 ACUTE HYPERKALEMIA: Primary | ICD-10-CM

## 2021-08-23 DIAGNOSIS — I10 ESSENTIAL HYPERTENSION: Primary | ICD-10-CM

## 2021-08-23 DIAGNOSIS — E87.5 HYPERKALEMIA: Primary | ICD-10-CM

## 2021-08-23 LAB
AMORPH URATE CRY URNS QL MICRO: NORMAL /HPF
ANION GAP SERPL CALCULATED.3IONS-SCNC: 13 MMOL/L (ref 4–13)
ANION GAP SERPL CALCULATED.3IONS-SCNC: 9 MMOL/L (ref 4–13)
ANION GAP SERPL CALCULATED.3IONS-SCNC: 9 MMOL/L (ref 4–13)
BACTERIA UR QL AUTO: ABNORMAL /HPF
BACTERIA UR QL AUTO: NORMAL /HPF
BASOPHILS # BLD AUTO: 0.06 THOUSANDS/ΜL (ref 0–0.1)
BASOPHILS NFR BLD AUTO: 1 % (ref 0–1)
BILIRUB UR QL STRIP: NEGATIVE
BILIRUB UR QL STRIP: NEGATIVE
BUN SERPL-MCNC: 30 MG/DL (ref 5–25)
BUN SERPL-MCNC: 32 MG/DL (ref 5–25)
BUN SERPL-MCNC: 33 MG/DL (ref 5–25)
CALCIUM SERPL-MCNC: 9 MG/DL (ref 8.3–10.1)
CALCIUM SERPL-MCNC: 9.3 MG/DL (ref 8.3–10.1)
CALCIUM SERPL-MCNC: 9.4 MG/DL (ref 8.3–10.1)
CHLORIDE SERPL-SCNC: 106 MMOL/L (ref 100–108)
CHLORIDE SERPL-SCNC: 106 MMOL/L (ref 100–108)
CHLORIDE SERPL-SCNC: 108 MMOL/L (ref 100–108)
CLARITY UR: CLEAR
CLARITY UR: CLEAR
CO2 SERPL-SCNC: 20 MMOL/L (ref 21–32)
CO2 SERPL-SCNC: 23 MMOL/L (ref 21–32)
CO2 SERPL-SCNC: 24 MMOL/L (ref 21–32)
COLOR UR: YELLOW
COLOR UR: YELLOW
CREAT SERPL-MCNC: 1.12 MG/DL (ref 0.6–1.3)
CREAT SERPL-MCNC: 1.39 MG/DL (ref 0.6–1.3)
CREAT SERPL-MCNC: 1.4 MG/DL (ref 0.6–1.3)
CREAT UR-MCNC: 98 MG/DL
EOSINOPHIL # BLD AUTO: 0.7 THOUSAND/ΜL (ref 0–0.61)
EOSINOPHIL NFR BLD AUTO: 7 % (ref 0–6)
ERYTHROCYTE [DISTWIDTH] IN BLOOD BY AUTOMATED COUNT: 12.1 % (ref 11.6–15.1)
ERYTHROCYTE [DISTWIDTH] IN BLOOD BY AUTOMATED COUNT: 12.2 % (ref 11.6–15.1)
GFR SERPL CREATININE-BSD FRML MDRD: 52 ML/MIN/1.73SQ M
GFR SERPL CREATININE-BSD FRML MDRD: 53 ML/MIN/1.73SQ M
GFR SERPL CREATININE-BSD FRML MDRD: 69 ML/MIN/1.73SQ M
GLUCOSE SERPL-MCNC: 122 MG/DL (ref 65–140)
GLUCOSE SERPL-MCNC: 152 MG/DL (ref 65–140)
GLUCOSE SERPL-MCNC: 159 MG/DL (ref 65–140)
GLUCOSE UR STRIP-MCNC: NEGATIVE MG/DL
GLUCOSE UR STRIP-MCNC: NEGATIVE MG/DL
HCT VFR BLD AUTO: 37.5 % (ref 36.5–49.3)
HCT VFR BLD AUTO: 38 % (ref 36.5–49.3)
HGB BLD-MCNC: 12 G/DL (ref 12–17)
HGB BLD-MCNC: 12.2 G/DL (ref 12–17)
HGB UR QL STRIP.AUTO: NEGATIVE
HGB UR QL STRIP.AUTO: NEGATIVE
IMM GRANULOCYTES # BLD AUTO: 0.09 THOUSAND/UL (ref 0–0.2)
IMM GRANULOCYTES NFR BLD AUTO: 1 % (ref 0–2)
KETONES UR STRIP-MCNC: ABNORMAL MG/DL
KETONES UR STRIP-MCNC: NEGATIVE MG/DL
LEUKOCYTE ESTERASE UR QL STRIP: NEGATIVE
LEUKOCYTE ESTERASE UR QL STRIP: NEGATIVE
LYMPHOCYTES # BLD AUTO: 2.85 THOUSANDS/ΜL (ref 0.6–4.47)
LYMPHOCYTES NFR BLD AUTO: 30 % (ref 14–44)
MAGNESIUM SERPL-MCNC: 1.7 MG/DL (ref 1.6–2.6)
MCH RBC QN AUTO: 30.7 PG (ref 26.8–34.3)
MCH RBC QN AUTO: 31.2 PG (ref 26.8–34.3)
MCHC RBC AUTO-ENTMCNC: 32 G/DL (ref 31.4–37.4)
MCHC RBC AUTO-ENTMCNC: 32.1 G/DL (ref 31.4–37.4)
MCV RBC AUTO: 96 FL (ref 82–98)
MCV RBC AUTO: 97 FL (ref 82–98)
MONOCYTES # BLD AUTO: 0.53 THOUSAND/ΜL (ref 0.17–1.22)
MONOCYTES NFR BLD AUTO: 6 % (ref 4–12)
NEUTROPHILS # BLD AUTO: 5.29 THOUSANDS/ΜL (ref 1.85–7.62)
NEUTS SEG NFR BLD AUTO: 55 % (ref 43–75)
NITRITE UR QL STRIP: NEGATIVE
NITRITE UR QL STRIP: NEGATIVE
NON-SQ EPI CELLS URNS QL MICRO: ABNORMAL /HPF
NON-SQ EPI CELLS URNS QL MICRO: NORMAL /HPF
NRBC BLD AUTO-RTO: 0 /100 WBCS
PH UR STRIP.AUTO: 5.5 [PH]
PH UR STRIP.AUTO: 5.5 [PH] (ref 4.5–8)
PLATELET # BLD AUTO: 225 THOUSANDS/UL (ref 149–390)
PLATELET # BLD AUTO: 227 THOUSANDS/UL (ref 149–390)
PMV BLD AUTO: 9.8 FL (ref 8.9–12.7)
PMV BLD AUTO: 9.9 FL (ref 8.9–12.7)
POTASSIUM SERPL-SCNC: 4.3 MMOL/L (ref 3.5–5.3)
POTASSIUM SERPL-SCNC: 5.6 MMOL/L (ref 3.5–5.3)
POTASSIUM SERPL-SCNC: 6.4 MMOL/L (ref 3.5–5.3)
PROT UR STRIP-MCNC: ABNORMAL MG/DL
PROT UR STRIP-MCNC: ABNORMAL MG/DL
PROT UR-MCNC: 54 MG/DL
PROT/CREAT UR: 0.55 MG/G{CREAT} (ref 0–0.1)
RBC # BLD AUTO: 3.91 MILLION/UL (ref 3.88–5.62)
RBC # BLD AUTO: 3.91 MILLION/UL (ref 3.88–5.62)
RBC #/AREA URNS AUTO: ABNORMAL /HPF
RBC #/AREA URNS AUTO: NORMAL /HPF
SODIUM SERPL-SCNC: 138 MMOL/L (ref 136–145)
SODIUM SERPL-SCNC: 139 MMOL/L (ref 136–145)
SODIUM SERPL-SCNC: 141 MMOL/L (ref 136–145)
SP GR UR STRIP.AUTO: 1.02 (ref 1–1.03)
SP GR UR STRIP.AUTO: 1.02 (ref 1–1.03)
UROBILINOGEN UR QL STRIP.AUTO: 0.2 E.U./DL
UROBILINOGEN UR QL STRIP.AUTO: 0.2 E.U./DL
WBC # BLD AUTO: 8.27 THOUSAND/UL (ref 4.31–10.16)
WBC # BLD AUTO: 9.52 THOUSAND/UL (ref 4.31–10.16)
WBC #/AREA URNS AUTO: ABNORMAL /HPF
WBC #/AREA URNS AUTO: NORMAL /HPF

## 2021-08-23 PROCEDURE — 81001 URINALYSIS AUTO W/SCOPE: CPT

## 2021-08-23 PROCEDURE — 84156 ASSAY OF PROTEIN URINE: CPT

## 2021-08-23 PROCEDURE — 82570 ASSAY OF URINE CREATININE: CPT

## 2021-08-23 PROCEDURE — 36415 COLL VENOUS BLD VENIPUNCTURE: CPT

## 2021-08-23 PROCEDURE — 96375 TX/PRO/DX INJ NEW DRUG ADDON: CPT

## 2021-08-23 PROCEDURE — 99285 EMERGENCY DEPT VISIT HI MDM: CPT | Performed by: EMERGENCY MEDICINE

## 2021-08-23 PROCEDURE — 99283 EMERGENCY DEPT VISIT LOW MDM: CPT

## 2021-08-23 PROCEDURE — 3061F NEG MICROALBUMINURIA REV: CPT | Performed by: INTERNAL MEDICINE

## 2021-08-23 PROCEDURE — 3066F NEPHROPATHY DOC TX: CPT | Performed by: INTERNAL MEDICINE

## 2021-08-23 PROCEDURE — 93005 ELECTROCARDIOGRAM TRACING: CPT

## 2021-08-23 PROCEDURE — 96365 THER/PROPH/DIAG IV INF INIT: CPT

## 2021-08-23 PROCEDURE — 85025 COMPLETE CBC W/AUTO DIFF WBC: CPT | Performed by: EMERGENCY MEDICINE

## 2021-08-23 PROCEDURE — 80048 BASIC METABOLIC PNL TOTAL CA: CPT | Performed by: EMERGENCY MEDICINE

## 2021-08-23 PROCEDURE — 83735 ASSAY OF MAGNESIUM: CPT

## 2021-08-23 PROCEDURE — 96361 HYDRATE IV INFUSION ADD-ON: CPT

## 2021-08-23 PROCEDURE — 85027 COMPLETE CBC AUTOMATED: CPT

## 2021-08-23 RX ORDER — HYDROCODONE BITARTRATE AND ACETAMINOPHEN 5; 325 MG/1; MG/1
1 TABLET ORAL ONCE
Status: COMPLETED | OUTPATIENT
Start: 2021-08-23 | End: 2021-08-23

## 2021-08-23 RX ORDER — DEXTROSE MONOHYDRATE 25 G/50ML
25 INJECTION, SOLUTION INTRAVENOUS ONCE
Status: COMPLETED | OUTPATIENT
Start: 2021-08-23 | End: 2021-08-23

## 2021-08-23 RX ORDER — ALBUTEROL SULFATE 2.5 MG/3ML
10 SOLUTION RESPIRATORY (INHALATION) ONCE
Status: COMPLETED | OUTPATIENT
Start: 2021-08-23 | End: 2021-08-23

## 2021-08-23 RX ORDER — CALCIUM GLUCONATE 20 MG/ML
1 INJECTION, SOLUTION INTRAVENOUS ONCE
Status: COMPLETED | OUTPATIENT
Start: 2021-08-23 | End: 2021-08-23

## 2021-08-23 RX ORDER — CARVEDILOL 6.25 MG/1
6.25 TABLET ORAL 2 TIMES DAILY WITH MEALS
Qty: 180 TABLET | Refills: 3 | Status: SHIPPED | OUTPATIENT
Start: 2021-08-23 | End: 2021-09-01 | Stop reason: SDUPTHER

## 2021-08-23 RX ADMIN — DEXTROSE MONOHYDRATE 25 ML: 25 INJECTION, SOLUTION INTRAVENOUS at 19:54

## 2021-08-23 RX ADMIN — SODIUM CHLORIDE 1000 ML: 0.9 INJECTION, SOLUTION INTRAVENOUS at 19:19

## 2021-08-23 RX ADMIN — INSULIN HUMAN 10 UNITS: 100 INJECTION, SOLUTION PARENTERAL at 19:54

## 2021-08-23 RX ADMIN — CALCIUM GLUCONATE 1 G: 20 INJECTION, SOLUTION INTRAVENOUS at 20:41

## 2021-08-23 RX ADMIN — HYDROCODONE BITARTRATE AND ACETAMINOPHEN 1 TABLET: 5; 325 TABLET ORAL at 19:44

## 2021-08-23 RX ADMIN — ALBUTEROL SULFATE 10 MG: 2.5 SOLUTION RESPIRATORY (INHALATION) at 20:40

## 2021-08-23 NOTE — TELEPHONE ENCOUNTER
[facility] called with a critical results    Potassium 6 2  I have sent a tiger text to Dr Crissy Plata

## 2021-08-23 NOTE — ED PROVIDER NOTES
History  Chief Complaint   Patient presents with    Abnormal Lab     pt told Op labs showed high K+ told to come to Er no hx of K+ issues     60-year-old male presents the emergency department for evaluation of hyperkalemia  Patient reports having routine outpatient lab work drawn earlier today  He was notified by Dr Trixie Deshpande his neurologist that his potassium was elevated  Patient does take lisinopril and spironolactone  He does admit to eating potassium containing foods often  He has no history of hyperkalemia  He reports being asymptomatic  He was instructed to discontinue lisinopril and spironolactone and instead start carvedilol for blood pressure control  Patient's nephrologist recommended that he come to the emergency department for treatment of hyperkalemia  Patient reports normal urinary output  History provided by:  Patient and medical records   used: No    Medical Problem  Location:  K+= 6 4  Quality:  Asymptomatic  Severity:  Unable to specify  Onset quality:  Unable to specify  Timing:  Unable to specify  Progression:  Unchanged  Chronicity:  New  Context:  Taking lisinopril and spironolactone  Relieved by:  Nothing  Worsened by:  Nothing  Ineffective treatments:  None  Associated symptoms: no abdominal pain, no chest pain, no cough, no diarrhea, no fever, no nausea, no shortness of breath and no vomiting        Prior to Admission Medications   Prescriptions Last Dose Informant Patient Reported? Taking?    Blood Glucose Calibration (OT ULTRA/FASTTK CNTRL SOLN) SOLN  Self No No   Sig: by In Vitro route as needed (to calibrate blood sugar meter)   Blood Glucose Monitoring Suppl (ONE TOUCH ULTRA MINI) w/Device KIT  Self No No   Sig: by Does not apply route 2 (two) times a day   Continuous Blood Gluc  (FreeStyle Paul 14 Day [place]) LEO   Yes No   Sig: USE TO CHECK BLOOD GLUCOSE   Continuous Blood Gluc Sensor (FreeStyle Paul 14 Day Sensor) MISC   No No   Sig: Use 1 ampule every 14 (fourteen) days   HYDROcodone-acetaminophen (NORCO) 5-325 mg per tablet   No No   Sig: TAKE 1 TABLET BY MOUTH DAILY AS NEEDED FOR PAIN - MAX DAILY AMOUNT IS 1 TABLET   ONETOUCH DELICA LANCETS 91Z MISC  Self No No   Sig: by Does not apply route 2 (two) times a day   amLODIPine (NORVASC) 10 mg tablet   No No   Sig: Take 1 tablet (10 mg total) by mouth daily   aspirin (ECOTRIN LOW STRENGTH) 81 mg EC tablet  Self Yes No   Sig: Take 81 mg by mouth daily   atorvastatin (LIPITOR) 20 mg tablet   No No   Sig: Take 1 tablet (20 mg total) by mouth daily   carvedilol (COREG) 6 25 mg tablet   No No   Sig: Take 1 tablet (6 25 mg total) by mouth 2 (two) times a day with meals   glimepiride (AMARYL) 4 mg tablet   No No   Sig: Take 1 tablet (4 mg total) by mouth 2 (two) times a day   ketoconazole (NIZORAL) 2 % cream  Self No No   Sig: Apply topically daily   Patient not taking: Reported on 4/7/2021   levothyroxine 75 mcg tablet   No No   Sig: Take 1 tablet (75 mcg total) by mouth daily   metFORMIN (GLUCOPHAGE) 1000 MG tablet   No No   Sig: Take 1 tablet (1,000 mg total) by mouth 2 (two) times a day with meals   pramipexole (MIRAPEX) 0 125 mg tablet   No No   Sig: Take 1 tablet (0 125 mg total) by mouth daily at bedtime   sildenafil (REVATIO) 20 mg tablet   No No   Sig: Take 2 tablets (40 mg total) by mouth daily   sildenafil (VIAGRA) 50 MG tablet  Self Yes No   sildenafil (VIAGRA) 50 MG tablet  Self No No   Sig: Take 1 tablet (50 mg total) by mouth daily as needed for erectile dysfunction   sitaGLIPtin (JANUVIA) 100 mg tablet   No No   Sig: Take 1 tablet (100 mg total) by mouth daily      Facility-Administered Medications: None       Past Medical History:   Diagnosis Date    BPH (benign prostatic hyperplasia)     Chronic kidney disease     CPAP (continuous positive airway pressure) dependence     Diabetes mellitus (HCC)     Disease of thyroid gland     Hyperlipidemia     Hypertension     Sleep apnea     Type 2 diabetes mellitus with diabetic chronic kidney disease ([facility] [address] ) 2021       Past Surgical History:   Procedure Laterality Date    COLONOSCOPY      2016    [place] EGD TRANSORAL BIOPSY SINGLE/MULTIPLE N/A 2017    Procedure: ESOPHAGOGASTRODUODENOSCOPY (EGD) with bx;  Surgeon: Melida Francois MD;  Location: [facility]; Service: Bariatrics    TONSILLECTOMY         Family History   Problem Relation Age of Onset    Lung cancer Mother     Diabetes Father     Cancer Brother     Alcohol abuse Neg Hx     Substance Abuse Neg Hx     Mental illness Neg Hx     Depression Neg Hx      I have reviewed and agree with the history as documented  E-Cigarette/Vaping    E-Cigarette Use Never User      E-Cigarette/Vaping Substances    Nicotine No     THC No     CBD No     Flavoring No     Other No     Unknown No      Social History     Tobacco Use    Smoking status: Former Smoker     Packs/day: 2 00     Years: 0 00     Pack years: 0 00     Types: Cigarettes     Quit date:      Years since quittin 6    Smokeless tobacco: Never Used   Vaping Use    Vaping Use: Never used   Substance Use Topics    Alcohol use: Yes     Comment: social    Drug use: No       Review of Systems   Constitutional: Negative for appetite change and fever  Respiratory: Negative for cough, chest tightness and shortness of breath  Cardiovascular: Negative for chest pain, palpitations and leg swelling  Gastrointestinal: Negative for abdominal pain, diarrhea, nausea and vomiting  Genitourinary: Negative for decreased urine volume and dysuria  Musculoskeletal: Negative for back pain  Neurological: Negative for weakness, light-headedness and numbness  All other systems reviewed and are negative  Physical Exam  Physical Exam  Vitals reviewed  Constitutional:       Appearance: Normal appearance  He is well-developed  HENT:      Head: Normocephalic and atraumatic     Eyes:      Conjunctiva/sclera: Conjunctivae normal       Pupils: Pupils are equal, round, and reactive to light  Cardiovascular:      Rate and Rhythm: Normal rate and regular rhythm  Heart sounds: Normal heart sounds  Pulmonary:      Effort: Pulmonary effort is normal  No accessory muscle usage or respiratory distress  Breath sounds: Normal breath sounds  Chest:      Chest wall: No tenderness  Abdominal:      General: Bowel sounds are normal  There is no distension  Palpations: Abdomen is soft  Tenderness: There is no abdominal tenderness  There is no guarding or rebound  Musculoskeletal:         General: No tenderness or deformity  Normal range of motion  Cervical back: Normal range of motion and neck supple  Lymphadenopathy:      Cervical: No cervical adenopathy  Skin:     General: Skin is warm and dry  Findings: No rash  Neurological:      Mental Status: He is alert and oriented to person, place, and time  Coordination: Coordination normal       Deep Tendon Reflexes: Reflexes are normal and symmetric  Psychiatric:         Behavior: Behavior normal          Thought Content:  Thought content normal          Judgment: Judgment normal          Vital Signs  ED Triage Vitals [08/23/21 1832]   Temperature Pulse Respirations Blood Pressure SpO2   98 9 °F (37 2 °C) 86 16 138/64 99 %      Temp Source Heart Rate Source Patient Position - Orthostatic VS BP Location FiO2 (%)   Oral Monitor Sitting Left arm --      Pain Score       No Pain           Vitals:    08/23/21 1832 08/23/21 2029   BP: 138/64 131/60   Pulse: 86 84   Patient Position - Orthostatic VS: Sitting          Visual Acuity      ED Medications  Medications   HYDROcodone-acetaminophen (NORCO) 5-325 mg per tablet 1 tablet (1 tablet Oral Given 8/23/21 1944)   sodium chloride 0 9 % bolus 1,000 mL (0 mL Intravenous Stopped 8/23/21 2041)   insulin regular (HumuLIN R,NovoLIN R) injection 10 Units (10 Units Intravenous Given 8/23/21 1954)   dextrose 50 % IV solution 25 mL (25 mL Intravenous Given 8/23/21 1954)   calcium gluconate 1 g in sodium chloride 0 9% 50 mL (premix) (0 g Intravenous Stopped 8/23/21 2127)   albuterol inhalation solution 10 mg (10 mg Nebulization Given 8/23/21 2040)       Diagnostic Studies  Results Reviewed     Procedure Component Value Units Date/Time    Basic metabolic panel [[id number]]  (Abnormal) Collected: 08/23/21 2134    Lab Status: Final result Specimen: Blood from Arm, Right Updated: 08/23/21 2203     Sodium 141 mmol/L      Potassium 4 3 mmol/L      Chloride 108 mmol/L      CO2 20 mmol/L      ANION GAP 13 mmol/L      BUN 33 mg/dL      Creatinine 1 40 mg/dL      Glucose 122 mg/dL      Calcium 9 0 mg/dL      eGFR 52 ml/min/1 73sq m     Narrative:      [facility] guidelines for Chronic Kidney Disease (CKD):     Stage 1 with normal or high GFR (GFR > 90 mL/min/1 73 square meters)    Stage 2 Mild CKD (GFR = 60-89 mL/min/1 73 square meters)    Stage 3A Moderate CKD (GFR = 45-59 mL/min/1 73 square meters)    Stage 3B Moderate CKD (GFR = 30-44 mL/min/1 73 square meters)    Stage 4 Severe CKD (GFR = 15-29 mL/min/1 73 square meters)    Stage 5 End Stage CKD (GFR <15 mL/min/1 73 square meters)  Note: GFR calculation is accurate only with a steady state creatinine    Urine Microscopic [[id number]] Collected: 08/23/21 1941    Lab Status: Final result Specimen: Urine, Clean Catch Updated: 08/23/21 2035     RBC, UA 0-1 /hpf      WBC, UA None Seen /hpf      Epithelial Cells None Seen /hpf      Bacteria, UA None Seen /hpf      AMORPH URATES Occasional /hpf     Urine Macroscopic, POC [[id number]]  (Abnormal) Collected: 08/23/21 1941    Lab Status: Final result Specimen: Urine Updated: 08/23/21 1943     Color, UA Yellow     Clarity, UA Clear     pH, UA 5 5     Leukocytes, UA Negative     Nitrite, UA Negative     Protein,  (2+) mg/dl      Glucose, UA Negative mg/dl      Ketones, UA Trace mg/dl      Urobilinogen, UA 0 2 E U /dl      Bilirubin, UA Negative     Blood, UA Negative     Specific Gravity, UA 1 025    Narrative:      CLINITEK RESULT    Basic metabolic panel [[id number]]  (Abnormal) Collected: 08/23/21 1914    Lab Status: Final result Specimen: Blood from Arm, Right Updated: 08/23/21 1941     Sodium 139 mmol/L      Potassium 5 6 mmol/L      Chloride 106 mmol/L      CO2 24 mmol/L      ANION GAP 9 mmol/L      BUN 32 mg/dL      Creatinine 1 39 mg/dL      Glucose 152 mg/dL      Calcium 9 3 mg/dL      eGFR 53 ml/min/1 73sq m     Narrative:      [facility] guidelines for Chronic Kidney Disease (CKD):     Stage 1 with normal or high GFR (GFR > 90 mL/min/1 73 square meters)    Stage 2 Mild CKD (GFR = 60-89 mL/min/1 73 square meters)    Stage 3A Moderate CKD (GFR = 45-59 mL/min/1 73 square meters)    Stage 3B Moderate CKD (GFR = 30-44 mL/min/1 73 square meters)    Stage 4 Severe CKD (GFR = 15-29 mL/min/1 73 square meters)    Stage 5 End Stage CKD (GFR <15 mL/min/1 73 square meters)  Note: GFR calculation is accurate only with a steady state creatinine    CBC and differential [[id number]]  (Abnormal) Collected: 08/23/21 1914    Lab Status: Final result Specimen: Blood from Arm, Right Updated: 08/23/21 1925     WBC 9 52 Thousand/uL      RBC 3 91 Million/uL      Hemoglobin 12 2 g/dL      Hematocrit 38 0 %      MCV 97 fL      MCH 31 2 pg      MCHC 32 1 g/dL      RDW 12 2 %      MPV 9 8 fL      Platelets 397 Thousands/uL      nRBC 0 /100 WBCs      Neutrophils Relative 55 %      Immat GRANS % 1 %      Lymphocytes Relative 30 %      Monocytes Relative 6 %      Eosinophils Relative 7 %      Basophils Relative 1 %      Neutrophils Absolute 5 29 Thousands/µL      Immature Grans Absolute 0 09 Thousand/uL      Lymphocytes Absolute 2 85 Thousands/µL      Monocytes Absolute 0 53 Thousand/µL      Eosinophils Absolute 0 70 Thousand/µL      Basophils Absolute 0 06 Thousands/µL                  No orders to display Procedures  ECG 12 Lead Documentation Only    Date/Time: 8/23/2021 7:33 PM  Performed by: Vanessa Torres DO  Authorized by: Vanessa Torres DO     Indications / Diagnosis:  Hyperkalemia  ECG reviewed by me, the ED Provider: yes    Patient location:  ED  Previous ECG:     Previous ECG:  Unavailable  Interpretation:     Interpretation: normal    Rate:     ECG rate:  80    ECG rate assessment: normal    Rhythm:     Rhythm: sinus rhythm    Ectopy:     Ectopy: none    QRS:     QRS axis:  Normal  Conduction:     Conduction: normal    ST segments:     ST segments:  Normal  T waves:     T waves: normal               ED Course  ED Course as of Aug 23 2302   Mon Aug 23, 2021   2214 Patient re-evaluated  His potassium is now 4 3  He was instructed by nephrologist to discontinue his spironolactone lisinopril, this was freaking indicated with him and his wife  Patient to follow-up with his nephrologist for mild renal insufficiency  MDM  Number of Diagnoses or Management Options  Acute hyperkalemia: new and requires workup     Amount and/or Complexity of Data Reviewed  Clinical lab tests: ordered and reviewed  Tests in the medicine section of CPT®: ordered and reviewed  Decide to obtain previous medical records or to obtain history from someone other than the patient: yes  Discuss the patient with other providers: yes  Independent visualization of images, tracings, or specimens: yes    Risk of Complications, Morbidity, and/or Mortality  General comments: 81-year-old male presents for evaluation of hyperkalemia found on outpatient lab work  Patient presented asymptomatic  Patient's potassium likely elevated due to patient's use of lisinopril and spironolactone  He also admits to dietary indiscretion  His potassium level improved to the normal range after treatment department    He will discontinue lisinopril and spironolactone as discussed with his nephrologist   He will be restarted on blood pressure medication, carvedilol  We discussed signs symptoms return to the emergency department  Patient Progress  Patient progress: improved      Disposition  Final diagnoses:   Acute hyperkalemia     Time reflects when diagnosis was documented in both MDM as applicable and the Disposition within this note     Time User Action Codes Description Comment    8/23/2021 10:15 PM Douglas Simon [E87 [id number]] Acute hyperkalemia       ED Disposition     ED Disposition Condition Date/Time Comment    Discharge Stable Mon Aug 23, 2021 10:15 PM Gustavo Tony discharge to home/self care  Follow-up Information     Follow up With Specialties Details Why [address] [facility] Internal Medicine, Nurse Practitioner Schedule an appointment as soon as possible for a visit in 1 day For recheck of current symptoms Reinaldo Etienne [address]    [address]      Cande Briones MD Nephrology Schedule an appointment as soon as possible for a visit in 1 day For recheck of current symptoms [address]  [facility] Blaine Butler 3 [address] [facility]  [phone]            Discharge Medication List as of 8/23/2021 10:17 PM      CONTINUE these medications which have NOT CHANGED    Details   amLODIPine (NORVASC) 10 mg tablet Take 1 tablet (10 mg total) by mouth daily, Starting Wed 7/21/2021, Normal      aspirin (ECOTRIN LOW STRENGTH) 81 mg EC tablet Take 81 mg by mouth daily, Historical Med      atorvastatin (LIPITOR) 20 mg tablet Take 1 tablet (20 mg total) by mouth daily, Starting Wed 7/21/2021, Normal      Blood Glucose Calibration (OT ULTRA/FASTTK CNTRL SOLN) SOLN by In Vitro route as needed (to calibrate blood sugar meter), Starting Mon 9/17/2018, Normal      Blood Glucose Monitoring Suppl (ONE TOUCH ULTRA MINI) w/Device KIT by Does not apply route 2 (two) times a day, Starting Tue 4/24/2018, Normal      carvedilol (COREG) 6 25 mg tablet Take 1 tablet (6 25 mg total) by mouth 2 (two) times a day with meals, Starting Mon 8/23/2021, Normal      Continuous Blood Gluc  (FreeStyle [place] 14 Day [place]) LEO USE TO CHECK BLOOD GLUCOSE, Historical Med      Continuous Blood Gluc Sensor (FreeStyle Paul 14 Day Sensor) MISC Use 1 ampule every 14 (fourteen) days, Starting Fri 8/20/2021, Normal      glimepiride (AMARYL) 4 mg tablet Take 1 tablet (4 mg total) by mouth 2 (two) times a day, Starting Tue 6/22/2021, Normal      HYDROcodone-acetaminophen (NORCO) 5-325 mg per tablet TAKE 1 TABLET BY MOUTH DAILY AS NEEDED FOR PAIN - MAX DAILY AMOUNT IS 1 TABLET, Normal      ketoconazole (NIZORAL) 2 % cream Apply topically daily, Starting Tue 7/3/2018, Normal      levothyroxine 75 mcg tablet Take 1 tablet (75 mcg total) by mouth daily, Starting Fri 7/30/2021, Normal      metFORMIN (GLUCOPHAGE) 1000 MG tablet Take 1 tablet (1,000 mg total) by mouth 2 (two) times a day with meals, Starting Wed 7/21/2021, Normal      ONETOUCH DELICA LANCETS 68C MISC by Does not apply route 2 (two) times a day, Starting Tue 4/24/2018, Normal      pramipexole (MIRAPEX) 0 125 mg tablet Take 1 tablet (0 125 mg total) by mouth daily at bedtime, Starting Tue 6/29/2021, Normal      sildenafil (REVATIO) 20 mg tablet Take 2 tablets (40 mg total) by mouth daily, Starting Thu 5/27/2021, Normal      !! sildenafil (VIAGRA) 50 MG tablet Starting Tue 11/20/2018, Historical Med      !! sildenafil (VIAGRA) 50 MG tablet Take 1 tablet (50 mg total) by mouth daily as needed for erectile dysfunction, Starting Wed 11/13/2019, Normal      sitaGLIPtin (JANUVIA) 100 mg tablet Take 1 tablet (100 mg total) by mouth daily, Starting Thu 5/27/2021, Normal       !! - Potential duplicate medications found  Please discuss with provider  No discharge procedures on file      PDMP Review       Value Time User    PDMP Reviewed  Yes 8/22/2021  1:17 PM Albino Serna          ED Provider  Electronically Signed by           Cassandra Ogden DO  08/23/21 Ilir [address], DO  08/23/21 [id number]

## 2021-08-23 NOTE — TELEPHONE ENCOUNTER
Was called for critical lab  Potassium 6 4  Patient was seen by me in April and was started on spironolactone at that time, did not have any labs done as suggested  Did have blood work in June as per PCP and had potassium 5 4 at that time  Plan:   · Recommended going to ED for medical management of hyperkalemia with insulin dextrose, IV Lasix 40 mg  · EKG in the ED  · Stressed on risk of high potassium like -cardiac arrest and stressed on going to the ED, will be going to [facility] ED  · Recommend stopping lisinopril and spironolactone  Was on 36 of lisinopril and 25 of spironolactone  · Started on carvedilol for blood pressure control  · Once potassium is back to normal may consider reinitiating low-dose lisinopril but would continue to hold spironolactone  · Stressed on low-potassium diet  · Check BMP on Wednesday  Orders in epic    · ADT order placed

## 2021-08-25 LAB
ATRIAL RATE: 80 BPM
P AXIS: 45 DEGREES
PR INTERVAL: 154 MS
QRS AXIS: 11 DEGREES
QRSD INTERVAL: 74 MS
QT INTERVAL: 348 MS
QTC INTERVAL: 401 MS
T WAVE AXIS: 57 DEGREES
VENTRICULAR RATE: 80 BPM

## 2021-08-25 PROCEDURE — 93010 ELECTROCARDIOGRAM REPORT: CPT | Performed by: INTERNAL MEDICINE

## 2021-08-26 ENCOUNTER — TELEPHONE (OUTPATIENT)
Dept: INTERNAL MEDICINE CLINIC | Facility: CLINIC | Age: 66
End: 2021-08-26

## 2021-08-26 NOTE — TELEPHONE ENCOUNTER
He sees me on 9/28 not 8/28, this is fine to keep this appointment since he sees the kidney doctor soon  I did put in a blood test for his kidneys and potassium to do the day prior to his nephrology appointment

## 2021-08-26 NOTE — TELEPHONE ENCOUNTER
Pt  Was sent to the ER on 8/23 by his kidney Doc because of his potassium level  He has a f/u appt  With him on 9/8 and an appt  With Joey on 8/28  Does Joey want to see him before then?

## 2021-08-31 ENCOUNTER — OFFICE VISIT (OUTPATIENT)
Dept: NEPHROLOGY | Facility: CLINIC | Age: 66
End: 2021-08-31
Payer: COMMERCIAL

## 2021-08-31 VITALS
SYSTOLIC BLOOD PRESSURE: 148 MMHG | HEIGHT: 68 IN | DIASTOLIC BLOOD PRESSURE: 84 MMHG | WEIGHT: 284 LBS | BODY MASS INDEX: 43.04 KG/M2 | HEART RATE: 77 BPM | RESPIRATION RATE: 16 BRPM

## 2021-08-31 DIAGNOSIS — R80.9 TYPE 2 DIABETES MELLITUS WITH PROTEINURIA (HCC): ICD-10-CM

## 2021-08-31 DIAGNOSIS — E66.01 OBESITY, MORBID, BMI 40.0-49.9 (HCC): ICD-10-CM

## 2021-08-31 DIAGNOSIS — E87.5 HYPERKALEMIA: ICD-10-CM

## 2021-08-31 DIAGNOSIS — R80.1 PERSISTENT PROTEINURIA: Primary | ICD-10-CM

## 2021-08-31 DIAGNOSIS — E11.29 TYPE 2 DIABETES MELLITUS WITH PROTEINURIA (HCC): ICD-10-CM

## 2021-08-31 DIAGNOSIS — I10 ESSENTIAL HYPERTENSION: ICD-10-CM

## 2021-08-31 DIAGNOSIS — R79.89 ELEVATED SERUM CREATININE: ICD-10-CM

## 2021-08-31 PROCEDURE — 3079F DIAST BP 80-89 MM HG: CPT | Performed by: INTERNAL MEDICINE

## 2021-08-31 PROCEDURE — 99214 OFFICE O/P EST MOD 30 MIN: CPT | Performed by: INTERNAL MEDICINE

## 2021-08-31 PROCEDURE — 3008F BODY MASS INDEX DOCD: CPT | Performed by: INTERNAL MEDICINE

## 2021-08-31 PROCEDURE — 1036F TOBACCO NON-USER: CPT | Performed by: INTERNAL MEDICINE

## 2021-08-31 PROCEDURE — 3077F SYST BP >= 140 MM HG: CPT | Performed by: INTERNAL MEDICINE

## 2021-08-31 NOTE — PROGRESS NOTES
NEPHROLOGY OUTPATIENT PROGRESS NOTE   Twila Sanchez 73 y o  male MRN: [id number]  DATE: 8/31/2021  Reason for visit: No chief complaint on file  ASSESSMENT and PLAN:  Persistent Proteinuria  -proteinuria improving with UPC ratio trending down to 0 55 on urine test from August 23rd 2021, with use of high dose ACE inhibitor and spironolactone but this medicines are currently on hold due to recent finding of hyperkalemia  Would continue to monitor BMP and consider low-dose of ACE inhibitor if potassium level at normal range  Discussed about low-potassium diet  -peak microalbumin creatinine ratio from March 2, 2021 was reviewed with the patient, was 3 0 g   -discussed with patient that  proteinuria is likely due to uncontrolled diabetes mellitus type 2 with peak A1c 10 4 in September 2020, now trending down to 8 3 with oral medication,  - also uncontrolled hypertension and use of NSAIDs contributing to proteinuria in the past   -discussed about avoiding NSAIDs  -recommended goal blood pressure less than 130/80  -continue to monitor with repeat upc ratio before the next office visit     Primary Hypertension:   -Current medication: Amlodipine 10 mg, Coreg 6 25 mg bid  -was taken off aldactone and ACEI due to hyperkalemia on blood work in august 23 rd    -Current blood pressure: Elevated  Says forgot to take coreg yesterday evening   -Also has Sleep apnea- could be contributing to elevated BP but not tolerating the mask and so not using it  Recommend discussion with PCP and see if other masks can work   -renal duplex:  Showed less than 60% stenosis in the right main renal artery, no evidence of stenosis in the left main renal artery   -Plan:    ? Continue current medication-amlodipine and carvedilol as mentioned above, follow-up BMP from this week and if potassium level is at normal range would consider initiation of low-dose of lisinopril with close monitoring of renal function and potassium level    Discussed with patient and he verbalized understanding, list of food to avoid  was provided  ? Recommended using CPAP, recommend discussion with PCP about different kind of mask and see if a different mask would help him use CPAP machine  -Recommend 2 g sodium diet  -Recommend daily exercise and weight loss  -Discussed home monitoring of BP and maintaining a log of blood pressure   -Recommend goal BP less than 130/80  Hyperkalemia  -likely due to combination use of ACE inhibitor plus spironolactone and dietary indiscretion  Discussed about low-potassium diet and list was provided, recent hospital admission on 08/23 with hyperkalemia  Potassium level trended down to normal range with medical treatment  Was taken off spironolactone and ACE inhibitor  Repeat BMP this week, recheck again in a month and again before the office visit      Elevated Chronic kidney disease stage 2  -renal function baseline creatinine has been around 0 9-1 1 recently and creatinine was around 1 1 on 8/23 but on recheck in the ED creatinine was trending up to 1 4, likely from intravascular volume depletion, would monitor on repeat blood work, stressed on oral hydration  - In the setting of persistent proteinuria patient does have chronic kidney disease stage 2  -stressed on importance of better diabetic control and blood pressure control and patient verbalized understanding, discussed about avoiding nephrotoxic    A1c already improving, continue to monitor per PCP     DM-2 with proteinuria  -on Januvia, Metformin, Glimipiride  - peak A1c was 10 4 in sept 2020 and now improving to 8 3 on test from 06/28/2021  -recommend goal A1c less than 7 0, discussed about risk of worsening renal function and proteinuria with uncontrolled diabetes mellitus and patient verbalized understanding  -stressed weight loss and diabetic diet and daily exercise  -continue follow-up with PCP and medical management of diabetes mellitus type 2 per PCP      Hyperlipidemia, mixed  -lipid panel from 06/28/2021 showed LDL improving to 54 but triglyceride trended up to 282, stressed on daily exercise and lifestyle modification  -Currently on Lipitor 20 mg daily  -Continue follow-up with PCP and management per PCP     Obesity:  BMI more than 40  Stressed again on daily exercise and weight loss and lifestyle modification     Health maintenance:  Had colonoscopy in 2013, next one is due in 2023    Patient Instructions   Blood pressure is acceptable  Continue same meds    -Recommend 2 g sodium diet  -Recommend daily exercise and weight loss  -Discussed home monitoring of BP and maintaining a log of blood pressure   -Recommend goal BP less than 130/80  Please inform me if SBP below 110 or above 140's persistently  - Stressed on low k diet  -check BMP this week and in one month  -follow up in dec with repeat labs        Diagnoses and all orders for this visit:    Persistent proteinuria  -     Basic metabolic panel; Future  -     Basic metabolic panel; Future  -     Basic metabolic panel; Future  -     Magnesium; Future  -     Protein / creatinine ratio, urine; Future  -     Urinalysis with microscopic; Future    Essential hypertension  -     Basic metabolic panel; Future  -     Basic metabolic panel; Future    Hyperkalemia  -     Basic metabolic panel; Future  -     Basic metabolic panel; Future  -     Basic metabolic panel; Future    Elevated serum creatinine  -     Basic metabolic panel; Future    Type 2 diabetes mellitus with proteinuria (HCC)    Obesity, morbid, BMI 40 0-49 9 ([address] )            SUBJECTIVE / HPI:  Leland Kwan is a 73 y o   male with medical issues of  Diabetes mellitus type 2 x 10 yrs on oral pills with DN+, hypertension x 5 yrs who presents for  Follow-up of proteinuria     patient had microalbumin creatinine ratio 327 milligram/gram in July 2018 and since then has gradually worsened with   microalbumin creatinine ratio of 3 g in March 2021 and so referred to Nephrology   Was seen by Nephrology in April 2021, was started on spironolactone in addition to lisinopril 40 mg at that time to help with blood pressure as well as proteinuria  Was told to go for repeat BMP in 2 weeks which  Was not done  He did have blood work by his PCP in June and potassium was around 5 4  Blood work on 08/23/2021 showed potassium elevated to 6 4 after which he was sent to the ED for medical management of hyperkalemia, stressed on low-potassium diet, was taken of spironolactone and lisinopril due to hyperkalemia,  Was started on 08/23 on carvedilol for blood pressure control  After medical management potassium level came down to normal range and he was discharged  No repeat labs since then  Was recommend repeat BMP the same week     creatinine on blood work in the ED on 08/23 was elevated to 1 4 but on the earlier blood work the same day creatinine was stable at 1 12 UA was positive for 2+ protein, upc ratio was 0 55  No new complaints  BP at home around 929'X systolic  Denies any lower extremity edema, blood pressure at home not checked frequently has been around 038Z to 734Q systolic    REVIEW OF SYSTEMS:    Review of Systems   Constitutional: Negative for chills and fever  HENT: Negative for ear pain and sore throat  Eyes: Negative for pain and visual disturbance  Respiratory: Negative for cough and shortness of breath  Cardiovascular: Negative for chest pain and palpitations  Gastrointestinal: Negative for abdominal pain and vomiting  Genitourinary: Negative for dysuria and hematuria  Musculoskeletal: Negative for arthralgias and back pain  Skin: Negative for color change and rash  Neurological: Negative for seizures and syncope  All other systems reviewed and are negative  More than 10 point review of systems were obtained and discussed in length with the patient   Complete review of systems were negative / unremarkable except mentioned above  PAST MEDICAL HISTORY:  Past Medical History:   Diagnosis Date    BPH (benign prostatic hyperplasia)     Chronic kidney disease     CPAP (continuous positive airway pressure) dependence     [facility] mellitus [facility] [address] )     Disease of thyroid gland     Hyperlipidemia     Hypertension     Sleep apnea     Type 2 diabetes mellitus with diabetic chronic kidney disease ([facility] ) 2021       PAST SURGICAL HISTORY:  Past Surgical History:   Procedure Laterality Date    COLONOSCOPY      2016    [facility] EGD TRANSORAL BIOPSY SINGLE/MULTIPLE N/A 2017    Procedure: ESOPHAGOGASTRODUODENOSCOPY (EGD) with bx;  Surgeon: Nitin Joe MD;  Location: [facility];   Service: Bariatrics    TONSILLECTOMY         SOCIAL HISTORY:  Social History     Substance and Sexual Activity   Alcohol Use Yes    Comment: social     Social History     Substance and Sexual Activity   Drug Use No     Social History     Tobacco Use   Smoking Status Former Smoker    Packs/day: 2 00    Years: 0 00    Pack years: 0 00    Types: Cigarettes    Quit date: 37    Years since quittin 6   Smokeless Tobacco Never Used       FAMILY HISTORY:  Family History   Problem Relation Age of Onset    Lung cancer Mother     Diabetes Father     Cancer Brother     Alcohol abuse Neg Hx     Substance Abuse Neg Hx     Mental illness Neg Hx     Depression Neg Hx        MEDICATIONS:    Current Outpatient Medications:     amLODIPine (NORVASC) 10 mg tablet, Take 1 tablet (10 mg total) by mouth daily, Disp: 90 tablet, Rfl: 0    aspirin (ECOTRIN LOW STRENGTH) 81 mg EC tablet, Take 81 mg by mouth daily, Disp: , Rfl:     atorvastatin (LIPITOR) 20 mg tablet, Take 1 tablet (20 mg total) by mouth daily, Disp: 90 tablet, Rfl: 0    Blood Glucose Calibration (OT ULTRA/FASTTK CNTRL SOLN) SOLN, by In Vitro route as needed (to calibrate blood sugar meter), Disp: 1 each, Rfl: 1    carvedilol (COREG) 6 25 mg tablet, Take 1 tablet (6 25 mg total) by mouth 2 (two) times a day with meals, Disp: 180 tablet, Rfl: 3    Continuous Blood Gluc  (FreeStyle Paul 14 Day [place]) [facility], USE TO CHECK BLOOD GLUCOSE, Disp: , Rfl:     Continuous Blood Gluc Sensor (FreeStyle Paul 14 Day Sensor) MISC, Use 1 ampule every 14 (fourteen) days, Disp: 6 each, Rfl: 0    glimepiride (AMARYL) 4 mg tablet, Take 1 tablet (4 mg total) by mouth 2 (two) times a day, Disp: 180 tablet, Rfl: 0    HYDROcodone-acetaminophen (NORCO) 5-325 mg per tablet, TAKE 1 TABLET BY MOUTH DAILY AS NEEDED FOR PAIN - MAX DAILY AMOUNT IS 1 TABLET, Disp: 30 tablet, Rfl: 0    levothyroxine 75 mcg tablet, Take 1 tablet (75 mcg total) by mouth daily, Disp: 90 tablet, Rfl: 0    metFORMIN (GLUCOPHAGE) 1000 MG tablet, Take 1 tablet (1,000 mg total) by mouth 2 (two) times a day with meals, Disp: 180 tablet, Rfl: 0    pramipexole (MIRAPEX) 0 125 mg tablet, Take 1 tablet (0 125 mg total) by mouth daily at bedtime, Disp: 30 tablet, Rfl: 0    sildenafil (VIAGRA) 50 MG tablet, , Disp: , Rfl:     sildenafil (VIAGRA) 50 MG tablet, Take 1 tablet (50 mg total) by mouth daily as needed for erectile dysfunction, Disp: 10 tablet, Rfl: 2    sitaGLIPtin (JANUVIA) 100 mg tablet, Take 1 tablet (100 mg total) by mouth daily, Disp: 90 tablet, Rfl: 0    Blood Glucose Monitoring Suppl (ONE TOUCH ULTRA MINI) w/Device KIT, by Does not apply route 2 (two) times a day, Disp: 1 each, Rfl: 0    ketoconazole (NIZORAL) 2 % cream, Apply topically daily (Patient not taking: Reported on 4/7/2021), Disp: 60 g, Rfl: 1    ONETOUCH DELICA LANCETS 75W MISC, by Does not apply route 2 (two) times a day, Disp: 100 each, Rfl: 3    sildenafil (REVATIO) 20 mg tablet, Take 2 tablets (40 mg total) by mouth daily (Patient not taking: Reported on 8/31/2021), Disp: 20 tablet, Rfl: 0      PHYSICAL EXAM:  Vitals:    08/31/21 1303   BP: 148/84   BP Location: Left arm   Patient Position: Sitting   Cuff Size: Standard   Pulse: 77   Resp: 16   Weight: 129 kg (284 lb)   Height: 5' 8" (1 727 m)     Body mass index is 43 18 kg/m²  Physical Exam  Constitutional:       General: He is not in acute distress  Appearance: He is well-developed  He is not diaphoretic  HENT:      Head: Normocephalic and atraumatic  Mouth/Throat:      Mouth: Mucous membranes are moist    Eyes:      General: No scleral icterus  Conjunctiva/sclera: Conjunctivae normal       Pupils: Pupils are equal, round, and reactive to light  Neck:      Thyroid: No thyromegaly  Cardiovascular:      Rate and Rhythm: Normal rate and regular rhythm  Heart sounds: Normal heart sounds  No murmur heard  No friction rub  Pulmonary:      Effort: Pulmonary effort is normal  No respiratory distress  Breath sounds: Normal breath sounds  No wheezing or rales  Abdominal:      General: Bowel sounds are normal  There is no distension  Palpations: Abdomen is soft  Tenderness: There is no abdominal tenderness  Musculoskeletal:         General: No deformity  Cervical back: Neck supple  Right lower leg: No edema  Left lower leg: No edema  Lymphadenopathy:      Cervical: No cervical adenopathy  Skin:     Coloration: Skin is not pale  Nails: There is no clubbing  Neurological:      Mental Status: He is alert and oriented to person, place, and time  He is not disoriented  Psychiatric:         Mood and Affect: Mood normal  Mood is not anxious  Affect is not inappropriate  Behavior: Behavior normal          Thought Content:  Thought content normal              Lab Results:   Results for orders placed or performed during the hospital encounter of 08/23/21   CBC and differential   Result Value Ref Range    WBC 9 52 4 31 - 10 16 Thousand/uL    RBC 3 91 3 88 - 5 62 Million/uL    Hemoglobin 12 2 12 0 - 17 0 g/dL    Hematocrit 38 0 36 5 - 49 3 %    MCV 97 82 - 98 fL    MCH 31 2 26 8 - 34 3 pg    MCHC 32 1 31 4 - 37 4 g/dL    RDW 12 2 11 6 - 15 1 %    MPV 9 8 8 9 - 12 7 fL    Platelets 774 258 - 894 Thousands/uL    nRBC 0 /100 WBCs    Neutrophils Relative 55 43 - 75 %    Immat GRANS % 1 0 - 2 %    Lymphocytes Relative 30 14 - 44 %    Monocytes Relative 6 4 - 12 %    Eosinophils Relative 7 (H) 0 - 6 %    Basophils Relative 1 0 - 1 %    Neutrophils Absolute 5 29 1 85 - 7 62 Thousands/µL    Immature Grans Absolute 0 09 0 00 - 0 20 Thousand/uL    Lymphocytes Absolute 2 85 0 60 - 4 47 Thousands/µL    Monocytes Absolute 0 53 0 17 - 1 22 Thousand/µL    Eosinophils Absolute 0 70 (H) 0 00 - 0 61 Thousand/µL    Basophils Absolute 0 06 0 00 - 0 10 Thousands/µL   Basic metabolic panel   Result Value Ref Range    Sodium 139 136 - 145 mmol/L    Potassium 5 6 (H) 3 5 - 5 3 mmol/L    Chloride 106 100 - 108 mmol/L    CO2 24 21 - 32 mmol/L    ANION GAP 9 4 - 13 mmol/L    BUN 32 (H) 5 - 25 mg/dL    Creatinine 1 39 (H) 0 60 - 1 30 mg/dL    Glucose 152 (H) 65 - 140 mg/dL    Calcium 9 3 8 3 - 10 1 mg/dL    eGFR 53 ml/min/1 73sq m   Urine Microscopic   Result Value Ref Range    RBC, UA 0-1 None Seen, 0-1, 1-2, 2-4, 0-5 /hpf    WBC, UA None Seen None Seen, 0-1, 1-2, 0-5, 2-4 /hpf    Epithelial Cells None Seen None Seen, Occasional /hpf    Bacteria, UA None Seen None Seen, Occasional /hpf    AMORPH URATES Occasional /hpf   Basic metabolic panel   Result Value Ref Range    Sodium 141 136 - 145 mmol/L    Potassium 4 3 3 5 - 5 3 mmol/L    Chloride 108 100 - 108 mmol/L    CO2 20 (L) 21 - 32 mmol/L    ANION GAP 13 4 - 13 mmol/L    BUN 33 (H) 5 - 25 mg/dL    Creatinine 1 40 (H) 0 60 - 1 30 mg/dL    Glucose 122 65 - 140 mg/dL    Calcium 9 0 8 3 - 10 1 mg/dL    eGFR 52 ml/min/1 73sq m   ECG 12 lead   Result Value Ref Range    Ventricular Rate 80 BPM    Atrial Rate 80 BPM    NE Interval 154 ms    QRSD Interval 74 ms    QT Interval 348 ms    QTC Interval 401 ms    P Axis 45 degrees    QRS Axis 11 degrees    T Wave Axis 57 degrees   Urine Macroscopic, POC   Result Value Ref Range    Color, UA Yellow Clarity, UA Clear     pH, UA 5 5 4 5 - 8 0    Leukocytes, UA Negative Negative    Nitrite, UA Negative Negative    Protein,  (2+) (A) Negative mg/dl    Glucose, UA Negative Negative mg/dl    Ketones, UA Trace (A) Negative mg/dl    Urobilinogen, UA 0 2 0 2, 1 0 E U /dl E U /dl    Bilirubin, UA Negative Negative    Blood, UA Negative Negative    Specific Gravity, UA 1 025 1 003 - 1 030

## 2021-08-31 NOTE — PATIENT INSTRUCTIONS
Blood pressure is acceptable  Continue same meds    -Recommend 2 g sodium diet  -Recommend daily exercise and weight loss  -Discussed home monitoring of BP and maintaining a log of blood pressure   -Recommend goal BP less than 130/80  Please inform me if SBP below 110 or above 140's persistently      - Stressed on low k diet  -check BMP this week and in one month  -follow up in dec with repeat labs

## 2021-09-01 ENCOUNTER — APPOINTMENT (OUTPATIENT)
Dept: LAB | Facility: CLINIC | Age: 66
End: 2021-09-01
Payer: COMMERCIAL

## 2021-09-01 ENCOUNTER — TELEPHONE (OUTPATIENT)
Dept: NEPHROLOGY | Facility: CLINIC | Age: 66
End: 2021-09-01

## 2021-09-01 DIAGNOSIS — I10 ESSENTIAL HYPERTENSION: ICD-10-CM

## 2021-09-01 DIAGNOSIS — R80.1 PERSISTENT PROTEINURIA: ICD-10-CM

## 2021-09-01 DIAGNOSIS — E87.5 HYPERKALEMIA: ICD-10-CM

## 2021-09-01 LAB
ANION GAP SERPL CALCULATED.3IONS-SCNC: 6 MMOL/L (ref 4–13)
BUN SERPL-MCNC: 20 MG/DL (ref 5–25)
CALCIUM SERPL-MCNC: 9.6 MG/DL (ref 8.3–10.1)
CHLORIDE SERPL-SCNC: 104 MMOL/L (ref 100–108)
CO2 SERPL-SCNC: 26 MMOL/L (ref 21–32)
CREAT SERPL-MCNC: 1.05 MG/DL (ref 0.6–1.3)
GFR SERPL CREATININE-BSD FRML MDRD: 74 ML/MIN/1.73SQ M
GLUCOSE SERPL-MCNC: 244 MG/DL (ref 65–140)
POTASSIUM SERPL-SCNC: 5.2 MMOL/L (ref 3.5–5.3)
SODIUM SERPL-SCNC: 136 MMOL/L (ref 136–145)

## 2021-09-01 PROCEDURE — 36415 COLL VENOUS BLD VENIPUNCTURE: CPT

## 2021-09-01 PROCEDURE — 80048 BASIC METABOLIC PNL TOTAL CA: CPT

## 2021-09-01 RX ORDER — CARVEDILOL 12.5 MG/1
12.5 TABLET ORAL 2 TIMES DAILY WITH MEALS
Qty: 30 TABLET
Start: 2021-09-01 | End: 2021-10-05 | Stop reason: SDUPTHER

## 2021-09-01 NOTE — TELEPHONE ENCOUNTER
Discussed with patient about potassium level improving but still at 5 2, not able to start ACE inhibitor at this time  blood pressure around 065 systolic, recommended increasing Coreg to 12 5 milligram twice daily  Stressed on low-potassium diet, recheck again in 3-4 weeks as previously discussed

## 2021-09-02 ENCOUNTER — VBI (OUTPATIENT)
Dept: ADMINISTRATIVE | Facility: OTHER | Age: 66
End: 2021-09-02

## 2021-09-06 DIAGNOSIS — E11.42 TYPE 2 DIABETES MELLITUS WITH DIABETIC POLYNEUROPATHY, WITHOUT LONG-TERM CURRENT USE OF INSULIN (HCC): ICD-10-CM

## 2021-09-07 RX ORDER — SITAGLIPTIN 100 MG/1
TABLET, FILM COATED ORAL
Qty: 90 TABLET | Refills: 0 | Status: SHIPPED | OUTPATIENT
Start: 2021-09-07 | End: 2021-10-05 | Stop reason: SDUPTHER

## 2021-09-20 ENCOUNTER — TELEPHONE (OUTPATIENT)
Dept: INTERNAL MEDICINE CLINIC | Facility: CLINIC | Age: 66
End: 2021-09-20

## 2021-09-20 NOTE — TELEPHONE ENCOUNTER
Lvm regarding update on Dm eye exam  (ok per comm  consent)    Need to know where and when pt had this done

## 2021-09-21 ENCOUNTER — RA CDI HCC (OUTPATIENT)
Dept: OTHER | Facility: HOSPITAL | Age: 66
End: 2021-09-21

## 2021-09-21 NOTE — PROGRESS NOTES
[facility] [address]  coding opportunities          Number of diagnosis code(s) already on the problem list added to FYI fla                     Patients insurance company: [employer] ([employer])           Found on active problem list - please assess using MEAT for  billing in next appt on 2021    E11 65-Type 2 diabetes mellitus with [id number] - NOT USED   E11 22-Type 2 diabetes mellitus with diabetic chronic kidney disease- USED     HCC coding opportunities          Number of diagnosis code(s) already on the problem list added to FYI fla               Number of suggestions used: 1      Number of suggestions NOT actually used: 1     Patients insurance company: [employer] ([employer]     Visit status: Patient arrived for their scheduled appointment     Provider never responded to [employer]  coding request

## 2021-09-28 ENCOUNTER — OFFICE VISIT (OUTPATIENT)
Dept: INTERNAL MEDICINE CLINIC | Facility: CLINIC | Age: 66
End: 2021-09-28
Payer: COMMERCIAL

## 2021-09-28 VITALS
OXYGEN SATURATION: 99 % | SYSTOLIC BLOOD PRESSURE: 120 MMHG | DIASTOLIC BLOOD PRESSURE: 70 MMHG | TEMPERATURE: 98 F | HEART RATE: 71 BPM | WEIGHT: 287.2 LBS | BODY MASS INDEX: 43.67 KG/M2

## 2021-09-28 DIAGNOSIS — G47.33 OSA (OBSTRUCTIVE SLEEP APNEA): ICD-10-CM

## 2021-09-28 DIAGNOSIS — G89.29 CHRONIC MIDLINE LOW BACK PAIN WITH RIGHT-SIDED SCIATICA: ICD-10-CM

## 2021-09-28 DIAGNOSIS — N18.31 TYPE 2 DIABETES MELLITUS WITH STAGE 3A CHRONIC KIDNEY DISEASE, WITHOUT LONG-TERM CURRENT USE OF INSULIN (HCC): Primary | ICD-10-CM

## 2021-09-28 DIAGNOSIS — E11.42 TYPE 2 DIABETES MELLITUS WITH DIABETIC POLYNEUROPATHY, WITHOUT LONG-TERM CURRENT USE OF INSULIN (HCC): ICD-10-CM

## 2021-09-28 DIAGNOSIS — M54.41 CHRONIC MIDLINE LOW BACK PAIN WITH RIGHT-SIDED SCIATICA: ICD-10-CM

## 2021-09-28 DIAGNOSIS — E11.22 TYPE 2 DIABETES MELLITUS WITH STAGE 3A CHRONIC KIDNEY DISEASE, WITHOUT LONG-TERM CURRENT USE OF INSULIN (HCC): Primary | ICD-10-CM

## 2021-09-28 DIAGNOSIS — I10 ESSENTIAL HYPERTENSION: ICD-10-CM

## 2021-09-28 DIAGNOSIS — E78.2 MIXED HYPERLIPIDEMIA: ICD-10-CM

## 2021-09-28 DIAGNOSIS — R80.9 MICROALBUMINURIA: ICD-10-CM

## 2021-09-28 DIAGNOSIS — Z23 NEED FOR VACCINATION: ICD-10-CM

## 2021-09-28 PROCEDURE — 3078F DIAST BP <80 MM HG: CPT | Performed by: NURSE PRACTITIONER

## 2021-09-28 PROCEDURE — G0008 ADMIN INFLUENZA VIRUS VAC: HCPCS | Performed by: NURSE PRACTITIONER

## 2021-09-28 PROCEDURE — 3074F SYST BP LT 130 MM HG: CPT | Performed by: NURSE PRACTITIONER

## 2021-09-28 PROCEDURE — 1036F TOBACCO NON-USER: CPT | Performed by: NURSE PRACTITIONER

## 2021-09-28 PROCEDURE — 99214 OFFICE O/P EST MOD 30 MIN: CPT | Performed by: NURSE PRACTITIONER

## 2021-09-28 PROCEDURE — 90662 IIV NO PRSV INCREASED AG IM: CPT | Performed by: NURSE PRACTITIONER

## 2021-09-28 PROCEDURE — 3066F NEPHROPATHY DOC TX: CPT | Performed by: NURSE PRACTITIONER

## 2021-09-28 RX ORDER — METFORMIN HYDROCHLORIDE 1000 MG/1
1000 TABLET, FILM COATED, EXTENDED RELEASE ORAL
Qty: 60 TABLET | Refills: 0 | Status: SHIPPED | OUTPATIENT
Start: 2021-09-28 | End: 2021-10-22 | Stop reason: SDUPTHER

## 2021-09-28 RX ORDER — GLIMEPIRIDE 4 MG/1
TABLET ORAL
Qty: 180 TABLET | Refills: 0 | Status: SHIPPED | OUTPATIENT
Start: 2021-09-28 | End: 2022-01-12 | Stop reason: ALTCHOICE

## 2021-09-28 NOTE — ASSESSMENT & PLAN NOTE
Lab Results   Component Value Date    HGBA1C 8 3 (H) 06/28/2021   due for labs  On metformin, will switch to ER to see if diarrhea improves  Continue Januvia and amaryl   Overdue for an eye exam, advised to schedule

## 2021-09-28 NOTE — PROGRESS NOTES
Assessment/Plan:    Type 2 diabetes mellitus with diabetic chronic kidney disease ([facility] [address] )    Lab Results   Component Value Date    HGBA1C 8 3 (H) 06/28/2021   due for labs  On metformin, will switch to ER to see if diarrhea improves  Continue Januvia and amaryl   Overdue for an eye exam, advised to schedule  Type 2 diabetes mellitus with diabetic polyneuropathy, without long-term current use of insulin (HCC)    Lab Results   Component Value Date    HGBA1C 8 3 (H) 06/28/2021       JOSE RAUL (obstructive sleep apnea)  Referral provided to sleep medicine last visit, encouraged him to schedule  Essential hypertension  Well controlled  On amlodipine and coreg, lisinopril stopped due to hyperkalemia  Chronic midline low back pain with right-sided sciatica  Doing well recently  Mixed hyperlipidemia  On statin therapy  Diagnoses and all orders for this visit:    Type 2 diabetes mellitus with stage 3a chronic kidney disease, without long-term current use of insulin (HCC)  -     Hemoglobin A1C; Future  -     Comprehensive metabolic panel; Future  -     metFORMIN (GLUMETZA) 1000 MG (MOD) 24 hr tablet; Take 1 tablet (1,000 mg total) by mouth daily with dinner    Essential hypertension    Type 2 diabetes mellitus with diabetic polyneuropathy, without long-term current use of insulin (HCC)    JOSE RAUL (obstructive sleep apnea)    Chronic midline low back pain with right-sided sciatica    Mixed hyperlipidemia    Microalbuminuria    Need for vaccination  -     influenza vaccine, high-dose, PF 0 7 mL (FLUZONE HIGH-DOSE)          Subjective:      Patient ID: Tia Escudero is a 73 y o  male  Here today for follow up  Chikis Watson is doing well  He has been busy seeing his new grandchild  His back pain has improved recently  He recently went back to work and has been moving more  He feels this is why it has improved  His sugars have been a little higher recently, FBS around 140-150  He has been eating more junk food  He hasn't checked his blood sugar lately  He has been having periods of diarrhea  This is not new for him  His wife was having similar issues and switched to ER metformin with improvement  He is requesting to try this  The following portions of the patient's history were reviewed and updated as appropriate: allergies, current medications, past family history, past medical history, past social history, past surgical history and problem list     Review of Systems   Constitutional: Negative for activity change, appetite change, fatigue and unexpected weight change  Eyes: Negative for visual disturbance  Respiratory: Negative for cough, chest tightness and shortness of breath  Cardiovascular: Negative for chest pain, palpitations and leg swelling  Gastrointestinal: Positive for diarrhea  Negative for abdominal pain, blood in stool, constipation, nausea and vomiting  Genitourinary: Negative for difficulty urinating  Musculoskeletal: Negative for arthralgias  Skin: Negative for rash  Neurological: Negative for dizziness, weakness, light-headedness and headaches  Psychiatric/Behavioral: Negative for sleep disturbance  Objective:      /70   Pulse 71   Temp 98 °F (36 7 °C)   Wt 130 kg (287 lb 3 2 oz)   SpO2 99%   BMI 43 67 kg/m²          Physical Exam  Vitals reviewed  Constitutional:       Appearance: He is well-developed  HENT:      Head: Normocephalic and atraumatic  Eyes:      Conjunctiva/sclera: Conjunctivae normal       Pupils: Pupils are equal, round, and reactive to light  Cardiovascular:      Rate and Rhythm: Normal rate and regular rhythm  Heart sounds: Normal heart sounds  Pulmonary:      Effort: Pulmonary effort is normal       Breath sounds: Normal breath sounds  Abdominal:      General: Bowel sounds are normal       Palpations: Abdomen is soft  Musculoskeletal:         General: Normal range of motion  Cervical back: Neck supple  Right lower leg: Edema present  Left lower leg: Edema present  Comments: Mild non pitting edema  Skin:     General: Skin is warm and dry  Neurological:      Mental Status: He is alert and oriented to person, place, and time     Psychiatric:         Behavior: Behavior normal

## 2021-09-30 DIAGNOSIS — G89.29 CHRONIC MIDLINE LOW BACK PAIN WITH RIGHT-SIDED SCIATICA: ICD-10-CM

## 2021-09-30 DIAGNOSIS — M54.41 CHRONIC MIDLINE LOW BACK PAIN WITH RIGHT-SIDED SCIATICA: ICD-10-CM

## 2021-09-30 RX ORDER — HYDROCODONE BITARTRATE AND ACETAMINOPHEN 5; 325 MG/1; MG/1
1 TABLET ORAL DAILY PRN
Qty: 30 TABLET | Refills: 0 | Status: SHIPPED | OUTPATIENT
Start: 2021-09-30 | End: 2021-11-30 | Stop reason: SDUPTHER

## 2021-10-05 DIAGNOSIS — E11.42 TYPE 2 DIABETES MELLITUS WITH DIABETIC POLYNEUROPATHY, WITHOUT LONG-TERM CURRENT USE OF INSULIN (HCC): ICD-10-CM

## 2021-10-05 DIAGNOSIS — N52.9 ERECTILE DYSFUNCTION, UNSPECIFIED ERECTILE DYSFUNCTION TYPE: ICD-10-CM

## 2021-10-05 RX ORDER — SILDENAFIL 50 MG/1
50 TABLET, FILM COATED ORAL DAILY PRN
Qty: 10 TABLET | Refills: 0 | Status: SHIPPED | OUTPATIENT
Start: 2021-10-05 | End: 2021-11-08

## 2021-10-06 DIAGNOSIS — I10 ESSENTIAL HYPERTENSION: ICD-10-CM

## 2021-10-07 RX ORDER — CARVEDILOL 12.5 MG/1
12.5 TABLET ORAL 2 TIMES DAILY WITH MEALS
Qty: 180 TABLET | Refills: 0
Start: 2021-10-07 | End: 2021-10-15 | Stop reason: SDUPTHER

## 2021-10-14 ENCOUNTER — VBI (OUTPATIENT)
Dept: ADMINISTRATIVE | Facility: OTHER | Age: 66
End: 2021-10-14

## 2021-10-15 DIAGNOSIS — I10 ESSENTIAL HYPERTENSION: ICD-10-CM

## 2021-10-15 RX ORDER — CARVEDILOL 12.5 MG/1
12.5 TABLET ORAL 2 TIMES DAILY WITH MEALS
Qty: 180 TABLET | Refills: 0 | Status: SHIPPED | OUTPATIENT
Start: 2021-10-15 | End: 2022-01-23 | Stop reason: SDUPTHER

## 2021-10-22 DIAGNOSIS — E11.22 TYPE 2 DIABETES MELLITUS WITH STAGE 3A CHRONIC KIDNEY DISEASE, WITHOUT LONG-TERM CURRENT USE OF INSULIN (HCC): ICD-10-CM

## 2021-10-22 DIAGNOSIS — N18.31 TYPE 2 DIABETES MELLITUS WITH STAGE 3A CHRONIC KIDNEY DISEASE, WITHOUT LONG-TERM CURRENT USE OF INSULIN (HCC): ICD-10-CM

## 2021-10-22 DIAGNOSIS — E78.2 MIXED HYPERLIPIDEMIA: ICD-10-CM

## 2021-10-22 DIAGNOSIS — E03.9 HYPOTHYROIDISM, UNSPECIFIED TYPE: ICD-10-CM

## 2021-10-22 RX ORDER — METFORMIN HYDROCHLORIDE 1000 MG/1
1000 TABLET, FILM COATED, EXTENDED RELEASE ORAL
Qty: 60 TABLET | Refills: 0 | Status: SHIPPED | OUTPATIENT
Start: 2021-10-22 | End: 2021-10-28 | Stop reason: SDUPTHER

## 2021-10-22 RX ORDER — ATORVASTATIN CALCIUM 20 MG/1
20 TABLET, FILM COATED ORAL DAILY
Qty: 90 TABLET | Refills: 0 | Status: SHIPPED | OUTPATIENT
Start: 2021-10-22 | End: 2022-01-29 | Stop reason: SDUPTHER

## 2021-10-22 RX ORDER — LEVOTHYROXINE SODIUM 0.07 MG/1
75 TABLET ORAL DAILY
Qty: 90 TABLET | Refills: 0 | Status: SHIPPED | OUTPATIENT
Start: 2021-10-22 | End: 2022-03-03

## 2021-10-28 DIAGNOSIS — N18.31 TYPE 2 DIABETES MELLITUS WITH STAGE 3A CHRONIC KIDNEY DISEASE, WITHOUT LONG-TERM CURRENT USE OF INSULIN (HCC): ICD-10-CM

## 2021-10-28 DIAGNOSIS — E11.22 TYPE 2 DIABETES MELLITUS WITH STAGE 3A CHRONIC KIDNEY DISEASE, WITHOUT LONG-TERM CURRENT USE OF INSULIN (HCC): ICD-10-CM

## 2021-10-28 DIAGNOSIS — I10 BENIGN ESSENTIAL HYPERTENSION: ICD-10-CM

## 2021-10-29 RX ORDER — METFORMIN HYDROCHLORIDE 1000 MG/1
1000 TABLET, FILM COATED, EXTENDED RELEASE ORAL
Qty: 60 TABLET | Refills: 0 | Status: SHIPPED | OUTPATIENT
Start: 2021-10-29 | End: 2022-01-12 | Stop reason: SDUPTHER

## 2021-10-29 RX ORDER — AMLODIPINE BESYLATE 10 MG/1
10 TABLET ORAL DAILY
Qty: 90 TABLET | Refills: 0 | Status: SHIPPED | OUTPATIENT
Start: 2021-10-29 | End: 2022-01-29 | Stop reason: SDUPTHER

## 2021-11-05 ENCOUNTER — TELEMEDICINE (OUTPATIENT)
Dept: INTERNAL MEDICINE CLINIC | Facility: CLINIC | Age: 66
End: 2021-11-05
Payer: COMMERCIAL

## 2021-11-05 VITALS — DIASTOLIC BLOOD PRESSURE: 80 MMHG | SYSTOLIC BLOOD PRESSURE: 124 MMHG

## 2021-11-05 DIAGNOSIS — U07.1 COVID-19: Primary | ICD-10-CM

## 2021-11-05 PROCEDURE — 3079F DIAST BP 80-89 MM HG: CPT | Performed by: INTERNAL MEDICINE

## 2021-11-05 PROCEDURE — 99213 OFFICE O/P EST LOW 20 MIN: CPT | Performed by: INTERNAL MEDICINE

## 2021-11-05 PROCEDURE — 3074F SYST BP LT 130 MM HG: CPT | Performed by: INTERNAL MEDICINE

## 2021-11-05 RX ORDER — ONDANSETRON 2 MG/ML
4 INJECTION INTRAMUSCULAR; INTRAVENOUS ONCE AS NEEDED
Status: CANCELLED | OUTPATIENT
Start: 2021-11-06

## 2021-11-05 RX ORDER — ALBUTEROL SULFATE 90 UG/1
3 AEROSOL, METERED RESPIRATORY (INHALATION) ONCE AS NEEDED
Status: CANCELLED | OUTPATIENT
Start: 2021-11-06

## 2021-11-05 RX ORDER — SODIUM CHLORIDE 9 MG/ML
20 INJECTION, SOLUTION INTRAVENOUS ONCE
Status: CANCELLED | OUTPATIENT
Start: 2021-11-06

## 2021-11-05 RX ORDER — ACETAMINOPHEN 325 MG/1
650 TABLET ORAL ONCE AS NEEDED
Status: CANCELLED | OUTPATIENT
Start: 2021-11-06

## 2021-11-06 ENCOUNTER — HOSPITAL ENCOUNTER (OUTPATIENT)
Dept: INFUSION CENTER | Facility: HOSPITAL | Age: 66
Discharge: HOME | End: 2021-11-06
Payer: COMMERCIAL

## 2021-11-06 VITALS
OXYGEN SATURATION: 96 % | HEART RATE: 74 BPM | RESPIRATION RATE: 16 BRPM | DIASTOLIC BLOOD PRESSURE: 86 MMHG | SYSTOLIC BLOOD PRESSURE: 165 MMHG | TEMPERATURE: 98 F

## 2021-11-06 DIAGNOSIS — U07.1 COVID-19: Primary | ICD-10-CM

## 2021-11-06 PROCEDURE — M0243 CASIRIVI AND IMDEVI INFUSION: HCPCS | Performed by: INTERNAL MEDICINE

## 2021-11-06 RX ORDER — ONDANSETRON 2 MG/ML
4 INJECTION INTRAMUSCULAR; INTRAVENOUS ONCE AS NEEDED
Status: DISCONTINUED | OUTPATIENT
Start: 2021-11-06 | End: 2021-11-09 | Stop reason: HOSPADM

## 2021-11-06 RX ORDER — ALBUTEROL SULFATE 90 UG/1
3 AEROSOL, METERED RESPIRATORY (INHALATION) ONCE AS NEEDED
Status: DISCONTINUED | OUTPATIENT
Start: 2021-11-06 | End: 2021-11-09 | Stop reason: HOSPADM

## 2021-11-06 RX ORDER — ONDANSETRON 2 MG/ML
4 INJECTION INTRAMUSCULAR; INTRAVENOUS ONCE AS NEEDED
Status: CANCELLED | OUTPATIENT
Start: 2021-11-06

## 2021-11-06 RX ORDER — SODIUM CHLORIDE 9 MG/ML
20 INJECTION, SOLUTION INTRAVENOUS ONCE
Status: CANCELLED | OUTPATIENT
Start: 2021-11-06

## 2021-11-06 RX ORDER — ALBUTEROL SULFATE 90 UG/1
3 AEROSOL, METERED RESPIRATORY (INHALATION) ONCE AS NEEDED
Status: CANCELLED | OUTPATIENT
Start: 2021-11-06

## 2021-11-06 RX ORDER — SODIUM CHLORIDE 9 MG/ML
20 INJECTION, SOLUTION INTRAVENOUS ONCE
Status: COMPLETED | OUTPATIENT
Start: 2021-11-06 | End: 2021-11-06

## 2021-11-06 RX ORDER — ACETAMINOPHEN 325 MG/1
650 TABLET ORAL ONCE AS NEEDED
Status: DISCONTINUED | OUTPATIENT
Start: 2021-11-06 | End: 2021-11-09 | Stop reason: HOSPADM

## 2021-11-06 RX ORDER — ACETAMINOPHEN 325 MG/1
650 TABLET ORAL ONCE AS NEEDED
Status: CANCELLED | OUTPATIENT
Start: 2021-11-06

## 2021-11-08 ENCOUNTER — TELEMEDICINE (OUTPATIENT)
Dept: INTERNAL MEDICINE CLINIC | Facility: CLINIC | Age: 66
End: 2021-11-08
Payer: COMMERCIAL

## 2021-11-08 VITALS — HEART RATE: 61 BPM | WEIGHT: 282 LBS | BODY MASS INDEX: 40.37 KG/M2 | OXYGEN SATURATION: 99 % | HEIGHT: 70 IN

## 2021-11-08 DIAGNOSIS — U07.1 COVID-19: Primary | ICD-10-CM

## 2021-11-08 PROCEDURE — 1036F TOBACCO NON-USER: CPT | Performed by: NURSE PRACTITIONER

## 2021-11-08 PROCEDURE — 3008F BODY MASS INDEX DOCD: CPT | Performed by: NURSE PRACTITIONER

## 2021-11-08 PROCEDURE — 99213 OFFICE O/P EST LOW 20 MIN: CPT | Performed by: NURSE PRACTITIONER

## 2021-11-08 RX ORDER — METFORMIN HYDROCHLORIDE 500 MG/1
500 TABLET, EXTENDED RELEASE ORAL 2 TIMES DAILY
COMMUNITY
Start: 2021-11-07 | End: 2021-12-30

## 2021-11-09 ENCOUNTER — TELEPHONE (OUTPATIENT)
Dept: INTERNAL MEDICINE CLINIC | Facility: CLINIC | Age: 66
End: 2021-11-09

## 2021-11-12 DIAGNOSIS — E11.42 TYPE 2 DIABETES MELLITUS WITH DIABETIC POLYNEUROPATHY, WITHOUT LONG-TERM CURRENT USE OF INSULIN (HCC): ICD-10-CM

## 2021-11-15 ENCOUNTER — TELEPHONE (OUTPATIENT)
Dept: NEPHROLOGY | Facility: CLINIC | Age: 66
End: 2021-11-15

## 2021-11-22 ENCOUNTER — VBI (OUTPATIENT)
Dept: ADMINISTRATIVE | Facility: OTHER | Age: 66
End: 2021-11-22

## 2021-11-30 DIAGNOSIS — G89.29 CHRONIC MIDLINE LOW BACK PAIN WITH RIGHT-SIDED SCIATICA: ICD-10-CM

## 2021-11-30 DIAGNOSIS — M54.41 CHRONIC MIDLINE LOW BACK PAIN WITH RIGHT-SIDED SCIATICA: ICD-10-CM

## 2021-11-30 RX ORDER — HYDROCODONE BITARTRATE AND ACETAMINOPHEN 5; 325 MG/1; MG/1
1 TABLET ORAL DAILY PRN
Qty: 30 TABLET | Refills: 0 | Status: SHIPPED | OUTPATIENT
Start: 2021-11-30 | End: 2022-01-23 | Stop reason: SDUPTHER

## 2021-12-17 DIAGNOSIS — E11.42 TYPE 2 DIABETES MELLITUS WITH DIABETIC POLYNEUROPATHY, WITHOUT LONG-TERM CURRENT USE OF INSULIN (HCC): ICD-10-CM

## 2021-12-18 NOTE — TELEPHONE ENCOUNTER
Patient returned call  Let him know Devin Funk added aquatherapy in the referral notes  Patien will let us know if any other issues  done

## 2021-12-21 ENCOUNTER — VBI (OUTPATIENT)
Dept: ADMINISTRATIVE | Facility: OTHER | Age: 66
End: 2021-12-21

## 2021-12-30 ENCOUNTER — OFFICE VISIT (OUTPATIENT)
Dept: INTERNAL MEDICINE CLINIC | Facility: CLINIC | Age: 66
End: 2021-12-30
Payer: COMMERCIAL

## 2021-12-30 VITALS
OXYGEN SATURATION: 98 % | HEART RATE: 70 BPM | SYSTOLIC BLOOD PRESSURE: 140 MMHG | HEIGHT: 70 IN | WEIGHT: 288.6 LBS | BODY MASS INDEX: 41.32 KG/M2 | TEMPERATURE: 97.7 F | DIASTOLIC BLOOD PRESSURE: 80 MMHG

## 2021-12-30 DIAGNOSIS — E11.22 TYPE 2 DIABETES MELLITUS WITH STAGE 3A CHRONIC KIDNEY DISEASE, WITHOUT LONG-TERM CURRENT USE OF INSULIN (HCC): Primary | ICD-10-CM

## 2021-12-30 DIAGNOSIS — I10 ESSENTIAL HYPERTENSION: ICD-10-CM

## 2021-12-30 DIAGNOSIS — R80.1 PERSISTENT PROTEINURIA: ICD-10-CM

## 2021-12-30 DIAGNOSIS — N18.31 TYPE 2 DIABETES MELLITUS WITH STAGE 3A CHRONIC KIDNEY DISEASE, WITHOUT LONG-TERM CURRENT USE OF INSULIN (HCC): Primary | ICD-10-CM

## 2021-12-30 DIAGNOSIS — M54.41 CHRONIC MIDLINE LOW BACK PAIN WITH RIGHT-SIDED SCIATICA: ICD-10-CM

## 2021-12-30 DIAGNOSIS — E78.2 MIXED HYPERLIPIDEMIA: ICD-10-CM

## 2021-12-30 DIAGNOSIS — G89.29 CHRONIC MIDLINE LOW BACK PAIN WITH RIGHT-SIDED SCIATICA: ICD-10-CM

## 2021-12-30 DIAGNOSIS — G47.33 OSA (OBSTRUCTIVE SLEEP APNEA): ICD-10-CM

## 2021-12-30 PROCEDURE — 3008F BODY MASS INDEX DOCD: CPT | Performed by: NURSE PRACTITIONER

## 2021-12-30 PROCEDURE — 3066F NEPHROPATHY DOC TX: CPT | Performed by: NURSE PRACTITIONER

## 2021-12-30 PROCEDURE — 99214 OFFICE O/P EST MOD 30 MIN: CPT | Performed by: NURSE PRACTITIONER

## 2022-01-05 ENCOUNTER — APPOINTMENT (OUTPATIENT)
Dept: LAB | Facility: CLINIC | Age: 67
End: 2022-01-05
Payer: COMMERCIAL

## 2022-01-05 ENCOUNTER — TELEPHONE (OUTPATIENT)
Dept: INTERNAL MEDICINE CLINIC | Facility: CLINIC | Age: 67
End: 2022-01-05

## 2022-01-05 DIAGNOSIS — N18.31 TYPE 2 DIABETES MELLITUS WITH STAGE 3A CHRONIC KIDNEY DISEASE, WITHOUT LONG-TERM CURRENT USE OF INSULIN (HCC): ICD-10-CM

## 2022-01-05 DIAGNOSIS — E11.22 TYPE 2 DIABETES MELLITUS WITH STAGE 3A CHRONIC KIDNEY DISEASE, WITHOUT LONG-TERM CURRENT USE OF INSULIN (HCC): ICD-10-CM

## 2022-01-05 LAB
ALBUMIN SERPL BCP-MCNC: 3.5 G/DL (ref 3.5–5)
ALP SERPL-CCNC: 112 U/L (ref 46–116)
ALT SERPL W P-5'-P-CCNC: 30 U/L (ref 12–78)
ANION GAP SERPL CALCULATED.3IONS-SCNC: 10 MMOL/L (ref 4–13)
AST SERPL W P-5'-P-CCNC: 22 U/L (ref 5–45)
BILIRUB SERPL-MCNC: 0.38 MG/DL (ref 0.2–1)
BUN SERPL-MCNC: 18 MG/DL (ref 5–25)
CALCIUM SERPL-MCNC: 9.5 MG/DL (ref 8.3–10.1)
CHLORIDE SERPL-SCNC: 97 MMOL/L (ref 100–108)
CO2 SERPL-SCNC: 26 MMOL/L (ref 21–32)
CREAT SERPL-MCNC: 0.96 MG/DL (ref 0.6–1.3)
GFR SERPL CREATININE-BSD FRML MDRD: 82 ML/MIN/1.73SQ M
GLUCOSE P FAST SERPL-MCNC: 220 MG/DL (ref 65–99)
POTASSIUM SERPL-SCNC: 4.9 MMOL/L (ref 3.5–5.3)
PROT SERPL-MCNC: 7.6 G/DL (ref 6.4–8.2)
SODIUM SERPL-SCNC: 133 MMOL/L (ref 136–145)

## 2022-01-05 PROCEDURE — 83036 HEMOGLOBIN GLYCOSYLATED A1C: CPT

## 2022-01-05 PROCEDURE — 3066F NEPHROPATHY DOC TX: CPT | Performed by: INTERNAL MEDICINE

## 2022-01-05 PROCEDURE — 36415 COLL VENOUS BLD VENIPUNCTURE: CPT

## 2022-01-05 PROCEDURE — 80053 COMPREHEN METABOLIC PANEL: CPT

## 2022-01-05 NOTE — TELEPHONE ENCOUNTER
Sinus congestions, post nasal drip, and cough aren't getting any better since he saw you last   He wants to know if you will call in an antibiotic

## 2022-01-07 LAB
EST. AVERAGE GLUCOSE BLD GHB EST-MCNC: 237 MG/DL
HBA1C MFR BLD: 9.9 %

## 2022-01-07 PROCEDURE — 3046F HEMOGLOBIN A1C LEVEL >9.0%: CPT | Performed by: INTERNAL MEDICINE

## 2022-01-12 ENCOUNTER — TELEPHONE (OUTPATIENT)
Dept: ENDOCRINOLOGY | Facility: CLINIC | Age: 67
End: 2022-01-12

## 2022-01-12 ENCOUNTER — CONSULT (OUTPATIENT)
Dept: ENDOCRINOLOGY | Facility: CLINIC | Age: 67
End: 2022-01-12
Payer: COMMERCIAL

## 2022-01-12 VITALS
DIASTOLIC BLOOD PRESSURE: 62 MMHG | TEMPERATURE: 97.6 F | BODY MASS INDEX: 41.47 KG/M2 | HEART RATE: 62 BPM | SYSTOLIC BLOOD PRESSURE: 124 MMHG | WEIGHT: 289 LBS

## 2022-01-12 DIAGNOSIS — E03.9 ACQUIRED HYPOTHYROIDISM: ICD-10-CM

## 2022-01-12 DIAGNOSIS — E78.2 MIXED HYPERLIPIDEMIA: ICD-10-CM

## 2022-01-12 DIAGNOSIS — E11.42 TYPE 2 DIABETES MELLITUS WITH DIABETIC POLYNEUROPATHY, WITHOUT LONG-TERM CURRENT USE OF INSULIN (HCC): ICD-10-CM

## 2022-01-12 DIAGNOSIS — E11.22 TYPE 2 DIABETES MELLITUS WITH STAGE 3A CHRONIC KIDNEY DISEASE, WITHOUT LONG-TERM CURRENT USE OF INSULIN (HCC): ICD-10-CM

## 2022-01-12 DIAGNOSIS — G47.33 OSA (OBSTRUCTIVE SLEEP APNEA): ICD-10-CM

## 2022-01-12 DIAGNOSIS — E11.65 UNCONTROLLED TYPE 2 DIABETES MELLITUS WITH HYPERGLYCEMIA (HCC): Primary | ICD-10-CM

## 2022-01-12 DIAGNOSIS — I10 PRIMARY HYPERTENSION: ICD-10-CM

## 2022-01-12 DIAGNOSIS — N18.31 TYPE 2 DIABETES MELLITUS WITH STAGE 3A CHRONIC KIDNEY DISEASE, WITHOUT LONG-TERM CURRENT USE OF INSULIN (HCC): ICD-10-CM

## 2022-01-12 PROCEDURE — 1036F TOBACCO NON-USER: CPT | Performed by: INTERNAL MEDICINE

## 2022-01-12 PROCEDURE — 3078F DIAST BP <80 MM HG: CPT | Performed by: INTERNAL MEDICINE

## 2022-01-12 PROCEDURE — 99204 OFFICE O/P NEW MOD 45 MIN: CPT | Performed by: INTERNAL MEDICINE

## 2022-01-12 PROCEDURE — 3074F SYST BP LT 130 MM HG: CPT | Performed by: INTERNAL MEDICINE

## 2022-01-12 RX ORDER — METFORMIN HYDROCHLORIDE 1000 MG/1
1000 TABLET, FILM COATED, EXTENDED RELEASE ORAL 2 TIMES DAILY WITH MEALS
Qty: 60 TABLET | Refills: 0
Start: 2022-01-12 | End: 2022-01-24 | Stop reason: SDUPTHER

## 2022-01-12 RX ORDER — INSULIN GLARGINE 100 [IU]/ML
INJECTION, SOLUTION SUBCUTANEOUS
Qty: 15 ML | Refills: 0
Start: 2022-01-12 | End: 2022-03-13 | Stop reason: SDUPTHER

## 2022-01-12 RX ORDER — SEMAGLUTIDE 1.34 MG/ML
INJECTION, SOLUTION SUBCUTANEOUS
Qty: 2 ML | Refills: 4 | Status: SHIPPED | OUTPATIENT
Start: 2022-01-12 | End: 2022-03-13 | Stop reason: SDUPTHER

## 2022-01-12 RX ORDER — DAPAGLIFLOZIN 10 MG/1
TABLET, FILM COATED ORAL
Qty: 30 TABLET | Refills: 5 | Status: SHIPPED | OUTPATIENT
Start: 2022-01-12 | End: 2022-03-13 | Stop reason: SDUPTHER

## 2022-01-12 RX ORDER — METFORMIN HYDROCHLORIDE 1000 MG/1
1000 TABLET, FILM COATED, EXTENDED RELEASE ORAL 2 TIMES DAILY WITH MEALS
Qty: 60 TABLET | Refills: 0
Start: 2022-01-12 | End: 2022-01-12 | Stop reason: SDUPTHER

## 2022-01-12 RX ORDER — GLIMEPIRIDE 4 MG/1
TABLET ORAL
Qty: 180 TABLET | Refills: 0
Start: 2022-01-12 | End: 2022-04-13 | Stop reason: SDUPTHER

## 2022-01-12 NOTE — PATIENT INSTRUCTIONS
Hypoglycemia instructions   Nando Kirby  1/12/2022  [id number]    Low Blood Sugar    Steps to treat low blood sugar  1  Test blood sugar if you have symptoms of low blood sugar:   Low Blood Sugar Symptoms:  o Sweaty  o Dizzy  o Rapid heartbeat  o Shaky    o Bad mood  o Hungry      2  Treat blood sugar less than 70 with 15 grams of fast-acting carbohydrate:   Examples of 15 grams Fast-Acting Carbohydrate:  o 4 oz juice  o 4 oz regular soda  o 3-4 glucose tablets (chew)  o 3-4 hard candies (chew)              3    Wait 15 minutes and test your blood sugar again           4   If blood sugar is less than 100, repeat steps 2-3       5  When your blood sugar is 100 or more, eat a snack if it will be longer than one hour until your next meal  The snack should be 15 grams of carbohydrate and a protein:   Examples of snacks:  o ½ sandwich  o 6 crackers with cheese  o Piece of fruit with cheese or peanut butter  o 6 crackers with peanut butter

## 2022-01-12 NOTE — PROGRESS NOTES
Mariely Comfort 73 y o  male MRN: [id number]    Encounter: [id number]      Assessment/Plan     Assessment: This is a 73y o -year-old male with diabetes with hyperglycemia  Plan:  Diagnoses and all orders for this visit:    Uncontrolled type 2 diabetes mellitus with hyperglycemia ([facility] [address] )    Lab Results   Component Value Date    HGBA1C 9 9 (H) 01/05/2022   A1c is 9 9%, uncontrolled, suggestive of severe hyperglycemia    Considering patient has CKD and microalbuminuria, uncontrolled diabetes will start Farxiga 10 mg daily (discussed the CKD benefits and cardiovascular benefits with Javier Toussaint, also discussed the risk of urine tract infections, and call if he notices any infection) discussed to keep well hydrated  Continue glimepiride 4 mg twice a day,  Increase Lantus to 20 units at bedtime,  Start Ozempic 0 25 mg once a week, increase the dose to 0 5 mg in 4 weeks if no nausea or vomiting  Discussed the safety and efficacy of Ozempic  Patient does not give history of pancreatitis or family or personal history of medullary thyroid cancer  Increase metformin to 1000 mg twice a day, discussed to check blood sugars twice daily before breakfast and before dinner  Send log in 2 weeks  Goal for blood sugar is 80 to 160 mg/dL range  Discussed the long-term effects of uncontrolled diabetes including worsening of nephropathy, retinopathy, neuropathy, risk of heart disease and stroke   Discussed hypoglycemia symptoms and treatment  Discussed to keep carbohydrate consistent with meals to avoid fluctuation in blood sugars  Will refer to nutrition therapy      -     Ambulatory referral to Endocrinology  -     Semaglutide,0 25 or 0 5MG/DOS, (Ozempic, 0 25 or 0 5 MG/DOSE,) 2 MG/1 5ML SOPN; Inject 0 25 mg once a week  -     Dapagliflozin Propanediol (Farxiga) 10 MG TABS; Take one tablet with breakfast  -     insulin glargine (Lantus SoloStar) 100 units/mL injection pen;  Inject 20 units at bedtime  -     Ambulatory referral to Diabetic Education; Future  -     Ambulatory referral to Podiatry; Future  -     Basic metabolic panel; Future  -     Hemoglobin A1C; Future  -     Hemoglobin A1C; Future    Primary hypertension    BP Readings from Last 3 Encounters:   01/12/22 124/62   12/30/21 140/80   11/06/21 165/86   Blood pressure well controlled  Continue current management as per PCP  Mixed hyperlipidemia  LDL is at goal  Lab Results   Component Value Date    LDLCALC 54 06/28/2021   Continue statins    Type 2 diabetes mellitus with stage 3a chronic kidney disease, without long-term current use of insulin ([facility] [address] )  Lab Results   Component Value Date    CREATININE 0 96 01/05/2022   Patient has history of CKD and microalbuminuria, will start Ozempic as well as [place]  -  -     Semaglutide,0 25 or 0 5MG/DOS, (Ozempic, 0 25 or 0 5 MG/DOSE,) 2 MG/1 5ML SOPN; Inject 0 25 mg once a week  -     Dapagliflozin Propanediol (Farxiga) 10 MG TABS; Take one tablet with breakfast    Type 2 diabetes mellitus with diabetic polyneuropathy, without long-term current use of insulin ([facility])  -     glimepiride (AMARYL) 4 mg tablet; Take one tablet twice a day  -     Basic metabolic panel; Future  -     Hemoglobin A1C; Future  -     Hemoglobin A1C; Future    JOSE RAUL (obstructive sleep apnea)  Discussed importance of use of CPAP machine  Acquired hypothyroidism  Obtain TSH and free T4  Continue current dose of levothyroxine 75 mcg daily for now  -     T4, free; Future  -     TSH, 3rd generation; Future        CC: Diabetes    History of Present Illness     HPI:    Lily Jung is 71-year-old gentleman with medical history of type 2 diabetes, obstructive sleep apnea, hypertension hyperlipidemia obesity is here for establishing visit for type 2 diabetes management  He has complications of diabetes such as CKD and microalbuminuria    He takes metformin 1000 mg with dinner, glimepiride 4 mg twice a day, Januvia 100 mg daily, Lantus 10 units at bedtime  He was started on Lantus just 2-3 days ago    He checks blood sugars once or twice daily  His blood sugars are usually in 200-300 mg/dL range  He does not follow a low-carbohydrate diet  For hypothyroidism he takes levothyroxine 75 mcg daily  He takes it appropriately on empty stomach 1 hour before breakfast     For hyperlipidemia he takes Lipitor 20 mg daily, denies side effects  For hypertension he takes Norvasc 10 mg daily, blood pressure is well controlled  Lab Results   Component Value Date    KTV4PORGSBHS 3 002 03/02/2021         Lab Results   Component Value Date    HGBA1C 9 9 (H) 01/05/2022     Results for Evonnie Essex (MRN [id number]) as of 1/12/2022 14:19   Ref  Range 6/28/2021 08:34   Cholesterol Latest Ref Range: 50 - 200 mg/dL 151   Triglycerides Latest Ref Range: <=150 mg/dL 282 (H)   HDL Latest Ref Range: >=40 mg/dL 41   LDL Calculated Latest Ref Range: 0 - 100 mg/dL 54       Results for Evonnie Essex (MRN [id number]) as of 1/12/2022 14:03   Ref  Range 7/21/2018 08:14 11/13/2019 16:38 3/2/2021 12:35   MICROALBUMIN/CREATININE RATIO Latest Ref Range: 0 - 30 mg/g creatinine 327 (H) 1,654 (H) 3,001 (H)     Lab Results   Component Value Date    HGBA1C 9 9 (H) 01/05/2022   Results for Evonnie Essex (MRN [id number]) as of 1/12/2022 14:03   Ref   Range 1/5/2022 16:07   Sodium Latest Ref Range: 136 - 145 mmol/L 133 (L)   Potassium Latest Ref Range: 3 5 - 5 3 mmol/L 4 9   Chloride Latest Ref Range: 100 - 108 mmol/L 97 (L)   CO2 Latest Ref Range: 21 - 32 mmol/L 26   Anion Gap Latest Ref Range: 4 - 13 mmol/L 10   BUN Latest Ref Range: 5 - 25 mg/dL 18   Creatinine Latest Ref Range: 0 60 - 1 30 mg/dL 0 96   GLUCOSE FASTING Latest Ref Range: 65 - 99 mg/dL 220 (H)   Calcium Latest Ref Range: 8 3 - 10 1 mg/dL 9 5   AST Latest Ref Range: 5 - 45 U/L 22   ALT Latest Ref Range: 12 - 78 U/L 30   Alkaline Phosphatase Latest Ref Range: 46 - 116 U/L 112   Total Protein Latest Ref Range: 6 4 - 8 2 g/dL 7 6   Albumin Latest Ref Range: 3 5 - 5 0 g/dL 3 5   TOTAL BILIRUBIN Latest Ref Range: 0 20 - 1 00 mg/dL 0 38   eGFR Latest Units: ml/min/1 73sq m 82   Hemoglobin A1C Latest Ref Range: Normal 3 8-5 6%; PreDiabetic 5 7-6 4%; Diabetic >=6 5%; Glycemic control for adults with diabetes <7 0% % 9 9 (H)   eAG, EST AVG Glucose Latest Units: mg/dl 237     Results for Nadine Del Valle (MRN [id number]) as of 1/12/2022 14:03   Ref  Range 1/5/2022 16:07   Hemoglobin A1C Latest Ref Range: Normal 3 8-5 6%; PreDiabetic 5 7-6 4%; Diabetic >=6 5%; Glycemic control for adults with diabetes <7 0% % 9 9 (H)   eAG, EST AVG Glucose Latest Units: mg/dl 237        Review of Systems   Constitutional: Positive for activity change and unexpected weight change (Weight gain over the years)  Negative for diaphoresis, fatigue and fever  HENT: Negative  Eyes: Negative for visual disturbance  Respiratory: Negative for cough, chest tightness and shortness of breath  Cardiovascular: Negative for chest pain, palpitations and leg swelling  Gastrointestinal: Negative for abdominal pain, blood in stool, constipation, diarrhea, nausea and vomiting  Endocrine: Negative for cold intolerance, heat intolerance, polydipsia, polyphagia and polyuria  Genitourinary: Negative for dysuria, enuresis, frequency and urgency  Musculoskeletal: Negative for arthralgias and myalgias  Skin: Negative for pallor, rash and wound  Allergic/Immunologic: Negative  Neurological: Negative for dizziness, tremors, weakness and numbness  Hematological: Negative  Psychiatric/Behavioral: Negative          Historical Information   Past Medical History:   Diagnosis Date    BPH (benign prostatic hyperplasia)     Chronic kidney disease     CPAP (continuous positive airway pressure) dependence     Diabetes mellitus ([facility] [address] )     Disease of thyroid gland     Hyperlipidemia     Hypertension     Sleep apnea     Type 2 diabetes mellitus with diabetic chronic kidney disease ([facility] [address] ) 2021     Past Surgical History:   Procedure Laterality Date    COLONOSCOPY      2016    [place] EGD TRANSORAL BIOPSY SINGLE/MULTIPLE N/A 2017    Procedure: ESOPHAGOGASTRODUODENOSCOPY (EGD) with bx;  Surgeon: Willa Newberry MD;  Location: [facility];   Service: Bariatrics    TONSILLECTOMY       Social History   Social History     Substance and Sexual Activity   Alcohol Use Yes    Comment: social     Social History     Substance and Sexual Activity   Drug Use No     Social History     Tobacco Use   Smoking Status Former Smoker    Packs/day: 2 00    Years: 0 00    Pack years: 0 00    Types: Cigarettes    Quit date: 36    Years since quittin 0   Smokeless Tobacco Never Used     Family History:   Family History   Problem Relation Age of Onset    Lung cancer Mother     Diabetes Father     Cancer Brother     Alcohol abuse Neg Hx     Substance Abuse Neg Hx     Mental illness Neg Hx     Depression Neg Hx        Meds/Allergies   Current Outpatient Medications   Medication Sig Dispense Refill    amLODIPine (NORVASC) 10 mg tablet Take 1 tablet (10 mg total) by mouth daily 90 tablet 0    aspirin (ECOTRIN LOW STRENGTH) 81 mg EC tablet Take 81 mg by mouth daily      atorvastatin (LIPITOR) 20 mg tablet Take 1 tablet (20 mg total) by mouth daily 90 tablet 0    Blood Glucose Calibration (OT ULTRA/FASTTK CNTRL SOLN) SOLN by In Vitro route as needed (to calibrate blood sugar meter) 1 each 1    carvedilol (COREG) 12 5 mg tablet Take 1 tablet (12 5 mg total) by mouth 2 (two) times a day with meals 180 tablet 0    Continuous Blood Gluc  (FreeStyle Paul 14 Day [place]) LEO USE TO CHECK BLOOD GLUCOSE      glimepiride (AMARYL) 4 mg tablet Take one tablet twice a day 180 tablet 0    HYDROcodone-acetaminophen (NORCO) 5-325 mg per tablet Take 1 tablet by mouth daily as needed for pain Max Daily Amount: 1 tablet 30 tablet 0    insulin glargine (Lantus SoloStar) 100 units/mL injection pen Inject 20 units at bedtime 15 mL 0    Insulin Pen Needle (Pen Needles) 31G X 5 MM MISC Use in the morning 100 each 0    levothyroxine 75 mcg tablet Take 1 tablet (75 mcg total) by mouth daily 90 tablet 0    sildenafil (VIAGRA) 50 MG tablet       Dapagliflozin Propanediol (Farxiga) 10 MG TABS Take one tablet with breakfast 30 tablet 5    ketoconazole (NIZORAL) 2 % cream Apply topically daily (Patient not taking: Reported on 1/12/2022 ) 60 g 1    metFORMIN (GLUMETZA) 1000 MG (MOD) 24 hr tablet Take 1 tablet (1,000 mg total) by mouth 2 (two) times a day with meals 60 tablet 0    Semaglutide,0 25 or 0 5MG/DOS, (Ozempic, 0 25 or 0 5 MG/DOSE,) 2 MG/1 5ML SOPN Inject 0 25 mg once a week 2 mL 4     No current facility-administered medications for this visit  Allergies   Allergen Reactions    Penicillins      SYNCOPE, but has taken amoxicillin/augmentin in past       Objective   Vitals: Blood pressure 124/62, pulse 62, temperature 97 6 °F (36 4 °C), weight 131 kg (289 lb)  Physical Exam  Vitals reviewed  Constitutional:       General: He is not in acute distress  Appearance: Normal appearance  He is well-developed  He is obese  HENT:      Head: Normocephalic and atraumatic  Eyes:      Pupils: Pupils are equal, round, and reactive to light  Neck:      Thyroid: No thyromegaly  Cardiovascular:      Rate and Rhythm: Normal rate and regular rhythm  Heart sounds: Normal heart sounds  Pulmonary:      Effort: Pulmonary effort is normal  No respiratory distress  Breath sounds: Normal breath sounds  Musculoskeletal:         General: No deformity  Normal range of motion  Cervical back: Normal range of motion and neck supple  Skin:     General: Skin is warm and dry  Findings: No erythema  Neurological:      General: No focal deficit present  Mental Status: He is alert and oriented to person, place, and time     Psychiatric:         Mood and Affect: Mood normal          Behavior: Behavior normal          The history was obtained from the review of the chart, patient  Lab Results:   Lab Results   Component Value Date/Time    Hemoglobin A1C 9 9 (H) 01/05/2022 04:07 PM    Hemoglobin A1C 8 3 (H) 06/28/2021 08:34 AM    Hemoglobin A1C 9 8 (H) 03/02/2021 12:35 PM    WBC 9 52 08/23/2021 07:14 PM    WBC 8 27 08/23/2021 01:39 PM    Hemoglobin 12 2 08/23/2021 07:14 PM    Hemoglobin 12 0 08/23/2021 01:39 PM    Hematocrit 38 0 08/23/2021 07:14 PM    Hematocrit 37 5 08/23/2021 01:39 PM    MCV 97 08/23/2021 07:14 PM    MCV 96 08/23/2021 01:39 PM    Platelets 260 83/64/1117 07:14 PM    Platelets 554 17/73/5435 01:39 PM    BUN 18 01/05/2022 04:07 PM    BUN 20 09/01/2021 12:40 PM    BUN 33 (H) 08/23/2021 09:34 PM    Potassium 4 9 01/05/2022 04:07 PM    Potassium 5 2 09/01/2021 12:40 PM    Potassium 4 3 08/23/2021 09:34 PM    Chloride 97 (L) 01/05/2022 04:07 PM    Chloride 104 09/01/2021 12:40 PM    Chloride 108 08/23/2021 09:34 PM    CO2 26 01/05/2022 04:07 PM    CO2 26 09/01/2021 12:40 PM    CO2 20 (L) 08/23/2021 09:34 PM    Creatinine 0 96 01/05/2022 04:07 PM    Creatinine 1 05 09/01/2021 12:40 PM    Creatinine 1 40 (H) 08/23/2021 09:34 PM    AST 22 01/05/2022 04:07 PM    AST 16 06/28/2021 08:34 AM    AST 17 03/02/2021 12:35 PM    ALT 30 01/05/2022 04:07 PM    ALT 28 06/28/2021 08:34 AM    ALT 32 03/02/2021 12:35 PM    Albumin 3 5 01/05/2022 04:07 PM    Albumin 3 8 06/28/2021 08:34 AM    Albumin 3 5 03/02/2021 12:35 PM    HDL, Direct 41 06/28/2021 08:34 AM    Triglycerides 282 (H) 06/28/2021 08:34 AM           Imaging Studies: I have personally reviewed pertinent reports  Portions of the record may have been created with voice recognition software  Occasional wrong word or "sound a like" substitutions may have occurred due to the inherent limitations of voice recognition software  Read the chart carefully and recognize, using context, where substitutions have occurred

## 2022-01-23 DIAGNOSIS — M54.41 CHRONIC MIDLINE LOW BACK PAIN WITH RIGHT-SIDED SCIATICA: ICD-10-CM

## 2022-01-23 DIAGNOSIS — G89.29 CHRONIC MIDLINE LOW BACK PAIN WITH RIGHT-SIDED SCIATICA: ICD-10-CM

## 2022-01-23 DIAGNOSIS — E11.22 TYPE 2 DIABETES MELLITUS WITH STAGE 3A CHRONIC KIDNEY DISEASE, WITHOUT LONG-TERM CURRENT USE OF INSULIN (HCC): Primary | ICD-10-CM

## 2022-01-23 DIAGNOSIS — N18.31 TYPE 2 DIABETES MELLITUS WITH STAGE 3A CHRONIC KIDNEY DISEASE, WITHOUT LONG-TERM CURRENT USE OF INSULIN (HCC): Primary | ICD-10-CM

## 2022-01-23 DIAGNOSIS — I10 ESSENTIAL HYPERTENSION: ICD-10-CM

## 2022-01-24 RX ORDER — METFORMIN HYDROCHLORIDE 500 MG/1
1000 TABLET, EXTENDED RELEASE ORAL 2 TIMES DAILY WITH MEALS
Qty: 90 TABLET | Refills: 1 | Status: SHIPPED | OUTPATIENT
Start: 2022-01-24 | End: 2022-03-13 | Stop reason: SDUPTHER

## 2022-01-24 RX ORDER — HYDROCODONE BITARTRATE AND ACETAMINOPHEN 5; 325 MG/1; MG/1
1 TABLET ORAL DAILY PRN
Qty: 30 TABLET | Refills: 0 | Status: SHIPPED | OUTPATIENT
Start: 2022-01-24 | End: 2022-01-26 | Stop reason: SDUPTHER

## 2022-01-24 RX ORDER — CARVEDILOL 12.5 MG/1
12.5 TABLET ORAL 2 TIMES DAILY WITH MEALS
Qty: 180 TABLET | Refills: 0 | Status: SHIPPED | OUTPATIENT
Start: 2022-01-24 | End: 2022-04-21

## 2022-01-24 RX ORDER — METFORMIN HYDROCHLORIDE 1000 MG/1
1000 TABLET, FILM COATED, EXTENDED RELEASE ORAL 2 TIMES DAILY WITH MEALS
Qty: 60 TABLET | Refills: 0 | Status: CANCELLED | OUTPATIENT
Start: 2022-01-24

## 2022-01-26 DIAGNOSIS — M54.41 CHRONIC MIDLINE LOW BACK PAIN WITH RIGHT-SIDED SCIATICA: ICD-10-CM

## 2022-01-26 DIAGNOSIS — G89.29 CHRONIC MIDLINE LOW BACK PAIN WITH RIGHT-SIDED SCIATICA: ICD-10-CM

## 2022-01-27 DIAGNOSIS — G89.29 CHRONIC MIDLINE LOW BACK PAIN WITH RIGHT-SIDED SCIATICA: ICD-10-CM

## 2022-01-27 DIAGNOSIS — M54.41 CHRONIC MIDLINE LOW BACK PAIN WITH RIGHT-SIDED SCIATICA: ICD-10-CM

## 2022-01-27 RX ORDER — HYDROCODONE BITARTRATE AND ACETAMINOPHEN 5; 325 MG/1; MG/1
1 TABLET ORAL DAILY PRN
Qty: 30 TABLET | Refills: 0 | Status: SHIPPED | OUTPATIENT
Start: 2022-01-27 | End: 2022-03-13 | Stop reason: SDUPTHER

## 2022-01-27 RX ORDER — HYDROCODONE BITARTRATE AND ACETAMINOPHEN 5; 325 MG/1; MG/1
1 TABLET ORAL DAILY PRN
Qty: 30 TABLET | Refills: 0 | Status: SHIPPED | OUTPATIENT
Start: 2022-01-27 | End: 2022-01-27 | Stop reason: SDUPTHER

## 2022-01-29 DIAGNOSIS — E78.2 MIXED HYPERLIPIDEMIA: ICD-10-CM

## 2022-01-29 DIAGNOSIS — I10 BENIGN ESSENTIAL HYPERTENSION: ICD-10-CM

## 2022-01-31 DIAGNOSIS — N52.9 ERECTILE DYSFUNCTION, UNSPECIFIED ERECTILE DYSFUNCTION TYPE: Primary | ICD-10-CM

## 2022-01-31 RX ORDER — ATORVASTATIN CALCIUM 20 MG/1
20 TABLET, FILM COATED ORAL DAILY
Qty: 90 TABLET | Refills: 0 | Status: SHIPPED | OUTPATIENT
Start: 2022-01-31 | End: 2022-04-21 | Stop reason: SDUPTHER

## 2022-01-31 RX ORDER — AMLODIPINE BESYLATE 10 MG/1
10 TABLET ORAL DAILY
Qty: 90 TABLET | Refills: 0 | Status: SHIPPED | OUTPATIENT
Start: 2022-01-31 | End: 2022-05-19 | Stop reason: SDUPTHER

## 2022-01-31 RX ORDER — SILDENAFIL 50 MG/1
50 TABLET, FILM COATED ORAL AS NEEDED
Qty: 10 TABLET | Refills: 1 | Status: SHIPPED | OUTPATIENT
Start: 2022-01-31 | End: 2022-04-21 | Stop reason: SDUPTHER

## 2022-02-24 ENCOUNTER — OFFICE VISIT (OUTPATIENT)
Dept: PODIATRY | Facility: CLINIC | Age: 67
End: 2022-02-24
Payer: COMMERCIAL

## 2022-02-24 VITALS
HEIGHT: 70 IN | BODY MASS INDEX: 40.23 KG/M2 | HEART RATE: 69 BPM | DIASTOLIC BLOOD PRESSURE: 84 MMHG | WEIGHT: 281 LBS | SYSTOLIC BLOOD PRESSURE: 135 MMHG

## 2022-02-24 DIAGNOSIS — E11.65 UNCONTROLLED TYPE 2 DIABETES MELLITUS WITH HYPERGLYCEMIA (HCC): Primary | ICD-10-CM

## 2022-02-24 DIAGNOSIS — B35.3 TINEA PEDIS OF BOTH FEET: ICD-10-CM

## 2022-02-24 DIAGNOSIS — B35.1 ONYCHOMYCOSIS: ICD-10-CM

## 2022-02-24 PROCEDURE — 1160F RVW MEDS BY RX/DR IN RCRD: CPT | Performed by: PODIATRIST

## 2022-02-24 PROCEDURE — 3075F SYST BP GE 130 - 139MM HG: CPT | Performed by: PODIATRIST

## 2022-02-24 PROCEDURE — 1036F TOBACCO NON-USER: CPT | Performed by: PODIATRIST

## 2022-02-24 PROCEDURE — 3079F DIAST BP 80-89 MM HG: CPT | Performed by: PODIATRIST

## 2022-02-24 PROCEDURE — 3008F BODY MASS INDEX DOCD: CPT | Performed by: PODIATRIST

## 2022-02-24 PROCEDURE — 99203 OFFICE O/P NEW LOW 30 MIN: CPT | Performed by: PODIATRIST

## 2022-02-24 RX ORDER — CLOTRIMAZOLE AND BETAMETHASONE DIPROPIONATE 10; .64 MG/G; MG/G
CREAM TOPICAL 2 TIMES DAILY
Qty: 45 G | Refills: 3 | Status: SHIPPED | OUTPATIENT
Start: 2022-02-24

## 2022-02-24 RX ORDER — FLASH GLUCOSE SENSOR
KIT MISCELLANEOUS
COMMUNITY
Start: 2022-01-03 | End: 2022-03-24

## 2022-02-24 NOTE — LETTER
February 25, 2022     [address] [place] [address]    Patient: Lazaro Wynne   YOB: 1955   Date of Visit: 2/24/2022       Dear Dr Trejo Payment: Thank you for referring Lazaro Wynne to me for evaluation  Below are my notes for this consultation  If you have questions, please do not hesitate to call me  I look forward to following your patient along with you  Sincerely,        Maya Ravi DPM        CC: [facility]  2/25/2022  3:07 PM  Sign when Signing Visit        PATIENT:  Lazrao Wynne    1955      ASSESSMENT:     1  Uncontrolled type 2 diabetes mellitus with hyperglycemia ([address] )  Ambulatory referral to Podiatry   2  Tinea pedis of both feet  clotrimazole-betamethasone (LOTRISONE) 1-0 05 % cream   3  Onychomycosis           PLAN:  1  Patient was counseled on the condition and diagnosis  2   Educated disease prevention and risks related to diabetes  3   Educated proper daily foot care and exam   Instructed proper skin care / protection and footwear  Instructed to identify any signs of infection and related foot problem  4   The recent blood work was reviewed and the last HbA1c was 9 9  Discussed proper blood glucose control with diet and exercise  5  Start him on Lotrisone cream for tinea pedis  6  Nails debrided  He would not be a good candidate for medical treatment of onychomycosis  Will proceed with periodic foot care  7  Instructed compression, elevation, and low sodium diet to reduce LE edema  8  The patient will return in 9 weeks  Subjective:      HPI  The patient was referred to my office for diabetic foot evaluation  The patient has diabetes for 10 years  The blood glucose is not in good control  The patient denied any history of diabetic foot ulcer, related foot infection, or amputation  He has some numbness and paresthesia in his feet    Denied weakness or significant functional deficit  He has dry skin in his feet and skin cream does not seem to help  He also complained of thick toenails  He had them for a long time and difficulty trimming them at home  The following portions of the patient's history were reviewed and updated as appropriate: allergies, current medications, past family history, past medical history, past social history, past surgical history and problem list   All pertinent labs and images were reviewed  Past Medical History  Past Medical History:   Diagnosis Date    BPH (benign prostatic hyperplasia)     Chronic kidney disease     CPAP (continuous positive airway pressure) dependence     Diabetes mellitus ([facility] [address] )     Disease of thyroid gland     Hyperlipidemia     Hypertension     Sleep apnea     Type 2 diabetes mellitus with diabetic chronic kidney disease ([facility] [address] ) 6/24/2021       Past Surgical History  Past Surgical History:   Procedure Laterality Date    COLONOSCOPY      2016    [place] EGD TRANSORAL BIOPSY SINGLE/MULTIPLE N/A 5/17/2017    Procedure: ESOPHAGOGASTRODUODENOSCOPY (EGD) with bx;  Surgeon: Joyce Choi MD;  Location: [facility];   Service: Bariatrics    TONSILLECTOMY          Allergies:  Penicillins    Medications:  Current Outpatient Medications   Medication Sig Dispense Refill    amLODIPine (NORVASC) 10 mg tablet Take 1 tablet (10 mg total) by mouth daily 90 tablet 0    aspirin (ECOTRIN LOW STRENGTH) 81 mg EC tablet Take 81 mg by mouth daily      atorvastatin (LIPITOR) 20 mg tablet Take 1 tablet (20 mg total) by mouth daily 90 tablet 0    Blood Glucose Calibration (OT ULTRA/FASTTK CNTRL SOLN) SOLN by In Vitro route as needed (to calibrate blood sugar meter) 1 each 1    carvedilol (COREG) 12 5 mg tablet Take 1 tablet (12 5 mg total) by mouth 2 (two) times a day with meals 180 tablet 0    Continuous Blood Gluc  (DataMotionyle Paul 14 Day [place]) LEO USE TO CHECK BLOOD GLUCOSE      Continuous Blood Gluc Sensor (FreeStyle Paul 14 Day Sensor) MISC APPLY 1 SENSOR TOPICALLY EVERY 14 DAYS      Dapagliflozin Propanediol (Farxiga) 10 MG TABS Take one tablet with breakfast 30 tablet 5    glimepiride (AMARYL) 4 mg tablet Take one tablet twice a day 180 tablet 0    HYDROcodone-acetaminophen (NORCO) 5-325 mg per tablet Take 1 tablet by mouth daily as needed for pain Max Daily Amount: 1 tablet 30 tablet 0    insulin glargine (Lantus SoloStar) 100 units/mL injection pen Inject 20 units at bedtime 15 mL 0    Insulin Pen Needle (Pen Needles) 31G X 5 MM MISC Use in the morning 100 each 0    levothyroxine 75 mcg tablet Take 1 tablet (75 mcg total) by mouth daily 90 tablet 0    metFORMIN (GLUCOPHAGE-XR) 500 mg 24 hr tablet Take 2 tablets (1,000 mg total) by mouth 2 (two) times a day with meals 90 tablet 1    Semaglutide,0 25 or 0 5MG/DOS, (Ozempic, 0 25 or 0 5 MG/DOSE,) 2 MG/1 5ML SOPN Inject 0 25 mg once a week 2 mL 4    sildenafil (VIAGRA) 50 MG tablet Take 1 tablet (50 mg total) by mouth as needed for erectile dysfunction 10 tablet 1    clotrimazole-betamethasone (LOTRISONE) 1-0 05 % cream Apply topically 2 (two) times a day 45 g 3    ketoconazole (NIZORAL) 2 % cream Apply topically daily (Patient not taking: Reported on 2022 ) 60 g 1     No current facility-administered medications for this visit         Social History:  Social History     Socioeconomic History    Marital status: /Civil Union     Spouse name: None    Number of children: None    Years of education: None    Highest education level: None   Occupational History    None   Tobacco Use    Smoking status: Former Smoker     Packs/day: 2 00     Years: 0 00     Pack years: 0 00     Types: Cigarettes     Quit date:      Years since quittin 1    Smokeless tobacco: Never Used   Vaping Use    Vaping Use: Never used   Substance and Sexual Activity    Alcohol use: Yes     Comment: social    Drug use: No    Sexual activity: Yes     Partners: Female     Birth control/protection: None   Other Topics Concern    None   Social History Narrative         Social Determinants of Health     Financial Resource Strain: Not on file   Food Insecurity: Not on file   Transportation Needs: Not on file   Physical Activity: Not on file   Stress: Not on file   Social Connections: Not on file   Intimate Partner Violence: Not on file   Housing Stability: Not on file        Review of Systems   Constitutional: Negative for appetite change, chills and fever  HENT: Negative for sore throat  Eyes: Negative for visual disturbance  Respiratory: Negative for cough and shortness of breath  Cardiovascular: Positive for leg swelling  Negative for chest pain  Gastrointestinal: Negative for diarrhea, nausea and vomiting  Musculoskeletal: Positive for arthralgias and back pain  Skin: Negative for wound  Neurological: Negative for weakness  Hematological: Negative  Psychiatric/Behavioral: Negative for behavioral problems and confusion  Objective:      /84   Pulse 69   Ht 5' 10" (1 778 m) Comment: verbal  Wt 127 kg (281 lb)   BMI 40 32 kg/m²          Physical Exam  Vitals reviewed  Constitutional:       General: He is not in acute distress  Appearance: He is obese  He is not toxic-appearing  HENT:      Head: Normocephalic and atraumatic  Eyes:      Extraocular Movements: Extraocular movements intact  Cardiovascular:      Rate and Rhythm: Normal rate and regular rhythm  Pulses: no weak pulses          Dorsalis pedis pulses are 2+ on the right side and 2+ on the left side  Posterior tibial pulses are 1+ on the right side and 1+ on the left side  Pulmonary:      Effort: Pulmonary effort is normal  No respiratory distress  Musculoskeletal:         General: No tenderness or signs of injury  Cervical back: Normal range of motion and neck supple  Right lower leg: Edema present        Left lower leg: Edema present  Right foot: No Charcot foot or foot drop  Left foot: No Charcot foot or foot drop  Feet:      Right foot:      Protective Sensation: 10 sites tested  9 sites sensed  Skin integrity: Dry skin present  No ulcer or skin breakdown  Left foot:      Protective Sensation: 10 sites tested  10 sites sensed  Skin integrity: Dry skin present  No ulcer or skin breakdown  Skin:     General: Skin is warm  Capillary Refill: Capillary refill takes less than 2 seconds  Coloration: Skin is not cyanotic or mottled  Findings: No abscess, ecchymosis or wound  Nails: There is no clubbing  Comments: Thick, mycotic nails x 6  Chronic tinea pedis noted with skin peeling and redness  Neurological:      General: No focal deficit present  Mental Status: He is alert and oriented to person, place, and time  Cranial Nerves: No cranial nerve deficit  Sensory: No sensory deficit  Motor: No weakness  Coordination: Coordination normal    Psychiatric:         Mood and Affect: Mood normal          Behavior: Behavior normal          Thought Content: Thought content normal          Judgment: Judgment normal            Diabetic Foot Exam    Patient's shoes and socks removed  Right Foot/Ankle   Right Foot Inspection  Skin Exam: skin intact and dry skin  No maceration and no ulcer  Toe Exam: No swelling, no tenderness, erythema and  no right toe deformity    Sensory   Vibration: diminished  Proprioception: intact  Monofilament testing: intact    Vascular  Capillary refills: < 3 seconds  The right DP pulse is 2+  The right PT pulse is 1+  Left Foot/Ankle  Left Foot Inspection  Skin Exam: skin intact and dry skin  No maceration and no ulcer  Toe Exam: No swelling, no tenderness, no erythema and no left toe deformity       Sensory   Vibration: diminished  Proprioception: intact  Monofilament testing: intact    Vascular  Capillary refills: < 3 seconds  The left DP pulse is 2+  The left PT pulse is 1+       Assign Risk Category  No deformity present  No loss of protective sensation  No weak pulses  Risk: 0

## 2022-02-24 NOTE — PROGRESS NOTES
PATIENT:  Skip Huber    1955      ASSESSMENT:     1  Uncontrolled type 2 diabetes mellitus with hyperglycemia ([address] )  Ambulatory referral to Podiatry   2  Tinea pedis of both feet  clotrimazole-betamethasone (LOTRISONE) 1-0 05 % cream   3  Onychomycosis           PLAN:  1  Patient was counseled on the condition and diagnosis  2   Educated disease prevention and risks related to diabetes  3   Educated proper daily foot care and exam   Instructed proper skin care / protection and footwear  Instructed to identify any signs of infection and related foot problem  4   The recent blood work was reviewed and the last HbA1c was 9 9  Discussed proper blood glucose control with diet and exercise  5  Start him on Lotrisone cream for tinea pedis  6  Nails debrided  He would not be a good candidate for medical treatment of onychomycosis  Will proceed with periodic foot care  7  Instructed compression, elevation, and low sodium diet to reduce LE edema  8  The patient will return in 9 weeks  Subjective:      HPI  The patient was referred to my office for diabetic foot evaluation  The patient has diabetes for 10 years  The blood glucose is not in good control  The patient denied any history of diabetic foot ulcer, related foot infection, or amputation  He has some numbness and paresthesia in his feet  Denied weakness or significant functional deficit  He has dry skin in his feet and skin cream does not seem to help  He also complained of thick toenails  He had them for a long time and difficulty trimming them at home  The following portions of the patient's history were reviewed and updated as appropriate: allergies, current medications, past family history, past medical history, past social history, past surgical history and problem list   All pertinent labs and images were reviewed      Past Medical History  Past Medical History:   Diagnosis Date    BPH (benign prostatic hyperplasia)     Chronic kidney disease     CPAP (continuous positive airway pressure) dependence     Diabetes mellitus ([facility] )     Disease of thyroid gland     Hyperlipidemia     Hypertension     Sleep apnea     Type 2 diabetes mellitus with diabetic chronic kidney disease ([facility] [address] [facility] ) 6/24/2021       Past Surgical History  Past Surgical History:   Procedure Laterality Date    COLONOSCOPY      2016    GA EGD TRANSORAL BIOPSY SINGLE/MULTIPLE N/A 5/17/2017    Procedure: ESOPHAGOGASTRODUODENOSCOPY (EGD) with bx;  Surgeon: Adrián Carrasco MD;  Location: [facility];   Service: Bariatrics    TONSILLECTOMY          Allergies:  Penicillins    Medications:  Current Outpatient Medications   Medication Sig Dispense Refill    amLODIPine (NORVASC) 10 mg tablet Take 1 tablet (10 mg total) by mouth daily 90 tablet 0    aspirin (ECOTRIN LOW STRENGTH) 81 mg EC tablet Take 81 mg by mouth daily      atorvastatin (LIPITOR) 20 mg tablet Take 1 tablet (20 mg total) by mouth daily 90 tablet 0    Blood Glucose Calibration (OT ULTRA/FASTTK CNTRL SOLN) SOLN by In Vitro route as needed (to calibrate blood sugar meter) 1 each 1    carvedilol (COREG) 12 5 mg tablet Take 1 tablet (12 5 mg total) by mouth 2 (two) times a day with meals 180 tablet 0    Continuous Blood Gluc  (FreeStyle Paul 14 Day Sanborn) LEO USE TO CHECK BLOOD GLUCOSE      Continuous Blood Gluc Sensor (FreeStyle Paul 14 Day Sensor) MISC APPLY 1 SENSOR TOPICALLY EVERY 14 DAYS      Dapagliflozin Propanediol (Farxiga) 10 MG TABS Take one tablet with breakfast 30 tablet 5    glimepiride (AMARYL) 4 mg tablet Take one tablet twice a day 180 tablet 0    HYDROcodone-acetaminophen (NORCO) 5-325 mg per tablet Take 1 tablet by mouth daily as needed for pain Max Daily Amount: 1 tablet 30 tablet 0    insulin glargine (Lantus SoloStar) 100 units/mL injection pen Inject 20 units at bedtime 15 mL 0    Insulin Pen Needle (Pen Needles) 31G X 5 MM MISC Use in the morning 100 each 0    levothyroxine 75 mcg tablet Take 1 tablet (75 mcg total) by mouth daily 90 tablet 0    metFORMIN (GLUCOPHAGE-XR) 500 mg 24 hr tablet Take 2 tablets (1,000 mg total) by mouth 2 (two) times a day with meals 90 tablet 1    Semaglutide,0 25 or 0 5MG/DOS, (Ozempic, 0 25 or 0 5 MG/DOSE,) 2 MG/1 5ML SOPN Inject 0 25 mg once a week 2 mL 4    sildenafil (VIAGRA) 50 MG tablet Take 1 tablet (50 mg total) by mouth as needed for erectile dysfunction 10 tablet 1    clotrimazole-betamethasone (LOTRISONE) 1-0 05 % cream Apply topically 2 (two) times a day 45 g 3    ketoconazole (NIZORAL) 2 % cream Apply topically daily (Patient not taking: Reported on 2022 ) 60 g 1     No current facility-administered medications for this visit  Social History:  Social History     Socioeconomic History    Marital status: /Civil Union     Spouse name: None    Number of children: None    Years of education: None    Highest education level: None   Occupational History    None   Tobacco Use    Smoking status: Former Smoker     Packs/day: 2 00     Years: 0 00     Pack years: 0 00     Types: Cigarettes     Quit date:      Years since quittin 1    Smokeless tobacco: Never Used   Vaping Use    Vaping Use: Never used   Substance and Sexual Activity    Alcohol use: Yes     Comment: social    Drug use: No    Sexual activity: Yes     Partners: Female     Birth control/protection: None   Other Topics Concern    None   Social History Narrative         Social Determinants of Health     Financial Resource Strain: Not on file   Food Insecurity: Not on file   Transportation Needs: Not on file   Physical Activity: Not on file   Stress: Not on file   Social Connections: Not on file   Intimate Partner Violence: Not on file   Housing Stability: Not on file        Review of Systems   Constitutional: Negative for appetite change, chills and fever  HENT: Negative for sore throat      Eyes: Negative for visual disturbance  Respiratory: Negative for cough and shortness of breath  Cardiovascular: Positive for leg swelling  Negative for chest pain  Gastrointestinal: Negative for diarrhea, nausea and vomiting  Musculoskeletal: Positive for arthralgias and back pain  Skin: Negative for wound  Neurological: Negative for weakness  Hematological: Negative  Psychiatric/Behavioral: Negative for behavioral problems and confusion  Objective:      /84   Pulse 69   Ht 5' 10" (1 778 m) Comment: verbal  Wt 127 kg (281 lb)   BMI 40 32 kg/m²          Physical Exam  Vitals reviewed  Constitutional:       General: He is not in acute distress  Appearance: He is obese  He is not toxic-appearing  HENT:      Head: Normocephalic and atraumatic  Eyes:      Extraocular Movements: Extraocular movements intact  Cardiovascular:      Rate and Rhythm: Normal rate and regular rhythm  Pulses: no weak pulses          Dorsalis pedis pulses are 2+ on the right side and 2+ on the left side  Posterior tibial pulses are 1+ on the right side and 1+ on the left side  Pulmonary:      Effort: Pulmonary effort is normal  No respiratory distress  Musculoskeletal:         General: No tenderness or signs of injury  Cervical back: Normal range of motion and neck supple  Right lower leg: Edema present  Left lower leg: Edema present  Right foot: No Charcot foot or foot drop  Left foot: No Charcot foot or foot drop  Feet:      Right foot:      Protective Sensation: 10 sites tested  9 sites sensed  Skin integrity: Dry skin present  No ulcer or skin breakdown  Left foot:      Protective Sensation: 10 sites tested  10 sites sensed  Skin integrity: Dry skin present  No ulcer or skin breakdown  Skin:     General: Skin is warm  Capillary Refill: Capillary refill takes less than 2 seconds  Coloration: Skin is not cyanotic or mottled        Findings: No abscess, ecchymosis or wound  Nails: There is no clubbing  Comments: Thick, mycotic nails x 6  Chronic tinea pedis noted with skin peeling and redness  Neurological:      General: No focal deficit present  Mental Status: He is alert and oriented to person, place, and time  Cranial Nerves: No cranial nerve deficit  Sensory: No sensory deficit  Motor: No weakness  Coordination: Coordination normal    Psychiatric:         Mood and Affect: Mood normal          Behavior: Behavior normal          Thought Content: Thought content normal          Judgment: Judgment normal            Diabetic Foot Exam    Patient's shoes and socks removed  Right Foot/Ankle   Right Foot Inspection  Skin Exam: skin intact and dry skin  No maceration and no ulcer  Toe Exam: No swelling, no tenderness, erythema and  no right toe deformity    Sensory   Vibration: diminished  Proprioception: intact  Monofilament testing: intact    Vascular  Capillary refills: < 3 seconds  The right DP pulse is 2+  The right PT pulse is 1+  Left Foot/Ankle  Left Foot Inspection  Skin Exam: skin intact and dry skin  No maceration and no ulcer  Toe Exam: No swelling, no tenderness, no erythema and no left toe deformity  Sensory   Vibration: diminished  Proprioception: intact  Monofilament testing: intact    Vascular  Capillary refills: < 3 seconds  The left DP pulse is 2+  The left PT pulse is 1+       Assign Risk Category  No deformity present  No loss of protective sensation  No weak pulses  Risk: 0

## 2022-02-24 NOTE — PATIENT INSTRUCTIONS
Foot Care for People with Diabetes   AMBULATORY CARE:   What you need to know about foot care:   · Foot care helps protect your feet and prevent foot ulcers or sores  Long-term high blood sugar levels can damage the blood vessels and nerves in your legs and feet  This damage makes it hard to feel pressure, pain, temperature, and touch  You may not be able to feel a cut or sore, or shoes that are too tight  Foot care is needed to prevent serious problems, such as an infection or amputation  · Diabetes may cause your toes to become crooked or curved under  These changes may affect the way you walk and can lead to increased pressure on your foot  The pressure can decrease blood flow to your feet  Lack of blood flow increases your risk for a foot ulcer  Do not ignore small problems, such as dry skin or small wounds  These can become life-threatening over time without proper care  Call your care team provider if:   · Your feet become numb, weak, or hard to move  · You have pus draining from a sore on your foot  · You have a wound on your foot that gets bigger, deeper, or does not heal      · You see blisters, cuts, scratches, calluses, or sores on your foot  · You have a fever, and your feet become red, warm, and swollen  · Your toenails become thick, curled, or yellow  · You find it hard to check your feet because your vision is poor  · You have questions or concerns about your condition or care  How to care for your feet:   · Check your feet each day  Look at your whole foot, including the bottom, and between and under your toes  Check for wounds, corns, and calluses  Use a mirror to see the bottom of your feet  The skin on your feet may be shiny, tight, or darker than normal  Your feet may also be cold and pale  Feel your feet by running your hands along the tops, bottoms, sides, and between your toes   Redness, swelling, and warmth are signs of blood flow problems that can lead to a foot ulcer  Do not try to remove corns or calluses yourself  · Wash your feet each day with soap and warm water  Do not use hot water, because this can injure your foot  Dry your feet gently with a towel after you wash them  Dry between and under your toes  · Apply lotion or a moisturizer on your dry feet  Ask your care team provider what lotions are best to use  Do not put lotion or moisturizer between your toes  Moisture between your toes could lead to skin breakdown  · Cut your toenails correctly  File or cut your toenails straight across  Use a soft brush to clean around your toenails  If your toenails are very thick, you may need to have a care team provider or specialist cut them  · Protect your feet  Do not walk barefoot or wear your shoes without socks  Check your shoes for rocks or other objects that can hurt your feet  Wear cotton socks to help keep your feet dry  Wear socks without toe seams, or wear them with the seams inside out  Change your socks each day  Do not wear socks that are dirty or damp  · Wear shoes that fit well  Wear shoes that do not rub against any area of your feet  Your shoes should be ½ to ¾ inch (1 to 2 centimeters) longer than your feet  Your shoes should also have extra space around the widest part of your feet  Walking or athletic shoes with laces or straps that adjust are best  Ask your care team provider for help to choose shoes that fit you best  Ask him or her if you need to wear an insert, orthotic, or bandage on your feet  · Go to your follow-up visits  Your care team provider will do a foot exam at least once a year  You may need a foot exam more often if you have nerve damage, foot deformities, or ulcers  He will check for nerve damage and how well you can feel your feet  He will check your shoes to see if they fit well  · Do not smoke  Smoking can damage your blood vessels and put you at increased risk for foot ulcers   Ask your care team provider for information if you currently smoke and need help to quit  E-cigarettes or smokeless tobacco still contain nicotine  Talk to your care team provider before you use these products  Follow up with your diabetes care team provider or foot specialist as directed: You will need to have your feet checked at least once a year  You may need a foot exam more often if you have nerve damage, foot deformities, or ulcers  Write down your questions so you remember to ask them during your visits  © Copyright [employer] 2021 Information is for End User's use only and may not be sold, redistributed or otherwise used for commercial purposes  All illustrations and images included in CareNotes® are the copyrighted property of [employer] M [employer] Inc  or [facility]   The above information is an  only  It is not intended as medical advice for individual conditions or treatments  Talk to your doctor, nurse or pharmacist before following any medical regimen to see if it is safe and effective for you  Athlete's Foot   WHAT YOU NEED TO KNOW:   What is athlete's foot? Athlete's foot is a foot infection caused by a fungus  What increases my risk for athlete's foot? Athlete's foot is spread when an infected person shares towels or walks barefoot in shower stalls or public locker rooms  Your risk of athlete's foot is greater if you do not wash your feet or do not change your socks every day  What are the signs and symptoms of athlete's foot? · Cracks or blisters    · Redness, swelling, itching, or burning    · Scaly or peeling skin    · Bad smelling feet    · Thick, dark skin on the bottoms or sides of your feet    · Thick, abnormal toenails    How is athlete's foot diagnosed? Your healthcare provider may be able to tell you have athlete's foot by looking at your feet  He or she may gently scrape off some of your skin and look at the sample through a microscope   This will help the provider know the type of fungus that is causing your infection  How is athlete's foot treated? Athlete's foot is usually treated with an antifungal medicine  This medicine may be given as a cream or pill  You may need a doctor's order for this medicine  Take the medicine until it is gone, even if your feet look like they are healed  How can I prevent the spread of athlete's foot? · Prevent the spread of this infection to other parts of your body  When you shower, dry your groin area and other parts of your body before you dry your feet  · Keep your feet clean and dry  Wash your feet each day and dry them well, especially between your toes  After your feet are dry, put powder on your feet and between your toes  Wear clean cotton or wool socks each day  Put your socks on first so you do not spread the infection to other areas of your body  Wear sandals, canvas tennis shoes, or other shoes that allow air to flow to your feet  This helps keep your feet dry  Do not use shoes that are tight, or made of plastic or rubber  · Soak your feet in an astringent (drying) solution as directed  if you have blisters  You may need to do this for 20 to 30 minutes, 2 times each day to help dry out the blisters  · Wear shoes in public areas  Wear shower shoes or sandals in warm, damp areas  This includes shower stalls, near swimming pools, and locker rooms  Do not share socks or shoes  Do not use public swimming pools  When should I seek immediate care? · You have a fever or chills  · You have red streaks going up your leg  When should I contact my healthcare provider? · Your infection spreads to other parts of your body  · Your infection is not better in 14 days or is not completely gone in 90 days  · The skin on your foot or leg is red and hot  · You have questions or concerns about your condition or care  CARE AGREEMENT:   You have the right to help plan your care   Learn about your health condition and how it may be treated  Discuss treatment options with your healthcare providers to decide what care you want to receive  You always have the right to refuse treatment  The above information is an  only  It is not intended as medical advice for individual conditions or treatments  Talk to your doctor, nurse or pharmacist before following any medical regimen to see if it is safe and effective for you  © Copyright [employer] 2021 Information is for End User's use only and may not be sold, redistributed or otherwise used for commercial purposes   All illustrations and images included in CareNotes® are the copyrighted property of A D A M , Inc  or [address] [place] [address] [employer]

## 2022-03-03 DIAGNOSIS — E03.9 HYPOTHYROIDISM, UNSPECIFIED TYPE: ICD-10-CM

## 2022-03-03 RX ORDER — LEVOTHYROXINE SODIUM 0.07 MG/1
TABLET ORAL
Qty: 90 TABLET | Refills: 0 | Status: SHIPPED | OUTPATIENT
Start: 2022-03-03 | End: 2022-07-19 | Stop reason: SDUPTHER

## 2022-03-13 DIAGNOSIS — N18.31 TYPE 2 DIABETES MELLITUS WITH STAGE 3A CHRONIC KIDNEY DISEASE, WITHOUT LONG-TERM CURRENT USE OF INSULIN (HCC): ICD-10-CM

## 2022-03-13 DIAGNOSIS — E11.22 TYPE 2 DIABETES MELLITUS WITH STAGE 3A CHRONIC KIDNEY DISEASE, WITHOUT LONG-TERM CURRENT USE OF INSULIN (HCC): ICD-10-CM

## 2022-03-13 DIAGNOSIS — E11.65 UNCONTROLLED TYPE 2 DIABETES MELLITUS WITH HYPERGLYCEMIA (HCC): ICD-10-CM

## 2022-03-13 DIAGNOSIS — M54.41 CHRONIC MIDLINE LOW BACK PAIN WITH RIGHT-SIDED SCIATICA: ICD-10-CM

## 2022-03-13 DIAGNOSIS — G89.29 CHRONIC MIDLINE LOW BACK PAIN WITH RIGHT-SIDED SCIATICA: ICD-10-CM

## 2022-03-13 PROCEDURE — 3066F NEPHROPATHY DOC TX: CPT | Performed by: PODIATRIST

## 2022-03-14 RX ORDER — SEMAGLUTIDE 1.34 MG/ML
INJECTION, SOLUTION SUBCUTANEOUS
Qty: 2 ML | Refills: 0 | Status: SHIPPED | OUTPATIENT
Start: 2022-03-14 | End: 2022-04-13

## 2022-03-14 RX ORDER — METFORMIN HYDROCHLORIDE 500 MG/1
1000 TABLET, EXTENDED RELEASE ORAL 2 TIMES DAILY WITH MEALS
Qty: 90 TABLET | Refills: 0 | Status: SHIPPED | OUTPATIENT
Start: 2022-03-14 | End: 2022-06-22 | Stop reason: SDUPTHER

## 2022-03-14 RX ORDER — HYDROCODONE BITARTRATE AND ACETAMINOPHEN 5; 325 MG/1; MG/1
1 TABLET ORAL DAILY PRN
Qty: 30 TABLET | Refills: 0 | Status: SHIPPED | OUTPATIENT
Start: 2022-03-14 | End: 2022-04-18 | Stop reason: SDUPTHER

## 2022-03-14 RX ORDER — INSULIN GLARGINE 100 [IU]/ML
INJECTION, SOLUTION SUBCUTANEOUS
Qty: 15 ML | Refills: 0 | Status: SHIPPED | OUTPATIENT
Start: 2022-03-14 | End: 2022-04-13

## 2022-03-15 ENCOUNTER — TELEPHONE (OUTPATIENT)
Dept: ENDOCRINOLOGY | Facility: CLINIC | Age: 67
End: 2022-03-15

## 2022-03-24 DIAGNOSIS — E11.65 UNCONTROLLED TYPE 2 DIABETES MELLITUS WITH HYPERGLYCEMIA (HCC): ICD-10-CM

## 2022-03-24 RX ORDER — PEN NEEDLE, DIABETIC 31 GX3/16"
NEEDLE, DISPOSABLE MISCELLANEOUS
Qty: 100 EACH | Refills: 0 | Status: SHIPPED | OUTPATIENT
Start: 2022-03-24 | End: 2022-07-19 | Stop reason: SDUPTHER

## 2022-03-24 RX ORDER — FLASH GLUCOSE SENSOR
KIT MISCELLANEOUS
Qty: 6 EACH | Refills: 1 | Status: SHIPPED | OUTPATIENT
Start: 2022-03-24 | End: 2022-06-13 | Stop reason: SDUPTHER

## 2022-04-05 ENCOUNTER — TELEPHONE (OUTPATIENT)
Dept: INTERNAL MEDICINE CLINIC | Facility: CLINIC | Age: 67
End: 2022-04-05

## 2022-04-05 ENCOUNTER — OFFICE VISIT (OUTPATIENT)
Dept: INTERNAL MEDICINE CLINIC | Facility: CLINIC | Age: 67
End: 2022-04-05
Payer: COMMERCIAL

## 2022-04-05 VITALS
SYSTOLIC BLOOD PRESSURE: 126 MMHG | BODY MASS INDEX: 39.37 KG/M2 | WEIGHT: 275 LBS | HEIGHT: 70 IN | OXYGEN SATURATION: 98 % | TEMPERATURE: 97.6 F | HEART RATE: 71 BPM | DIASTOLIC BLOOD PRESSURE: 82 MMHG

## 2022-04-05 DIAGNOSIS — N18.31 TYPE 2 DIABETES MELLITUS WITH STAGE 3A CHRONIC KIDNEY DISEASE, WITHOUT LONG-TERM CURRENT USE OF INSULIN (HCC): ICD-10-CM

## 2022-04-05 DIAGNOSIS — M54.12 CERVICAL RADICULOPATHY: ICD-10-CM

## 2022-04-05 DIAGNOSIS — E11.42 TYPE 2 DIABETES MELLITUS WITH DIABETIC POLYNEUROPATHY, WITHOUT LONG-TERM CURRENT USE OF INSULIN (HCC): Primary | ICD-10-CM

## 2022-04-05 DIAGNOSIS — F11.20 CONTINUOUS OPIOID DEPENDENCE (HCC): ICD-10-CM

## 2022-04-05 DIAGNOSIS — G47.33 OSA (OBSTRUCTIVE SLEEP APNEA): ICD-10-CM

## 2022-04-05 DIAGNOSIS — Z00.00 MEDICARE ANNUAL WELLNESS VISIT, SUBSEQUENT: ICD-10-CM

## 2022-04-05 DIAGNOSIS — E03.9 HYPOTHYROIDISM, UNSPECIFIED TYPE: ICD-10-CM

## 2022-04-05 DIAGNOSIS — Z12.5 SCREENING FOR PROSTATE CANCER: ICD-10-CM

## 2022-04-05 DIAGNOSIS — E11.22 TYPE 2 DIABETES MELLITUS WITH STAGE 3A CHRONIC KIDNEY DISEASE, WITHOUT LONG-TERM CURRENT USE OF INSULIN (HCC): ICD-10-CM

## 2022-04-05 DIAGNOSIS — G89.29 CHRONIC MIDLINE LOW BACK PAIN WITH RIGHT-SIDED SCIATICA: ICD-10-CM

## 2022-04-05 DIAGNOSIS — E78.2 MIXED HYPERLIPIDEMIA: ICD-10-CM

## 2022-04-05 DIAGNOSIS — M54.41 CHRONIC MIDLINE LOW BACK PAIN WITH RIGHT-SIDED SCIATICA: ICD-10-CM

## 2022-04-05 DIAGNOSIS — I10 ESSENTIAL HYPERTENSION: ICD-10-CM

## 2022-04-05 PROCEDURE — 3288F FALL RISK ASSESSMENT DOCD: CPT | Performed by: NURSE PRACTITIONER

## 2022-04-05 PROCEDURE — G0439 PPPS, SUBSEQ VISIT: HCPCS | Performed by: NURSE PRACTITIONER

## 2022-04-05 PROCEDURE — 1170F FXNL STATUS ASSESSED: CPT | Performed by: NURSE PRACTITIONER

## 2022-04-05 PROCEDURE — 99214 OFFICE O/P EST MOD 30 MIN: CPT | Performed by: NURSE PRACTITIONER

## 2022-04-05 PROCEDURE — 3066F NEPHROPATHY DOC TX: CPT | Performed by: PHYSICIAN ASSISTANT

## 2022-04-05 PROCEDURE — 3725F SCREEN DEPRESSION PERFORMED: CPT | Performed by: NURSE PRACTITIONER

## 2022-04-05 PROCEDURE — 1125F AMNT PAIN NOTED PAIN PRSNT: CPT | Performed by: NURSE PRACTITIONER

## 2022-04-05 RX ORDER — GABAPENTIN 100 MG/1
100 CAPSULE ORAL 3 TIMES DAILY
Qty: 90 CAPSULE | Refills: 0 | Status: SHIPPED | OUTPATIENT
Start: 2022-04-05 | End: 2022-07-19 | Stop reason: SDUPTHER

## 2022-04-05 NOTE — ASSESSMENT & PLAN NOTE
Check labs  Patient insurance will not pay for robaxin anymore needs a script for alternative medication.  Also needs referral for pain Management because her surgeon has released her and said she needs to get her pain meds from PCP.  Advised that you do not give Oxycodone and she would need to see pain management for that and she was suppose to have a referral put in  For ENT as well and there is not one in.

## 2022-04-05 NOTE — PROGRESS NOTES
Assessment and Plan:     Problem List Items Addressed This Visit        Endocrine    Type 2 diabetes mellitus with diabetic polyneuropathy, without long-term current use of insulin ([facility] ) - Primary     Due for labs  On metformin, amaryl, farxiga, ozempic, and lantus  Sees endo  Referral provided to schedule an eye exam           Relevant Orders    Comprehensive metabolic panel    Ambulatory Referral to Ophthalmology    Hypothyroid     On levothyroxine  Due for TSH  Relevant Orders    TSH, 3rd generation with Free T4 reflex    Type 2 diabetes mellitus with diabetic chronic kidney disease ([facility] )     Check labs  Respiratory    JOSE RAUL (obstructive sleep apnea)     Referral provided for sleep medicine last visit  Cardiovascular and Mediastinum    Essential hypertension     Well controlled  Continue amlodipine and carvedilol             Nervous and Auditory    Chronic midline low back pain with right-sided sciatica     Completed PT in the past   With worsening symptoms  Takes hydrocodone as needed, pain contract signed today  Referral provided for pain management for possible intervention  Relevant Medications    gabapentin (Neurontin) 100 mg capsule    Other Relevant Orders    Ambulatory Referral to Pain Management    Cervical radiculopathy     Check cervical xray  Start PT  Referral provided for pain management  Trial gabapentin once daily for 3 days then twice daily for 3 days then 3 times daily            Relevant Medications    gabapentin (Neurontin) 100 mg capsule    Other Relevant Orders    XR spine cervical complete 4 or 5 vw non injury    Ambulatory Referral to Physical Therapy    Ambulatory Referral to Pain Management       Other    Mixed hyperlipidemia     On statin           Relevant Orders    Lipid Panel with Direct LDL reflex      Other Visit Diagnoses     Medicare annual wellness visit, subsequent        Continuous opioid dependence ([facility] )        Screening for prostate cancer        Relevant Orders    PSA, Total Screen           Preventive health issues were discussed with patient, and age appropriate screening tests were ordered as noted in patient's After Visit Summary  Personalized health advice and appropriate referrals for health education or preventive services given if needed, as noted in patient's After Visit Summary       History of Present Illness:     Patient presents for Medicare Annual Wellness visit    Patient Care Team:  ROLAND Hull as PCP - General (Internal Medicine)  MD Brea Lu MD Coni Senters, MD     Problem List:     Patient Active Problem List   Diagnosis    Type 2 diabetes mellitus with diabetic polyneuropathy, without long-term current use of insulin ([facility] )    JOSE RAUL (obstructive sleep apnea)    Essential hypertension    Mixed hyperlipidemia    Periodic limb movement    Hypothyroid    Chronic midline low back pain with right-sided sciatica    Obesity, morbid, BMI 40 0-49 9 ([facility] )    Other male erectile dysfunction    Microalbuminuria    Chronic kidney disease, stage 2 (mild)    Type 2 diabetes mellitus with proteinuria ([facility] )    Type 2 diabetes mellitus with hyperglycemia ([facility] )    Type 2 diabetes mellitus with diabetic chronic kidney disease (HCC)    Restless leg syndrome    Persistent proteinuria    Elevated serum creatinine    COVID-19    Cervical radiculopathy      Past Medical and Surgical History:     Past Medical History:   Diagnosis Date    BPH (benign prostatic hyperplasia)     Chronic kidney disease     CPAP (continuous positive airway pressure) dependence     Diabetes mellitus ([facility] )     Disease of thyroid gland     Hyperlipidemia     Hypertension     Sleep apnea     Type 2 diabetes mellitus with diabetic chronic kidney disease ([facility] ) 6/24/2021     Past Surgical History:   Procedure Laterality Date    COLONOSCOPY      2016    GA EGD TRANSORAL BIOPSY SINGLE/MULTIPLE N/A 2017    Procedure: ESOPHAGOGASTRODUODENOSCOPY (EGD) with bx;  Surgeon: Andres Sanchez MD;  Location: [facility];   Service: Bariatrics    TONSILLECTOMY        Family History:     Family History   Problem Relation Age of Onset    Lung cancer Mother     Diabetes Father     Cancer Brother     Alcohol abuse Neg Hx     Substance Abuse Neg Hx     Mental illness Neg Hx     Depression Neg Hx       Social History:     Social History     Socioeconomic History    Marital status: /Civil Union     Spouse name: None    Number of children: None    Years of education: None    Highest education level: None   Occupational History    None   Tobacco Use    Smoking status: Former Smoker     Packs/day: 2 00     Years: 0 00     Pack years: 0 00     Types: Cigarettes     Quit date:      Years since quittin 2    Smokeless tobacco: Never Used   Vaping Use    Vaping Use: Never used   Substance and Sexual Activity    Alcohol use: Yes     Comment: social    Drug use: No    Sexual activity: Yes     Partners: Female     Birth control/protection: None   Other Topics Concern    None   Social History Narrative         Social Determinants of Health     Financial Resource Strain: Not on file   Food Insecurity: Not on file   Transportation Needs: Not on file   Physical Activity: Not on file   Stress: Not on file   Social Connections: Not on file   Intimate Partner Violence: Not on file   Housing Stability: Not on file      Medications and Allergies:     Current Outpatient Medications   Medication Sig Dispense Refill    amLODIPine (NORVASC) 10 mg tablet Take 1 tablet (10 mg total) by mouth daily 90 tablet 0    aspirin (ECOTRIN LOW STRENGTH) 81 mg EC tablet Take 81 mg by mouth daily      atorvastatin (LIPITOR) 20 mg tablet Take 1 tablet (20 mg total) by mouth daily 90 tablet 0    Blood Glucose Calibration (OT ULTRA/FASTTK CNTRL SOLN) SOLN by In Vitro route as needed (to calibrate blood sugar meter) 1 each 1    [facility] Unifine Pentips Plus 31G X 5 MM MISC USE IN THE MORNING 100 each 0    carvedilol (COREG) 12 5 mg tablet Take 1 tablet (12 5 mg total) by mouth 2 (two) times a day with meals 180 tablet 0    clotrimazole-betamethasone (LOTRISONE) 1-0 05 % cream Apply topically 2 (two) times a day 45 g 3    Continuous Blood Gluc  (FreeStyle Paul 14 Day [place]) LEO USE TO CHECK BLOOD GLUCOSE      Continuous Blood Gluc Sensor (FreeStyle Paul 14 Day Sensor) MISC APPLY 1 SENSOR TOPICALLY EVERY 14 DAYS 6 each 1    Dapagliflozin Propanediol (Farxiga) 10 MG TABS Take one tablet with breakfast 30 tablet 0    gabapentin (Neurontin) 100 mg capsule Take 1 capsule (100 mg total) by mouth 3 (three) times a day 90 capsule 0    glimepiride (AMARYL) 4 mg tablet Take one tablet twice a day 180 tablet 0    HYDROcodone-acetaminophen (NORCO) 5-325 mg per tablet Take 1 tablet by mouth daily as needed for pain Max Daily Amount: 1 tablet 30 tablet 0    insulin glargine (Lantus SoloStar) 100 units/mL injection pen Inject 20 units at bedtime 15 mL 0    insulin glargine (Lantus SoloStar) 100 units/mL injection pen Inject 20 units at bedtime 15 mL 0    ketoconazole (NIZORAL) 2 % cream Apply topically daily (Patient not taking: Reported on 1/12/2022 ) 60 g 1    levothyroxine 75 mcg tablet TAKE 1 TABLET BY MOUTH DAILY 90 tablet 0    metFORMIN (GLUCOPHAGE-XR) 500 mg 24 hr tablet Take 2 tablets (1,000 mg total) by mouth 2 (two) times a day with meals 90 tablet 0    Semaglutide,0 25 or 0 5MG/DOS, (Ozempic, 0 25 or 0 5 MG/DOSE,) 2 MG/1 5ML SOPN Inject 0 25 mg once a week 2 mL 0    Semaglutide,0 25 or 0 5MG/DOS, (Ozempic, 0 25 or 0 5 MG/DOSE,) 2 MG/1 5ML SOPN Inject 0 25 mg once a week 2 mL 0    sildenafil (VIAGRA) 50 MG tablet Take 1 tablet (50 mg total) by mouth as needed for erectile dysfunction 10 tablet 1     No current facility-administered medications for this visit       Allergies   Allergen Reactions    Penicillins      SYNCOPE, but has taken amoxicillin/augmentin in past      Immunizations:     Immunization History   Administered Date(s) Administered    COVID-19, unspecified 03/03/2021, 03/24/2021    INFLUENZA 01/17/2011, 09/26/2011, 10/01/2012, 10/14/2013, 10/30/2014, 09/12/2017    Influenza, high dose seasonal 0 7 mL 09/28/2021    Influenza, injectable, quadrivalent, preservative free 0 5 mL 08/20/2020    Influenza, recombinant, quadrivalent,injectable, preservative free 09/17/2018, 11/13/2019    Pneumococcal Polysaccharide PPV23 01/17/2011    Tdap 09/12/2017      Health Maintenance:         Topic Date Due    Colorectal Cancer Screening  06/13/2023    Hepatitis C Screening  Completed         Topic Date Due    Pneumococcal Vaccine: 65+ Years (2 of 2 - PPSV23) 02/25/2021    COVID-19 Vaccine (3 - Booster) 08/24/2021      Medicare Health Risk Assessment:     /82   Pulse 71   Temp 97 6 °F (36 4 °C)   Ht 5' 10" (1 778 m)   Wt 125 kg (275 lb)   SpO2 98%   BMI 39 46 kg/m²      Fabiana Pounds is here for his Subsequent Wellness visit  Last Medicare Wellness visit information reviewed, patient interviewed and updates made to the record today  Health Risk Assessment:   Patient rates overall health as good  Patient feels that their physical health rating is same  Patient is satisfied with their life  Eyesight was rated as same  Hearing was rated as same  Patient feels that their emotional and mental health rating is same  Patients states they are never, rarely angry  Patient states they are never, rarely unusually tired/fatigued  Pain experienced in the last 7 days has been a lot  Patient's pain rating has been 8/10  Patient states that he has experienced no weight loss or gain in last 6 months  Depression Screening:   PHQ-2 Score: 0      Fall Risk Screening:    In the past year, patient has experienced: no history of falling in past year      Home Safety:  Patient does not have trouble with stairs inside or outside of their home  Patient has working smoke alarms and has working carbon monoxide detector  Home safety hazards include: none  Nutrition:   Current diet is Regular  Medications:   Patient is currently taking over-the-counter supplements  OTC medications include: see medication list  Patient is able to manage medications  Activities of Daily Living (ADLs)/Instrumental Activities of Daily Living (IADLs):   Walk and transfer into and out of bed and chair?: Yes  Dress and groom yourself?: Yes    Bathe or shower yourself?: Yes    Feed yourself?  Yes  Do your laundry/housekeeping?: Yes  Manage your money, pay your bills and track your expenses?: Yes  Make your own meals?: Yes    Do your own shopping?: Yes    Previous Hospitalizations:   Any hospitalizations or ED visits within the last 12 months?: No      Advance Care Planning:   Living will: No    Durable POA for healthcare: No      PREVENTIVE SCREENINGS      Cardiovascular Screening:    General: Screening Not Indicated and History Lipid Disorder      Diabetes Screening:     General: Screening Not Indicated and History Diabetes      Colorectal Cancer Screening:     General: Screening Current      Prostate Cancer Screening:    General: Risks and Benefits Discussed    Due for: PSA      Osteoporosis Screening:    General: Screening Not Indicated      Abdominal Aortic Aneurysm (AAA) Screening:    Risk factors include: age between 73-69 yo and tobacco use        General: Screening Not Indicated      Lung Cancer Screening:     General: Screening Not Indicated      Hepatitis C Screening:    General: Screening Current    Screening, Brief Intervention, and Referral to Treatment (SBIRT)    Screening  Typical number of drinks in a day: 0    Single Item Drug Screening:  How often have you used an illegal drug (including marijuana) or a prescription medication for non-medical reasons in the past year? never    Single Item Drug Screen Score: 0  Interpretation: Negative screen for possible drug use disorder    Review of Current Opioid Use    Opioid Risk Tool (ORT) Interpretation: Complete Opioid Risk Tool (ORT)      ROLAND Pina

## 2022-04-05 NOTE — TELEPHONE ENCOUNTER
His temporary disability parking placard has   He wants to know if you would consider a permanent disability placard since by the time he receives the temporary one it is senior care

## 2022-04-05 NOTE — ASSESSMENT & PLAN NOTE
Due for labs  On metformin, amaryl, farxiga, ozempic, and lantus  Sees endo     Referral provided to schedule an eye exam

## 2022-04-05 NOTE — ASSESSMENT & PLAN NOTE
Completed PT in the past   With worsening symptoms  Takes hydrocodone as needed, pain contract signed today  Referral provided for pain management for possible intervention

## 2022-04-05 NOTE — PROGRESS NOTES
Assessment/Plan:    Type 2 diabetes mellitus with diabetic polyneuropathy, without long-term current use of insulin ([facility] )  Due for labs  On metformin, amaryl, farxiga, ozempic, and lantus  Sees endo  Referral provided to schedule an eye exam      Type 2 diabetes mellitus with diabetic chronic kidney disease ([facility] )  Check labs  Hypothyroid  On levothyroxine  Due for TSH      JOSE RAUL (obstructive sleep apnea)  Referral provided for sleep medicine last visit  Essential hypertension  Well controlled  Continue amlodipine and carvedilol     Chronic midline low back pain with right-sided sciatica  Completed PT in the past   With worsening symptoms  Takes hydrocodone as needed, pain contract signed today  Referral provided for pain management for possible intervention  Mixed hyperlipidemia  On statin      Cervical radiculopathy  Check cervical xray  Start PT  Referral provided for pain management  Trial gabapentin once daily for 3 days then twice daily for 3 days then 3 times daily  BMI Counseling: Body mass index is 39 46 kg/m²  The BMI is above normal  Nutrition recommendations include reducing portion sizes, decreasing overall calorie intake and moderation in carbohydrate intake  Exercise recommendations include exercising 3-5 times per week  Diagnoses and all orders for this visit:    Type 2 diabetes mellitus with diabetic polyneuropathy, without long-term current use of insulin ([facility])  -     Comprehensive metabolic panel; Future  -     Ambulatory Referral to Ophthalmology; Future    Medicare annual wellness visit, subsequent    Chronic midline low back pain with right-sided sciatica  -     Ambulatory Referral to Pain Management; Future  -     gabapentin (Neurontin) 100 mg capsule; Take 1 capsule (100 mg total) by mouth 3 (three) times a day    Cervical radiculopathy  -     XR spine cervical complete 4 or 5 vw non injury; Future  -     Ambulatory Referral to Physical Therapy;  Future  - Ambulatory Referral to Pain Management; Future  -     gabapentin (Neurontin) 100 mg capsule; Take 1 capsule (100 mg total) by mouth 3 (three) times a day    Type 2 diabetes mellitus with stage 3a chronic kidney disease, without long-term current use of insulin (HCC)    Hypothyroidism, unspecified type  -     TSH, 3rd generation with Free T4 reflex; Future    JOSE RAUL (obstructive sleep apnea)    Essential hypertension    Mixed hyperlipidemia  -     Lipid Panel with Direct LDL reflex; Future    Continuous opioid dependence ([facility] )    Screening for prostate cancer  -     PSA, Total Screen; Future          Subjective:      Patient ID: Nevin Woodall is a 78 y o  male  Here today for follow up  Jerri Jackson is doing ok  He continues to have chronic low back pain  He also has chronic neck pain that radiates to his upper arms and causes numbness and tingling in both hands  He feels both are getting worse  He has a hard time sleeping due to pain  He gets numbness in his hands after driving or using his arms for a short while  He did complete PT for his lower back  He did not feel it helped much  He is taking hydrocodone when the pain is severe  His sugars have been better  They are running under 200 most of the time  His blood pressures have been well controlled, around 130/70-80 at home  He denies any chest pain or shortness of breath  The following portions of the patient's history were reviewed and updated as appropriate: allergies, current medications, past family history, past medical history, past social history, past surgical history and problem list     Review of Systems   Constitutional: Negative for activity change, appetite change, fatigue and unexpected weight change  Eyes: Negative for visual disturbance  Respiratory: Negative for cough, chest tightness and shortness of breath  Cardiovascular: Negative for chest pain, palpitations and leg swelling     Gastrointestinal: Negative for abdominal pain, blood in stool, constipation and diarrhea  Genitourinary: Negative for difficulty urinating  Musculoskeletal: Positive for arthralgias, back pain and neck pain  Skin: Negative for rash  Neurological: Positive for weakness and numbness  Negative for dizziness, light-headedness and headaches  Psychiatric/Behavioral: Positive for sleep disturbance  Negative for dysphoric mood  The patient is not nervous/anxious  Objective:      /82   Pulse 71   Temp 97 6 °F (36 4 °C)   Ht 5' 10" (1 778 m)   Wt 125 kg (275 lb)   SpO2 98%   BMI 39 46 kg/m²          Physical Exam  Vitals reviewed  Constitutional:       Appearance: Normal appearance  HENT:      Head: Normocephalic and atraumatic  Eyes:      Conjunctiva/sclera: Conjunctivae normal    Cardiovascular:      Rate and Rhythm: Normal rate and regular rhythm  Heart sounds: Normal heart sounds  Pulmonary:      Effort: Pulmonary effort is normal       Breath sounds: Normal breath sounds  Abdominal:      General: Bowel sounds are normal       Palpations: Abdomen is soft  Musculoskeletal:      Right lower leg: No edema  Left lower leg: No edema  Skin:     General: Skin is warm and dry  Neurological:      Mental Status: He is alert and oriented to person, place, and time     Psychiatric:         Mood and Affect: Mood normal          Behavior: Behavior normal

## 2022-04-05 NOTE — ASSESSMENT & PLAN NOTE
Check cervical xray  Start PT  Referral provided for pain management  Trial gabapentin once daily for 3 days then twice daily for 3 days then 3 times daily

## 2022-04-06 ENCOUNTER — TELEPHONE (OUTPATIENT)
Dept: ENDOCRINOLOGY | Facility: CLINIC | Age: 67
End: 2022-04-06

## 2022-04-06 NOTE — TELEPHONE ENCOUNTER
Spoke with pharmacy wrong med called by kasey  Corrected rx per Dr Atif Navas order   Sent another invite to connect Kd Sheikh and appointment scheduled

## 2022-04-06 NOTE — TELEPHONE ENCOUNTER
Pt l/m   He realized his sensor is not connected  And also having an issue with insurance company not covering his metformin,

## 2022-04-13 ENCOUNTER — OFFICE VISIT (OUTPATIENT)
Dept: ENDOCRINOLOGY | Facility: CLINIC | Age: 67
End: 2022-04-13
Payer: COMMERCIAL

## 2022-04-13 VITALS
WEIGHT: 276 LBS | BODY MASS INDEX: 39.51 KG/M2 | TEMPERATURE: 96 F | DIASTOLIC BLOOD PRESSURE: 76 MMHG | HEIGHT: 70 IN | HEART RATE: 80 BPM | SYSTOLIC BLOOD PRESSURE: 128 MMHG

## 2022-04-13 DIAGNOSIS — E11.65 UNCONTROLLED TYPE 2 DIABETES MELLITUS WITH HYPERGLYCEMIA (HCC): ICD-10-CM

## 2022-04-13 DIAGNOSIS — Z79.4 TYPE 2 DIABETES MELLITUS WITH HYPERGLYCEMIA, WITH LONG-TERM CURRENT USE OF INSULIN (HCC): Primary | ICD-10-CM

## 2022-04-13 DIAGNOSIS — E03.9 ACQUIRED HYPOTHYROIDISM: ICD-10-CM

## 2022-04-13 DIAGNOSIS — I10 ESSENTIAL HYPERTENSION: ICD-10-CM

## 2022-04-13 DIAGNOSIS — E78.2 MIXED HYPERLIPIDEMIA: ICD-10-CM

## 2022-04-13 DIAGNOSIS — E11.65 TYPE 2 DIABETES MELLITUS WITH HYPERGLYCEMIA, WITH LONG-TERM CURRENT USE OF INSULIN (HCC): Primary | ICD-10-CM

## 2022-04-13 DIAGNOSIS — N18.31 TYPE 2 DIABETES MELLITUS WITH STAGE 3A CHRONIC KIDNEY DISEASE, WITHOUT LONG-TERM CURRENT USE OF INSULIN (HCC): ICD-10-CM

## 2022-04-13 DIAGNOSIS — E11.22 TYPE 2 DIABETES MELLITUS WITH STAGE 3A CHRONIC KIDNEY DISEASE, WITHOUT LONG-TERM CURRENT USE OF INSULIN (HCC): ICD-10-CM

## 2022-04-13 DIAGNOSIS — E11.42 TYPE 2 DIABETES MELLITUS WITH DIABETIC POLYNEUROPATHY, WITHOUT LONG-TERM CURRENT USE OF INSULIN (HCC): ICD-10-CM

## 2022-04-13 PROCEDURE — 3074F SYST BP LT 130 MM HG: CPT | Performed by: PHYSICIAN ASSISTANT

## 2022-04-13 PROCEDURE — 95251 CONT GLUC MNTR ANALYSIS I&R: CPT | Performed by: PHYSICIAN ASSISTANT

## 2022-04-13 PROCEDURE — 1160F RVW MEDS BY RX/DR IN RCRD: CPT | Performed by: PHYSICIAN ASSISTANT

## 2022-04-13 PROCEDURE — 1036F TOBACCO NON-USER: CPT | Performed by: PHYSICIAN ASSISTANT

## 2022-04-13 PROCEDURE — 99214 OFFICE O/P EST MOD 30 MIN: CPT | Performed by: PHYSICIAN ASSISTANT

## 2022-04-13 PROCEDURE — 3078F DIAST BP <80 MM HG: CPT | Performed by: PHYSICIAN ASSISTANT

## 2022-04-13 PROCEDURE — 3008F BODY MASS INDEX DOCD: CPT | Performed by: PHYSICIAN ASSISTANT

## 2022-04-13 RX ORDER — INSULIN GLARGINE 100 [IU]/ML
INJECTION, SOLUTION SUBCUTANEOUS
Qty: 15 ML | Refills: 0
Start: 2022-04-13 | End: 2022-06-13 | Stop reason: SDUPTHER

## 2022-04-13 RX ORDER — SEMAGLUTIDE 1.34 MG/ML
INJECTION, SOLUTION SUBCUTANEOUS
Qty: 2 ML | Refills: 0
Start: 2022-04-13 | End: 2022-05-03 | Stop reason: SDUPTHER

## 2022-04-13 NOTE — PATIENT INSTRUCTIONS
Hypoglycemia instructions   Ilir Bobby  4/13/2022  [id number]    Low Blood Sugar    Steps to treat low blood sugar  1  Test blood sugar if you have symptoms of low blood sugar:   Low Blood Sugar Symptoms:  o Sweaty  o Dizzy  o Rapid heartbeat  o Shaky  o Bad mood  o Hungry      2  Treat blood sugar less than 70 with 15 grams of fast-acting carbohydrate:   Examples of 15 grams Fast-Acting Carbohydrate:  o 4 oz juice  o 4 oz regular soda  o 3-4 glucose tablets (chew)  o 3-4 hard candies (chew)          3  Wait 15 minutes and test your blood sugar again     4   If blood sugar is less than 100, repeat steps 2-3     5  When your blood sugar is 100 or more, eat a snack if it will be longer than one hour until your next meal  The snack should be 15 grams of carbohydrate and a protein:   Examples of snacks:  o ½ sandwich  o 6 crackers with cheese  o Piece of fruit with cheese or peanut butter  o 6 crackers with peanut butter

## 2022-04-13 NOTE — PROGRESS NOTES
Patient Progress Note      CC: DM      Referring Provider  Marge Pritchard 5  24 [facility],  [address] [place] [address]     History of Present Illness:   Vasiliy Rand is a 78 y o  male with a history of type 2 diabetes with long term use of insulin  Diabetes course has been stable  Complications of DM: CKD  Denies recent illness or hospitalizations  Denies recent severe hypoglycemic or severe hyperglycemic episodes  Denies any issues with his current regimen  Home glucose monitoring: are performed regularly, using Freestyle Paul  Average glucose 159 mg/dl  In target range 66%, above range 33%, below range 1%    Current regimen: Lantus 20 units QHS, Ozempic 0 25 mg weekly, Farxiga 10 mg daily, glimepiride 4 mg BID  compliant most of the time, denies any side effects from medications  Injects in: abdomen  Rotates sites: Yes  Hypoglycemic episodes: Yes, rare  H/o of hypoglycemia causing hospitalization or Intervention such as glucagon injection  or ambulance call :  No  Hypoglycemia symptoms: jitteriness  Treatment of hypoglycemia: soda    Medic alert tag: recommended: Yes     Diabetes education: No  Diet: 2 meals per day, 2-3 snacks per day  Timing of meals is predictable  Diabetic diet compliance:  noncompliant some of the time  Activity: Daily activity is predictable: Yes  No routine exercise currently due to back issue  [facility]: he received referral from PCP; he will schedule it  Podiatry: Feb 2022    Has hypertension: not on ACE inhibitor/ARB  Has hyperlipidemia: on statin - tolerating well, no myalgias  compliant most of the time, denies any side effects from medications  Thyroid disorders: Hypothyroidism  Is taking levothyroxine 75 mcg daily    History of pancreatitis: No    Patient Active Problem List   Diagnosis    Type 2 diabetes mellitus with diabetic polyneuropathy, without long-term current use of insulin ([address] )    JOSE RAUL (obstructive sleep apnea)    Essential hypertension    Mixed hyperlipidemia    Periodic limb movement    Hypothyroid    Chronic midline low back pain with right-sided sciatica    Obesity, morbid, BMI 40 0-49 9 (HCC)    Other male erectile dysfunction    Microalbuminuria    Chronic kidney disease, stage 2 (mild)    Type 2 diabetes mellitus with proteinuria (HCC)    Type 2 diabetes mellitus with hyperglycemia (HCC)    Type 2 diabetes mellitus with diabetic chronic kidney disease (HCC)    Restless leg syndrome    Persistent proteinuria    Elevated serum creatinine    COVID-19    Cervical radiculopathy      Past Medical History:   Diagnosis Date    BPH (benign prostatic hyperplasia)     Chronic kidney disease     CPAP (continuous positive airway pressure) dependence     [facility]     Disease of thyroid gland     Hyperlipidemia     Hypertension     Sleep apnea     Type 2 diabetes mellitus with diabetic chronic kidney disease ([facility] ) 2021      Past Surgical History:   Procedure Laterality Date    COLONOSCOPY          [place] EGD TRANSORAL BIOPSY SINGLE/MULTIPLE N/A 2017    Procedure: ESOPHAGOGASTRODUODENOSCOPY (EGD) with bx;  Surgeon: Raz See MD;  Location: [facility];   Service: Bariatrics    TONSILLECTOMY        Family History   Problem Relation Age of Onset    Lung cancer Mother     Diabetes Father     Cancer Brother     Alcohol abuse Neg Hx     Substance Abuse Neg Hx     Mental illness Neg Hx     Depression Neg Hx      Social History     Tobacco Use    Smoking status: Former Smoker     Packs/day: 2 00     Years: 0 00     Pack years: 0 00     Types: Cigarettes     Quit date:      Years since quittin 3    Smokeless tobacco: Never Used   Substance Use Topics    Alcohol use: Yes     Comment: social     Allergies   Allergen Reactions    Penicillins      SYNCOPE, but has taken amoxicillin/augmentin in past         Current Outpatient Medications:     amLODIPine (NORVASC) 10 mg tablet, Take 1 tablet (10 mg total) by mouth daily, Disp: 90 tablet, Rfl: 0    aspirin (ECOTRIN LOW STRENGTH) 81 mg EC tablet, Take 81 mg by mouth daily, Disp: , Rfl:     atorvastatin (LIPITOR) 20 mg tablet, Take 1 tablet (20 mg total) by mouth daily, Disp: 90 tablet, Rfl: 0    Blood Glucose Calibration (OT ULTRA/FASTTK CNTRL SOLN) SOLN, by In Vitro route as needed (to calibrate blood sugar meter), Disp: 1 each, Rfl: 1    CareOne Unifine Pentips Plus 31G X 5 MM MISC, USE IN THE MORNING, Disp: 100 each, Rfl: 0    carvedilol (COREG) 12 5 mg tablet, Take 1 tablet (12 5 mg total) by mouth 2 (two) times a day with meals, Disp: 180 tablet, Rfl: 0    clotrimazole-betamethasone (LOTRISONE) 1-0 05 % cream, Apply topically 2 (two) times a day, Disp: 45 g, Rfl: 3    Continuous Blood Gluc  (FreeStyle Paul 14 Day [place]) LEO, USE TO CHECK BLOOD GLUCOSE, Disp: , Rfl:     Continuous Blood Gluc Sensor (FreeStyle Paul 14 Day Sensor) MISC, APPLY 1 SENSOR TOPICALLY EVERY 14 DAYS, Disp: 6 each, Rfl: 1    Dapagliflozin Propanediol (Farxiga) 10 MG TABS, Take one tablet with breakfast, Disp: 30 tablet, Rfl: 0    gabapentin (Neurontin) 100 mg capsule, Take 1 capsule (100 mg total) by mouth 3 (three) times a day, Disp: 90 capsule, Rfl: 0    glimepiride (AMARYL) 4 mg tablet, Take one tablet twice a day, Disp: 180 tablet, Rfl: 0    HYDROcodone-acetaminophen (NORCO) 5-325 mg per tablet, Take 1 tablet by mouth daily as needed for pain Max Daily Amount: 1 tablet, Disp: 30 tablet, Rfl: 0    insulin glargine (Lantus SoloStar) 100 units/mL injection pen, Inject 16 units at bedtime, Disp: 15 mL, Rfl: 0    ketoconazole (NIZORAL) 2 % cream, Apply topically daily, Disp: 60 g, Rfl: 1    levothyroxine 75 mcg tablet, TAKE 1 TABLET BY MOUTH DAILY, Disp: 90 tablet, Rfl: 0    metFORMIN (GLUCOPHAGE-XR) 500 mg 24 hr tablet, Take 2 tablets (1,000 mg total) by mouth 2 (two) times a day with meals, Disp: 90 tablet, Rfl: 0   Semaglutide,0 25 or 0 5MG/DOS, (Ozempic, 0 25 or 0 5 MG/DOSE,) 2 MG/1 5ML SOPN, Inject 0 5 mg once a week, Disp: 2 mL, Rfl: 0    sildenafil (VIAGRA) 50 MG tablet, Take 1 tablet (50 mg total) by mouth as needed for erectile dysfunction, Disp: 10 tablet, Rfl: 1  Review of Systems   Constitutional: Negative for activity change, appetite change, fatigue and unexpected weight change  HENT: Negative for trouble swallowing (had an issue in the past but not recently)  Eyes: Negative for visual disturbance  Respiratory: Negative for shortness of breath  Cardiovascular: Negative for chest pain and palpitations  Gastrointestinal: Negative for constipation and diarrhea  Endocrine: Negative for polydipsia and polyuria  Musculoskeletal: Positive for arthralgias and back pain  Skin: Negative for wound  Neurological: Positive for numbness  Psychiatric/Behavioral: Negative  Physical Exam:  Body mass index is 39 6 kg/m²  /76   Pulse 80   Temp (!) 96 °F (35 6 °C) (Tympanic)   Ht 5' 10" (1 778 m)   Wt 125 kg (276 lb)   BMI 39 60 kg/m²    Wt Readings from Last 3 Encounters:   04/13/22 125 kg (276 lb)   04/05/22 125 kg (275 lb)   02/24/22 127 kg (281 lb)       Physical Exam  Vitals and nursing note reviewed  Constitutional:       Appearance: He is well-developed  HENT:      Head: Normocephalic  Eyes:      General: No scleral icterus  Pupils: Pupils are equal, round, and reactive to light  Neck:      Thyroid: No thyromegaly  Cardiovascular:      Rate and Rhythm: Normal rate and regular rhythm  Pulses:           Radial pulses are 2+ on the right side and 2+ on the left side  Heart sounds: No murmur heard  Pulmonary:      Effort: Pulmonary effort is normal  No respiratory distress  Breath sounds: Normal breath sounds  No wheezing  Musculoskeletal:      Cervical back: Neck supple  Skin:     General: Skin is warm and dry     Neurological:      Mental Status: He is alert  Patient's shoes and socks were not removed  Labs:   Component      Latest Ref Rng & Units 9/1/2021 1/5/2022   Sodium      136 - 145 mmol/L 136 133 (L)   Potassium      3 5 - 5 3 mmol/L 5 2 4 9   Chloride      100 - 108 mmol/L 104 97 (L)   CO2      21 - 32 mmol/L 26 26   Anion Gap      4 - 13 mmol/L 6 10   BUN      5 - 25 mg/dL 20 18   Creatinine      0 60 - 1 30 mg/dL 1 05 0 96   GLUCOSE FASTING      65 - 99 mg/dL  220 (H)   Calcium      8 3 - 10 1 mg/dL 9 6 9 5   AST      5 - 45 U/L  22   ALT      12 - 78 U/L  30   Alkaline Phosphatase      46 - 116 U/L  112   Total Protein      6 4 - 8 2 g/dL  7 6   Albumin      3 5 - 5 0 g/dL  3 5   TOTAL BILIRUBIN      0 20 - 1 00 mg/dL  0 38   eGFR      ml/min/1 73sq m 74 82   Glucose, Random      65 - 140 mg/dL 244 (H)    Hemoglobin A1C      Normal 3 8-5 6%; PreDiabetic 5 7-6 4%; Diabetic >=6 5%; Glycemic control for adults with diabetes <7 0% %  9 9 (H)   eAG, EST AVG Glucose      mg/dl  237       Plan:    Diagnoses and all orders for this visit:    Type 2 diabetes mellitus with hyperglycemia, with long-term current use of insulin ([facility])  HGA1C 9 9%  Due now  Treatment regimen: morning BG occasionally low / low normal  Decrease Lantus to 16 units QHS  Increase Ozempic to 0 5 mg weekly  Discussed if postprandial BG levels start to trend low, will need to decrease glimepiride dose  Discussed intensive insulin regimen does increase risk for hypoglycemia  Episodes of hypoglycemia can lead to permanent disability and death  Discussed risks/complications associated with uncontrolled diabetes  Advised to adhere to diabetic diet, and recommended staying active/exercising routinely as tolerated  Keep carbohydrates consistent to limit blood glucose fluctuations  Advised to call if blood sugars less than 70 mg/dl or over 300 mg/dl  Check blood glucose 3+ times a day  Discussed symptoms and treatment of hypoglycemia     Discussed use of CGM to collect additional blood glucose data to reveal trends and patterns that can be used to optimize treatment plan  Referred to diabetes/nutrition education  Recommended routine follow-up with podiatry and ophthalmology  Send log in 2 weeks  Ordered blood work to complete now and prior to next visit  -     Hemoglobin A1C; Future  -     Basic metabolic panel; Future  -     Microalbumin / creatinine urine ratio; Future  -     Semaglutide,0 25 or 0 5MG/DOS, (Ozempic, 0 25 or 0 5 MG/DOSE,) 2 MG/1 5ML SOPN; Inject 0 5 mg once a week  -     insulin glargine (Lantus SoloStar) 100 units/mL injection pen; Inject 16 units at bedtime    Acquired hypothyroidism  TSH previously 3 002  Continue current treatment  Monitor labs   -     T4, free; Future  -     TSH, 3rd generation; Future    Essential hypertension  Blood pressure well controlled, continue current treatment   -     Basic metabolic panel; Future    Mixed hyperlipidemia  LDL previously 54  Continue current treatment        Discussed with the patient diagnosis and treatment and all questions fully answered  He will call me if any problems arise  Counseled patient on diagnostic results, prognosis, risk and benefit of treatment options, instruction for management, importance of treatment compliance, risk factor reduction and impressions        Perla Velasquez PA-C

## 2022-04-14 RX ORDER — GLIMEPIRIDE 4 MG/1
TABLET ORAL
Qty: 180 TABLET | Refills: 0
Start: 2022-04-14 | End: 2022-04-16 | Stop reason: SDUPTHER

## 2022-04-16 DIAGNOSIS — E11.42 TYPE 2 DIABETES MELLITUS WITH DIABETIC POLYNEUROPATHY, WITHOUT LONG-TERM CURRENT USE OF INSULIN (HCC): ICD-10-CM

## 2022-04-18 DIAGNOSIS — G89.29 CHRONIC MIDLINE LOW BACK PAIN WITH RIGHT-SIDED SCIATICA: ICD-10-CM

## 2022-04-18 DIAGNOSIS — M54.41 CHRONIC MIDLINE LOW BACK PAIN WITH RIGHT-SIDED SCIATICA: ICD-10-CM

## 2022-04-18 RX ORDER — HYDROCODONE BITARTRATE AND ACETAMINOPHEN 5; 325 MG/1; MG/1
1 TABLET ORAL DAILY PRN
Qty: 30 TABLET | Refills: 0 | Status: SHIPPED | OUTPATIENT
Start: 2022-04-18 | End: 2022-05-23 | Stop reason: SDUPTHER

## 2022-04-18 RX ORDER — GLIMEPIRIDE 4 MG/1
TABLET ORAL
Qty: 180 TABLET | Refills: 1 | Status: SHIPPED | OUTPATIENT
Start: 2022-04-18 | End: 2022-07-19 | Stop reason: SDUPTHER

## 2022-04-21 DIAGNOSIS — I10 ESSENTIAL HYPERTENSION: ICD-10-CM

## 2022-04-21 DIAGNOSIS — N52.9 ERECTILE DYSFUNCTION, UNSPECIFIED ERECTILE DYSFUNCTION TYPE: ICD-10-CM

## 2022-04-21 DIAGNOSIS — E78.2 MIXED HYPERLIPIDEMIA: ICD-10-CM

## 2022-04-21 RX ORDER — CARVEDILOL 12.5 MG/1
TABLET ORAL
Qty: 180 TABLET | Refills: 0 | Status: SHIPPED | OUTPATIENT
Start: 2022-04-21

## 2022-04-22 ENCOUNTER — TELEPHONE (OUTPATIENT)
Dept: NEPHROLOGY | Facility: CLINIC | Age: 67
End: 2022-04-22

## 2022-04-22 RX ORDER — SILDENAFIL 50 MG/1
50 TABLET, FILM COATED ORAL AS NEEDED
Qty: 10 TABLET | Refills: 0 | Status: SHIPPED | OUTPATIENT
Start: 2022-04-22

## 2022-04-22 RX ORDER — ATORVASTATIN CALCIUM 20 MG/1
20 TABLET, FILM COATED ORAL DAILY
Qty: 90 TABLET | Refills: 0 | Status: SHIPPED | OUTPATIENT
Start: 2022-04-22

## 2022-04-22 NOTE — TELEPHONE ENCOUNTER
----- Message from Alma Mchugh MD sent at 4/21/2022  4:27 PM EDT -----  Please schedule follow-up appointment with an advanced practitioner  He was last seen in August 2021    Please order for BMP, upc ratio, UA urine microscopy to be done the office visit

## 2022-04-27 ENCOUNTER — VBI (OUTPATIENT)
Dept: ADMINISTRATIVE | Facility: OTHER | Age: 67
End: 2022-04-27

## 2022-05-02 ENCOUNTER — VBI (OUTPATIENT)
Dept: ADMINISTRATIVE | Facility: OTHER | Age: 67
End: 2022-05-02

## 2022-05-09 ENCOUNTER — TELEPHONE (OUTPATIENT)
Dept: INTERNAL MEDICINE CLINIC | Facility: CLINIC | Age: 67
End: 2022-05-09

## 2022-05-09 NOTE — TELEPHONE ENCOUNTER
Pt  Is in the donut hole right now and wife wants to know if there are any programs out there for patients in this situation to help lower the costs of meds  Specifically, Ozempic

## 2022-05-18 ENCOUNTER — CONSULT (OUTPATIENT)
Dept: PAIN MEDICINE | Facility: CLINIC | Age: 67
End: 2022-05-18
Payer: COMMERCIAL

## 2022-05-18 VITALS
BODY MASS INDEX: 39.6 KG/M2 | DIASTOLIC BLOOD PRESSURE: 72 MMHG | WEIGHT: 276 LBS | SYSTOLIC BLOOD PRESSURE: 136 MMHG | HEART RATE: 86 BPM

## 2022-05-18 DIAGNOSIS — M54.12 CERVICAL RADICULOPATHY: ICD-10-CM

## 2022-05-18 DIAGNOSIS — F40.240 CLAUSTROPHOBIA: ICD-10-CM

## 2022-05-18 DIAGNOSIS — M51.16 INTERVERTEBRAL DISC DISORDER WITH RADICULOPATHY OF LUMBAR REGION: Primary | ICD-10-CM

## 2022-05-18 PROCEDURE — 3078F DIAST BP <80 MM HG: CPT | Performed by: ANESTHESIOLOGY

## 2022-05-18 PROCEDURE — 99204 OFFICE O/P NEW MOD 45 MIN: CPT | Performed by: ANESTHESIOLOGY

## 2022-05-18 PROCEDURE — 3075F SYST BP GE 130 - 139MM HG: CPT | Performed by: ANESTHESIOLOGY

## 2022-05-18 RX ORDER — AMOXICILLIN 500 MG/1
500 TABLET, FILM COATED ORAL DAILY
COMMUNITY
Start: 2022-04-14

## 2022-05-18 RX ORDER — LORAZEPAM 0.5 MG/1
TABLET ORAL
Qty: 2 TABLET | Refills: 0 | Status: SHIPPED | OUTPATIENT
Start: 2022-05-18

## 2022-05-18 NOTE — PROGRESS NOTES
Assessment  1  Intervertebral disc disorder with radiculopathy of lumbar region    2  Cervical radiculopathy    3  Claustrophobia        Plan  The patient's symptoms, history/physical are consistent with a right-sided radiculopathy from underlying degenerative disk disease  At this time, given ongoing pain symptoms and weakness of his right leg, I will order an MRI of the lumbar spine to evaluate further  Advised we will call with the results and discuss treatment moving forward  I will prescribe him lorazepam to take prior to the MRI to help with anxiolysis related to claustrophobia  For now, I have instructed him to increase his gabapentin to 200 mg b i d  for 3 days and if that is not helping then to increase it to 300 mg b i d  He was apprised of the most common side effects including sleepiness and dizziness  In regards to neck symptoms, this is secondary to the low back symptoms but eventually will need an MRI C-spine as I suspect spinal stenosis is the etiology  My impressions and treatment recommendations were discussed in detail with the patient who verbalized understanding and had no further questions  Discharge instructions were provided  I personally saw and examined the patient and I agree with the above discussed plan of care  Orders Placed This Encounter   Procedures    MRI lumbar spine without contrast     Standing Status:   Future     Standing Expiration Date:   5/18/2026     Scheduling Instructions: There is no preparation for this test  Please leave your jewelry and valuables at home, wedding rings are the exception  Magnetic nail polish must be removed prior to arrival for your test  Please bring your insurance cards, a form of photo ID and a list of your medications with you  Arrive 15 minutes prior to your appointment time in order to register  Please bring any prior CT or MRI studies of this area that were not performed at a [facility]              To schedule this appointment, please contact Central Scheduling at [phone]  Prior to your appointment, please make sure you complete the MRI Screening Form when you e-Check in for your appointment  This will be available starting 7 days before your appointment in [address]  You may receive an e-mail with an activation code if you do not have a [employer] account  If you do not have access to a device, we will complete your screening at your appointment  Order Specific Question:   What is the patient's sedation requirement? Answer:   No Sedation     Order Specific Question:   Release to patient through [employer]     Answer:   Immediate     Order Specific Question:   Is order priority selected as STAT? Answer:   No     Order Specific Question:   Reason for Exam (FREE TEXT)     Answer:   lbp into right leg     New Medications Ordered This Visit   Medications    amoxicillin (AMOXIL) 500 MG tablet     Sig: Take 500 mg by mouth in the morning    LORazepam (ATIVAN) 0 5 mg tablet     Sig: Take 1 tablet 2 hours prior to MRI  May repeat after 1 hour     Dispense:  2 tablet     Refill:  0       History of Present Illness    Maria C Parra is a 78 y o  male referred by ROLAND Justin for chronic neck and back pain has been present for over 10 years  Symptoms are moderate to severe rated 6-10/10 on numeric rating scale and felt constantly  Symptoms are worse at night described to be dull aching with sciatica down the right leg as well as sharp shooting with numbness into both arms  Symptoms are aggravated with standing, bending, sitting, walking, exercise  Treatment history has included prior injections, therapy and exercise which have provided moderate relief  He is on hydrocodone 5/325 once daily prescribed by his family physician which is helpful  He also uses Tylenol ibuprofen as needed      I have personally reviewed and/or updated the patient's past medical history, past surgical history, family history, social history, current medications, allergies, and vital signs today  Review of Systems   Constitutional: Negative for fever and unexpected weight change  HENT: Negative for trouble swallowing  Eyes: Negative for visual disturbance  Respiratory: Negative for shortness of breath and wheezing  Cardiovascular: Negative for chest pain and palpitations  Gastrointestinal: Negative for constipation, diarrhea, nausea and vomiting  Endocrine: Negative for cold intolerance, heat intolerance and polydipsia  Genitourinary: Negative for difficulty urinating and frequency  Musculoskeletal: Positive for back pain and neck pain  Negative for arthralgias, gait problem, joint swelling and myalgias  Skin: Negative for rash  Neurological: Negative for dizziness, seizures, syncope, weakness and headaches  Hematological: Does not bruise/bleed easily  Psychiatric/Behavioral: Negative for dysphoric mood  All other systems reviewed and are negative        Patient Active Problem List   Diagnosis    Type 2 diabetes mellitus with diabetic polyneuropathy, without long-term current use of insulin ([facility] )    JOSE RAUL (obstructive sleep apnea)    Essential hypertension    Mixed hyperlipidemia    Periodic limb movement    Hypothyroid    Chronic midline low back pain with right-sided sciatica    Obesity, morbid, BMI 40 0-49 9 ([facility] )    Other male erectile dysfunction    Microalbuminuria    Chronic kidney disease, stage 2 (mild)    Type 2 diabetes mellitus with proteinuria (HCC)    Type 2 diabetes mellitus with hyperglycemia (HCC)    Type 2 diabetes mellitus with diabetic chronic kidney disease (HCC)    Restless leg syndrome    Persistent proteinuria    Elevated serum creatinine    COVID-19    Cervical radiculopathy       Past Medical History:   Diagnosis Date    BPH (benign prostatic hyperplasia)     Chronic kidney disease     CPAP (continuous positive airway pressure) dependence     Diabetes mellitus ([facility] )     Disease of thyroid gland     Hyperlipidemia     Hypertension     Sleep apnea     Type 2 diabetes mellitus with diabetic chronic kidney disease ([facility] ) 2021       Past Surgical History:   Procedure Laterality Date    COLONOSCOPY      2016    [place] EGD TRANSORAL BIOPSY SINGLE/MULTIPLE N/A 2017    Procedure: ESOPHAGOGASTRODUODENOSCOPY (EGD) with bx;  Surgeon: Inocente Aparicio MD;  Location: [facility];   Service: Bariatrics    TONSILLECTOMY         Family History   Problem Relation Age of Onset    Lung cancer Mother     Diabetes Father     Cancer Brother     Alcohol abuse Neg Hx     Substance Abuse Neg Hx     Mental illness Neg Hx     Depression Neg Hx        Social History     Occupational History    Not on file   Tobacco Use    Smoking status: Former Smoker     Packs/day: 2 00     Years: 0 00     Pack years: 0 00     Types: Cigarettes     Quit date:      Years since quittin 4    Smokeless tobacco: Never Used   Vaping Use    Vaping Use: Never used   Substance and Sexual Activity    Alcohol use: Yes     Comment: social    Drug use: No    Sexual activity: Yes     Partners: Female     Birth control/protection: None       Current Outpatient Medications on File Prior to Visit   Medication Sig    amoxicillin (AMOXIL) 500 MG tablet Take 500 mg by mouth in the morning    aspirin (ECOTRIN LOW STRENGTH) 81 mg EC tablet Take 81 mg by mouth daily    atorvastatin (LIPITOR) 20 mg tablet Take 1 tablet (20 mg total) by mouth daily    Blood Glucose Calibration (OT ULTRA/FASTTK CNTRL SOLN) SOLN by In Vitro route as needed (to calibrate blood sugar meter)    CareOne Unifine Pentips Plus 31G X 5 MM MISC USE IN THE MORNING    carvedilol (COREG) 12 5 mg tablet TAKE 1 TABLET (12 5MG TOTAL) BY MOUTH 2 ( TWO) TIMES A DAY WITH MEALS    clotrimazole-betamethasone (LOTRISONE) 1-0 05 % cream Apply topically 2 (two) times a day    Continuous Blood Gluc  (FreeStyle [place] 14 Day [place]) [place] USE TO CHECK BLOOD GLUCOSE    Continuous Blood Gluc Sensor (FreeStyle Paul 14 Day Sensor) MISC APPLY 1 SENSOR TOPICALLY EVERY 14 DAYS    gabapentin (Neurontin) 100 mg capsule Take 1 capsule (100 mg total) by mouth 3 (three) times a day    glimepiride (AMARYL) 4 mg tablet Take one tablet twice a day    insulin glargine (Lantus SoloStar) 100 units/mL injection pen Inject 16 units at bedtime    ketoconazole (NIZORAL) 2 % cream Apply topically daily    levothyroxine 75 mcg tablet TAKE 1 TABLET BY MOUTH DAILY    metFORMIN (GLUCOPHAGE-XR) 500 mg 24 hr tablet Take 2 tablets (1,000 mg total) by mouth 2 (two) times a day with meals    Semaglutide,0 25 or 0 5MG/DOS, (Ozempic, 0 25 or 0 5 MG/DOSE,) 2 MG/1 5ML SOPN Inject 0 5 mg once a week    sildenafil (VIAGRA) 50 MG tablet Take 1 tablet (50 mg total) by mouth as needed for erectile dysfunction    [DISCONTINUED] HYDROcodone-acetaminophen (NORCO) 5-325 mg per tablet Take 1 tablet by mouth daily as needed for pain Max Daily Amount: 1 tablet     No current facility-administered medications on file prior to visit  Allergies   Allergen Reactions    Penicillins      SYNCOPE, but has taken amoxicillin/augmentin in past       Physical Exam    /72   Pulse 86   Wt 125 kg (276 lb)   BMI 39 60 kg/m²     Constitutional: normal, well developed, well nourished, alert, in no distress and non-toxic and no overt pain behavior   and obese  Eyes: anicteric  HEENT: grossly intact  Neck: supple, symmetric, trachea midline and no masses   Pulmonary:even and unlabored  Cardiovascular:No edema or pitting edema present  Skin:Normal without rashes or lesions and well hydrated  Psychiatric:Mood and affect appropriate  Neurologic:Cranial Nerves II-XII grossly intact  Musculoskeletal:normal     Lumbar Spine Exam  Appearance:  Normal lordosis  Palpation/Tenderness:  left lumbar paraspinal tenderness  right lumbar paraspinal tenderness  Range of Motion:  Flexion: Moderately limited  with pain  Extension:  Moderately limited  with pain  Motor Strength:  Left hip flexion:  5/5  Left hip extension:  5/5  Right hip flexion:  5/5  Right hip extension:  5/5  Left knee flexion:  5/5  Left knee extension:  5/5  Right knee flexion:  5/5  Right knee extension:  5/5  Left foot dorsiflexion:  5/5  Left foot plantar flexion:  5/5  Right foot dorsiflexion:  5/5  Right foot plantar flexion:  5/5  Reflexes:  Left Patellar:  1+   Right Patellar:  1+   Left Achilles:  1+   Right Achilles:  1+   Special Tests:  Left Straight Leg Test:  positive  Right Straight Leg Test:  positive      Imaging    XR LUMBAR SPINE     INDICATION:   M54 41: Lumbago with sciatica, right side  G89 29: Other chronic pain      COMPARISON:  None     VIEWS:  XR SPINE LUMBAR MINIMUM 4 VIEWS NON INJURY        FINDINGS:     There are 5 non rib bearing lumbar vertebral bodies       There is no evidence of acute fracture or destructive osseous lesion      Mild scoliotic deformity is noted  Alignment is otherwise unremarkable       There are mild endplate and facet joint degenerative changes throughout the lumbar spine with small anterior osteophyte present      The pedicles appear intact      Soft tissues are unremarkable      IMPRESSION:     No acute osseous abnormality        Degenerative changes as described            RIGHT HIP     INDICATION:   M54 41: Lumbago with sciatica, right side  G89 29:  Other chronic pain      COMPARISON:  None     VIEWS:  XR HIP/PELV 2-3 VWS RIGHT W PELVIS IF PERFORMED         FINDINGS:     There is no acute fracture or dislocation      No significant hip degenerative changes      No lytic or blastic osseous lesion      Soft tissues are unremarkable      Degenerative changes pubic symphysis and visualized lower lumbar spine      IMPRESSION:     No acute osseous abnormality

## 2022-05-18 NOTE — PATIENT INSTRUCTIONS
Core Strengthening Exercises   WHAT YOU NEED TO KNOW:   What do I need to know about core strengthening exercises? Core strengthening exercises help heal and strengthen muscles of your hips, back, and abdomen to prevent reinjury  They are beginning exercises to help support your spine  Ask your healthcare provider if you need to see a physical therapist for more advanced exercises  Do the exercises on a mat or firm surface  (not on a bed) to support your spine and avoid low back pain  Do the exercises in the same order every time  to train your muscles to work together  Your healthcare provider will show you how to perform these exercises  Do them every day, or as directed by your healthcare provider  Move slowly and smoothly  Avoid fast or jerky motions  Stop if you feel pain  It is normal to feel some discomfort at first  Regular exercise will help decrease your discomfort over time  How do I perform core strengthening exercises safely? Hold each exercise for 5 seconds  When you can do the exercise without pain for 5 seconds, increase your hold to 10 to 15 seconds  When you can do the exercise without pain for 10 to 15 seconds, add the next exercise  Increase the time you hold each exercise, or repeat the exercises as directed  As you do each exercise, breathe normally  Do not hold your breath  Abdominal bracing:  Lie on your back with your knees bent and feet flat on the floor  Place your arms in a relaxed position beside your body  Pull your belly button in toward your spine  Do not flatten or arch your back  Tighten the abdominal muscles below your belly button  Hold for 5 seconds  Begin all of your exercises with abdominal bracing  You can also practice abdominal bracing throughout the day while you are sitting or standing  Bridging:  Lie on your back with your knees bent and feet flat on the floor  Rest your arms at your side   Tighten your buttocks, and then lift your hips 1 inch off the floor  Hold for 5 seconds  When you can do this exercise without pain for 10 seconds, increase the distance you lift your hips  A good goal is to be able to lift your hips so that your shoulders, hips, and knees are in a straight line  Curl up:  Lie on your back with your knees bent and feet flat on the floor  Place your hands, palms down, underneath the curve in your lower back  Next, with your elbows on the floor, lift your shoulders and chest 2 to 3 inches  Keep your head in line with your shoulders  Hold this position for 5 seconds  When you can do this exercise without pain for 10 to 15 seconds, you may add a rotation  While your shoulders and chest are lifted off the ground, turn slightly to the left and hold  Repeat on the other side  Dead bug:  Lie on your back with your knees bent and feet flat on the floor  Place your arms in a relaxed position beside your body  Begin with abdominal bracing  Next, raise one leg, keeping your knee bent  Hold for 5 seconds  Repeat with the other leg  When you can do this exercise without pain for 10 to 15 seconds, you may raise one straight leg and hold  Repeat with the other leg  Quadruped:  Place your hands and knees on the floor  Keep your wrists directly below your shoulders and your knees directly below your hips  Pull your belly button in toward your spine  Do not flatten or arch your back  Tighten your abdominal muscles below your belly button  Hold for 5 seconds  When you can do this exercise without pain for 10 to 15 seconds, you may extend one arm and hold  Repeat on the other side  Side Bridge:      Standing side bridge:  Stand next to a wall and extend one arm toward the wall  Place your palm flat on the wall with your fingers pointing upward  Begin with abdominal bracing  Next, without moving your feet, slowly bend your arm to 90 degrees  Hold for 5 seconds  Repeat on the other side   When you can do this exercise without pain for 10 to 15 seconds, you may do the bent leg side bridge on the floor  Bent leg side bridge:  Lie on one side with your legs, hips, and shoulders in a straight line  Prop yourself up onto your forearm so your elbow is directly below your shoulder  Bend your knees back to 90 degrees  Begin with abdominal bracing  Next, lift your hips and balance yourself on your forearm and knees  Hold for 5 seconds  Repeat on the other side  When you can do this exercise without pain for 10 to 15 seconds, you may do the straight leg side bridge on the floor  Straight leg side bridge:  Lie on one side with your legs, hips, and shoulders in a straight line  Prop yourself up onto your forearm so your elbow is directly below your shoulder  Begin with abdominal bracing  Lift your hips off the floor and balance yourself on your forearm and the outside of your flexed foot  Do not let your ankle bend sideways  Hold for 5 seconds  Repeat on the other side  When you can do this exercise without pain for 10 to 15 seconds, ask your healthcare provider for more advanced exercises  When should I contact my healthcare provider? Your pain becomes worse  You have new pain  You have questions or concerns about your condition, care, or exercise program   CARE AGREEMENT:   You have the right to help plan your care  Learn about your health condition and how it may be treated  Discuss treatment options with your caregivers to decide what care you want to receive  You always have the right to refuse treatment  The above information is an  only  It is not intended as medical advice for individual conditions or treatments  Talk to your doctor, nurse or pharmacist before following any medical regimen to see if it is safe and effective for you  © 2016 [address] [employer] is for End User's use only and may not be sold, redistributed or otherwise used for commercial purposes  All illustrations and images included in CareNotes® are the copyrighted property of [employer]  or [employer]

## 2022-05-19 DIAGNOSIS — I10 BENIGN ESSENTIAL HYPERTENSION: ICD-10-CM

## 2022-05-19 PROCEDURE — 3066F NEPHROPATHY DOC TX: CPT | Performed by: ANESTHESIOLOGY

## 2022-05-19 RX ORDER — AMLODIPINE BESYLATE 10 MG/1
10 TABLET ORAL DAILY
Qty: 90 TABLET | Refills: 0 | Status: SHIPPED | OUTPATIENT
Start: 2022-05-19

## 2022-05-23 DIAGNOSIS — G89.29 CHRONIC MIDLINE LOW BACK PAIN WITH RIGHT-SIDED SCIATICA: ICD-10-CM

## 2022-05-23 DIAGNOSIS — M54.41 CHRONIC MIDLINE LOW BACK PAIN WITH RIGHT-SIDED SCIATICA: ICD-10-CM

## 2022-05-23 RX ORDER — HYDROCODONE BITARTRATE AND ACETAMINOPHEN 5; 325 MG/1; MG/1
1 TABLET ORAL DAILY PRN
Qty: 30 TABLET | Refills: 0 | Status: SHIPPED | OUTPATIENT
Start: 2022-05-23 | End: 2022-06-22 | Stop reason: SDUPTHER

## 2022-06-08 ENCOUNTER — TELEPHONE (OUTPATIENT)
Dept: INTERNAL MEDICINE CLINIC | Facility: CLINIC | Age: 67
End: 2022-06-08

## 2022-06-08 NOTE — TELEPHONE ENCOUNTER
Started a week ago with sinus congestion, sore throat,cough w/ phlegm  doesn't think he had a fever at all  Did a covid home covid test yesterday and last Friday-both neg  Wants to know what he can take for his symptoms  Can lm if pt  doesn't answer when we call back  He is working until 3:00pm today

## 2022-06-08 NOTE — TELEPHONE ENCOUNTER
He can start flonase 1 spray in each nostril daily  Can also take coricidin HBP for symptoms     If symptoms persist or worsen, recommend office visit

## 2022-06-13 ENCOUNTER — OFFICE VISIT (OUTPATIENT)
Dept: INTERNAL MEDICINE CLINIC | Facility: CLINIC | Age: 67
End: 2022-06-13
Payer: COMMERCIAL

## 2022-06-13 VITALS
WEIGHT: 268 LBS | SYSTOLIC BLOOD PRESSURE: 120 MMHG | BODY MASS INDEX: 38.37 KG/M2 | HEART RATE: 83 BPM | HEIGHT: 70 IN | OXYGEN SATURATION: 97 % | DIASTOLIC BLOOD PRESSURE: 70 MMHG | TEMPERATURE: 97 F

## 2022-06-13 DIAGNOSIS — E11.65 UNCONTROLLED TYPE 2 DIABETES MELLITUS WITH HYPERGLYCEMIA (HCC): ICD-10-CM

## 2022-06-13 DIAGNOSIS — Z79.4 TYPE 2 DIABETES MELLITUS WITH HYPERGLYCEMIA, WITH LONG-TERM CURRENT USE OF INSULIN (HCC): ICD-10-CM

## 2022-06-13 DIAGNOSIS — E11.65 TYPE 2 DIABETES MELLITUS WITH HYPERGLYCEMIA, WITH LONG-TERM CURRENT USE OF INSULIN (HCC): ICD-10-CM

## 2022-06-13 DIAGNOSIS — B34.9 VIRAL INFECTION: Primary | ICD-10-CM

## 2022-06-13 PROCEDURE — 99213 OFFICE O/P EST LOW 20 MIN: CPT | Performed by: NURSE PRACTITIONER

## 2022-06-13 RX ORDER — INSULIN GLARGINE 100 [IU]/ML
INJECTION, SOLUTION SUBCUTANEOUS
Qty: 15 ML | Refills: 1 | Status: SHIPPED | OUTPATIENT
Start: 2022-06-13

## 2022-06-13 RX ORDER — FLASH GLUCOSE SENSOR
1 KIT MISCELLANEOUS
Qty: 6 EACH | Refills: 1 | Status: SHIPPED | OUTPATIENT
Start: 2022-06-13

## 2022-06-13 NOTE — PROGRESS NOTES
Assessment/Plan:     Diagnoses and all orders for this visit:    Viral infection  Comments:  symptoms are improving  start flonase 1 spray in each nostril daily  call if symptoms return or worsen  Type 2 diabetes mellitus with hyperglycemia, with long-term current use of insulin ([facility])  -     insulin glargine (Lantus SoloStar) 100 units/mL injection pen; Inject 16 units at bedtime    Uncontrolled type 2 diabetes mellitus with hyperglycemia ([facility])  -     Continuous Blood Gluc Sensor (FreeStyle Paul 14 Day Sensor) MISC; Inject 1 application under the skin every 14 (fourteen) days          Subjective:      Patient ID: Hua Alarcon is a 78 y o  male  Here today with complaints of sinus congestion  Symptoms started about 2 weeks ago  He has a nasal congestion, sore throat, moist cough, and mild body aches  He has bilateral ear pressure  No fevers or headaches  Taking mucinex and an antihistamine  Yesterday and today he has started to feel better       The following portions of the patient's history were reviewed and updated as appropriate: allergies, current medications, past family history, past medical history, past social history, past surgical history and problem list     Review of Systems   Constitutional: Negative for activity change, appetite change, fatigue and fever  HENT: Positive for congestion, ear pain, postnasal drip, rhinorrhea, sinus pressure, sinus pain and sore throat  Respiratory: Positive for cough  Gastrointestinal: Negative for abdominal pain, diarrhea, nausea and vomiting  Neurological: Negative for dizziness, light-headedness and headaches  Objective:      /70   Pulse 83   Temp (!) 97 °F (36 1 °C)   Ht 5' 10" (1 778 m)   Wt 122 kg (268 lb)   SpO2 97%   BMI 38 45 kg/m²          Physical Exam  Vitals reviewed  Constitutional:       Appearance: Normal appearance  HENT:      Head: Normocephalic and atraumatic        Mouth/Throat:      Pharynx: Posterior oropharyngeal erythema present  Eyes:      Conjunctiva/sclera: Conjunctivae normal    Cardiovascular:      Rate and Rhythm: Normal rate and regular rhythm  Heart sounds: Normal heart sounds  Pulmonary:      Effort: Pulmonary effort is normal       Breath sounds: Normal breath sounds  Neurological:      Mental Status: He is alert and oriented to person, place, and time     Psychiatric:         Mood and Affect: Mood normal          Behavior: Behavior normal

## 2022-06-20 ENCOUNTER — RA CDI HCC (OUTPATIENT)
Dept: OTHER | Facility: HOSPITAL | Age: 67
End: 2022-06-20

## 2022-06-20 ENCOUNTER — TELEPHONE (OUTPATIENT)
Dept: NEPHROLOGY | Facility: CLINIC | Age: 67
End: 2022-06-20

## 2022-06-20 NOTE — TELEPHONE ENCOUNTER
Called and spoke with [employer] to complete their bloodwork prior to their appointment on 06/30/22 with Dr Yamilex Melo at the [facility]

## 2022-06-20 NOTE — PROGRESS NOTES
Please review if the following dx  is applicable to the patient's condition and assess and document, if applicable in next visit on 07/06/2022    E66 01: Morbid (severe) obesity due to excess calories ([facility] [address] ) -     Per CMS/ICD 10 coding guidelines, to be used when BMI > 35 & <40 with one or more comorbidity (DM, HTN, or JOSE RAUL)    [address]  coding opportunities          Chart Reviewed number of suggestions sent to Provider: 1     Patients Insurance     Medicare Insurance: Vicki ACEVEDO's

## 2022-06-22 ENCOUNTER — VBI (OUTPATIENT)
Dept: ADMINISTRATIVE | Facility: OTHER | Age: 67
End: 2022-06-22

## 2022-06-22 DIAGNOSIS — E11.65 UNCONTROLLED TYPE 2 DIABETES MELLITUS WITH HYPERGLYCEMIA (HCC): ICD-10-CM

## 2022-06-22 DIAGNOSIS — N18.31 TYPE 2 DIABETES MELLITUS WITH STAGE 3A CHRONIC KIDNEY DISEASE, WITHOUT LONG-TERM CURRENT USE OF INSULIN (HCC): ICD-10-CM

## 2022-06-22 DIAGNOSIS — E11.22 TYPE 2 DIABETES MELLITUS WITH STAGE 3A CHRONIC KIDNEY DISEASE, WITHOUT LONG-TERM CURRENT USE OF INSULIN (HCC): ICD-10-CM

## 2022-06-22 DIAGNOSIS — G89.29 CHRONIC MIDLINE LOW BACK PAIN WITH RIGHT-SIDED SCIATICA: ICD-10-CM

## 2022-06-22 DIAGNOSIS — M54.41 CHRONIC MIDLINE LOW BACK PAIN WITH RIGHT-SIDED SCIATICA: ICD-10-CM

## 2022-06-22 PROCEDURE — 3066F NEPHROPATHY DOC TX: CPT | Performed by: NURSE PRACTITIONER

## 2022-06-23 ENCOUNTER — HOSPITAL ENCOUNTER (OUTPATIENT)
Dept: RADIOLOGY | Age: 67
Discharge: HOME | End: 2022-06-23
Payer: COMMERCIAL

## 2022-06-23 DIAGNOSIS — M51.16 INTERVERTEBRAL DISC DISORDER WITH RADICULOPATHY OF LUMBAR REGION: ICD-10-CM

## 2022-06-23 PROCEDURE — 72148 MRI LUMBAR SPINE W/O DYE: CPT

## 2022-06-23 RX ORDER — HYDROCODONE BITARTRATE AND ACETAMINOPHEN 5; 325 MG/1; MG/1
1 TABLET ORAL DAILY PRN
Qty: 30 TABLET | Refills: 0 | Status: SHIPPED | OUTPATIENT
Start: 2022-06-23 | End: 2022-07-19 | Stop reason: SDUPTHER

## 2022-06-28 ENCOUNTER — TELEPHONE (OUTPATIENT)
Dept: INTERNAL MEDICINE CLINIC | Facility: CLINIC | Age: 67
End: 2022-06-28

## 2022-06-28 ENCOUNTER — OFFICE VISIT (OUTPATIENT)
Dept: INTERNAL MEDICINE CLINIC | Facility: CLINIC | Age: 67
End: 2022-06-28
Payer: COMMERCIAL

## 2022-06-28 VITALS
HEART RATE: 96 BPM | DIASTOLIC BLOOD PRESSURE: 78 MMHG | BODY MASS INDEX: 38.8 KG/M2 | SYSTOLIC BLOOD PRESSURE: 140 MMHG | OXYGEN SATURATION: 97 % | WEIGHT: 271 LBS | TEMPERATURE: 97 F | HEIGHT: 70 IN

## 2022-06-28 DIAGNOSIS — J06.9 ACUTE UPPER RESPIRATORY INFECTION: Primary | ICD-10-CM

## 2022-06-28 PROCEDURE — 99213 OFFICE O/P EST LOW 20 MIN: CPT | Performed by: NURSE PRACTITIONER

## 2022-06-28 PROCEDURE — 3008F BODY MASS INDEX DOCD: CPT | Performed by: NURSE PRACTITIONER

## 2022-06-28 PROCEDURE — 3078F DIAST BP <80 MM HG: CPT | Performed by: NURSE PRACTITIONER

## 2022-06-28 PROCEDURE — 3077F SYST BP >= 140 MM HG: CPT | Performed by: NURSE PRACTITIONER

## 2022-06-28 PROCEDURE — 1160F RVW MEDS BY RX/DR IN RCRD: CPT | Performed by: NURSE PRACTITIONER

## 2022-06-28 PROCEDURE — 1036F TOBACCO NON-USER: CPT | Performed by: NURSE PRACTITIONER

## 2022-06-28 RX ORDER — GUAIFENESIN AND CODEINE PHOSPHATE 100; 10 MG/5ML; MG/5ML
5 SOLUTION ORAL 3 TIMES DAILY PRN
Qty: 120 ML | Refills: 0 | Status: SHIPPED | OUTPATIENT
Start: 2022-06-28

## 2022-06-28 RX ORDER — AZITHROMYCIN 250 MG/1
TABLET, FILM COATED ORAL
Qty: 6 TABLET | Refills: 0 | Status: SHIPPED | OUTPATIENT
Start: 2022-06-28 | End: 2022-07-02

## 2022-06-28 NOTE — PROGRESS NOTES
Assessment/Plan:    Take antibiotics as prescribed with food  Cough medication as needed- advised not to take his hydrocodone if taking the cough medication  May cause sedation, avoid driving while taking the medication  Continue flonase daily  Diagnoses and all orders for this visit:    Acute upper respiratory infection  -     azithromycin (ZITHROMAX) 250 mg tablet; Take 2 tablets daily on day 1 then 1 tablet daily for the next 4 days  -     guaifenesin-codeine (GUAIFENESIN AC) 100-10 MG/5ML liquid; Take 5 mL by mouth 3 (three) times a day as needed for cough          Subjective:      Patient ID: Donovan Lopez is a 78 y o  male  Angelic Fast is here today with complaints of cough and nasal congestion  Symptoms started 3 week ago and are progressively getting worse  He has a productive cough with clear/yellow mucous  He denies any fevers, sore throat, or headache  He has been using flonase and mucinex without relief  He has had multiple negative covid tests  The following portions of the patient's history were reviewed and updated as appropriate: allergies, current medications, past family history, past medical history, past social history, past surgical history and problem list     Review of Systems   Constitutional: Negative for activity change, appetite change and fever  HENT: Positive for congestion, postnasal drip and rhinorrhea  Negative for sinus pressure, sinus pain and sore throat  Respiratory: Positive for cough  Negative for chest tightness, shortness of breath and wheezing  Cardiovascular: Negative for chest pain  Gastrointestinal: Negative for abdominal pain  Musculoskeletal: Negative for myalgias  Neurological: Negative for dizziness and headaches  Objective:      /78   Pulse 96   Temp (!) 97 °F (36 1 °C)   Ht 5' 10" (1 778 m)   Wt 123 kg (271 lb)   SpO2 97%   BMI 38 88 kg/m²          Physical Exam  Vitals reviewed     Constitutional: Appearance: Normal appearance  HENT:      Head: Normocephalic and atraumatic  Right Ear: Tympanic membrane, ear canal and external ear normal       Left Ear: Tympanic membrane, ear canal and external ear normal       Mouth/Throat:      Pharynx: Posterior oropharyngeal erythema present  Eyes:      Conjunctiva/sclera: Conjunctivae normal    Cardiovascular:      Rate and Rhythm: Normal rate and regular rhythm  Heart sounds: Normal heart sounds  Pulmonary:      Effort: Pulmonary effort is normal       Breath sounds: Normal breath sounds  Skin:     General: Skin is warm and dry  Neurological:      Mental Status: He is alert and oriented to person, place, and time     Psychiatric:         Mood and Affect: Mood normal          Behavior: Behavior normal

## 2022-06-28 NOTE — TELEPHONE ENCOUNTER
If symptoms are slowly improving, it may take awhile for cough to resolve  If symptoms are the same or worse, recommend an office visit  Can schedule for 3 pm today

## 2022-06-28 NOTE — TELEPHONE ENCOUNTER
Pt  was seen in the office recently for a cold  Was told to call if it doesn't get better  Still has a cough and congestion  No fever

## 2022-06-29 ENCOUNTER — TELEPHONE (OUTPATIENT)
Dept: PAIN MEDICINE | Facility: MEDICAL CENTER | Age: 67
End: 2022-06-29

## 2022-06-29 DIAGNOSIS — M51.16 INTERVERTEBRAL DISC DISORDER WITH RADICULOPATHY OF LUMBAR REGION: Primary | ICD-10-CM

## 2022-06-29 NOTE — TELEPHONE ENCOUNTER
Please call patient with MRI of lumbar spine showing mild to moderate spondylotic changes of the lumbar spine including multifactorial moderate right L5-S1 foraminal encroachment concerning for impingement of the exiting right L5 nerve root  I read Dr Richelle Babinski note and this is likely the cause of the symptoms he is experiencing down the right leg  I will place an order for a right-sided L5-S1 TFESI, our  will call to schedule him if he would like to move forward with this option

## 2022-06-30 NOTE — TELEPHONE ENCOUNTER
Pt given results of cervical MRI and recommendation for MATILDA  Pt would like to proceed with the inj    Told pt our  will reach out to him to set up date for inj

## 2022-07-01 ENCOUNTER — TELEPHONE (OUTPATIENT)
Dept: NEPHROLOGY | Facility: CLINIC | Age: 67
End: 2022-07-01

## 2022-07-06 ENCOUNTER — OFFICE VISIT (OUTPATIENT)
Dept: INTERNAL MEDICINE CLINIC | Facility: CLINIC | Age: 67
End: 2022-07-06
Payer: COMMERCIAL

## 2022-07-06 VITALS
TEMPERATURE: 97.4 F | SYSTOLIC BLOOD PRESSURE: 122 MMHG | HEART RATE: 76 BPM | OXYGEN SATURATION: 96 % | WEIGHT: 267.4 LBS | HEIGHT: 70 IN | DIASTOLIC BLOOD PRESSURE: 80 MMHG | BODY MASS INDEX: 38.28 KG/M2

## 2022-07-06 DIAGNOSIS — G89.29 CHRONIC MIDLINE LOW BACK PAIN WITH RIGHT-SIDED SCIATICA: ICD-10-CM

## 2022-07-06 DIAGNOSIS — I10 ESSENTIAL HYPERTENSION: ICD-10-CM

## 2022-07-06 DIAGNOSIS — E11.42 TYPE 2 DIABETES MELLITUS WITH DIABETIC POLYNEUROPATHY, WITHOUT LONG-TERM CURRENT USE OF INSULIN (HCC): Primary | ICD-10-CM

## 2022-07-06 DIAGNOSIS — G47.33 OSA (OBSTRUCTIVE SLEEP APNEA): ICD-10-CM

## 2022-07-06 DIAGNOSIS — E03.9 ACQUIRED HYPOTHYROIDISM: ICD-10-CM

## 2022-07-06 DIAGNOSIS — M54.41 CHRONIC MIDLINE LOW BACK PAIN WITH RIGHT-SIDED SCIATICA: ICD-10-CM

## 2022-07-06 DIAGNOSIS — N18.2 CHRONIC KIDNEY DISEASE, STAGE 2 (MILD): ICD-10-CM

## 2022-07-06 DIAGNOSIS — E78.2 MIXED HYPERLIPIDEMIA: ICD-10-CM

## 2022-07-06 PROBLEM — E11.29 TYPE 2 DIABETES MELLITUS WITH PROTEINURIA (HCC): Status: RESOLVED | Noted: 2021-04-07 | Resolved: 2022-07-06

## 2022-07-06 PROBLEM — E11.65 TYPE 2 DIABETES MELLITUS WITH HYPERGLYCEMIA (HCC): Status: RESOLVED | Noted: 2021-06-24 | Resolved: 2022-07-06

## 2022-07-06 PROBLEM — R80.9 TYPE 2 DIABETES MELLITUS WITH PROTEINURIA (HCC): Status: RESOLVED | Noted: 2021-04-07 | Resolved: 2022-07-06

## 2022-07-06 PROCEDURE — 3074F SYST BP LT 130 MM HG: CPT | Performed by: NURSE PRACTITIONER

## 2022-07-06 PROCEDURE — 3066F NEPHROPATHY DOC TX: CPT | Performed by: NURSE PRACTITIONER

## 2022-07-06 PROCEDURE — 3079F DIAST BP 80-89 MM HG: CPT | Performed by: NURSE PRACTITIONER

## 2022-07-06 PROCEDURE — 1160F RVW MEDS BY RX/DR IN RCRD: CPT | Performed by: NURSE PRACTITIONER

## 2022-07-06 PROCEDURE — 99214 OFFICE O/P EST MOD 30 MIN: CPT | Performed by: NURSE PRACTITIONER

## 2022-07-06 NOTE — ASSESSMENT & PLAN NOTE
Lab Results   Component Value Date    EGFR 82 01/05/2022    EGFR 74 09/01/2021    EGFR 52 08/23/2021    CREATININE 0 96 01/05/2022    CREATININE 1 05 09/01/2021    CREATININE 1 40 (H) 08/23/2021   due for labs  Sees nephrology

## 2022-07-06 NOTE — ASSESSMENT & PLAN NOTE
Due for labs  On metformin, farxiga, ozempic, lantus, and glimepiride  Sugars have been improving  Sees endo     Encouraged him to schedule an eye exam

## 2022-07-06 NOTE — TELEPHONE ENCOUNTER
Pt scheudled for 8/16/22  Pt is diabetic and wears a monitor  Informed him he would need to remove the monitor for the procedure  No thinners  Gave verbal instructions, [facility]

## 2022-07-06 NOTE — PROGRESS NOTES
Assessment/Plan:    Type 2 diabetes mellitus with diabetic polyneuropathy, without long-term current use of insulin ([address] )  Due for labs  On metformin, farxiga, ozempic, lantus, and glimepiride  Sugars have been improving  Sees endo  Encouraged him to schedule an eye exam      Hypothyroid  On levothyroxine  Sees endo  JOSE RAUL (obstructive sleep apnea)  Schedule with sleep medicine  Essential hypertension  Stable  On amlodipine and carvedilol     Chronic midline low back pain with right-sided sciatica  Takes hydrocodone as needed  Sees pain management, waiting to schedule injection  Chronic kidney disease, stage 2 (mild)  Lab Results   Component Value Date    EGFR 82 01/05/2022    EGFR 74 09/01/2021    EGFR 52 08/23/2021    CREATININE 0 96 01/05/2022    CREATININE 1 05 09/01/2021    CREATININE 1 40 (H) 08/23/2021   due for labs  Sees nephrology  Mixed hyperlipidemia  Continue statin        Diagnoses and all orders for this visit:    Type 2 diabetes mellitus with diabetic polyneuropathy, without long-term current use of insulin ([facility])    Acquired hypothyroidism    JOSE RAUL (obstructive sleep apnea)    Essential hypertension    Chronic midline low back pain with right-sided sciatica    Chronic kidney disease, stage 2 (mild)    Mixed hyperlipidemia          Subjective:      Patient ID: Ruby Hagan is a 78 y o  male  Adriana Foster is here today for follow up  He was seen here last week for URI  He was treated with antibiotics and symptoms are improving  His blood sugars have been better  Blood sugars mostly under 200  He has been watching his diet more, less sweets  He continues with chronic back pain  He is waiting to be scheduled for an injection with pain management           The following portions of the patient's history were reviewed and updated as appropriate: allergies, current medications, past family history, past medical history, past social history, past surgical history and problem list     Review of Systems   Constitutional: Negative for activity change, appetite change and fatigue  HENT: Positive for postnasal drip and rhinorrhea  Negative for congestion and sore throat  Respiratory: Positive for cough  Negative for chest tightness, shortness of breath and wheezing  Cardiovascular: Negative for chest pain, palpitations and leg swelling  Gastrointestinal: Negative for abdominal pain, constipation and diarrhea  Genitourinary: Negative for difficulty urinating  Musculoskeletal: Positive for arthralgias and back pain  Neurological: Negative for dizziness, light-headedness and headaches  Psychiatric/Behavioral: Negative for sleep disturbance  The patient is not nervous/anxious  Objective:      /80   Pulse 76   Temp (!) 97 4 °F (36 3 °C)   Ht 5' 10" (1 778 m)   Wt 121 kg (267 lb 6 4 oz)   SpO2 96%   BMI 38 37 kg/m²          Physical Exam  Vitals reviewed  Constitutional:       Appearance: Normal appearance  HENT:      Head: Normocephalic and atraumatic  Eyes:      Conjunctiva/sclera: Conjunctivae normal    Cardiovascular:      Rate and Rhythm: Normal rate and regular rhythm  Heart sounds: Normal heart sounds  Pulmonary:      Effort: Pulmonary effort is normal       Breath sounds: Normal breath sounds  Musculoskeletal:         General: Normal range of motion  Skin:     General: Skin is warm and dry  Neurological:      Mental Status: He is alert and oriented to person, place, and time     Psychiatric:         Mood and Affect: Mood normal          Behavior: Behavior normal

## 2022-07-19 DIAGNOSIS — N18.31 TYPE 2 DIABETES MELLITUS WITH STAGE 3A CHRONIC KIDNEY DISEASE, WITHOUT LONG-TERM CURRENT USE OF INSULIN (HCC): ICD-10-CM

## 2022-07-19 DIAGNOSIS — E11.65 UNCONTROLLED TYPE 2 DIABETES MELLITUS WITH HYPERGLYCEMIA (HCC): ICD-10-CM

## 2022-07-19 DIAGNOSIS — E03.9 HYPOTHYROIDISM, UNSPECIFIED TYPE: ICD-10-CM

## 2022-07-19 DIAGNOSIS — M54.41 CHRONIC MIDLINE LOW BACK PAIN WITH RIGHT-SIDED SCIATICA: ICD-10-CM

## 2022-07-19 DIAGNOSIS — E11.22 TYPE 2 DIABETES MELLITUS WITH STAGE 3A CHRONIC KIDNEY DISEASE, WITHOUT LONG-TERM CURRENT USE OF INSULIN (HCC): ICD-10-CM

## 2022-07-19 DIAGNOSIS — M54.12 CERVICAL RADICULOPATHY: ICD-10-CM

## 2022-07-19 DIAGNOSIS — G89.29 CHRONIC MIDLINE LOW BACK PAIN WITH RIGHT-SIDED SCIATICA: ICD-10-CM

## 2022-07-19 DIAGNOSIS — E11.42 TYPE 2 DIABETES MELLITUS WITH DIABETIC POLYNEUROPATHY, WITHOUT LONG-TERM CURRENT USE OF INSULIN (HCC): ICD-10-CM

## 2022-07-19 RX ORDER — GLIMEPIRIDE 4 MG/1
TABLET ORAL
Qty: 180 TABLET | Refills: 0 | Status: SHIPPED | OUTPATIENT
Start: 2022-07-19

## 2022-07-21 RX ORDER — PEN NEEDLE, DIABETIC 31 GX3/16"
NEEDLE, DISPOSABLE MISCELLANEOUS DAILY
Qty: 100 EACH | Refills: 3 | Status: SHIPPED | OUTPATIENT
Start: 2022-07-21 | End: 2022-10-12 | Stop reason: SDUPTHER

## 2022-07-21 RX ORDER — HYDROCODONE BITARTRATE AND ACETAMINOPHEN 5; 325 MG/1; MG/1
1 TABLET ORAL DAILY PRN
Qty: 30 TABLET | Refills: 0 | Status: SHIPPED | OUTPATIENT
Start: 2022-07-21 | End: 2022-09-11 | Stop reason: SDUPTHER

## 2022-07-21 RX ORDER — LEVOTHYROXINE SODIUM 0.07 MG/1
75 TABLET ORAL DAILY
Qty: 90 TABLET | Refills: 0 | Status: SHIPPED | OUTPATIENT
Start: 2022-07-21

## 2022-07-21 RX ORDER — GABAPENTIN 100 MG/1
100 CAPSULE ORAL 3 TIMES DAILY
Qty: 90 CAPSULE | Refills: 2 | Status: SHIPPED | OUTPATIENT
Start: 2022-07-21 | End: 2022-09-11 | Stop reason: SDUPTHER

## 2022-08-03 ENCOUNTER — TELEPHONE (OUTPATIENT)
Dept: ADMINISTRATIVE | Facility: OTHER | Age: 67
End: 2022-08-03

## 2022-08-03 NOTE — LETTER
Diabetic Eye Exam Form    Date Requested: 22  Patient: Skip Huber  Patient : [id number]   Referring Provider: ROLAND Alarcon    DIABETIC Eye Exam Date _______________________________    Type of Exam MUST be documented for Diabetic Eye Exams  Please CHECK ONE  Retinal Exam       Dilated Retinal Exam       OCT       Optomap-Iris Exam      Fundus Photography     Left Eye - Please check Retinopathy AND Type or No Retinopathy      Exam did show retinopathy    Exam did not show retinopathy         Mild     Proliferative           Moderate    Severe            None         Right Eye - Please check Retinopathy AND Type or No Retinopathy     Exam did show retinopathy    Exam did not show retinopathy         Mild     Proliferative        Moderate    Severe        None       Comments __________________________________________________________    Practice Providing Exam ______________________________________________    Exam Performed By (print name) _______________________________________      Provider Signature ___________________________________________________    These reports are needed for  compliance  Please fax this completed form and a copy of the Diabetic Eye Exam report to our office located at [place] [address] as soon as possible via [phone] [address]: Phone [phone]  We thank you for your assistance in treating our mutual patient

## 2022-08-03 NOTE — TELEPHONE ENCOUNTER
----- Message from Hema Parra sent at 8/2/2022  1:12 PM EDT -----  Regarding: care gap request- Dm eye  08/02/22 1:12 PM    Hello, our patient attached above has had Diabetic Eye Exam completed/performed  Please assist in updating the patient chart by making an External outreach to Dr Sahra Fuentes facility located in [place] [address]  The date of service is 2022 or most recent      Thank you,  Hema Parra  [facility]

## 2022-08-03 NOTE — TELEPHONE ENCOUNTER
Upon review of the In Basket request and the patient's chart, initial outreach has been made via fax, please see Contacts section for details       Thank you  Sim Dean, [address] [phone] Fax [phone]

## 2022-08-04 NOTE — TELEPHONE ENCOUNTER
Upon review of the In Basket request we have found as a result of outreach that patient did not have the requested item(s) completed  Any additional questions or concerns should be emailed to the Practice Liaisons via Haylee@"[email or web address]  org email, please do not reply via In Basket      Thank you  Gerda Luciano, [address]

## 2022-08-14 DIAGNOSIS — I10 ESSENTIAL HYPERTENSION: ICD-10-CM

## 2022-08-14 DIAGNOSIS — N52.9 ERECTILE DYSFUNCTION, UNSPECIFIED ERECTILE DYSFUNCTION TYPE: ICD-10-CM

## 2022-08-14 DIAGNOSIS — E78.2 MIXED HYPERLIPIDEMIA: ICD-10-CM

## 2022-08-14 PROCEDURE — 3066F NEPHROPATHY DOC TX: CPT | Performed by: PHYSICIAN ASSISTANT

## 2022-08-15 RX ORDER — CARVEDILOL 12.5 MG/1
12.5 TABLET ORAL 2 TIMES DAILY WITH MEALS
Qty: 180 TABLET | Refills: 3 | Status: SHIPPED | OUTPATIENT
Start: 2022-08-15

## 2022-08-15 RX ORDER — SILDENAFIL 50 MG/1
50 TABLET, FILM COATED ORAL AS NEEDED
Qty: 10 TABLET | Refills: 0 | Status: SHIPPED | OUTPATIENT
Start: 2022-08-15 | End: 2022-10-06 | Stop reason: SDUPTHER

## 2022-08-15 RX ORDER — ATORVASTATIN CALCIUM 20 MG/1
20 TABLET, FILM COATED ORAL DAILY
Qty: 90 TABLET | Refills: 0 | Status: SHIPPED | OUTPATIENT
Start: 2022-08-15

## 2022-08-16 ENCOUNTER — LAB (OUTPATIENT)
Dept: LAB | Facility: CLINIC | Age: 67
End: 2022-08-16
Payer: COMMERCIAL

## 2022-08-16 ENCOUNTER — TELEPHONE (OUTPATIENT)
Dept: NEPHROLOGY | Facility: CLINIC | Age: 67
End: 2022-08-16

## 2022-08-16 ENCOUNTER — VBI (OUTPATIENT)
Dept: ADMINISTRATIVE | Facility: OTHER | Age: 67
End: 2022-08-16

## 2022-08-16 ENCOUNTER — HOSPITAL ENCOUNTER (OUTPATIENT)
Dept: RADIOLOGY | Facility: CLINIC | Age: 67
Discharge: HOME | End: 2022-08-16
Payer: COMMERCIAL

## 2022-08-16 VITALS
SYSTOLIC BLOOD PRESSURE: 149 MMHG | DIASTOLIC BLOOD PRESSURE: 76 MMHG | RESPIRATION RATE: 18 BRPM | TEMPERATURE: 98 F | HEART RATE: 78 BPM | OXYGEN SATURATION: 96 %

## 2022-08-16 DIAGNOSIS — E11.65 UNCONTROLLED TYPE 2 DIABETES MELLITUS WITH HYPERGLYCEMIA (HCC): ICD-10-CM

## 2022-08-16 DIAGNOSIS — Z79.4 TYPE 2 DIABETES MELLITUS WITH HYPERGLYCEMIA, WITH LONG-TERM CURRENT USE OF INSULIN (HCC): ICD-10-CM

## 2022-08-16 DIAGNOSIS — R79.89 ELEVATED SERUM CREATININE: ICD-10-CM

## 2022-08-16 DIAGNOSIS — E03.9 HYPOTHYROIDISM, UNSPECIFIED TYPE: ICD-10-CM

## 2022-08-16 DIAGNOSIS — M51.16 INTERVERTEBRAL DISC DISORDER WITH RADICULOPATHY OF LUMBAR REGION: ICD-10-CM

## 2022-08-16 DIAGNOSIS — I10 ESSENTIAL HYPERTENSION: ICD-10-CM

## 2022-08-16 DIAGNOSIS — N18.31 TYPE 2 DIABETES MELLITUS WITH STAGE 3A CHRONIC KIDNEY DISEASE, WITHOUT LONG-TERM CURRENT USE OF INSULIN (HCC): ICD-10-CM

## 2022-08-16 DIAGNOSIS — E78.2 MIXED HYPERLIPIDEMIA: ICD-10-CM

## 2022-08-16 DIAGNOSIS — E11.22 TYPE 2 DIABETES MELLITUS WITH STAGE 3A CHRONIC KIDNEY DISEASE, WITHOUT LONG-TERM CURRENT USE OF INSULIN (HCC): ICD-10-CM

## 2022-08-16 DIAGNOSIS — E87.5 HYPERKALEMIA: ICD-10-CM

## 2022-08-16 DIAGNOSIS — Z12.5 SCREENING FOR PROSTATE CANCER: ICD-10-CM

## 2022-08-16 DIAGNOSIS — E11.42 TYPE 2 DIABETES MELLITUS WITH DIABETIC POLYNEUROPATHY, WITHOUT LONG-TERM CURRENT USE OF INSULIN (HCC): ICD-10-CM

## 2022-08-16 DIAGNOSIS — E11.65 TYPE 2 DIABETES MELLITUS WITH HYPERGLYCEMIA, WITH LONG-TERM CURRENT USE OF INSULIN (HCC): ICD-10-CM

## 2022-08-16 DIAGNOSIS — E03.9 ACQUIRED HYPOTHYROIDISM: ICD-10-CM

## 2022-08-16 DIAGNOSIS — R80.1 PERSISTENT PROTEINURIA: ICD-10-CM

## 2022-08-16 LAB
ALBUMIN SERPL BCP-MCNC: 3.8 G/DL (ref 3.5–5)
ALP SERPL-CCNC: 75 U/L (ref 34–104)
ALT SERPL W P-5'-P-CCNC: 19 U/L (ref 7–52)
ANION GAP SERPL CALCULATED.3IONS-SCNC: 7 MMOL/L (ref 4–13)
AST SERPL W P-5'-P-CCNC: 14 U/L (ref 13–39)
BACTERIA UR QL AUTO: ABNORMAL /HPF
BILIRUB SERPL-MCNC: 0.47 MG/DL (ref 0.2–1)
BILIRUB UR QL STRIP: NEGATIVE
BUN SERPL-MCNC: 14 MG/DL (ref 5–25)
CALCIUM SERPL-MCNC: 9 MG/DL (ref 8.4–10.2)
CHLORIDE SERPL-SCNC: 104 MMOL/L (ref 96–108)
CHOLEST SERPL-MCNC: 117 MG/DL
CLARITY UR: CLEAR
CO2 SERPL-SCNC: 26 MMOL/L (ref 21–32)
COLOR UR: ABNORMAL
CREAT SERPL-MCNC: 0.64 MG/DL (ref 0.6–1.3)
CREAT UR-MCNC: 111.8 MG/DL
CREAT UR-MCNC: 130 MG/DL
EST. AVERAGE GLUCOSE BLD GHB EST-MCNC: 154 MG/DL
GFR SERPL CREATININE-BSD FRML MDRD: 102 ML/MIN/1.73SQ M
GLUCOSE P FAST SERPL-MCNC: 91 MG/DL (ref 65–99)
GLUCOSE UR STRIP-MCNC: NEGATIVE MG/DL
HBA1C MFR BLD: 7 %
HDLC SERPL-MCNC: 37 MG/DL
HGB UR QL STRIP.AUTO: NEGATIVE
KETONES UR STRIP-MCNC: NEGATIVE MG/DL
LDLC SERPL CALC-MCNC: 43 MG/DL (ref 0–100)
LEUKOCYTE ESTERASE UR QL STRIP: NEGATIVE
MAGNESIUM SERPL-MCNC: 1.7 MG/DL (ref 1.9–2.7)
MICROALBUMIN UR-MCNC: 1580 MG/L (ref 0–20)
MICROALBUMIN/CREAT 24H UR: 1215 MG/G CREATININE (ref 0–30)
MUCOUS THREADS UR QL AUTO: ABNORMAL
NITRITE UR QL STRIP: NEGATIVE
NON-SQ EPI CELLS URNS QL MICRO: ABNORMAL /HPF
PH UR STRIP.AUTO: 6 [PH]
POTASSIUM SERPL-SCNC: 4.3 MMOL/L (ref 3.5–5.3)
PROT SERPL-MCNC: 6.7 G/DL (ref 6.4–8.4)
PROT UR STRIP-MCNC: ABNORMAL MG/DL
PROT UR-MCNC: 191 MG/DL
PROT/CREAT UR: 1.71 MG/G{CREAT} (ref 0–0.1)
PSA SERPL-MCNC: 1.4 NG/ML (ref 0–4)
RBC #/AREA URNS AUTO: ABNORMAL /HPF
SODIUM SERPL-SCNC: 137 MMOL/L (ref 135–147)
SP GR UR STRIP.AUTO: 1.02 (ref 1–1.03)
T4 FREE SERPL-MCNC: 0.99 NG/DL (ref 0.76–1.46)
TRIGL SERPL-MCNC: 183 MG/DL
TSH SERPL DL<=0.05 MIU/L-ACNC: 3.5 UIU/ML (ref 0.45–4.5)
UROBILINOGEN UR STRIP-ACNC: <2 MG/DL
WBC #/AREA URNS AUTO: ABNORMAL /HPF

## 2022-08-16 PROCEDURE — 82570 ASSAY OF URINE CREATININE: CPT

## 2022-08-16 PROCEDURE — 84156 ASSAY OF PROTEIN URINE: CPT

## 2022-08-16 PROCEDURE — 81001 URINALYSIS AUTO W/SCOPE: CPT

## 2022-08-16 PROCEDURE — G0103 PSA SCREENING: HCPCS

## 2022-08-16 PROCEDURE — 84439 ASSAY OF FREE THYROXINE: CPT

## 2022-08-16 PROCEDURE — 82043 UR ALBUMIN QUANTITATIVE: CPT

## 2022-08-16 PROCEDURE — 36415 COLL VENOUS BLD VENIPUNCTURE: CPT

## 2022-08-16 PROCEDURE — 80053 COMPREHEN METABOLIC PANEL: CPT

## 2022-08-16 PROCEDURE — 64484 NJX AA&/STRD TFRM EPI L/S EA: CPT | Performed by: ANESTHESIOLOGY

## 2022-08-16 PROCEDURE — 80061 LIPID PANEL: CPT

## 2022-08-16 PROCEDURE — 3051F HG A1C>EQUAL 7.0%<8.0%: CPT | Performed by: PHYSICIAN ASSISTANT

## 2022-08-16 PROCEDURE — 83735 ASSAY OF MAGNESIUM: CPT

## 2022-08-16 PROCEDURE — 3062F POS MACROALBUMINURIA REV: CPT | Performed by: PHYSICIAN ASSISTANT

## 2022-08-16 PROCEDURE — 83036 HEMOGLOBIN GLYCOSYLATED A1C: CPT

## 2022-08-16 PROCEDURE — 64483 NJX AA&/STRD TFRM EPI L/S 1: CPT | Performed by: ANESTHESIOLOGY

## 2022-08-16 PROCEDURE — 84443 ASSAY THYROID STIM HORMONE: CPT

## 2022-08-16 RX ORDER — METHYLPREDNISOLONE ACETATE 80 MG/ML
80 INJECTION, SUSPENSION INTRA-ARTICULAR; INTRALESIONAL; INTRAMUSCULAR; PARENTERAL; SOFT TISSUE ONCE
Status: COMPLETED | OUTPATIENT
Start: 2022-08-16 | End: 2022-08-16

## 2022-08-16 RX ORDER — 0.9 % SODIUM CHLORIDE 0.9 %
4 VIAL (ML) INJECTION ONCE
Status: COMPLETED | OUTPATIENT
Start: 2022-08-16 | End: 2022-08-16

## 2022-08-16 RX ORDER — BUPIVACAINE HCL/PF 2.5 MG/ML
2 VIAL (ML) INJECTION ONCE
Status: COMPLETED | OUTPATIENT
Start: 2022-08-16 | End: 2022-08-16

## 2022-08-16 RX ADMIN — IOHEXOL 1 ML: 300 INJECTION, SOLUTION INTRAVENOUS at 14:31

## 2022-08-16 RX ADMIN — Medication 4 ML: at 14:29

## 2022-08-16 RX ADMIN — METHYLPREDNISOLONE ACETATE 80 MG: 80 INJECTION, SUSPENSION INTRA-ARTICULAR; INTRALESIONAL; INTRAMUSCULAR; PARENTERAL; SOFT TISSUE at 14:31

## 2022-08-16 RX ADMIN — BUPIVACAINE HYDROCHLORIDE 2 ML: 2.5 INJECTION, SOLUTION EPIDURAL; INFILTRATION; INTRACAUDAL at 14:31

## 2022-08-16 NOTE — DISCHARGE INSTR - LAB
Epidural Steroid Injection   WHAT YOU NEED TO KNOW:   An epidural steroid injection (MATILDA) is a procedure to inject steroid medicine into the epidural space  The epidural space is between your spinal cord and vertebrae  Steroids reduce inflammation and fluid buildup in your spine that may be causing pain  You may be given pain medicine along with the steroids  ACTIVITY  Do not drive or operate machinery today  No strenuous activity today - bending, lifting, etc   You may resume normal activites starting tomorrow - start slowly and as tolerated  You may shower today, but no tub baths or hot tubs  You may have numbness for several hours from the local anesthetic  Please use caution and common sense, especially with weight-bearing activities  CARE OF THE INJECTION SITE  If you have soreness or pain, apply ice to the area today (20 minutes on/20 minutes off)  Starting tomorrow, you may use warm, moist heat or ice if needed  You may have an increase or change in your discomfort for 36-48 hours after your treatment  Apply ice and continue with any pain medication you have been prescribed  Notify the [facility] if you have any of the following: redness, drainage, swelling, headache, stiff neck or fever above 100°F     SPECIAL INSTRUCTIONS  Our office will contact you in approximately 7 days for a progress report  MEDICATIONS  Continue to take all routine medications  Our office may have instructed you to hold some medications  As no general anesthesia was used in today's procedure, you should not experience any side effects related to anesthesia  If you are diabetic, the steroids used in today's injection may temporarily increase your blood sugar levels after the first few days after your injection  Please keep a close eye on your sugars and alert the doctor who manages your diabetes if your sugars are significantly high from your baseline or you are symptomatic       If you have a problem specifically related to your procedure, please call our office at [phone]  Problems not related to your procedure should be directed to your primary care physician

## 2022-08-16 NOTE — H&P
History of Present Illness: The patient is a 78 y o  male who presents with complaints of right lower back and leg pain is here today for right L5-S1 transforaminal epidural steroid injection    Patient Active Problem List   Diagnosis    Type 2 diabetes mellitus with diabetic polyneuropathy, without long-term current use of insulin ([facility] )    JOSE RAUL (obstructive sleep apnea)    Essential hypertension    Mixed hyperlipidemia    Periodic limb movement    Hypothyroid    Chronic midline low back pain with right-sided sciatica    Obesity, morbid, BMI 40 0-49 9 ([facility] )    Other male erectile dysfunction    Microalbuminuria    Chronic kidney disease, stage 2 (mild)    Type 2 diabetes mellitus with diabetic chronic kidney disease (HCC)    Restless leg syndrome    Persistent proteinuria    Elevated serum creatinine    COVID-19    Cervical radiculopathy       Past Medical History:   Diagnosis Date    BPH (benign prostatic hyperplasia)     Chronic kidney disease     CPAP (continuous positive airway pressure) dependence     Diabetes mellitus ([facility] )     Disease of thyroid gland     Hyperlipidemia     Hypertension     Sleep apnea     Type [facility] mellitus with diabetic [facility] 6/24/2021       Past Surgical History:   Procedure Laterality Date    COLONOSCOPY      2016    [place] EGD TRANSORAL BIOPSY SINGLE/MULTIPLE N/A 5/17/2017    Procedure: ESOPHAGOGASTRODUODENOSCOPY (EGD) with bx;  Surgeon: Tatum Carbajal MD;  Location: [facility];   Service: Bariatrics    TONSILLECTOMY           Current Outpatient Medications:     gabapentin (Neurontin) 100 mg capsule, Take 1 capsule (100 mg total) by mouth 3 (three) times a day, Disp: 90 capsule, Rfl: 2    HYDROcodone-acetaminophen (NORCO) 5-325 mg per tablet, Take 1 tablet by mouth daily as needed for pain Max Daily Amount: 1 tablet, Disp: 30 tablet, Rfl: 0    Insulin Pen Needle (CareOne Unifine Pentips Plus) 31G X 5 MM MISC, Inject under the skin in the morning, Disp: 100 each, Rfl: 3    levothyroxine 75 mcg tablet, Take 1 tablet (75 mcg total) by mouth daily, Disp: 90 tablet, Rfl: 0    amLODIPine (NORVASC) 10 mg tablet, Take 1 tablet (10 mg total) by mouth in the morning , Disp: 90 tablet, Rfl: 0    amoxicillin (AMOXIL) 500 MG tablet, Take 500 mg by mouth in the morning (Patient not taking: Reported on 8/16/2022), Disp: , Rfl:     aspirin (ECOTRIN LOW STRENGTH) 81 mg EC tablet, Take 81 mg by mouth daily, Disp: , Rfl:     atorvastatin (LIPITOR) 20 mg tablet, Take 1 tablet (20 mg total) by mouth daily, Disp: 90 tablet, Rfl: 0    Blood Glucose Calibration (OT ULTRA/FASTTK CNTRL SOLN) SOLN, by In Vitro route as needed (to calibrate blood sugar meter), Disp: 1 each, Rfl: 1    carvedilol (COREG) 12 5 mg tablet, Take 1 tablet (12 5 mg total) by mouth 2 (two) times a day with meals, Disp: 180 tablet, Rfl: 3    clotrimazole-betamethasone (LOTRISONE) 1-0 05 % cream, Apply topically 2 (two) times a day, Disp: 45 g, Rfl: 3    Continuous Blood Gluc  (FreeStyle Paul 14 Day [place]) LEO, USE TO CHECK BLOOD GLUCOSE, Disp: , Rfl:     Continuous Blood Gluc Sensor (FreeStyle Paul 14 Day Sensor) MISC, Inject 1 application under the skin every 14 (fourteen) days, Disp: 6 each, Rfl: 1    Dapagliflozin Propanediol (Farxiga) 10 MG TABS, Take one tablet with breakfast, Disp: 30 tablet, Rfl: 0    glimepiride (AMARYL) 4 mg tablet, Take one tablet twice a day, Disp: 180 tablet, Rfl: 0    guaifenesin-codeine (GUAIFENESIN AC) 100-10 MG/5ML liquid, Take 5 mL by mouth 3 (three) times a day as needed for cough, Disp: 120 mL, Rfl: 0    insulin glargine (Lantus SoloStar) 100 units/mL injection pen, Inject 16 units at bedtime, Disp: 15 mL, Rfl: 1    ketoconazole (NIZORAL) 2 % cream, Apply topically daily, Disp: 60 g, Rfl: 1    LORazepam (ATIVAN) 0 5 mg tablet, Take 1 tablet 2 hours prior to MRI    May repeat after 1 hour, Disp: 2 tablet, Rfl: 0    metFORMIN (GLUCOPHAGE-XR) 500 mg 24 hr tablet, Take 2 tablets (1,000 mg total) by mouth 2 (two) times a day with meals, Disp: 360 tablet, Rfl: 1    Semaglutide,0 25 or 0 5MG/DOS, (Ozempic, 0 25 or 0 5 MG/DOSE,) 2 MG/1 5ML SOPN, Inject 0 5 mg once a week, Disp: 4 5 mL, Rfl: 1    sildenafil (VIAGRA) 50 MG tablet, Take 1 tablet (50 mg total) by mouth as needed for erectile dysfunction, Disp: 10 tablet, Rfl: 0  No current facility-administered medications for this encounter  Allergies   Allergen Reactions    Penicillins      SYNCOPE, but has taken amoxicillin/augmentin in past       Physical Exam:   Vitals:    08/16/22 1434   BP: 149/76   Pulse: 78   Resp: 18   Temp:    SpO2: 96%     General: Awake, Alert, Oriented x 3, Mood and affect appropriate  Respiratory: Respirations even and unlabored  Cardiovascular: Peripheral pulses intact; no edema  Musculoskeletal Exam:  Right lower back and leg pain    ASA Score: 3    Patient/Chart Verification  Patient ID Verified: Verbal  ID Band Applied: No  Consents Confirmed: Procedural, To be obtained in the Pre-Procedure area  H&P( within 30 days) Verified: To be obtained in the Pre-Procedure area  Allergies Reviewed: Yes  Anticoag/NSAID held?: No  Currently on antibiotics?: No    Assessment:   1   Intervertebral disc disorder with radiculopathy of lumbar region        Plan: Right sided L5-S1 TFESI

## 2022-08-16 NOTE — TELEPHONE ENCOUNTER
Left message with patient advising:Please inform patient that renal function is stable at creatinine 0 6   Urine protein has worsened to 1 7 g   Please arrange for follow-up with me or an AP to discuss further

## 2022-08-16 NOTE — RESULT ENCOUNTER NOTE
Please inform patient that renal function is stable at creatinine 0 6  Urine protein has worsened to 1 7 g   Please arrange for follow-up with me or an AP to discuss further

## 2022-08-16 NOTE — TELEPHONE ENCOUNTER
----- Message from Jennifer Baugh MD sent at 8/16/2022 12:43 PM EDT -----  Please inform patient that renal function is stable at creatinine 0 6  Urine protein has worsened to 1 7 g   Please arrange for follow-up with me or an AP to discuss further

## 2022-08-17 ENCOUNTER — TELEMEDICINE (OUTPATIENT)
Dept: ENDOCRINOLOGY | Facility: CLINIC | Age: 67
End: 2022-08-17
Payer: COMMERCIAL

## 2022-08-17 DIAGNOSIS — E03.9 ACQUIRED HYPOTHYROIDISM: ICD-10-CM

## 2022-08-17 DIAGNOSIS — E78.2 MIXED HYPERLIPIDEMIA: ICD-10-CM

## 2022-08-17 DIAGNOSIS — I10 ESSENTIAL HYPERTENSION: ICD-10-CM

## 2022-08-17 DIAGNOSIS — E11.65 TYPE 2 DIABETES MELLITUS WITH HYPERGLYCEMIA, WITH LONG-TERM CURRENT USE OF INSULIN (HCC): Primary | ICD-10-CM

## 2022-08-17 DIAGNOSIS — Z79.4 TYPE 2 DIABETES MELLITUS WITH HYPERGLYCEMIA, WITH LONG-TERM CURRENT USE OF INSULIN (HCC): Primary | ICD-10-CM

## 2022-08-17 PROCEDURE — 99214 OFFICE O/P EST MOD 30 MIN: CPT | Performed by: PHYSICIAN ASSISTANT

## 2022-08-17 PROCEDURE — 1160F RVW MEDS BY RX/DR IN RCRD: CPT | Performed by: PHYSICIAN ASSISTANT

## 2022-08-17 RX ORDER — INSULIN GLARGINE 100 [IU]/ML
INJECTION, SOLUTION SUBCUTANEOUS
Qty: 15 ML | Refills: 1
Start: 2022-08-17 | End: 2022-10-13

## 2022-08-17 NOTE — PROGRESS NOTES
Virtual Regular Visit    Verification of patient location:    Patient is located in the following state in which I hold an active license [place]      Assessment/Plan:    Problem List Items Addressed This Visit        Endocrine    Hypothyroid    RESOLVED: Type 2 diabetes mellitus with hyperglycemia ([facility] ) - Primary       Cardiovascular and Mediastinum    Essential hypertension       Other    Mixed hyperlipidemia               Reason for visit is DM    Chief Complaint   Patient presents with    Virtual Regular Visit        Encounter provider Swapna Mazariegos PA-C    Provider located at [address] [place] [id number]      Recent Visits  No visits were found meeting these conditions  Showing recent visits within past 7 days and meeting all other requirements  Today's Visits  Date Type Provider Dept   08/17/22 Telemedicine Perla Velasquez PA-C [facility]   Showing today's visits and meeting all other requirements  Future Appointments  No visits were found meeting these conditions  Showing future appointments within next 150 days and meeting all other requirements       The patient was identified by name and date of birth  April Christina was informed that this is a telemedicine visit and that the visit is being conducted through [facility] and patient was informed this is a secure, HIPAA-complaint platform  He agrees to proceed     My office door was closed  No one else was in the room  He acknowledged consent and understanding of privacy and security of the video platform  The patient has agreed to participate and understands they can discontinue the visit at any time  Patient is aware this is a billable service  Subjective  April Christina is a 78 y o  male with a history of type 2 diabetes with long term use of insulin  Diabetes course has been stable  Complications of DM: CKD  Denies recent illness or hospitalizations  Denies recent severe hypoglycemic or severe hyperglycemic episodes  Denies any issues with his current regimen  Home glucose monitoring: are performed regularly, using Freestyle Paul  Amina Shayy is not shared with us  He reports range from 70s-150s mg/dl  Tends to be on the low side in the mornings  Current regimen: Lantus 16 units QHS, Ozempic 0 5 mg weekly, Farxiga 10 mg daily (was out of it for a week, will be picking it up today), glimepiride 4 mg BID, metformin 1000 mg BID  compliant most of the time, denies any side effects from medications  Injects in: abdomen  Rotates sites: Yes  Hypoglycemic episodes: Yes, rare  H/o of hypoglycemia causing hospitalization or Intervention such as glucagon injection or ambulance call : No  Hypoglycemia symptoms: jitteriness  Treatment of hypoglycemia: soda, glucose tablets     Medic alert tag: recommended: Yes  Diabetes education: No  Diet: 2-3 meals per day, 1-2 snacks per day  Timing of meals is predictable  Diabetic diet compliance: noncompliant some of the time  Activity: Daily activity is predictable: Yes  No routine exercise due to back issues  Ophthamology: he has it scheduled for next week  [employer]  Podiatry: Feb 2022     Has hypertension: not on ACE inhibitor/ARB  Has hyperlipidemia: on statin - tolerating well, no myalgias  compliant most of the time, denies any side effects from medications  Thyroid disorders: Hypothyroidism  Is taking levothyroxine 75 mcg daily    History of pancreatitis: No    Component      Latest Ref Rng & Units 1/5/2022 8/16/2022   Sodium      135 - 147 mmol/L 133 (L) 137   Potassium      3 5 - 5 3 mmol/L 4 9 4 3   Chloride      96 - 108 mmol/L 97 (L) 104   CO2      21 - 32 mmol/L 26 26   Anion Gap      4 - 13 mmol/L 10 7   BUN      5 - 25 mg/dL 18 14   Creatinine      0 60 - 1 30 mg/dL 0 96 0 64   GLUCOSE FASTING      65 - 99 mg/dL 220 (H) 91   Calcium      8 4 - 10 2 mg/dL 9 5 9 0   AST      13 - 39 U/L 22 14   ALT      7 - 52 U/L 30 19   Alkaline Phosphatase      34 - 104 U/L 112 75   Total Protein      6 4 - 8 4 g/dL 7 6 6 7   Albumin      3 5 - 5 0 g/dL 3 5 3 8   TOTAL BILIRUBIN      0 20 - 1 00 mg/dL 0 38 0 47   eGFR      ml/min/1 73sq m 82 102   Cholesterol      See Comment mg/dL  117   Triglycerides      See Comment mg/dL  183 (H)   HDL      >=40 mg/dL  37 (L)   LDL Calculated      0 - 100 mg/dL  43   EXT Creatinine Urine      mg/dL  130 0   MICROALBUM ,U,RANDOM      0 0 - 20 0 mg/L  1,580 0 (H)   MICROALBUMIN/CREATININE RATIO      0 - 30 mg/g creatinine  1,215 (H)   Hemoglobin A1C      Normal 3 8-5 6%; PreDiabetic 5 7-6 4%; Diabetic >=6 5%; Glycemic control for adults with diabetes <7 0% % 9 9 (H) 7 0 (H)   eAG, EST AVG Glucose      mg/dl 237 154   Free T4      0 76 - 1 46 ng/dL  0 99   TSH 3RD GENERATON      0 450 - 4 500 uIU/mL  3 498     Past Medical History:   Diagnosis Date    BPH (benign prostatic hyperplasia)     Chronic kidney disease     CPAP (continuous positive airway pressure) dependence     [facility]     Disease of thyroid gland     Hyperlipidemia     Hypertension     Sleep apnea     Type 2 diabetes mellitus with diabetic chronic kidney disease ([facility] ) 6/24/2021       Past Surgical History:   Procedure Laterality Date    COLONOSCOPY      2016    [place] EGD TRANSORAL BIOPSY SINGLE/MULTIPLE N/A 5/17/2017    Procedure: ESOPHAGOGASTRODUODENOSCOPY (EGD) with bx;  Surgeon: Shaan Painting MD;  Location: [facility];   Service: Bariatrics    TONSILLECTOMY         Current Outpatient Medications   Medication Sig Dispense Refill    gabapentin (Neurontin) 100 mg capsule Take 1 capsule (100 mg total) by mouth 3 (three) times a day 90 capsule 2    HYDROcodone-acetaminophen (NORCO) 5-325 mg per tablet Take 1 tablet by mouth daily as needed for pain Max Daily Amount: 1 tablet 30 tablet 0    Insulin Pen Needle (CareOne Unifine Pentips Plus) 31G X 5 MM MISC Inject under the skin in the morning 100 each 3    levothyroxine 75 mcg tablet Take 1 tablet (75 mcg total) by mouth daily 90 tablet 0    amLODIPine (NORVASC) 10 mg tablet Take 1 tablet (10 mg total) by mouth in the morning  90 tablet 0    amoxicillin (AMOXIL) 500 MG tablet Take 500 mg by mouth in the morning (Patient not taking: Reported on 8/16/2022)      aspirin (ECOTRIN LOW STRENGTH) 81 mg EC tablet Take 81 mg by mouth daily      atorvastatin (LIPITOR) 20 mg tablet Take 1 tablet (20 mg total) by mouth daily 90 tablet 0    Blood Glucose Calibration (OT ULTRA/FASTTK CNTRL SOLN) SOLN by In Vitro route as needed (to calibrate blood sugar meter) 1 each 1    carvedilol (COREG) 12 5 mg tablet Take 1 tablet (12 5 mg total) by mouth 2 (two) times a day with meals 180 tablet 3    clotrimazole-betamethasone (LOTRISONE) 1-0 05 % cream Apply topically 2 (two) times a day 45 g 3    Continuous Blood Gluc  (FreeStyle Paul 14 Day [place]) LEO USE TO CHECK BLOOD GLUCOSE      Continuous Blood Gluc Sensor (FreeStyle Paul 14 Day Sensor) MISC Inject 1 application under the skin every 14 (fourteen) days 6 each 1    Dapagliflozin Propanediol (Farxiga) 10 MG TABS Take one tablet with breakfast 30 tablet 0    glimepiride (AMARYL) 4 mg tablet Take one tablet twice a day 180 tablet 0    guaifenesin-codeine (GUAIFENESIN AC) 100-10 MG/5ML liquid Take 5 mL by mouth 3 (three) times a day as needed for cough 120 mL 0    insulin glargine (Lantus SoloStar) 100 units/mL injection pen Inject 16 units at bedtime 15 mL 1    ketoconazole (NIZORAL) 2 % cream Apply topically daily 60 g 1    LORazepam (ATIVAN) 0 5 mg tablet Take 1 tablet 2 hours prior to MRI    May repeat after 1 hour 2 tablet 0    metFORMIN (GLUCOPHAGE-XR) 500 mg 24 hr tablet Take 2 tablets (1,000 mg total) by mouth 2 (two) times a day with meals 360 tablet 1    Semaglutide,0 25 or 0 5MG/DOS, (Ozempic, 0 25 or 0 5 MG/DOSE,) 2 MG/1 5ML SOPN Inject 0 5 mg once a week 4 5 mL 1    sildenafil (VIAGRA) 50 MG tablet Take 1 tablet (50 mg total) by mouth as needed for erectile dysfunction 10 tablet 0     No current facility-administered medications for this visit  Allergies   Allergen Reactions    Penicillins      SYNCOPE, but has taken amoxicillin/augmentin in past       Review of Systems   Constitutional: Negative for activity change, appetite change, fatigue and unexpected weight change  HENT: Negative for trouble swallowing  Eyes: Negative for visual disturbance  Respiratory: Negative for shortness of breath  Cardiovascular: Negative for chest pain and palpitations  Gastrointestinal: Negative for constipation and diarrhea  Endocrine: Negative for polydipsia and polyuria  Musculoskeletal: Positive for arthralgias and back pain  Skin: Negative for wound  Neurological: Positive for numbness  Psychiatric/Behavioral: Negative  Video Exam    There were no vitals filed for this visit  Physical Exam     Physical Exam   Constitutional: He is oriented to person, place, and time  He appears well-developed and well-nourished  No distress  HENT:   Head: Normocephalic and atraumatic  Neck: Normal range of motion  Pulmonary/Chest: Effort normal    Musculoskeletal: Normal range of motion  Neurological: He is alert and oriented to person, place, and time  Skin: He is not diaphoretic  Psychiatric: He has a normal mood and affect  His behavior is normal      PLAN  Type 2 diabetes mellitus with hyperglycemia, with long-term current use of insulin ([facility])  HGA1C 7 0%  Improved  Treatment regimen: decrease Lantus to 14 units as morning readings are tightly controlled  Continue other medications  He will try to share the [employer] with us  Discussed intensive insulin regimen does increase risk for hypoglycemia  Episodes of hypoglycemia can lead to permanent disability and death    Discussed risks/complications associated with uncontrolled diabetes  Advised to adhere to diabetic diet, and recommended staying active/exercising routinely as tolerated  Keep carbohydrates consistent to limit blood glucose fluctuations  Advised to call if blood sugars less than 70 mg/dl or over 300 mg/dl  Check blood glucose 3+ times a day  Discussed symptoms and treatment of hypoglycemia  Discussed use of CGM to collect additional blood glucose data to reveal trends and patterns that can be used to optimize treatment plan  Recommended routine follow-up with podiatry and ophthalmology  Send log in 2 weeks  Ordered blood work to complete now and prior to next visit    Acquired hypothyroidism  TSH 3 498 and free T4 0 99  Continue current treatment  Monitor labs   Essential hypertension  Continue current treatment   Mixed hyperlipidemia  LDL 43  Continue current treatment      I spent 30 minutes directly with the patient during this visit    Perla Velasquez PA-C

## 2022-08-23 ENCOUNTER — NEW PATIENT COMPREHENSIVE (OUTPATIENT)
Dept: URBAN - METROPOLITAN AREA CLINIC 6 | Facility: CLINIC | Age: 67
End: 2022-08-23

## 2022-08-23 ENCOUNTER — TELEPHONE (OUTPATIENT)
Dept: PAIN MEDICINE | Facility: CLINIC | Age: 67
End: 2022-08-23

## 2022-08-23 DIAGNOSIS — Z79.4: ICD-10-CM

## 2022-08-23 DIAGNOSIS — H25.813: ICD-10-CM

## 2022-08-23 DIAGNOSIS — E11.9: ICD-10-CM

## 2022-08-23 LAB
LEFT EYE DIABETIC RETINOPATHY: NORMAL
RIGHT EYE DIABETIC RETINOPATHY: NORMAL

## 2022-08-23 PROCEDURE — 92004 COMPRE OPH EXAM NEW PT 1/>: CPT

## 2022-08-23 ASSESSMENT — KERATOMETRY
OD_AXISANGLE_DEGREES: 169
OS_K1POWER_DIOPTERS: 41.50
OD_K2POWER_DIOPTERS: 42.25
OS_AXISANGLE2_DEGREES: 98
OS_K2POWER_DIOPTERS: 43.25
OS_AXISANGLE_DEGREES: 8
OD_K1POWER_DIOPTERS: 41.50
OD_AXISANGLE2_DEGREES: 79

## 2022-08-23 ASSESSMENT — TONOMETRY
OD_IOP_MMHG: 15
OS_IOP_MMHG: 15

## 2022-08-23 ASSESSMENT — VISUAL ACUITY
OS_PH: 20/30
OD_SC: 20/40
OS_SC: 20/40+1
OD_PH: 20/30
OU_CC: J1
OU_OTHER: OTC +2.00
OD_GLARE: 20/50
OS_GLARE: 20/50

## 2022-08-27 DIAGNOSIS — I10 BENIGN ESSENTIAL HYPERTENSION: ICD-10-CM

## 2022-08-29 RX ORDER — AMLODIPINE BESYLATE 10 MG/1
10 TABLET ORAL DAILY
Qty: 90 TABLET | Refills: 0 | Status: SHIPPED | OUTPATIENT
Start: 2022-08-29

## 2022-08-30 ENCOUNTER — TELEPHONE (OUTPATIENT)
Dept: ENDOCRINOLOGY | Facility: CLINIC | Age: 67
End: 2022-08-30

## 2022-08-30 NOTE — TELEPHONE ENCOUNTER
Pt called about his Delsie Palm being 40 points off with his finger sticks  So I told him to call Delsie Palm and keep checking with his finger sticks

## 2022-09-01 ENCOUNTER — VBI (OUTPATIENT)
Dept: ADMINISTRATIVE | Facility: OTHER | Age: 67
End: 2022-09-01

## 2022-09-01 NOTE — TELEPHONE ENCOUNTER
Upon review of the [employer] Non-compliant list and the patient's chart, we were able to locate, review, and update the patient chart as requested for Diabetic Eye Exam     Any additional questions or concerns should be emailed to the Practice Liaisons via [email or web address] email, please do not reply via In Basket      Thank you  Reyna Link MA

## 2022-09-11 DIAGNOSIS — M54.41 CHRONIC MIDLINE LOW BACK PAIN WITH RIGHT-SIDED SCIATICA: ICD-10-CM

## 2022-09-11 DIAGNOSIS — M54.12 CERVICAL RADICULOPATHY: ICD-10-CM

## 2022-09-11 DIAGNOSIS — E11.65 TYPE 2 DIABETES MELLITUS WITH HYPERGLYCEMIA, WITH LONG-TERM CURRENT USE OF INSULIN (HCC): ICD-10-CM

## 2022-09-11 DIAGNOSIS — G89.29 CHRONIC MIDLINE LOW BACK PAIN WITH RIGHT-SIDED SCIATICA: ICD-10-CM

## 2022-09-11 DIAGNOSIS — E11.65 UNCONTROLLED TYPE 2 DIABETES MELLITUS WITH HYPERGLYCEMIA (HCC): ICD-10-CM

## 2022-09-11 DIAGNOSIS — Z79.4 TYPE 2 DIABETES MELLITUS WITH HYPERGLYCEMIA, WITH LONG-TERM CURRENT USE OF INSULIN (HCC): ICD-10-CM

## 2022-09-12 RX ORDER — GABAPENTIN 100 MG/1
100 CAPSULE ORAL 3 TIMES DAILY
Qty: 90 CAPSULE | Refills: 0 | Status: SHIPPED | OUTPATIENT
Start: 2022-09-12 | End: 2022-10-25

## 2022-09-12 RX ORDER — FLASH GLUCOSE SENSOR
1 KIT MISCELLANEOUS
Qty: 6 EACH | Refills: 0 | Status: SHIPPED | OUTPATIENT
Start: 2022-09-12

## 2022-09-12 RX ORDER — SEMAGLUTIDE 1.34 MG/ML
INJECTION, SOLUTION SUBCUTANEOUS
Qty: 4.5 ML | Refills: 0 | Status: SHIPPED | OUTPATIENT
Start: 2022-09-12 | End: 2022-10-13 | Stop reason: ALTCHOICE

## 2022-09-12 RX ORDER — HYDROCODONE BITARTRATE AND ACETAMINOPHEN 5; 325 MG/1; MG/1
1 TABLET ORAL DAILY PRN
Qty: 30 TABLET | Refills: 0 | Status: SHIPPED | OUTPATIENT
Start: 2022-09-12

## 2022-09-15 DIAGNOSIS — E11.22 TYPE 2 DIABETES MELLITUS WITH STAGE 3A CHRONIC KIDNEY DISEASE, WITHOUT LONG-TERM CURRENT USE OF INSULIN (HCC): ICD-10-CM

## 2022-09-15 DIAGNOSIS — E11.65 UNCONTROLLED TYPE 2 DIABETES MELLITUS WITH HYPERGLYCEMIA (HCC): ICD-10-CM

## 2022-09-15 DIAGNOSIS — N18.31 TYPE 2 DIABETES MELLITUS WITH STAGE 3A CHRONIC KIDNEY DISEASE, WITHOUT LONG-TERM CURRENT USE OF INSULIN (HCC): ICD-10-CM

## 2022-10-04 ENCOUNTER — RA CDI HCC (OUTPATIENT)
Dept: OTHER | Facility: HOSPITAL | Age: 67
End: 2022-10-04

## 2022-10-04 NOTE — PROGRESS NOTES
Please review if the following dx  is applicable to the patient's condition and assess and document, if applicable in next visit     E66 01: Morbid (severe) obesity due to excess calories ([address] ) -      Per CMS/ICD 10 coding guidelines, to be used when BMI > 35 & <40 with one or more comorbidity (DM, HTN, or JOSE RAUL)      [address]  coding opportunities          Chart Reviewed number of suggestions sent to Provider: 1     Patients Insurance     Medicare Insurance: [employer] [facility] Advantage

## 2022-10-06 ENCOUNTER — OFFICE VISIT (OUTPATIENT)
Dept: INTERNAL MEDICINE CLINIC | Facility: CLINIC | Age: 67
End: 2022-10-06
Payer: COMMERCIAL

## 2022-10-06 VITALS
BODY MASS INDEX: 38.94 KG/M2 | WEIGHT: 272 LBS | HEART RATE: 64 BPM | SYSTOLIC BLOOD PRESSURE: 118 MMHG | HEIGHT: 70 IN | DIASTOLIC BLOOD PRESSURE: 78 MMHG | TEMPERATURE: 97.5 F | OXYGEN SATURATION: 97 %

## 2022-10-06 DIAGNOSIS — I10 ESSENTIAL HYPERTENSION: ICD-10-CM

## 2022-10-06 DIAGNOSIS — N18.2 CHRONIC KIDNEY DISEASE, STAGE 2 (MILD): ICD-10-CM

## 2022-10-06 DIAGNOSIS — G89.29 CHRONIC MIDLINE LOW BACK PAIN WITH RIGHT-SIDED SCIATICA: ICD-10-CM

## 2022-10-06 DIAGNOSIS — E11.42 TYPE 2 DIABETES MELLITUS WITH DIABETIC POLYNEUROPATHY, WITHOUT LONG-TERM CURRENT USE OF INSULIN (HCC): Primary | ICD-10-CM

## 2022-10-06 DIAGNOSIS — G47.33 OSA (OBSTRUCTIVE SLEEP APNEA): ICD-10-CM

## 2022-10-06 DIAGNOSIS — R19.5 DARK STOOLS: ICD-10-CM

## 2022-10-06 DIAGNOSIS — N52.9 ERECTILE DYSFUNCTION, UNSPECIFIED ERECTILE DYSFUNCTION TYPE: ICD-10-CM

## 2022-10-06 DIAGNOSIS — Z23 NEED FOR VACCINATION: ICD-10-CM

## 2022-10-06 DIAGNOSIS — Z23 NEED FOR INFLUENZA VACCINATION: ICD-10-CM

## 2022-10-06 DIAGNOSIS — E78.2 MIXED HYPERLIPIDEMIA: ICD-10-CM

## 2022-10-06 DIAGNOSIS — E03.9 ACQUIRED HYPOTHYROIDISM: ICD-10-CM

## 2022-10-06 DIAGNOSIS — M54.41 CHRONIC MIDLINE LOW BACK PAIN WITH RIGHT-SIDED SCIATICA: ICD-10-CM

## 2022-10-06 PROCEDURE — 90662 IIV NO PRSV INCREASED AG IM: CPT | Performed by: NURSE PRACTITIONER

## 2022-10-06 PROCEDURE — G0009 ADMIN PNEUMOCOCCAL VACCINE: HCPCS | Performed by: NURSE PRACTITIONER

## 2022-10-06 PROCEDURE — 90677 PCV20 VACCINE IM: CPT | Performed by: NURSE PRACTITIONER

## 2022-10-06 PROCEDURE — G0008 ADMIN INFLUENZA VIRUS VAC: HCPCS | Performed by: NURSE PRACTITIONER

## 2022-10-06 PROCEDURE — 99214 OFFICE O/P EST MOD 30 MIN: CPT | Performed by: NURSE PRACTITIONER

## 2022-10-06 RX ORDER — SILDENAFIL 50 MG/1
50 TABLET, FILM COATED ORAL AS NEEDED
Qty: 10 TABLET | Refills: 0 | Status: SHIPPED | OUTPATIENT
Start: 2022-10-06

## 2022-10-06 NOTE — ASSESSMENT & PLAN NOTE
Has not needed hydrocodone after recent back injection  If he restarts hydrocodone regularly, discussed the need for q3 month visits

## 2022-10-06 NOTE — ASSESSMENT & PLAN NOTE
Lab Results   Component Value Date    HGBA1C 7 0 (H) 08/16/2022   better controlled  On metformin, farxiga, ozempic, lantus, and glimepiride  Sees endo    Recently had an eye exam

## 2022-10-06 NOTE — PROGRESS NOTES
Name: Stephanie Stuart      : 1955      MRN: [id number]  Encounter Provider: ROLAND Jeronimo  Encounter Date: 10/6/2022   Encounter department: [facility] [address]     1  Type 2 diabetes mellitus with diabetic polyneuropathy, without long-term current use of insulin ([facility])  Assessment & Plan:    Lab Results   Component Value Date    HGBA1C 7 0 (H) 2022   better controlled  On metformin, farxiga, ozempic, lantus, and glimepiride  Sees endo  Recently had an eye exam        2  Acquired hypothyroidism  Assessment & Plan:  Adequately replaced  3  JOSE RAUL (obstructive sleep apnea)  Assessment & Plan:  Needs to schedule with sleep medicine  4  Essential hypertension  Assessment & Plan:  Well controlled on amlodipine and carvedilol      5  Chronic midline low back pain with right-sided sciatica  Assessment & Plan:  Has not needed hydrocodone after recent back injection  If he restarts hydrocodone regularly, discussed the need for q3 month visits  6  Chronic kidney disease, stage 2 (mild)  Assessment & Plan:  Lab Results   Component Value Date    EGFR 102 2022    EGFR 82 2022    EGFR 74 2021    CREATININE 0 64 2022    CREATININE 0 96 2022    CREATININE 1 05 2021   stable      7  Mixed hyperlipidemia  Assessment & Plan: On statin      8  Dark stools  Comments:  check stool for occult blood  if stool issues persists, recommend GI evaluation  Orders:  -     CBC and differential; Future  -     Occult blood 1-3, stool; Future    9  Need for influenza vaccination  -     influenza vaccine, high-dose, PF 0 7 mL (FLUZONE HIGH-DOSE)    10  Erectile dysfunction, unspecified erectile dysfunction type  -     sildenafil (VIAGRA) 50 MG tablet; Take 1 tablet (50 mg total) by mouth as needed for erectile dysfunction    11   Need for vaccination  -     Pneumococcal Conjugate Vaccine 20-valent (Pcv20)         Subjective Cesar Mims is here today for follow up  He is doing well  Overall his sugars have been better  He got a new job with odd hours so his medications have been off schedule  His back pain has been better since he received an injection  He has only taken 1 hydrocodone since last month  He continues to have pain in his neck that radiates down both arms and causes numbness  He is planning to schedule another appointment with pain management to discuss  He has intermittent diarrhea  Over the last 6 weeks he has noticed it is occasionally black  He takes imodium with relief  Its not everyday  He denies any abdominal pain , nausea, or vomiting  He has been eating activia yogurt to help  Review of Systems   Constitutional: Negative for activity change, appetite change, fatigue and unexpected weight change  Eyes: Negative for visual disturbance  Respiratory: Negative for cough, chest tightness and shortness of breath  Cardiovascular: Negative for chest pain, palpitations and leg swelling  Gastrointestinal: Positive for diarrhea  Negative for abdominal pain, blood in stool, constipation, nausea and vomiting  Genitourinary: Negative for difficulty urinating  Musculoskeletal: Positive for arthralgias and neck pain  Skin: Negative for rash  Neurological: Negative for dizziness, weakness, light-headedness and headaches  Psychiatric/Behavioral: Negative for sleep disturbance         Current Outpatient Medications on File Prior to Visit   Medication Sig    gabapentin (Neurontin) 100 mg capsule Take 1 capsule (100 mg total) by mouth 3 (three) times a day    HYDROcodone-acetaminophen (NORCO) 5-325 mg per tablet Take 1 tablet by mouth daily as needed for pain Max Daily Amount: 1 tablet    Insulin Pen Needle (CareOne Unifine Pentips Plus) 31G X 5 MM MISC Inject under the skin in the morning    levothyroxine 75 mcg tablet Take 1 tablet (75 mcg total) by mouth daily    Semaglutide,0 25 or 0 5MG/DOS, (Ozempic, 0 25 or 0 5 MG/DOSE,) 2 MG/1 5ML SOPN Inject 0 5 mg once a week    amLODIPine (NORVASC) 10 mg tablet Take 1 tablet (10 mg total) by mouth daily    amoxicillin (AMOXIL) 500 MG tablet Take 500 mg by mouth in the morning (Patient not taking: Reported on 8/16/2022)    aspirin (ECOTRIN LOW STRENGTH) 81 mg EC tablet Take 81 mg by mouth daily    atorvastatin (LIPITOR) 20 mg tablet Take 1 tablet (20 mg total) by mouth daily    Blood Glucose Calibration (OT ULTRA/FASTTK CNTRL SOLN) SOLN by In Vitro route as needed (to calibrate blood sugar meter)    carvedilol (COREG) 12 5 mg tablet Take 1 tablet (12 5 mg total) by mouth 2 (two) times a day with meals    clotrimazole-betamethasone (LOTRISONE) 1-0 05 % cream Apply topically 2 (two) times a day    Continuous Blood Gluc  (FreeStyle Paul 14 Day [place]) LEO USE TO CHECK BLOOD GLUCOSE    Continuous Blood Gluc Sensor (FreeStyle Paul 14 Day Sensor) MISC Inject 1 application under the skin every 14 (fourteen) days    dapagliflozin (Farxiga) 10 MG tablet Take one tablet with breakfast    glimepiride (AMARYL) 4 mg tablet Take one tablet twice a day    guaifenesin-codeine (GUAIFENESIN AC) 100-10 MG/5ML liquid Take 5 mL by mouth 3 (three) times a day as needed for cough    Insulin Glargine Solostar (Lantus SoloStar) 100 UNIT/ML SOPN Inject 14 units at bedtime    ketoconazole (NIZORAL) 2 % cream Apply topically daily    metFORMIN (GLUCOPHAGE-XR) 500 mg 24 hr tablet Take 2 tablets (1,000 mg total) by mouth 2 (two) times a day with meals    [DISCONTINUED] LORazepam (ATIVAN) 0 5 mg tablet Take 1 tablet 2 hours prior to MRI  May repeat after 1 hour    [DISCONTINUED] sildenafil (VIAGRA) 50 MG tablet Take 1 tablet (50 mg total) by mouth as needed for erectile dysfunction       Objective     /78   Pulse 64   Temp 97 5 °F (36 4 °C)   Ht 5' 10" (1 778 m)   Wt 123 kg (272 lb)   SpO2 97%   BMI 39 03 kg/m²     Physical Exam  Vitals reviewed  Constitutional:       Appearance: Normal appearance  HENT:      Head: Normocephalic and atraumatic  Eyes:      Conjunctiva/sclera: Conjunctivae normal    Cardiovascular:      Rate and Rhythm: Normal rate and regular rhythm  Heart sounds: Normal heart sounds  Pulmonary:      Effort: Pulmonary effort is normal       Breath sounds: Normal breath sounds  Abdominal:      General: Bowel sounds are normal       Palpations: Abdomen is soft  Tenderness: There is no abdominal tenderness  Musculoskeletal:         General: Normal range of motion  Right lower leg: No edema  Left lower leg: No edema  Skin:     General: Skin is warm and dry  Neurological:      Mental Status: He is alert and oriented to person, place, and time     Psychiatric:         Mood and Affect: Mood normal          Behavior: Behavior normal        ROLAND Coon

## 2022-10-06 NOTE — ASSESSMENT & PLAN NOTE
Lab Results   Component Value Date    EGFR 102 08/16/2022    EGFR 82 01/05/2022    EGFR 74 09/01/2021    CREATININE 0 64 08/16/2022    CREATININE 0 96 01/05/2022    CREATININE 1 05 09/01/2021   stable

## 2022-10-12 DIAGNOSIS — Z79.4 TYPE 2 DIABETES MELLITUS WITH HYPERGLYCEMIA, WITH LONG-TERM CURRENT USE OF INSULIN (HCC): ICD-10-CM

## 2022-10-12 DIAGNOSIS — E11.65 UNCONTROLLED TYPE 2 DIABETES MELLITUS WITH HYPERGLYCEMIA (HCC): ICD-10-CM

## 2022-10-12 DIAGNOSIS — E11.65 TYPE 2 DIABETES MELLITUS WITH HYPERGLYCEMIA, WITH LONG-TERM CURRENT USE OF INSULIN (HCC): ICD-10-CM

## 2022-10-12 DIAGNOSIS — E11.22 TYPE 2 DIABETES MELLITUS WITH STAGE 3A CHRONIC KIDNEY DISEASE, WITHOUT LONG-TERM CURRENT USE OF INSULIN (HCC): ICD-10-CM

## 2022-10-12 DIAGNOSIS — N18.31 TYPE 2 DIABETES MELLITUS WITH STAGE 3A CHRONIC KIDNEY DISEASE, WITHOUT LONG-TERM CURRENT USE OF INSULIN (HCC): ICD-10-CM

## 2022-10-12 NOTE — PROGRESS NOTES
NEPHROLOGY OFFICE VISIT   Fito Rodriguez 78 y o  male MRN: [id number]  10/13/2022    Reason for Visit:  Follow-up    INTERVAL HISTORY and SUBJECTIVE:    I had the pleasure of seeing nirav today in the renal clinic  He is a 61-year-old male who was seen by Dr Bryan Figueredo for management of proteinuria  He was last seen in the nephrology clinic August 2021  Since last visit he has made adjustments in his diet  He has ongoing issues with diarrhea at this time and is working with his endocrinologist regarding adjustment of metformin which he thinks may be the culprit  He had a dark stool and is going for testing  He has not been using NSAIDs recently but this past summer had an escalation in use of naproxen due to severe back pain  Reports bubbles in urine    Last labs on 08/16/2022 reviewed with [facility]    ASSESSMENT AND PLAN:     Proteinuria, persistent:  · Etiology: Likely diabetic nephropathy and uncontrolled hypertension  History of NSAID use/naproxen in the past which also can be contributing  · Spirolactone and ACE-inhibitor held due to hyperkalemia  · Plan was to restart low-dose ACE inhibitor if potassium level   · Current microalbumin creatinine ratio 1215, protein creatinine ratio 1 71  Above goal   · Taking more naproxen this summer when labs were taken which may account for increase in UPCR  Microalbumin creatinine ratio much better  · Now rare use  · Urinalysis:  2+ protein, 1-2 RBCs  · Back pain:  On rare occasions using naproxen now  · Plan/recommendations:  · Encouraged to control modifiable risk factors  · Follow-up with endocrinology for adjustment in medications  · Follow-up with our service in approximately 1 year  · May need to consider small dose of ACE-inhibitor or angiotensin receptor blocker moving forward if protein excretions is not improve      Blood pressure/volume status:  · Primary hypertension  · Volume status:  Euvolemic  · Medications:  Amlodipine 10 mg daily, Coreg 12 5 mg twice a day  · Previously on ACE-inhibitor and Aldactone which were taken off due to hyperkalemia unfortunately these medications are indicated due to persistent proteinuria  · Blood pressure control essential  · History of obstructive sleep apnea- has a machine but does not tolerate it  · Encouraged to look into a different machine which may be better tolerated  · Prior workup:  60% stenosis right main renal artery  No evidence of stenosis left main renal artery  · At primary care office recently blood pressure 118/78 which is near current blood pressure  · Blood pressure adequately controlled  No changes    Hyperkalemia:  · Etiology: Likely related to dietary intake, effect of ACE inhibitor and potassium-sparing diuretic  · Calcium trended down with medical treatment  He was also taken off of medications which were contributing  · Potassium acceptable    Chronic kidney disease, stage II:  · Etiology: Persistent proteinuria in the setting of diabetes mellitus and hypertension  · Baseline creatinine 0 9-1 1 at last office visit  · Current creatinine 0 64  · On an SGLT2 inhibitor    Electrolytes/acid-base:  · Potassium acceptable  · Bicarbonate within normal limits, 26  · Magnesium low 1 7-replete which can help with blood pressure management  · Information given on magnesium content of foods  Would rather not take a magnesium supplement due to diarrhea/exacerbation of diarrhea    Hyperlipidemia:  · Triglycerides 183  · Low HDL, encourage exercise  · Lipitor    Diabetes mellitus:    · Management per PCP   · hemoglobin A1c down to 7%  Prior level 9 9%  · No retinopathy  · Indications: Farxiga, metformin, Lantus, glimepiride    Other:  · Black stools:  Stool for occult blood pending    PATIENT INSTRUCTIONS:    Patient Instructions   Your seen today for monitoring of protein in the urine and kidney function      Plan/recommendations:  • It is essential to control blood sugar and blood pressure to reduce protein in the urine  • Avoid NSAIDs such as Motrin Aleve ibuprofen and Advil, naproxen  • Increase magnesium in your diet  • No change in medications at this time  • Goal blood pressure less than 130/80  Please call if blood pressure remains consistently elevated  • Follow up in one year        Hypomagnesemia   WHAT YOU NEED TO KNOW:   Hypomagnesemia is a condition that develops when the amount of magnesium in your body is too low  Magnesium is a mineral that helps your heart, muscles, and nerves work normally  It also helps strengthen your bones  DISCHARGE INSTRUCTIONS:   Return to the emergency department if:   · You have numbness and tingling in your arms or legs  · You have painful muscle spasms and tremors in your arms or legs  · You are not able to move your muscles, and you have trouble thinking clearly  · Your heartbeat is faster than usual, or is irregular  · You have a seizure  Contact your healthcare provider if:   · You have fatigue and muscle tremors or twitching  · You become irritable and have trouble sleeping  · You have questions or concerns about your condition or care  Prevent hypomagnesemia:   · Manage health conditions  by following your treatment plan  Health conditions such as congestive heart failure, diabetes, and chronic diarrhea can put you at risk for hypomagnesemia  · Eat foods that contain magnesium every day  Ask your dietitian or healthcare provider how much magnesium you need each day  · Limit or do not drink alcohol  Alcohol can prevent your body from absorbing magnesium  Alcohol also makes your body release large amounts of magnesium through your urine  · You may need to take a magnesium supplement  Ask your healthcare provider which supplement to take and how often to take it      Foods that contain magnesium:   · Almonds, cashews, peanuts, and peanut butter    · Dark green leafy vegetables, such as spinach    · Raisins, bananas, apples, broccoli, and carrots    · Soy milk and soy beans    · Black beans and kidney beans    · Whole-wheat bread and brown rice    · Shredded-wheat cereal, oatmeal, and other breakfast cereals fortified with magnesium    · Plain low-fat yogurt and milk    · Cooked halibut    Follow up with your healthcare provider or specialist as directed:  Write down your questions so you remember to ask them during your visits  © Copyright [employer] 2022 Information is for End User's use only and may not be sold, redistributed or otherwise used for commercial purposes  All illustrations and images included in CareNotes® are the copyrighted property of A D [employer] M , Inc  or [email or web address]   The above information is an  only  It is not intended as medical advice for individual conditions or treatments  Talk to your doctor, nurse or pharmacist before following any medical regimen to see if it is safe and effective for you  Orders Placed This Encounter   Procedures   • Comprehensive metabolic panel     This is a patient instruction: Patient fasting for 8 hours or longer recommended       Standing Status:   Future     Standing Expiration Date:   12/1/2023   • Protein / creatinine ratio, urine     Standing Status:   Future     Standing Expiration Date:   12/1/2023   • Microalbumin / creatinine urine ratio     Standing Status:   Future     Standing Expiration Date:   12/1/2023   • Vitamin D 25 hydroxy     Standing Status:   Future     Standing Expiration Date:   12/1/2023   • Magnesium     Standing Status:   Future     Standing Expiration Date:   12/1/2023   • CBC     Standing Status:   Future     Standing Expiration Date:   12/1/2023       OBJECTIVE:  Current Weight: Weight - Scale: 122 kg (270 lb)  Vitals:    10/13/22 1126 10/13/22 1211   BP:  116/72   BP Location:  Left arm   Patient Position:  Sitting   Cuff Size:  Standard   Pulse:  64   Weight: 122 kg (270 lb)    Height: 5' 10" (1 778 m)     Body mass index is 38 74 kg/m²  REVIEW OF SYSTEMS:    Review of Systems   Constitutional: Negative for activity change, appetite change, chills and fever  HENT: Negative for ear pain and sore throat  Eyes: Negative for pain and visual disturbance  Respiratory: Negative for cough and shortness of breath  Cardiovascular: Negative for chest pain and palpitations  Gastrointestinal: Positive for diarrhea (cut back on metformin)  Negative for abdominal pain and vomiting  Genitourinary: Negative for dysuria and hematuria  Musculoskeletal: Positive for arthralgias  Negative for back pain  Skin: Negative for color change and rash  Allergic/Immunologic: Negative for immunocompromised state  Neurological: Negative for seizures and syncope  Psychiatric/Behavioral: The patient is not nervous/anxious  All other systems reviewed and are negative  PHYSICAL EXAM:      Physical Exam  Constitutional:       General: He is not in acute distress  Appearance: He is well-developed  He is obese  He is not ill-appearing, toxic-appearing or diaphoretic  HENT:      Head: Normocephalic and atraumatic  Nose: Nose normal  No congestion  Mouth/Throat:      Mouth: Mucous membranes are moist       Pharynx: No oropharyngeal exudate  Eyes:      General: No scleral icterus  Right eye: No discharge  Left eye: No discharge  Extraocular Movements: Extraocular movements intact  Conjunctiva/sclera: Conjunctivae normal    Neck:      Thyroid: No thyromegaly  Vascular: No carotid bruit or JVD  Trachea: No tracheal deviation  Cardiovascular:      Rate and Rhythm: Normal rate and regular rhythm  Heart sounds: No murmur heard  No friction rub  No gallop  Pulmonary:      Effort: Pulmonary effort is normal       Breath sounds: Normal breath sounds  Abdominal:      General: Bowel sounds are normal  There is no distension  Palpations: Abdomen is soft  There is no mass  Tenderness: There is no abdominal tenderness  There is no guarding or rebound  Musculoskeletal:         General: No swelling, tenderness or signs of injury  Normal range of motion  Cervical back: Normal range of motion and neck supple  No rigidity or tenderness  Right lower leg: No edema  Left lower leg: No edema  Skin:     General: Skin is warm and dry  Capillary Refill: Capillary refill takes less than 2 seconds  Coloration: Skin is not jaundiced or pale  Findings: No erythema or rash  Neurological:      Mental Status: He is alert and oriented to person, place, and time  Psychiatric:         Mood and Affect: Mood normal          Behavior: Behavior normal          Thought Content:  Thought content normal          Judgment: Judgment normal          Medications:    Current Outpatient Medications:   •  amLODIPine (NORVASC) 10 mg tablet, Take 1 tablet (10 mg total) by mouth daily, Disp: 90 tablet, Rfl: 0  •  aspirin (ECOTRIN LOW STRENGTH) 81 mg EC tablet, Take 81 mg by mouth daily, Disp: , Rfl:   •  atorvastatin (LIPITOR) 20 mg tablet, Take 1 tablet (20 mg total) by mouth daily, Disp: 90 tablet, Rfl: 0  •  Blood Glucose Calibration (OT ULTRA/FASTTK CNTRL SOLN) SOLN, by In Vitro route as needed (to calibrate blood sugar meter), Disp: 1 each, Rfl: 1  •  carvedilol (COREG) 12 5 mg tablet, Take 1 tablet (12 5 mg total) by mouth 2 (two) times a day with meals, Disp: 180 tablet, Rfl: 3  •  Continuous Blood Gluc  (FreeStyle Paul 14 Day [place]) LEO, USE TO CHECK BLOOD GLUCOSE, Disp: , Rfl:   •  Continuous Blood Gluc Sensor (FreeStyle Paul 14 Day Sensor) MISC, Inject 1 application under the skin every 14 (fourteen) days, Disp: 6 each, Rfl: 0  •  dapagliflozin (Farxiga) 10 MG tablet, Take one tablet with breakfast, Disp: 30 tablet, Rfl: 0  •  gabapentin (Neurontin) 100 mg capsule, Take 1 capsule (100 mg total) by mouth 3 (three) times a day, Disp: 90 capsule, Rfl: 0  •  glimepiride (AMARYL) 4 mg tablet, Take one tablet twice a day, Disp: 180 tablet, Rfl: 0  •  guaifenesin-codeine (GUAIFENESIN AC) 100-10 MG/5ML liquid, Take 5 mL by mouth 3 (three) times a day as needed for cough, Disp: 120 mL, Rfl: 0  •  HYDROcodone-acetaminophen (NORCO) 5-325 mg per tablet, Take 1 tablet by mouth daily as needed for pain Max Daily Amount: 1 tablet, Disp: 30 tablet, Rfl: 0  •  Insulin Glargine Solostar (Lantus SoloStar) 100 UNIT/ML SOPN, Inject 14 units at bedtime, Disp: 15 mL, Rfl: 1  •  Insulin Pen Needle (CareOne Unifine Pentips Plus) 31G X 5 MM MISC, Inject under the skin in the morning, Disp: 100 each, Rfl: 0  •  levothyroxine 75 mcg tablet, Take 1 tablet (75 mcg total) by mouth daily, Disp: 90 tablet, Rfl: 0  •  metFORMIN (GLUCOPHAGE-XR) 500 mg 24 hr tablet, Take 2 tablets (1,000 mg total) by mouth 2 (two) times a day with meals (Patient taking differently: Take 500 mg by mouth 2 (two) times a day with meals), Disp: 360 tablet, Rfl: 1  •  sildenafil (VIAGRA) 50 MG tablet, Take 1 tablet (50 mg total) by mouth as needed for erectile dysfunction, Disp: 10 tablet, Rfl: 0    Laboratory Results:        Invalid input(s): ALBUMIN    Results for orders placed or performed in visit on 09/01/22    Diabetes Eye Exam   Result Value Ref Range    Right Eye Diabetic Retinopathy None     Left Eye Diabetic Retinopathy None

## 2022-10-13 ENCOUNTER — OFFICE VISIT (OUTPATIENT)
Dept: NEPHROLOGY | Facility: CLINIC | Age: 67
End: 2022-10-13
Payer: COMMERCIAL

## 2022-10-13 VITALS
BODY MASS INDEX: 38.65 KG/M2 | HEIGHT: 70 IN | HEART RATE: 64 BPM | WEIGHT: 270 LBS | DIASTOLIC BLOOD PRESSURE: 72 MMHG | SYSTOLIC BLOOD PRESSURE: 116 MMHG

## 2022-10-13 DIAGNOSIS — N18.2 CHRONIC KIDNEY DISEASE, STAGE 2 (MILD): ICD-10-CM

## 2022-10-13 DIAGNOSIS — R80.1 PERSISTENT PROTEINURIA: ICD-10-CM

## 2022-10-13 DIAGNOSIS — I10 ESSENTIAL HYPERTENSION: ICD-10-CM

## 2022-10-13 DIAGNOSIS — R80.9 MICROALBUMINURIA: ICD-10-CM

## 2022-10-13 DIAGNOSIS — E11.42 TYPE 2 DIABETES MELLITUS WITH DIABETIC POLYNEUROPATHY, WITHOUT LONG-TERM CURRENT USE OF INSULIN (HCC): Primary | ICD-10-CM

## 2022-10-13 DIAGNOSIS — E78.2 MIXED HYPERLIPIDEMIA: ICD-10-CM

## 2022-10-13 DIAGNOSIS — E66.01 OBESITY, MORBID, BMI 40.0-49.9 (HCC): ICD-10-CM

## 2022-10-13 DIAGNOSIS — M54.41 CHRONIC MIDLINE LOW BACK PAIN WITH RIGHT-SIDED SCIATICA: ICD-10-CM

## 2022-10-13 DIAGNOSIS — G89.29 CHRONIC MIDLINE LOW BACK PAIN WITH RIGHT-SIDED SCIATICA: ICD-10-CM

## 2022-10-13 DIAGNOSIS — G47.33 OSA (OBSTRUCTIVE SLEEP APNEA): ICD-10-CM

## 2022-10-13 PROCEDURE — 99214 OFFICE O/P EST MOD 30 MIN: CPT | Performed by: NURSE PRACTITIONER

## 2022-10-13 RX ORDER — PEN NEEDLE, DIABETIC 31 GX3/16"
NEEDLE, DISPOSABLE MISCELLANEOUS DAILY
Qty: 100 EACH | Refills: 0 | Status: SHIPPED | OUTPATIENT
Start: 2022-10-13

## 2022-10-13 RX ORDER — INSULIN GLARGINE 100 [IU]/ML
INJECTION, SOLUTION SUBCUTANEOUS
Qty: 15 ML | Refills: 0 | OUTPATIENT
Start: 2022-10-13

## 2022-10-13 NOTE — PATIENT INSTRUCTIONS
Your seen today for monitoring of protein in the urine and kidney function  Plan/recommendations: It is essential to control blood sugar and blood pressure to reduce protein in the urine  Avoid NSAIDs such as Motrin Aleve ibuprofen and Advil, naproxen  Increase magnesium in your diet  No change in medications at this time  Goal blood pressure less than 130/80  Please call if blood pressure remains consistently elevated  Follow up in one year        Hypomagnesemia   WHAT YOU NEED TO KNOW:   Hypomagnesemia is a condition that develops when the amount of magnesium in your body is too low  Magnesium is a mineral that helps your heart, muscles, and nerves work normally  It also helps strengthen your bones  DISCHARGE INSTRUCTIONS:   Return to the emergency department if:   You have numbness and tingling in your arms or legs  You have painful muscle spasms and tremors in your arms or legs  You are not able to move your muscles, and you have trouble thinking clearly  Your heartbeat is faster than usual, or is irregular  You have a seizure  Contact your healthcare provider if:   You have fatigue and muscle tremors or twitching  You become irritable and have trouble sleeping  You have questions or concerns about your condition or care  Prevent hypomagnesemia:   Manage health conditions  by following your treatment plan  Health conditions such as congestive heart failure, diabetes, and chronic diarrhea can put you at risk for hypomagnesemia  Eat foods that contain magnesium every day  Ask your dietitian or healthcare provider how much magnesium you need each day  Limit or do not drink alcohol  Alcohol can prevent your body from absorbing magnesium  Alcohol also makes your body release large amounts of magnesium through your urine  You may need to take a magnesium supplement  Ask your healthcare provider which supplement to take and how often to take it      Foods that contain magnesium:   Almonds, cashews, peanuts, and peanut butter    Dark green leafy vegetables, such as spinach    Raisins, bananas, apples, broccoli, and carrots    Soy milk and soy beans    Black beans and kidney beans    Whole-wheat bread and brown rice    Shredded-wheat cereal, oatmeal, and other breakfast cereals fortified with magnesium    Plain low-fat yogurt and milk    Cooked halibut    Follow up with your healthcare provider or specialist as directed:  Write down your questions so you remember to ask them during your visits  © Copyright [employer] 2022 Information is for End User's use only and may not be sold, redistributed or otherwise used for commercial purposes  All illustrations and images included in CareNotes® are the copyrighted property of [employer]  or [employer]  The above information is an  only  It is not intended as medical advice for individual conditions or treatments  Talk to your doctor, nurse or pharmacist before following any medical regimen to see if it is safe and effective for you

## 2022-10-25 ENCOUNTER — OFFICE VISIT (OUTPATIENT)
Dept: PAIN MEDICINE | Facility: CLINIC | Age: 67
End: 2022-10-25
Payer: COMMERCIAL

## 2022-10-25 VITALS
SYSTOLIC BLOOD PRESSURE: 150 MMHG | BODY MASS INDEX: 39.22 KG/M2 | WEIGHT: 274 LBS | RESPIRATION RATE: 18 BRPM | HEART RATE: 61 BPM | DIASTOLIC BLOOD PRESSURE: 81 MMHG | HEIGHT: 70 IN

## 2022-10-25 DIAGNOSIS — M54.12 CERVICAL RADICULOPATHY: ICD-10-CM

## 2022-10-25 DIAGNOSIS — M51.16 LUMBAR DISC DISEASE WITH RADICULOPATHY: ICD-10-CM

## 2022-10-25 DIAGNOSIS — M54.2 NECK PAIN: ICD-10-CM

## 2022-10-25 DIAGNOSIS — M51.16 INTERVERTEBRAL DISC DISORDER WITH RADICULOPATHY OF LUMBAR REGION: ICD-10-CM

## 2022-10-25 DIAGNOSIS — G89.29 CHRONIC MIDLINE LOW BACK PAIN WITH RIGHT-SIDED SCIATICA: ICD-10-CM

## 2022-10-25 DIAGNOSIS — M54.41 CHRONIC MIDLINE LOW BACK PAIN WITH RIGHT-SIDED SCIATICA: ICD-10-CM

## 2022-10-25 DIAGNOSIS — G89.4 CHRONIC PAIN SYNDROME: Primary | ICD-10-CM

## 2022-10-25 PROCEDURE — 99214 OFFICE O/P EST MOD 30 MIN: CPT

## 2022-10-25 RX ORDER — GABAPENTIN 300 MG/1
300 CAPSULE ORAL 2 TIMES DAILY
Qty: 60 CAPSULE | Refills: 2 | Status: SHIPPED | OUTPATIENT
Start: 2022-10-25

## 2022-10-25 NOTE — PROGRESS NOTES
Assessment:  1  Chronic pain syndrome    2  Intervertebral disc disorder with radiculopathy of lumbar region    3  Chronic midline low back pain with right-sided sciatica    4  Neck pain    5  Cervical radiculopathy    6  Lumbar disc disease with radiculopathy        Plan:  The patient is a 44-year-old male with a history of chronic pain secondary to low back pain, lumbar intervertebral disc disorder with radiculopathy, neck pain and cervical radiculopathy who presents to the office with worsening right-sided low back pain and pain that radiates down the right lower extremity and bilateral neck pain with pain and numbness into bilateral upper extremities  At this time, to decrease inflammation and provide him relief of his low back pain symptoms, I instructed patient we can repeat the right-sided L5 and right S1 TFESI as this procedure provided him moderate to significant relief of his right-sided low back pain symptoms when it was done on 08/16/2022  I instructed our  will schedule him  Complete risks and benefits including bleeding, infection, tissue reaction, nerve injury and allergic reaction were discussed  The approach was demonstrated using models and literature was provided  Verbal and written consent was obtained  I will also increase his gabapentin to 300 mg twice a day for better relief of his neck and low back pain  An updated script was sent to the patient's pharmacy  I instructed patient to call our office if he experiences any adverse effects  The patient has also not completed any formal physical therapy for his neck, therefore I will send him to physical therapy  I instructed patient to complete physical therapy over the next 4-6 weeks and if he is still experiencing neck pain with numbness into bilateral upper extremities, we will order MRI imaging of the cervical spine to evaluate for any pathology causing his pain and numbness      My impressions and treatment recommendations were discussed in detail with the patient who verbalized understanding and had no further questions  Discharge instructions were provided  I personally saw and examined the patient and I agree with the above discussed plan of care  Orders Placed This Encounter   Procedures   • [place] spine and pain procedure     Dr Yaya Blackwell     Standing Status:   Future     Standing Expiration Date:   10/25/2026     Order Specific Question:   Reason for Exam:     Answer:   Right L5 and right S1 TFESI     Order Specific Question:   Anticoagulant hold needed? Answer:   No   • Ambulatory referral to Physical Therapy     Standing Status:   Future     Standing Expiration Date:   10/25/2023     Referral Priority:   Routine     Referral Type:   Physical Therapy     Referral Reason:   Specialty Services Required     Requested Specialty:   Physical Therapy     Number of Visits Requested:   1     Expiration Date:   10/25/2023     New Medications Ordered This Visit   Medications   • gabapentin (NEURONTIN) 300 mg capsule     Sig: Take 1 capsule (300 mg total) by mouth 2 (two) times a day     Dispense:  60 capsule     Refill:  2       History of Present Illness:  Mino Torres is a 78 y o  male with a history of chronic pain secondary to low back pain, lumbar intervertebral disc disorder with radiculopathy, neck pain and cervical radiculopathy  He was last seen on 08/16/2022 where he underwent a right-sided L5 and right S1 TFESI which provided him moderate to significant relief of his right-sided low back pain symptoms for 2 months before his low back pain started to return  He presents to the office with on going right-sided low back pain with pain that radiates into the right lower extremity and neck pain that radiates into bilateral shoulders and into bilateral upper extremities  He states his pain is the same since the last office visit and intermittent but worse at night    He rates the quality of his pain as dull/aching, throbbing, numbness and pins/needles and is currently rating it an 8/10 on a numeric scale  Current pain medications include gabapentin 100 mg 1 tablet 3 times a day  It was recommended at his last office visit that he can increase this medication, however he has not increased this medication  He is also prescribed Norco as needed by his PCP which he takes sparingly  I have personally reviewed and/or updated the patient's past medical history, past surgical history, family history, social history, current medications, allergies, and vital signs today  Review of Systems   Respiratory: Negative for shortness of breath  Cardiovascular: Negative for chest pain  Gastrointestinal: Negative for constipation, diarrhea, nausea and vomiting  Musculoskeletal: Positive for back pain, gait problem, joint swelling and neck pain  Negative for arthralgias and myalgias  B/L Shoulder Pain   Skin: Negative for rash  Neurological: Negative for dizziness, seizures and weakness  All other systems reviewed and are negative        Patient Active Problem List   Diagnosis   • Type 2 diabetes mellitus with diabetic polyneuropathy, without long-term current use of insulin ([facility])   • JOSE RAUL (obstructive sleep apnea)   • Essential hypertension   • Mixed hyperlipidemia   • Periodic limb movement   • Hypothyroid   • Chronic midline low back pain with right-sided sciatica   • Obesity, morbid, BMI 40 0-49 9 ([facility] [address] )   • Other male erectile dysfunction   • Microalbuminuria   • Chronic kidney disease, stage 2 (mild)   • Type 2 diabetes mellitus with diabetic chronic kidney disease (HCC)   • Restless leg syndrome   • Persistent proteinuria   • Elevated serum creatinine   • COVID-19   • Cervical radiculopathy   • Intervertebral disc disorder with radiculopathy of lumbar region       Past Medical History:   Diagnosis Date   • BPH (benign prostatic hyperplasia)    • Chronic kidney disease    • CPAP (continuous positive airway pressure) dependence    • Diabetes mellitus (HCC)    • Disease of thyroid gland    • Hyperlipidemia    • Hypertension    • Sleep apnea    • Type 2 diabetes mellitus with diabetic chronic kidney disease ([facility] [address] ) 2021       Past Surgical History:   Procedure Laterality Date   • COLONOSCOPY      2016   • [place] EGD TRANSORAL BIOPSY SINGLE/MULTIPLE N/A 2017    Procedure: ESOPHAGOGASTRODUODENOSCOPY (EGD) with bx;  Surgeon: Lisa Man MD;  Location: [facility];   Service: Bariatrics   • TONSILLECTOMY         Family History   Problem Relation Age of Onset   • Lung cancer Mother    • Diabetes Father    • Cancer Brother    • Alcohol abuse Neg Hx    • Substance Abuse Neg Hx    • Mental illness Neg Hx    • Depression Neg Hx        Social History     Occupational History   • Not on file   Tobacco Use   • Smoking status: Former Smoker     Packs/day: 2 00     Years: 0 00     Pack years: 0 00     Types: Cigarettes     Quit date:      Years since quittin 8   • Smokeless tobacco: Never Used   Vaping Use   • Vaping Use: Never used   Substance and Sexual Activity   • Alcohol use: Yes     Comment: social   • Drug use: No   • Sexual activity: Yes     Partners: Female     Birth control/protection: None       Current Outpatient Medications on File Prior to Visit   Medication Sig   • amLODIPine (NORVASC) 10 mg tablet Take 1 tablet (10 mg total) by mouth daily   • aspirin (ECOTRIN LOW STRENGTH) 81 mg EC tablet Take 81 mg by mouth daily   • atorvastatin (LIPITOR) 20 mg tablet Take 1 tablet (20 mg total) by mouth daily   • Blood Glucose Calibration (OT ULTRA/FASTTK CNTRL SOLN) SOLN by In Vitro route as needed (to calibrate blood sugar meter)   • carvedilol (COREG) 12 5 mg tablet Take 1 tablet (12 5 mg total) by mouth 2 (two) times a day with meals   • Continuous Blood Gluc  (FreeStyle Paul 14 Day [place]) LEO USE TO CHECK BLOOD GLUCOSE   • Continuous Blood Gluc Sensor (FreeStyle Paul 14 Day Sensor) MISC Inject 1 application under the skin every 14 (fourteen) days   • dapagliflozin (Farxiga) 10 MG tablet Take one tablet with breakfast   • glimepiride (AMARYL) 4 mg tablet Take one tablet twice a day   • guaifenesin-codeine (GUAIFENESIN AC) 100-10 MG/5ML liquid Take 5 mL by mouth 3 (three) times a day as needed for cough   • HYDROcodone-acetaminophen (NORCO) 5-325 mg per tablet Take 1 tablet by mouth daily as needed for pain Max Daily Amount: 1 tablet   • insulin detemir (LEVEMIR FLEXTOUCH) 100 Units/mL injection pen Inject 14 units at bedtime   • Insulin Pen Needle ([employer] Unifine Pentips Plus) 31G X 5 MM MISC Inject under the skin in the morning   • levothyroxine 75 mcg tablet Take 1 tablet (75 mcg total) by mouth daily   • metFORMIN (GLUCOPHAGE-XR) 500 mg 24 hr tablet Take 2 tablets (1,000 mg total) by mouth 2 (two) times a day with meals (Patient taking differently: Take 500 mg by mouth 2 (two) times a day with meals)   • sildenafil (VIAGRA) 50 MG tablet Take 1 tablet (50 mg total) by mouth as needed for erectile dysfunction   • [DISCONTINUED] gabapentin (Neurontin) 100 mg capsule Take 1 capsule (100 mg total) by mouth 3 (three) times a day     No current facility-administered medications on file prior to visit  Allergies   Allergen Reactions   • Penicillins      SYNCOPE, but has taken amoxicillin/augmentin in past       Physical Exam:    /81   Pulse 61   Resp 18   Ht 5' 10" (1 778 m)   Wt 124 kg (274 lb)   BMI 39 31 kg/m²     Constitutional:normal, well developed, well nourished, alert, in no distress and non-toxic and no overt pain behavior    Eyes:anicteric  HEENT:grossly intact  Neck:supple, symmetric, trachea midline and no masses   Pulmonary:even and unlabored  Cardiovascular:No edema or pitting edema present  Skin:Normal without rashes or lesions and well hydrated  Psychiatric:Mood and affect appropriate  Neurologic:Cranial Nerves II-XII grossly intact  Musculoskeletal:antalgic     Lumbar Spine Exam    Appearance:  Normal lordosis  Palpation/Tenderness:  right lumbar paraspinal tenderness  Sensory:  no sensory deficits noted  Range of Motion:  Flexion:  Minimally limited  with pain  Extension:  Minimally limited  with pain  Motor Strength:  Left hip flexion:  5/5  Right hip flexion:  5/5  Left knee flexion:  5/5  Left knee extension:  5/5  Right knee flexion:  5/5  Right knee extension:  5/5  Left foot dorsiflexion:  5/5  Left foot plantar flexion:  5/5  Right foot dorsiflexion:  5/5  Right foot plantar flexion:  5/5      Imaging  MRI LUMBAR SPINE WITHOUT CONTRAST     INDICATION: M51 [id number]: Intervertebral disc disorders with radiculopathy, lumbar region      COMPARISON:  Plain film 6/29/2021      TECHNIQUE:  Sagittal T1, sagittal T2, sagittal inversion recovery, axial T1 and axial T2, coronal T2     IMAGE QUALITY:  Diagnostic     FINDINGS:     VERTEBRAL BODIES:  There are 5 lumbar type vertebral bodies  Normal alignment of the lumbar spine  No spondylolysis or spondylolisthesis  No scoliosis  No compression fracture  Normal marrow signal is identified within the visualized bony   structures  No discrete marrow lesion      SACRUM:  Normal signal within the sacrum  No evidence of insufficiency or stress fracture      DISTAL CORD AND CONUS:  Normal size and signal within the distal cord and conus      PARASPINAL SOFT TISSUES:  Bilateral renal cortical cysts identified      LOWER THORACIC DISC SPACES:  Normal disc height and signal   No disc herniation, canal stenosis or foraminal narrowing      LUMBAR DISC SPACES:     L1-L2:  Normal      L2-L3:  Diffuse disc bulge with facet arthrosis without significant central canal or foraminal stenosis      L3-L4:  Disc desiccation with loss of disc height  Disc bulge with superimposed left foraminal disc protrusion  There is mild narrowing of the left subarticular recess concerning for impingement of the descending left L4 nerve root    Mild left central   canal stenosis with mild left foraminal narrowing      L4-L5:  Disc desiccation with preserved disc height  Mild bulge and facet hypertrophy without significant central canal narrowing  Mild bilateral foraminal stenosis      L5-S1:  Disc desiccation with loss of disc height  Modic type II endplate change noted  Diffuse bulge with facet arthrosis  There is moderate right foraminal stenosis concerning for impingement of the exiting right L5 nerve root  Central canal and   left neural foramen remain patent      IMPRESSION:  Mild to moderate spondylotic changes of the lumbar spine including multifactorial moderate right L5-S1 foraminal encroachment concerning for impingement of the exiting right L5 nerve root    Correlate clinically

## 2022-10-31 DIAGNOSIS — N18.31 TYPE 2 DIABETES MELLITUS WITH STAGE 3A CHRONIC KIDNEY DISEASE, WITHOUT LONG-TERM CURRENT USE OF INSULIN (HCC): ICD-10-CM

## 2022-10-31 DIAGNOSIS — E03.9 HYPOTHYROIDISM, UNSPECIFIED TYPE: ICD-10-CM

## 2022-10-31 DIAGNOSIS — E11.22 TYPE 2 DIABETES MELLITUS WITH STAGE 3A CHRONIC KIDNEY DISEASE, WITHOUT LONG-TERM CURRENT USE OF INSULIN (HCC): ICD-10-CM

## 2022-11-01 RX ORDER — LEVOTHYROXINE SODIUM 0.07 MG/1
75 TABLET ORAL DAILY
Qty: 90 TABLET | Refills: 0 | Status: SHIPPED | OUTPATIENT
Start: 2022-11-01

## 2022-11-01 RX ORDER — METFORMIN HYDROCHLORIDE 500 MG/1
1000 TABLET, EXTENDED RELEASE ORAL 2 TIMES DAILY WITH MEALS
Qty: 360 TABLET | Refills: 0 | Status: SHIPPED | OUTPATIENT
Start: 2022-11-01 | End: 2022-11-10 | Stop reason: SDUPTHER

## 2022-11-08 DIAGNOSIS — E11.42 TYPE 2 DIABETES MELLITUS WITH DIABETIC POLYNEUROPATHY, WITHOUT LONG-TERM CURRENT USE OF INSULIN (HCC): ICD-10-CM

## 2022-11-09 ENCOUNTER — VBI (OUTPATIENT)
Dept: ADMINISTRATIVE | Facility: OTHER | Age: 67
End: 2022-11-09

## 2022-11-09 RX ORDER — GLIMEPIRIDE 4 MG/1
TABLET ORAL
Qty: 180 TABLET | Refills: 0 | Status: SHIPPED | OUTPATIENT
Start: 2022-11-09 | End: 2022-11-18 | Stop reason: ALTCHOICE

## 2022-11-10 DIAGNOSIS — N18.31 TYPE 2 DIABETES MELLITUS WITH STAGE 3A CHRONIC KIDNEY DISEASE, WITHOUT LONG-TERM CURRENT USE OF INSULIN (HCC): ICD-10-CM

## 2022-11-10 DIAGNOSIS — E11.22 TYPE 2 DIABETES MELLITUS WITH STAGE 3A CHRONIC KIDNEY DISEASE, WITHOUT LONG-TERM CURRENT USE OF INSULIN (HCC): ICD-10-CM

## 2022-11-10 RX ORDER — METFORMIN HYDROCHLORIDE 500 MG/1
1000 TABLET, EXTENDED RELEASE ORAL 2 TIMES DAILY WITH MEALS
Qty: 360 TABLET | Refills: 0 | Status: SHIPPED | OUTPATIENT
Start: 2022-11-10

## 2022-11-16 ENCOUNTER — HOSPITAL ENCOUNTER (OUTPATIENT)
Dept: RADIOLOGY | Facility: CLINIC | Age: 67
Discharge: HOME | End: 2022-11-16

## 2022-11-16 ENCOUNTER — TELEPHONE (OUTPATIENT)
Dept: PAIN MEDICINE | Facility: CLINIC | Age: 67
End: 2022-11-16

## 2022-11-16 VITALS
SYSTOLIC BLOOD PRESSURE: 152 MMHG | DIASTOLIC BLOOD PRESSURE: 86 MMHG | RESPIRATION RATE: 17 BRPM | HEART RATE: 63 BPM | TEMPERATURE: 96.5 F | OXYGEN SATURATION: 96 %

## 2022-11-16 DIAGNOSIS — M51.16 INTERVERTEBRAL DISC DISORDER WITH RADICULOPATHY OF LUMBAR REGION: ICD-10-CM

## 2022-11-16 RX ORDER — 0.9 % SODIUM CHLORIDE 0.9 %
4 VIAL (ML) INJECTION ONCE
Status: COMPLETED | OUTPATIENT
Start: 2022-11-16 | End: 2022-11-16

## 2022-11-16 RX ORDER — BUPIVACAINE HCL/PF 2.5 MG/ML
2 VIAL (ML) INJECTION ONCE
Status: COMPLETED | OUTPATIENT
Start: 2022-11-16 | End: 2022-11-16

## 2022-11-16 RX ORDER — METHYLPREDNISOLONE ACETATE 80 MG/ML
80 INJECTION, SUSPENSION INTRA-ARTICULAR; INTRALESIONAL; INTRAMUSCULAR; PARENTERAL; SOFT TISSUE ONCE
Status: COMPLETED | OUTPATIENT
Start: 2022-11-16 | End: 2022-11-16

## 2022-11-16 RX ADMIN — METHYLPREDNISOLONE ACETATE 80 MG: 80 INJECTION, SUSPENSION INTRA-ARTICULAR; INTRALESIONAL; INTRAMUSCULAR; PARENTERAL; SOFT TISSUE at 11:15

## 2022-11-16 RX ADMIN — IOHEXOL 1 ML: 300 INJECTION, SOLUTION INTRAVENOUS at 11:15

## 2022-11-16 RX ADMIN — Medication 4 ML: at 11:13

## 2022-11-16 RX ADMIN — BUPIVACAINE HYDROCHLORIDE 2 ML: 2.5 INJECTION, SOLUTION EPIDURAL; INFILTRATION; INTRACAUDAL at 11:15

## 2022-11-16 NOTE — TELEPHONE ENCOUNTER
Neida Mercado RN had received teams message from phone room and replied no massage tonight, he would need to wait until next week.

## 2022-11-16 NOTE — DISCHARGE INSTR - LAB
Epidural Steroid Injection   WHAT YOU NEED TO KNOW:   An epidural steroid injection (MATILDA) is a procedure to inject steroid medicine into the epidural space  The epidural space is between your spinal cord and vertebrae  Steroids reduce inflammation and fluid buildup in your spine that may be causing pain  You may be given pain medicine along with the steroids  ACTIVITY  Do not drive or operate machinery today  No strenuous activity today - bending, lifting, etc   You may resume normal activites starting tomorrow - start slowly and as tolerated  You may shower today, but no tub baths or hot tubs  You may have numbness for several hours from the local anesthetic  Please use caution and common sense, especially with weight-bearing activities  CARE OF THE INJECTION SITE  If you have soreness or pain, apply ice to the area today (20 minutes on/20 minutes off)  Starting tomorrow, you may use warm, moist heat or ice if needed  You may have an increase or change in your discomfort for 36-48 hours after your treatment  Apply ice and continue with any pain medication you have been prescribed  Notify the [facility] if you have any of the following: redness, drainage, swelling, headache, stiff neck or fever above 100°F     SPECIAL INSTRUCTIONS  Our office will contact you in approximately 7 days for a progress report  MEDICATIONS  Continue to take all routine medications  Our office may have instructed you to hold some medications  As no general anesthesia was used in today's procedure, you should not experience any side effects related to anesthesia  If you are diabetic, the steroids used in today's injection may temporarily increase your blood sugar levels after the first few days after your injection  Please keep a close eye on your sugars and alert the doctor who manages your diabetes if your sugars are significantly high from your baseline or you are symptomatic       If you have a problem specifically related to your procedure, please call our office at [phone]  Problems not related to your procedure should be directed to your primary care physician

## 2022-11-16 NOTE — TELEPHONE ENCOUNTER
Caller: patient    Doctor: Joey Oneill    Reason for call: Patient wanted to know if he can have a massage tonight at 9pm. Advised patient No, he must wait a week per nurse.     Call back#: [phone]

## 2022-11-16 NOTE — H&P
History of Present Illness: The patient is a 78 y o  male who presents with complaints of right lower back and leg pain is here today for right L5-S1 transforaminal epidural steroid injection    Past Medical History:   Diagnosis Date   • BPH (benign prostatic hyperplasia)    • Chronic kidney disease    • CPAP (continuous positive airway pressure) dependence    • Diabetes mellitus (HCC)    • Disease of thyroid gland    • Hyperlipidemia    • Hypertension    • Sleep apnea    • Type 2 diabetes mellitus with diabetic chronic kidney disease ([facility] [address] ) 6/24/2021       Past Surgical History:   Procedure Laterality Date   • COLONOSCOPY      2016   • [place] EGD TRANSORAL BIOPSY SINGLE/MULTIPLE N/A 5/17/2017    Procedure: ESOPHAGOGASTRODUODENOSCOPY (EGD) with bx;  Surgeon: Kyra Judge MD;  Location: [facility];   Service: Bariatrics   • TONSILLECTOMY           Current Outpatient Medications:   •  amLODIPine (NORVASC) 10 mg tablet, Take 1 tablet (10 mg total) by mouth daily, Disp: 90 tablet, Rfl: 0  •  aspirin (ECOTRIN LOW STRENGTH) 81 mg EC tablet, Take 81 mg by mouth daily, Disp: , Rfl:   •  atorvastatin (LIPITOR) 20 mg tablet, Take 1 tablet (20 mg total) by mouth daily, Disp: 90 tablet, Rfl: 0  •  Blood Glucose Calibration (OT ULTRA/FASTTK CNTRL SOLN) SOLN, by In Vitro route as needed (to calibrate blood sugar meter), Disp: 1 each, Rfl: 1  •  carvedilol (COREG) 12 5 mg tablet, Take 1 tablet (12 5 mg total) by mouth 2 (two) times a day with meals, Disp: 180 tablet, Rfl: 3  •  Continuous Blood Gluc  (FreeStyle Paul 14 Day [place]) LEO, USE TO CHECK BLOOD GLUCOSE, Disp: , Rfl:   •  Continuous Blood Gluc Sensor (FreeStyle Paul 14 Day Sensor) MISC, Inject 1 application under the skin every 14 (fourteen) days, Disp: 6 each, Rfl: 0  •  dapagliflozin (Farxiga) 10 MG tablet, Take one tablet with breakfast, Disp: 30 tablet, Rfl: 0  •  gabapentin (NEURONTIN) 300 mg capsule, Take 1 capsule (300 mg total) by mouth 2 (two) times a day, Disp: 60 capsule, Rfl: 2  •  glimepiride (AMARYL) 4 mg tablet, Take one tablet twice a day, Disp: 180 tablet, Rfl: 0  •  guaifenesin-codeine (GUAIFENESIN AC) 100-10 MG/5ML liquid, Take 5 mL by mouth 3 (three) times a day as needed for cough, Disp: 120 mL, Rfl: 0  •  HYDROcodone-acetaminophen (NORCO) 5-325 mg per tablet, Take 1 tablet by mouth daily as needed for pain Max Daily Amount: 1 tablet, Disp: 30 tablet, Rfl: 0  •  insulin detemir (LEVEMIR FLEXTOUCH) 100 Units/mL injection pen, Inject 14 units at bedtime, Disp: 15 mL, Rfl: 1  •  Insulin Pen Needle (CareOne Unifine Pentips Plus) 31G X 5 MM MISC, Inject under the skin in the morning, Disp: 100 each, Rfl: 0  •  levothyroxine 75 mcg tablet, Take 1 tablet (75 mcg total) by mouth daily, Disp: 90 tablet, Rfl: 0  •  metFORMIN (GLUCOPHAGE-XR) 500 mg 24 hr tablet, Take 2 tablets (1,000 mg total) by mouth 2 (two) times a day with meals, Disp: 360 tablet, Rfl: 0  •  sildenafil (VIAGRA) 50 MG tablet, Take 1 tablet (50 mg total) by mouth as needed for erectile dysfunction, Disp: 10 tablet, Rfl: 0  No current facility-administered medications for this encounter  Allergies   Allergen Reactions   • Penicillins      SYNCOPE, but has taken amoxicillin/augmentin in past       Physical Exam:   Vitals:    11/16/22 1104   BP: 153/87   Pulse: 66   Resp: 18   Temp: (!) 96 5 °F (35 8 °C)   SpO2: 95%     General: Awake, Alert, Oriented x 3, Mood and affect appropriate  Respiratory: Respirations even and unlabored  Cardiovascular: Peripheral pulses intact; no edema  Musculoskeletal Exam:  Right lower back and leg pain    ASA Score: 3    Patient/Chart Verification  Patient ID Verified: Verbal  ID Band Applied: No  Consents Confirmed: Procedural, To be obtained in the Pre-Procedure area  Allergies Reviewed: Yes  Anticoag/NSAID held?: No  Currently on antibiotics?: No    Assessment:   1   Intervertebral disc disorder with radiculopathy of lumbar region        Plan: Right L5 and right S1 TFESI

## 2022-11-18 ENCOUNTER — OFFICE VISIT (OUTPATIENT)
Dept: ENDOCRINOLOGY | Facility: CLINIC | Age: 67
End: 2022-11-18

## 2022-11-18 VITALS
WEIGHT: 270 LBS | BODY MASS INDEX: 38.74 KG/M2 | DIASTOLIC BLOOD PRESSURE: 72 MMHG | HEART RATE: 73 BPM | SYSTOLIC BLOOD PRESSURE: 128 MMHG

## 2022-11-18 DIAGNOSIS — G47.33 OSA (OBSTRUCTIVE SLEEP APNEA): ICD-10-CM

## 2022-11-18 DIAGNOSIS — I10 ESSENTIAL HYPERTENSION: ICD-10-CM

## 2022-11-18 DIAGNOSIS — N18.31 TYPE 2 DIABETES MELLITUS WITH STAGE 3A CHRONIC KIDNEY DISEASE, WITHOUT LONG-TERM CURRENT USE OF INSULIN (HCC): Primary | ICD-10-CM

## 2022-11-18 DIAGNOSIS — E11.22 TYPE 2 DIABETES MELLITUS WITH STAGE 3A CHRONIC KIDNEY DISEASE, WITHOUT LONG-TERM CURRENT USE OF INSULIN (HCC): Primary | ICD-10-CM

## 2022-11-18 DIAGNOSIS — E11.65 TYPE 2 DIABETES MELLITUS WITH HYPERGLYCEMIA, WITH LONG-TERM CURRENT USE OF INSULIN (HCC): ICD-10-CM

## 2022-11-18 DIAGNOSIS — E11.42 TYPE 2 DIABETES MELLITUS WITH DIABETIC POLYNEUROPATHY, WITHOUT LONG-TERM CURRENT USE OF INSULIN (HCC): ICD-10-CM

## 2022-11-18 DIAGNOSIS — Z79.4 TYPE 2 DIABETES MELLITUS WITH HYPERGLYCEMIA, WITH LONG-TERM CURRENT USE OF INSULIN (HCC): ICD-10-CM

## 2022-11-18 DIAGNOSIS — E78.2 MIXED HYPERLIPIDEMIA: ICD-10-CM

## 2022-11-18 DIAGNOSIS — E03.9 ACQUIRED HYPOTHYROIDISM: ICD-10-CM

## 2022-11-18 RX ORDER — SEMAGLUTIDE 1.34 MG/ML
INJECTION, SOLUTION SUBCUTANEOUS
Qty: 1.5 ML | Refills: 4 | Status: SHIPPED | OUTPATIENT
Start: 2022-11-18

## 2022-11-18 RX ORDER — GLIMEPIRIDE 4 MG/1
TABLET ORAL
Qty: 180 TABLET | Refills: 0 | Status: SHIPPED | OUTPATIENT
Start: 2022-11-18

## 2022-11-18 NOTE — PROGRESS NOTES
Jam Freedman 78 y o  male MRN: [id number]    Encounter: [id number]      Assessment/Plan     Assessment: This is a 78y o -year-old male with diabetes with hyperglycemia  Plan:    Diagnoses and all orders for this visit:    Type 2 diabetes mellitus with stage 3a chronic kidney disease, without long-term current use of insulin ([facility] )  Lab Results   Component Value Date    HGBA1C 7 0 (H) 08/16/2022   Last A1c is 7 0%, almost at goal  Recently blood sugars are elevated in 300 mg/dL range suggestive of hyperglycemia most likely secondary to steroid injection in back as well as not taking glimepiride  Will restart glimepiride 4 mg twice a day  Will start Ozempic 0 25 mg once a week for 4 weeks and then increase the dose to 0 5 mg once a week (patient took Ozempic prior few months ago however because of donut hole it was very expensive and stop taking it)  He could tolerate medication very well    Patient has been complaining some diarrhea discussed that he can reduce the dose to 1000 mg for some time and see if his symptoms improve  Discussed hypoglycemia symptoms and treatment  Follow-up in 4 months  Educated about dietary modifications    -     Semaglutide,0 25 or 0 5MG/DOS, (Ozempic, 0 25 or 0 5 MG/DOSE,) 2 MG/1 5ML SOPN; Inject 0 25 mg once a week for 2 weeks and then increase to 0 5 mg once a week SQ  -     Basic metabolic panel;  Future  -     Hemoglobin A1C; Future    Mixed hyperlipidemia    Lab Results   Component Value Date    LDLCALC 43 08/16/2022   continue statins   Essential hypertension  BP Readings from Last 3 Encounters:   11/18/22 128/72   11/16/22 152/86   10/25/22 150/81     Continue current management   Acquired hypothyroidism  Lab Results   Component Value Date    [id number] 3 498 08/16/2022   continue current dose of levothyroxine     JOSE RAUL (obstructive sleep apnea)  Discussed importance of use of CPAP machine  Type 2 diabetes mellitus with diabetic polyneuropathy, without long-term current use of insulin (HCC)  Restarted on glimepiride 4 mg twice a day  Increase Levemir to 16 units at bedtime  Discussed to upload the continues glucose monitor sensor again in 4 weeks  -     glimepiride (AMARYL) 4 mg tablet; Take one tablet twice a day    Type 2 diabetes mellitus with hyperglycemia, with long-term current use of insulin (HCC)  -     insulin detemir (LEVEMIR FLEXTOUCH) 100 Units/mL injection pen; Inject 16 units at bedtime        CC: Diabetes    History of Present Illness     HPI:      Donald Cardenas is 61-year-old gentleman with medical history of type 2 diabetes managed on oral medications, hyperlipidemia, hypertension, hypothyroidism, obstructive sleep apnea is here for follow-up  For type 2 diabetes, patient takes Farxiga 10 mg daily, glimepiride 4 mg, 1 tablet twice a day, Levemir 14 units at bedtime, metformin 1000 mg twice a day  Patient admits compliance to the medication,  He uses continues glucose monitor sensor by [employer], freestyle    November 5, 2022 to November 18, 2022, 14 days sensor overview reviewed  More than 72 hours of data reviewed  % time C GM is active is 27%  Average glucose is 218 mg/dL  Glucose variability is 27 6%  31% blood sugars are in target range, 0% blood sugars are low  43% blood sugars are high  26% blood sugars are very high  Patient has pattern of elevated blood sugars usually after breakfast after dinner and sometimes after lunch  Patient stated he did not take glimepiride for the past 2-3 days and also received steroid injection in his back to 3 days ago, which could be affecting his blood sugars    Eye appt -  Up todate ( no retinopathy)      For hyperlipidemia, he takes Lipitor 20 mg daily  He has hypertension which is well controlled on amlodipine, carvedilol    Lab Results   Component Value Date    HGBA1C 7 0 (H) 08/16/2022     Lab Results   Component Value Date    [id number] 3 498 08/16/2022       Review of Systems   Constitutional: Negative for activity change, diaphoresis, fatigue, fever and unexpected weight change  HENT: Negative  Eyes: Negative for visual disturbance  Respiratory: Negative for cough, chest tightness and shortness of breath  Cardiovascular: Negative for chest pain, palpitations and leg swelling  Gastrointestinal: Negative for abdominal pain, blood in stool, constipation, diarrhea, nausea and vomiting  Endocrine: Negative for cold intolerance, heat intolerance, polydipsia, polyphagia and polyuria  Genitourinary: Negative for dysuria, enuresis, frequency and urgency  Musculoskeletal: Negative for arthralgias and myalgias  Skin: Negative for pallor, rash and wound  Allergic/Immunologic: Negative  Neurological: Negative for dizziness, tremors, weakness and numbness  Hematological: Negative  Psychiatric/Behavioral: Negative  Historical Information   Past Medical History:   Diagnosis Date   • BPH (benign prostatic hyperplasia)    • Chronic kidney disease    • CPAP (continuous positive airway pressure) dependence    • Diabetes mellitus (HCC)    • Disease of thyroid gland    • Hyperlipidemia    • Hypertension    • Sleep apnea    • Type 2 diabetes mellitus with diabetic chronic kidney disease ([facility] ) 2021     Past Surgical History:   Procedure Laterality Date   • COLONOSCOPY         • VT EGD TRANSORAL BIOPSY SINGLE/MULTIPLE N/A 2017    Procedure: ESOPHAGOGASTRODUODENOSCOPY (EGD) with bx;  Surgeon: Tatum Carbajal MD;  Location: [facility];   Service: Bariatrics   • TONSILLECTOMY       Social History   Social History     Substance and Sexual Activity   Alcohol Use Yes    Comment: social     Social History     Substance and Sexual Activity   Drug Use No     Social History     Tobacco Use   Smoking Status Former   • Packs/day: 2 00   • Years: 0 00   • Pack years: 0 00   • Types: Cigarettes   • Quit date: 36   • Years since quittin 9   Smokeless Tobacco Never     Family History:   Family History Problem Relation Age of Onset   • Lung cancer Mother    • Diabetes Father    • Cancer Brother    • Alcohol abuse Neg Hx    • Substance Abuse Neg Hx    • Mental illness Neg Hx    • Depression Neg Hx        Meds/Allergies   Current Outpatient Medications   Medication Sig Dispense Refill   • amLODIPine (NORVASC) 10 mg tablet Take 1 tablet (10 mg total) by mouth daily 90 tablet 0   • aspirin (ECOTRIN LOW STRENGTH) 81 mg EC tablet Take 81 mg by mouth daily     • atorvastatin (LIPITOR) 20 mg tablet Take 1 tablet (20 mg total) by mouth daily 90 tablet 0   • Blood Glucose Calibration (OT ULTRA/FASTTK CNTRL SOLN) SOLN by In Vitro route as needed (to calibrate blood sugar meter) 1 each 1   • carvedilol (COREG) 12 5 mg tablet Take 1 tablet (12 5 mg total) by mouth 2 (two) times a day with meals 180 tablet 3   • Continuous Blood Gluc  (FreeStyle Paul 14 Day [place]) LEO USE TO CHECK BLOOD GLUCOSE     • Continuous Blood Gluc Sensor (FreeStyle Paul 14 Day Sensor) MISC Inject 1 application under the skin every 14 (fourteen) days 6 each 0   • dapagliflozin (Farxiga) 10 MG tablet Take one tablet with breakfast 30 tablet 0   • gabapentin (NEURONTIN) 300 mg capsule Take 1 capsule (300 mg total) by mouth 2 (two) times a day 60 capsule 2   • glimepiride (AMARYL) 4 mg tablet Take one tablet twice a day 180 tablet 0   • guaifenesin-codeine (GUAIFENESIN AC) 100-10 MG/5ML liquid Take 5 mL by mouth 3 (three) times a day as needed for cough 120 mL 0   • HYDROcodone-acetaminophen (NORCO) 5-325 mg per tablet Take 1 tablet by mouth daily as needed for pain Max Daily Amount: 1 tablet 30 tablet 0   • insulin detemir (LEVEMIR FLEXTOUCH) 100 Units/mL injection pen Inject 16 units at bedtime 15 mL 1   • Insulin Pen Needle ([employer] Unifine Pentips Plus) 31G X 5 MM MISC Inject under the skin in the morning 100 each 0   • levothyroxine 75 mcg tablet Take 1 tablet (75 mcg total) by mouth daily 90 tablet 0   • Semaglutide,0 25 or 0 5MG/DOS, (Ozempic, 0 25 or 0 5 MG/DOSE,) 2 MG/1 5ML SOPN Inject 0 25 mg once a week for 2 weeks and then increase to 0 5 mg once a week SQ 1 5 mL 4   • sildenafil (VIAGRA) 50 MG tablet Take 1 tablet (50 mg total) by mouth as needed for erectile dysfunction 10 tablet 0   • metFORMIN (GLUCOPHAGE-XR) 500 mg 24 hr tablet Take 2 tablets (1,000 mg total) by mouth 2 (two) times a day with meals 360 tablet 0     No current facility-administered medications for this visit  Allergies   Allergen Reactions   • Penicillins      SYNCOPE, but has taken amoxicillin/augmentin in past       Objective   Vitals: Blood pressure 128/72, pulse 73, weight 122 kg (270 lb)  Physical Exam  Vitals reviewed  Constitutional:       General: He is not in acute distress  Appearance: He is well-developed and well-nourished  HENT:      Head: Normocephalic and atraumatic  Eyes:      Extraocular Movements: EOM normal       Pupils: Pupils are equal, round, and reactive to light  Neck:      Thyroid: No thyromegaly  Cardiovascular:      Rate and Rhythm: Normal rate and regular rhythm  Pulses: Normal pulses  Heart sounds: Normal heart sounds  Pulmonary:      Effort: Pulmonary effort is normal  No respiratory distress  Breath sounds: Normal breath sounds  Musculoskeletal:         General: No deformity or edema  Normal range of motion  Cervical back: Normal range of motion and neck supple  Skin:     General: Skin is warm and dry  Findings: No erythema or rash  Neurological:      General: No focal deficit present  Mental Status: He is alert and oriented to person, place, and time  Psychiatric:         Mood and Affect: Mood and affect normal          The history was obtained from the review of the chart, patient      Lab Results:   Lab Results   Component Value Date/Time    Hemoglobin A1C 7 0 (H) 08/16/2022 09:54 AM    Hemoglobin A1C 9 9 (H) 01/05/2022 04:07 PM    BUN 14 08/16/2022 09:54 AM    BUN 18 01/05/2022 04:07 PM    Potassium 4 3 08/16/2022 09:54 AM    Potassium 4 9 01/05/2022 04:07 PM    Chloride 104 08/16/2022 09:54 AM    Chloride 97 (L) 01/05/2022 04:07 PM    CO2 26 08/16/2022 09:54 AM    CO2 26 01/05/2022 04:07 PM    Creatinine 0 64 08/16/2022 09:54 AM    Creatinine 0 96 01/05/2022 04:07 PM    AST 14 08/16/2022 09:54 AM    AST 22 01/05/2022 04:07 PM    ALT 19 08/16/2022 09:54 AM    ALT 30 01/05/2022 04:07 PM    Albumin 3 8 08/16/2022 09:54 AM    Albumin 3 5 01/05/2022 04:07 PM    HDL, Direct 37 (L) 08/16/2022 09:54 AM    Triglycerides 183 (H) 08/16/2022 09:54 AM           Imaging Studies: I have personally reviewed pertinent reports  Portions of the record may have been created with voice recognition software  Occasional wrong word or "sound a like" substitutions may have occurred due to the inherent limitations of voice recognition software  Read the chart carefully and recognize, using context, where substitutions have occurred

## 2022-11-23 ENCOUNTER — TELEPHONE (OUTPATIENT)
Dept: PAIN MEDICINE | Facility: CLINIC | Age: 67
End: 2022-11-23

## 2022-11-25 DIAGNOSIS — E78.2 MIXED HYPERLIPIDEMIA: ICD-10-CM

## 2022-11-25 DIAGNOSIS — E11.65 UNCONTROLLED TYPE 2 DIABETES MELLITUS WITH HYPERGLYCEMIA (HCC): ICD-10-CM

## 2022-11-25 DIAGNOSIS — N18.31 TYPE 2 DIABETES MELLITUS WITH STAGE 3A CHRONIC KIDNEY DISEASE, WITHOUT LONG-TERM CURRENT USE OF INSULIN (HCC): ICD-10-CM

## 2022-11-25 DIAGNOSIS — I10 ESSENTIAL HYPERTENSION: ICD-10-CM

## 2022-11-25 DIAGNOSIS — E11.22 TYPE 2 DIABETES MELLITUS WITH STAGE 3A CHRONIC KIDNEY DISEASE, WITHOUT LONG-TERM CURRENT USE OF INSULIN (HCC): ICD-10-CM

## 2022-11-26 RX ORDER — CARVEDILOL 12.5 MG/1
12.5 TABLET ORAL 2 TIMES DAILY WITH MEALS
Qty: 180 TABLET | Refills: 3 | Status: SHIPPED | OUTPATIENT
Start: 2022-11-26

## 2022-11-28 RX ORDER — ATORVASTATIN CALCIUM 20 MG/1
20 TABLET, FILM COATED ORAL DAILY
Qty: 90 TABLET | Refills: 0 | Status: SHIPPED | OUTPATIENT
Start: 2022-11-28

## 2022-12-10 DIAGNOSIS — M54.41 CHRONIC MIDLINE LOW BACK PAIN WITH RIGHT-SIDED SCIATICA: ICD-10-CM

## 2022-12-10 DIAGNOSIS — G89.29 CHRONIC MIDLINE LOW BACK PAIN WITH RIGHT-SIDED SCIATICA: ICD-10-CM

## 2022-12-10 DIAGNOSIS — I10 BENIGN ESSENTIAL HYPERTENSION: ICD-10-CM

## 2022-12-12 RX ORDER — HYDROCODONE BITARTRATE AND ACETAMINOPHEN 5; 325 MG/1; MG/1
1 TABLET ORAL DAILY PRN
Qty: 30 TABLET | Refills: 0 | Status: SHIPPED | OUTPATIENT
Start: 2022-12-12

## 2022-12-12 RX ORDER — AMLODIPINE BESYLATE 10 MG/1
10 TABLET ORAL DAILY
Qty: 90 TABLET | Refills: 0 | Status: SHIPPED | OUTPATIENT
Start: 2022-12-12

## 2022-12-15 DIAGNOSIS — E11.65 UNCONTROLLED TYPE 2 DIABETES MELLITUS WITH HYPERGLYCEMIA (HCC): ICD-10-CM

## 2022-12-19 ENCOUNTER — TELEPHONE (OUTPATIENT)
Dept: INTERNAL MEDICINE CLINIC | Facility: CLINIC | Age: 67
End: 2022-12-19

## 2022-12-19 ENCOUNTER — OFFICE VISIT (OUTPATIENT)
Dept: INTERNAL MEDICINE CLINIC | Facility: CLINIC | Age: 67
End: 2022-12-19

## 2022-12-19 VITALS
HEART RATE: 67 BPM | TEMPERATURE: 97.3 F | HEIGHT: 70 IN | OXYGEN SATURATION: 99 % | DIASTOLIC BLOOD PRESSURE: 80 MMHG | WEIGHT: 269 LBS | BODY MASS INDEX: 38.51 KG/M2 | SYSTOLIC BLOOD PRESSURE: 140 MMHG

## 2022-12-19 DIAGNOSIS — H01.002 BLEPHARITIS OF RIGHT LOWER EYELID, UNSPECIFIED TYPE: ICD-10-CM

## 2022-12-19 DIAGNOSIS — H00.012 HORDEOLUM EXTERNUM OF RIGHT LOWER EYELID: Primary | ICD-10-CM

## 2022-12-19 RX ORDER — ERYTHROMYCIN 5 MG/G
0.5 OINTMENT OPHTHALMIC
Qty: 3.5 G | Refills: 0 | Status: SHIPPED | OUTPATIENT
Start: 2022-12-19

## 2022-12-19 RX ORDER — FLASH GLUCOSE SENSOR
1 KIT MISCELLANEOUS
Qty: 6 EACH | Refills: 0 | Status: SHIPPED | OUTPATIENT
Start: 2022-12-19

## 2022-12-19 NOTE — TELEPHONE ENCOUNTER
Pt's right eye is red, swollen, and sore  No itching or drainage  Started this morning  Pt thinks he may have pink eye? Office visit?

## 2022-12-19 NOTE — PROGRESS NOTES
Name: Len Horvath      : 1955      MRN: [id number]  Encounter Provider: ROLAND Marcos  Encounter Date: 2022   Encounter department: [facility]     1  Hordeolum externum of right lower eyelid  -     erythromycin (ILOTYCIN) ophthalmic ointment; Administer 0 5 inches to the right eye daily at bedtime    2  Blepharitis of right lower eyelid, unspecified type  -     erythromycin (ILOTYCIN) ophthalmic ointment; Administer 0 5 inches to the right eye daily at bedtime    Apply erythromycin daily at HS  Apply warm moist compresses 5-10 minutes 4 times daily with gentle massage  Monitor and call the office for any worsening redness, pain, or swelling  Alley Granados is here today with complaints of right lower eyelid redness and swelling   Symptoms started this morning   He denies any vision changes or eye pain  The skin below his eye feels irritated  No eye drainage  Review of Systems   Constitutional: Negative for activity change, appetite change and fever  HENT: Negative for congestion and rhinorrhea  Eyes: Positive for redness  Negative for pain, discharge, itching and visual disturbance  Neurological: Negative for dizziness and headaches         Current Outpatient Medications on File Prior to Visit   Medication Sig   • HYDROcodone-acetaminophen (NORCO) 5-325 mg per tablet Take 1 tablet by mouth daily as needed for pain Max Daily Amount: 1 tablet   • amLODIPine (NORVASC) 10 mg tablet Take 1 tablet (10 mg total) by mouth daily   • aspirin (ECOTRIN LOW STRENGTH) 81 mg EC tablet Take 81 mg by mouth daily   • atorvastatin (LIPITOR) 20 mg tablet Take 1 tablet (20 mg total) by mouth daily   • carvedilol (COREG) 12 5 mg tablet Take 1 tablet (12 5 mg total) by mouth 2 (two) times a day with meals   • Continuous Blood Gluc  (FreeStyle Paul 14 Day [place]) LEO USE TO CHECK BLOOD GLUCOSE   • Continuous Blood Gluc Sensor (FreeStyle Paul 14 Day Sensor) MISC Inject 1 application under the skin every 14 (fourteen) days   • dapagliflozin (Farxiga) 10 MG tablet Take one tablet with breakfast   • gabapentin (NEURONTIN) 300 mg capsule Take 1 capsule (300 mg total) by mouth 2 (two) times a day   • glimepiride (AMARYL) 4 mg tablet Take one tablet twice a day   • guaifenesin-codeine (GUAIFENESIN AC) 100-10 MG/5ML liquid Take 5 mL by mouth 3 (three) times a day as needed for cough   • insulin detemir (LEVEMIR FLEXTOUCH) 100 Units/mL injection pen Inject 16 units at bedtime   • Insulin Pen Needle ([employer]One Unifine Pentips Plus) 31G X 5 MM MISC Inject under the skin in the morning   • levothyroxine 75 mcg tablet Take 1 tablet (75 mcg total) by mouth daily   • metFORMIN (GLUCOPHAGE-XR) 500 mg 24 hr tablet Take 2 tablets (1,000 mg total) by mouth 2 (two) times a day with meals   • Semaglutide,0 25 or 0 5MG/DOS, (Ozempic, 0 25 or 0 5 MG/DOSE,) 2 MG/1 5ML SOPN Inject 0 25 mg once a week for 2 weeks and then increase to 0 5 mg once a week SQ   • sildenafil (VIAGRA) 50 MG tablet Take 1 tablet (50 mg total) by mouth as needed for erectile dysfunction   • [DISCONTINUED] Blood Glucose Calibration (OT ULTRA/FASTTK CNTRL SOLN) SOLN by In Vitro route as needed (to calibrate blood sugar meter)       Objective     /80   Pulse 67   Temp (!) 97 3 °F (36 3 °C)   Ht 5' 10" (1 778 m)   Wt 122 kg (269 lb)   SpO2 99%   BMI 38 60 kg/m²     Physical Exam  Vitals reviewed  Constitutional:       Appearance: Normal appearance  HENT:      Head: Normocephalic and atraumatic  Eyes:        Comments: Right lower eyelid swelling  Mild erythema  Pulmonary:      Effort: Pulmonary effort is normal    Neurological:      Mental Status: He is alert and oriented to person, place, and time     Psychiatric:         Mood and Affect: Mood normal          Behavior: Behavior normal        Erminio Levels, CRNP

## 2023-01-12 ENCOUNTER — VBI (OUTPATIENT)
Dept: ADMINISTRATIVE | Facility: OTHER | Age: 68
End: 2023-01-12

## 2023-01-17 DIAGNOSIS — N52.9 ERECTILE DYSFUNCTION, UNSPECIFIED ERECTILE DYSFUNCTION TYPE: ICD-10-CM

## 2023-01-17 DIAGNOSIS — E11.65 TYPE 2 DIABETES MELLITUS WITH HYPERGLYCEMIA, WITH LONG-TERM CURRENT USE OF INSULIN (HCC): ICD-10-CM

## 2023-01-17 DIAGNOSIS — N18.31 TYPE 2 DIABETES MELLITUS WITH STAGE 3A CHRONIC KIDNEY DISEASE, WITHOUT LONG-TERM CURRENT USE OF INSULIN (HCC): ICD-10-CM

## 2023-01-17 DIAGNOSIS — E11.65 UNCONTROLLED TYPE 2 DIABETES MELLITUS WITH HYPERGLYCEMIA (HCC): ICD-10-CM

## 2023-01-17 DIAGNOSIS — Z79.4 TYPE 2 DIABETES MELLITUS WITH HYPERGLYCEMIA, WITH LONG-TERM CURRENT USE OF INSULIN (HCC): ICD-10-CM

## 2023-01-17 DIAGNOSIS — E11.22 TYPE 2 DIABETES MELLITUS WITH STAGE 3A CHRONIC KIDNEY DISEASE, WITHOUT LONG-TERM CURRENT USE OF INSULIN (HCC): ICD-10-CM

## 2023-01-17 RX ORDER — SILDENAFIL 50 MG/1
50 TABLET, FILM COATED ORAL AS NEEDED
Qty: 10 TABLET | Refills: 0 | Status: SHIPPED | OUTPATIENT
Start: 2023-01-17

## 2023-01-30 ENCOUNTER — RA CDI HCC (OUTPATIENT)
Dept: OTHER | Facility: HOSPITAL | Age: 68
End: 2023-01-30

## 2023-01-30 NOTE — PROGRESS NOTES
[facility]  coding opportunities       Chart reviewed, no opportunity found: CHART REVIEWED, NO OPPORTUNITY FOUND        Patients Insurance     Medicare Insurance: [employer] [facility] Advantage

## 2023-02-06 ENCOUNTER — OFFICE VISIT (OUTPATIENT)
Dept: INTERNAL MEDICINE CLINIC | Facility: CLINIC | Age: 68
End: 2023-02-06

## 2023-02-06 VITALS
SYSTOLIC BLOOD PRESSURE: 130 MMHG | HEART RATE: 77 BPM | OXYGEN SATURATION: 98 % | HEIGHT: 70 IN | DIASTOLIC BLOOD PRESSURE: 70 MMHG | WEIGHT: 269 LBS | BODY MASS INDEX: 38.51 KG/M2 | TEMPERATURE: 97.5 F

## 2023-02-06 DIAGNOSIS — E78.2 MIXED HYPERLIPIDEMIA: ICD-10-CM

## 2023-02-06 DIAGNOSIS — G89.29 CHRONIC MIDLINE LOW BACK PAIN WITH RIGHT-SIDED SCIATICA: ICD-10-CM

## 2023-02-06 DIAGNOSIS — E03.9 HYPOTHYROIDISM, UNSPECIFIED TYPE: ICD-10-CM

## 2023-02-06 DIAGNOSIS — E11.22 TYPE 2 DIABETES MELLITUS WITH STAGE 3A CHRONIC KIDNEY DISEASE, WITHOUT LONG-TERM CURRENT USE OF INSULIN (HCC): ICD-10-CM

## 2023-02-06 DIAGNOSIS — I10 ESSENTIAL HYPERTENSION: ICD-10-CM

## 2023-02-06 DIAGNOSIS — R80.1 PERSISTENT PROTEINURIA: ICD-10-CM

## 2023-02-06 DIAGNOSIS — G47.33 OSA (OBSTRUCTIVE SLEEP APNEA): ICD-10-CM

## 2023-02-06 DIAGNOSIS — E03.9 ACQUIRED HYPOTHYROIDISM: Primary | ICD-10-CM

## 2023-02-06 DIAGNOSIS — E11.42 TYPE 2 DIABETES MELLITUS WITH DIABETIC POLYNEUROPATHY, WITHOUT LONG-TERM CURRENT USE OF INSULIN (HCC): ICD-10-CM

## 2023-02-06 DIAGNOSIS — M54.41 CHRONIC MIDLINE LOW BACK PAIN WITH RIGHT-SIDED SCIATICA: ICD-10-CM

## 2023-02-06 DIAGNOSIS — N18.31 TYPE 2 DIABETES MELLITUS WITH STAGE 3A CHRONIC KIDNEY DISEASE, WITHOUT LONG-TERM CURRENT USE OF INSULIN (HCC): ICD-10-CM

## 2023-02-06 NOTE — ASSESSMENT & PLAN NOTE
Lab Results   Component Value Date    HGBA1C 7 0 (H) 08/16/2022   needs updated A1C  Sugars have been high due to increase in carb intake  On farxiga, ozempic, lantus, and glimepiride  Sees endo  Foot exam done today

## 2023-02-06 NOTE — PROGRESS NOTES
Name: Luis Cerna      : 1955      MRN: [id number]  Encounter Provider: ROLAND Alonso  Encounter Date: 2023   Encounter department: [facility]     1  Acquired hypothyroidism  Assessment & Plan: On levothyroxine  2  Type 2 diabetes mellitus with diabetic polyneuropathy, without long-term current use of insulin (HCC)  Assessment & Plan:    Lab Results   Component Value Date    HGBA1C 7 0 (H) 2022   needs updated A1C  Sugars have been high due to increase in carb intake  On farxiga, ozempic, lantus, and glimepiride  Sees endo  Foot exam done today  3  JOSE RAUL (obstructive sleep apnea)  Assessment & Plan:  Intolerant to cpap in the past    Encouraged him to schedule with sleep medicine  4  Essential hypertension  Assessment & Plan:  Stable  Continue amlodipine and carvedilol  5  Chronic midline low back pain with right-sided sciatica  Assessment & Plan:  Sees pain management for injections  Takes hydrocodone rarely  Pain contract updated today  6  Mixed hyperlipidemia  Assessment & Plan: On statin  Due for labs      7  Persistent proteinuria  Assessment & Plan:  Sees nephrology  Candis Child is here today for follow up  He is doing well  He continues to have back pain  He was getting injections but they only relieve the pain for a few weeks  He takes hydrocodone on occasion  Family moving to [place] in   He will miss his grandchildren  His sugars on bad days have been in the 200's  He has been eating more carbs lately  He ate [employer] yesterday  When he eats better his sugars run   He was off ozempic in December due to cost but now is back on it  His sleep and eating habits have been all over the place due to him working different shifts  He takes melatonin as needed  He has not scheduled with sleep medicine for cpap study         Review of Systems Constitutional: Negative for activity change, appetite change, fatigue and unexpected weight change  Eyes: Negative for visual disturbance  Respiratory: Negative for cough and shortness of breath  Cardiovascular: Negative for chest pain, palpitations and leg swelling  Gastrointestinal: Negative for abdominal pain, blood in stool, constipation and diarrhea  Genitourinary: Negative for difficulty urinating  Musculoskeletal: Negative for arthralgias  Skin: Negative for rash  Neurological: Negative for dizziness, light-headedness and headaches  Psychiatric/Behavioral: Negative for sleep disturbance         Current Outpatient Medications on File Prior to Visit   Medication Sig   • HYDROcodone-acetaminophen (NORCO) 5-325 mg per tablet Take 1 tablet by mouth daily as needed for pain Max Daily Amount: 1 tablet   • amLODIPine (NORVASC) 10 mg tablet Take 1 tablet (10 mg total) by mouth daily   • aspirin (ECOTRIN LOW STRENGTH) 81 mg EC tablet Take 81 mg by mouth daily   • atorvastatin (LIPITOR) 20 mg tablet Take 1 tablet (20 mg total) by mouth daily   • carvedilol (COREG) 12 5 mg tablet Take 1 tablet (12 5 mg total) by mouth 2 (two) times a day with meals   • Continuous Blood Gluc  (FreeStyle Paul 14 Day [place]) LEO USE TO CHECK BLOOD GLUCOSE   • Continuous Blood Gluc Sensor ([employer] Paul 14 Day Sensor) MISC Inject 1 application under the skin every 14 (fourteen) days   • dapagliflozin (Farxiga) 10 MG tablet Take one tablet with breakfast   • gabapentin (NEURONTIN) 300 mg capsule Take 1 capsule (300 mg total) by mouth 2 (two) times a day   • glimepiride (AMARYL) 4 mg tablet Take one tablet twice a day   • guaifenesin-codeine (GUAIFENESIN AC) 100-10 MG/5ML liquid Take 5 mL by mouth 3 (three) times a day as needed for cough   • insulin detemir (LEVEMIR FLEXTOUCH) 100 Units/mL injection pen Inject 16 units at bedtime   • Insulin Pen Needle (CareOne Unifine Pentips Plus) 31G X 5 MM MISC Inject under the skin in the morning   • levothyroxine 75 mcg tablet Take 1 tablet (75 mcg total) by mouth daily   • metFORMIN (GLUCOPHAGE-XR) 500 mg 24 hr tablet Take 2 tablets (1,000 mg total) by mouth 2 (two) times a day with meals   • Semaglutide,0 25 or 0 5MG/DOS, (Ozempic, 0 25 or 0 5 MG/DOSE,) 2 MG/1 5ML SOPN Inject 0 25 mg once a week for 2 weeks and then increase to 0 5 mg once a week SQ   • sildenafil (VIAGRA) 50 MG tablet Take 1 tablet (50 mg total) by mouth as needed for erectile dysfunction       Objective     /70   Pulse 77   Temp 97 5 °F (36 4 °C)   Ht 5' 10" (1 778 m)   Wt 122 kg (269 lb)   SpO2 98%   BMI 38 60 kg/m²     Physical Exam  Vitals reviewed  Constitutional:       Appearance: Normal appearance  HENT:      Head: Normocephalic and atraumatic  Eyes:      Conjunctiva/sclera: Conjunctivae normal    Cardiovascular:      Rate and Rhythm: Normal rate and regular rhythm  Heart sounds: Normal heart sounds  Pulmonary:      Effort: Pulmonary effort is normal       Breath sounds: Normal breath sounds  Abdominal:      General: Bowel sounds are normal       Palpations: Abdomen is soft  Musculoskeletal:         General: Normal range of motion  Cervical back: Neck supple  Right lower leg: Edema present  Left lower leg: Edema present  Comments: Mild non pitting edema  Skin:     General: Skin is warm and dry  Neurological:      Mental Status: He is alert and oriented to person, place, and time     Psychiatric:         Mood and Affect: Mood normal          Behavior: Behavior normal        ROLAND Sharma

## 2023-02-06 NOTE — PROGRESS NOTES
Diabetic Foot Exam    Patient's shoes and socks removed  Right Foot/Ankle   Right Foot Inspection  Skin Exam: skin normal and skin intact  No dry skin, no warmth, no callus, no erythema, no maceration, no abnormal color, no pre-ulcer, no ulcer and no callus  Toe Exam: ROM and strength within normal limits  Sensory   Monofilament testing: intact    Vascular  The right DP pulse is 1+  The right PT pulse is 1+  Left Foot/Ankle  Left Foot Inspection  Skin Exam: skin normal and skin intact  No dry skin, no warmth, no erythema, no maceration, normal color, no pre-ulcer, no ulcer and no callus  Toe Exam: ROM and strength within normal limits  Sensory   Monofilament testing: intact    Vascular  The left DP pulse is 1+  The left PT pulse is 1+       Assign Risk Category  No deformity present  No loss of protective sensation  No weak pulses  Risk: 0

## 2023-02-07 RX ORDER — LEVOTHYROXINE SODIUM 0.07 MG/1
75 TABLET ORAL DAILY
Qty: 90 TABLET | Refills: 0 | Status: SHIPPED | OUTPATIENT
Start: 2023-02-07

## 2023-02-07 RX ORDER — METFORMIN HYDROCHLORIDE 500 MG/1
1000 TABLET, EXTENDED RELEASE ORAL 2 TIMES DAILY WITH MEALS
Qty: 360 TABLET | Refills: 0 | Status: SHIPPED | OUTPATIENT
Start: 2023-02-07

## 2023-02-07 RX ORDER — GLIMEPIRIDE 4 MG/1
TABLET ORAL
Qty: 180 TABLET | Refills: 1 | Status: SHIPPED | OUTPATIENT
Start: 2023-02-07

## 2023-02-14 DIAGNOSIS — E11.65 UNCONTROLLED TYPE 2 DIABETES MELLITUS WITH HYPERGLYCEMIA (HCC): ICD-10-CM

## 2023-02-14 DIAGNOSIS — E11.22 TYPE 2 DIABETES MELLITUS WITH STAGE 3A CHRONIC KIDNEY DISEASE, WITHOUT LONG-TERM CURRENT USE OF INSULIN (HCC): ICD-10-CM

## 2023-02-14 DIAGNOSIS — N18.31 TYPE 2 DIABETES MELLITUS WITH STAGE 3A CHRONIC KIDNEY DISEASE, WITHOUT LONG-TERM CURRENT USE OF INSULIN (HCC): ICD-10-CM

## 2023-02-16 DIAGNOSIS — E11.22 TYPE 2 DIABETES MELLITUS WITH STAGE 3A CHRONIC KIDNEY DISEASE, WITHOUT LONG-TERM CURRENT USE OF INSULIN (HCC): ICD-10-CM

## 2023-02-16 DIAGNOSIS — N18.31 TYPE 2 DIABETES MELLITUS WITH STAGE 3A CHRONIC KIDNEY DISEASE, WITHOUT LONG-TERM CURRENT USE OF INSULIN (HCC): ICD-10-CM

## 2023-03-03 DIAGNOSIS — I10 BENIGN ESSENTIAL HYPERTENSION: ICD-10-CM

## 2023-03-03 DIAGNOSIS — I10 ESSENTIAL HYPERTENSION: ICD-10-CM

## 2023-03-03 DIAGNOSIS — E11.65 UNCONTROLLED TYPE 2 DIABETES MELLITUS WITH HYPERGLYCEMIA (HCC): ICD-10-CM

## 2023-03-03 RX ORDER — CARVEDILOL 12.5 MG/1
12.5 TABLET ORAL 2 TIMES DAILY WITH MEALS
Qty: 180 TABLET | Refills: 0 | Status: SHIPPED | OUTPATIENT
Start: 2023-03-03

## 2023-03-03 RX ORDER — AMLODIPINE BESYLATE 10 MG/1
10 TABLET ORAL DAILY
Qty: 90 TABLET | Refills: 0 | Status: SHIPPED | OUTPATIENT
Start: 2023-03-03

## 2023-03-03 RX ORDER — FLASH GLUCOSE SENSOR
1 KIT MISCELLANEOUS
Qty: 6 EACH | Refills: 0 | Status: SHIPPED | OUTPATIENT
Start: 2023-03-03

## 2023-03-03 RX ORDER — PEN NEEDLE, DIABETIC 31 GX3/16"
NEEDLE, DISPOSABLE MISCELLANEOUS DAILY
Qty: 100 EACH | Refills: 0 | Status: SHIPPED | OUTPATIENT
Start: 2023-03-03

## 2023-03-15 DIAGNOSIS — G89.29 CHRONIC MIDLINE LOW BACK PAIN WITH RIGHT-SIDED SCIATICA: ICD-10-CM

## 2023-03-15 DIAGNOSIS — M54.41 CHRONIC MIDLINE LOW BACK PAIN WITH RIGHT-SIDED SCIATICA: ICD-10-CM

## 2023-03-16 RX ORDER — HYDROCODONE BITARTRATE AND ACETAMINOPHEN 5; 325 MG/1; MG/1
1 TABLET ORAL DAILY PRN
Qty: 30 TABLET | Refills: 0 | Status: SHIPPED | OUTPATIENT
Start: 2023-03-16

## 2023-03-17 ENCOUNTER — LAB (OUTPATIENT)
Dept: LAB | Facility: CLINIC | Age: 68
End: 2023-03-17

## 2023-03-17 DIAGNOSIS — E11.22 TYPE 2 DIABETES MELLITUS WITH STAGE 3A CHRONIC KIDNEY DISEASE, WITHOUT LONG-TERM CURRENT USE OF INSULIN (HCC): ICD-10-CM

## 2023-03-17 DIAGNOSIS — R19.5 DARK STOOLS: ICD-10-CM

## 2023-03-17 DIAGNOSIS — N18.31 TYPE 2 DIABETES MELLITUS WITH STAGE 3A CHRONIC KIDNEY DISEASE, WITHOUT LONG-TERM CURRENT USE OF INSULIN (HCC): ICD-10-CM

## 2023-03-17 LAB
ANION GAP SERPL CALCULATED.3IONS-SCNC: 5 MMOL/L (ref 4–13)
BASOPHILS # BLD AUTO: 0.06 THOUSANDS/ÂΜL (ref 0–0.1)
BASOPHILS NFR BLD AUTO: 1 % (ref 0–1)
BUN SERPL-MCNC: 15 MG/DL (ref 5–25)
CALCIUM SERPL-MCNC: 9.5 MG/DL (ref 8.4–10.2)
CHLORIDE SERPL-SCNC: 102 MMOL/L (ref 96–108)
CO2 SERPL-SCNC: 30 MMOL/L (ref 21–32)
CREAT SERPL-MCNC: 0.81 MG/DL (ref 0.6–1.3)
EOSINOPHIL # BLD AUTO: 0.41 THOUSAND/ÂΜL (ref 0–0.61)
EOSINOPHIL NFR BLD AUTO: 4 % (ref 0–6)
ERYTHROCYTE [DISTWIDTH] IN BLOOD BY AUTOMATED COUNT: 13 % (ref 11.6–15.1)
EST. AVERAGE GLUCOSE BLD GHB EST-MCNC: 157 MG/DL
GFR SERPL CREATININE-BSD FRML MDRD: 91 ML/MIN/1.73SQ M
GLUCOSE P FAST SERPL-MCNC: 79 MG/DL (ref 65–99)
HBA1C MFR BLD: 7.1 %
HCT VFR BLD AUTO: 49.6 % (ref 36.5–49.3)
HGB BLD-MCNC: 16.2 G/DL (ref 12–17)
IMM GRANULOCYTES # BLD AUTO: 0.08 THOUSAND/UL (ref 0–0.2)
IMM GRANULOCYTES NFR BLD AUTO: 1 % (ref 0–2)
LYMPHOCYTES # BLD AUTO: 3.15 THOUSANDS/ÂΜL (ref 0.6–4.47)
LYMPHOCYTES NFR BLD AUTO: 32 % (ref 14–44)
MCH RBC QN AUTO: 30.2 PG (ref 26.8–34.3)
MCHC RBC AUTO-ENTMCNC: 32.7 G/DL (ref 31.4–37.4)
MCV RBC AUTO: 92 FL (ref 82–98)
MONOCYTES # BLD AUTO: 0.52 THOUSAND/ÂΜL (ref 0.17–1.22)
MONOCYTES NFR BLD AUTO: 5 % (ref 4–12)
NEUTROPHILS # BLD AUTO: 5.51 THOUSANDS/ÂΜL (ref 1.85–7.62)
NEUTS SEG NFR BLD AUTO: 57 % (ref 43–75)
NRBC BLD AUTO-RTO: 0 /100 WBCS
PLATELET # BLD AUTO: 230 THOUSANDS/UL (ref 149–390)
PMV BLD AUTO: 9.4 FL (ref 8.9–12.7)
POTASSIUM SERPL-SCNC: 4.3 MMOL/L (ref 3.5–5.3)
RBC # BLD AUTO: 5.37 MILLION/UL (ref 3.88–5.62)
SODIUM SERPL-SCNC: 137 MMOL/L (ref 135–147)
WBC # BLD AUTO: 9.73 THOUSAND/UL (ref 4.31–10.16)

## 2023-03-21 DIAGNOSIS — E11.65 UNCONTROLLED TYPE 2 DIABETES MELLITUS WITH HYPERGLYCEMIA (HCC): ICD-10-CM

## 2023-03-21 DIAGNOSIS — N18.31 TYPE 2 DIABETES MELLITUS WITH STAGE 3A CHRONIC KIDNEY DISEASE, WITHOUT LONG-TERM CURRENT USE OF INSULIN (HCC): ICD-10-CM

## 2023-03-21 DIAGNOSIS — E11.22 TYPE 2 DIABETES MELLITUS WITH STAGE 3A CHRONIC KIDNEY DISEASE, WITHOUT LONG-TERM CURRENT USE OF INSULIN (HCC): ICD-10-CM

## 2023-03-24 DIAGNOSIS — E78.2 MIXED HYPERLIPIDEMIA: ICD-10-CM

## 2023-03-24 RX ORDER — ATORVASTATIN CALCIUM 20 MG/1
20 TABLET, FILM COATED ORAL DAILY
Qty: 90 TABLET | Refills: 0 | Status: SHIPPED | OUTPATIENT
Start: 2023-03-24

## 2023-03-27 ENCOUNTER — OFFICE VISIT (OUTPATIENT)
Dept: ENDOCRINOLOGY | Facility: CLINIC | Age: 68
End: 2023-03-27

## 2023-03-27 VITALS
BODY MASS INDEX: 38.8 KG/M2 | DIASTOLIC BLOOD PRESSURE: 64 MMHG | HEIGHT: 70 IN | TEMPERATURE: 98.1 F | WEIGHT: 271 LBS | HEART RATE: 80 BPM | SYSTOLIC BLOOD PRESSURE: 130 MMHG

## 2023-03-27 DIAGNOSIS — E78.2 MIXED HYPERLIPIDEMIA: ICD-10-CM

## 2023-03-27 DIAGNOSIS — E11.22 TYPE 2 DIABETES MELLITUS WITH STAGE 3A CHRONIC KIDNEY DISEASE, WITHOUT LONG-TERM CURRENT USE OF INSULIN (HCC): Primary | ICD-10-CM

## 2023-03-27 DIAGNOSIS — N18.31 TYPE 2 DIABETES MELLITUS WITH STAGE 3A CHRONIC KIDNEY DISEASE, WITHOUT LONG-TERM CURRENT USE OF INSULIN (HCC): Primary | ICD-10-CM

## 2023-03-27 DIAGNOSIS — I10 ESSENTIAL HYPERTENSION: ICD-10-CM

## 2023-03-27 DIAGNOSIS — E03.9 ACQUIRED HYPOTHYROIDISM: ICD-10-CM

## 2023-03-27 RX ORDER — GLIMEPIRIDE 4 MG/1
TABLET ORAL
Qty: 180 TABLET | Refills: 1 | Status: SHIPPED | OUTPATIENT
Start: 2023-03-27

## 2023-03-27 NOTE — PROGRESS NOTES
Patient Progress Note      CC: DM      Referring Provider  No referring provider defined for this encounter  History of Present Illness:   Yancy Branham is a 80 y o  male with a history of type 2 diabetes with long term use of insulin  Diabetes course has been stable  Complications of DM: CKD  Denies recent illness or hospitalizations  Denies recent severe hypoglycemic or severe hyperglycemic episodes  Denies any issues with his current regimen  Home glucose monitoring: are performed regularly, using Freestyle Paul  Average glucose 117 mg/dl  In target range 82%, above range 8%, below range 10%  Reviewed report on patients form  Current regimen: Levemir 16 units QHS, Ozempic 0 5 mg weekly, Farxiga 10 mg daily, glimepiride 4 mg BID, metformin 1000 mg BID  compliant most of the time, denies any side effects from medications  Injects in: abdomen  Rotates sites: Yes  Hypoglycemic episodes: Yes, occasional  H/o of hypoglycemia causing hospitalization or Intervention such as glucagon injection or ambulance call : No  Hypoglycemia symptoms: jitteriness  Treatment of hypoglycemia: soda, glucose tablets     Medic alert tag: recommended: Yes  Diabetes education: No  Diet: 2 meals per day, 1-2 snacks per day  Timing of meals is predictable  Diabetic diet compliance: noncompliant some of the time  Activity: Daily activity is predictable: Yes  No routine exercise due to back issues  Ophthamology: August 2022  [facility]  Podiatry: Feb 2023     Has hypertension: not on ACE inhibitor/ARB  Has hyperlipidemia: on statin - tolerating well, no myalgias  compliant most of the time, denies any side effects from medications  Thyroid disorders: Hypothyroidism  Is taking levothyroxine 75 mcg daily    History of pancreatitis: No    Patient Active Problem List   Diagnosis   • Type 2 diabetes mellitus with diabetic polyneuropathy, without long-term current use of insulin (HCC)   • JOSE RAUL (obstructive sleep apnea)   • Essential hypertension   • Mixed hyperlipidemia   • Periodic limb movement   • Hypothyroid   • Chronic midline low back pain with right-sided sciatica   • Obesity, morbid, BMI 40 0-49 9 ([facility] )   • Other male erectile dysfunction   • Microalbuminuria   • Chronic kidney disease, stage 2 (mild)   • Type 2 diabetes mellitus with diabetic chronic kidney disease (HCC)   • Restless leg syndrome   • Persistent proteinuria   • Elevated serum creatinine   • COVID-19   • Cervical radiculopathy   • Intervertebral disc disorder with radiculopathy of lumbar region      Past Medical History:   Diagnosis Date   • BPH (benign prostatic hyperplasia)    • Chronic kidney disease    • CPAP (continuous positive airway pressure) dependence    • Diabetes mellitus ([place] [address] )    • Disease of thyroid gland    • Hyperlipidemia    • Hypertension    • Sleep apnea    • Type 2 diabetes mellitus with diabetic chronic kidney disease ([place] [address] ) 2021      Past Surgical History:   Procedure Laterality Date   • COLONOSCOPY         • [facility] EGD TRANSORAL BIOPSY SINGLE/MULTIPLE N/A 2017    Procedure: ESOPHAGOGASTRODUODENOSCOPY (EGD) with bx;  Surgeon: Dbeby Gerber MD;  Location: [facility];   Service: Bariatrics   • TONSILLECTOMY        Family History   Problem Relation Age of Onset   • Lung cancer Mother    • Diabetes Father    • Cancer Brother    • Alcohol abuse Neg Hx    • Substance Abuse Neg Hx    • Mental illness Neg Hx    • Depression Neg Hx      Social History     Tobacco Use   • Smoking status: Former     Packs/day: 2 00     Years: 0 00     Pack years: 0 00     Types: Cigarettes     Quit date:      Years since quittin 2   • Smokeless tobacco: Never   Substance Use Topics   • Alcohol use: Yes     Comment: social     Allergies   Allergen Reactions   • Penicillins      SYNCOPE, but has taken amoxicillin/augmentin in past         Current Outpatient Medications:   •  amLODIPine (NORVASC) 10 mg tablet, Take 1 tablet (10 mg total) by mouth daily, Disp: 90 tablet, Rfl: 0  •  aspirin (ECOTRIN LOW STRENGTH) 81 mg EC tablet, Take 81 mg by mouth daily, Disp: , Rfl:   •  atorvastatin (LIPITOR) 20 mg tablet, Take 1 tablet (20 mg total) by mouth daily, Disp: 90 tablet, Rfl: 0  •  carvedilol (COREG) 12 5 mg tablet, Take 1 tablet (12 5 mg total) by mouth 2 (two) times a day with meals, Disp: 180 tablet, Rfl: 0  •  Continuous Blood Gluc  (FreeStyle Paul 14 Day [place]) LEO, USE TO CHECK BLOOD GLUCOSE, Disp: , Rfl:   •  Continuous Blood Gluc Sensor (FreeStyle Paul 14 Day Sensor) MISC, Inject 1 application   under the skin every 14 (fourteen) days, Disp: 6 each, Rfl: 0  •  dapagliflozin (Farxiga) 10 MG tablet, Take one tablet with breakfast, Disp: 30 tablet, Rfl: 0  •  glimepiride (AMARYL) 4 mg tablet, Take 1/2 tablet with breakfast and a full tablet with dinner , Disp: 180 tablet, Rfl: 1  •  HYDROcodone-acetaminophen (NORCO) 5-325 mg per tablet, Take 1 tablet by mouth daily as needed for pain Max Daily Amount: 1 tablet, Disp: 30 tablet, Rfl: 0  •  insulin detemir (LEVEMIR FLEXTOUCH) 100 Units/mL injection pen, Inject 16 units at bedtime, Disp: 15 mL, Rfl: 0  •  Insulin Pen Needle ([employer]One Unifine Pentips Plus) 31G X 5 MM MISC, Inject under the skin in the morning, Disp: 100 each, Rfl: 0  •  levothyroxine 75 mcg tablet, Take 1 tablet (75 mcg total) by mouth daily, Disp: 90 tablet, Rfl: 0  •  metFORMIN (GLUCOPHAGE-XR) 500 mg 24 hr tablet, Take 2 tablets (1,000 mg total) by mouth 2 (two) times a day with meals, Disp: 360 tablet, Rfl: 0  •  semaglutide, 0 25 or 0 5 mg/dose, (Ozempic, 0 25 or 0 5 MG/DOSE,) 2 mg/1 5 mL injection pen, Inject 0 5 mg once a week, Disp: 1 5 mL, Rfl: 0  •  sildenafil (VIAGRA) 50 MG tablet, Take 1 tablet (50 mg total) by mouth as needed for erectile dysfunction, Disp: 10 tablet, Rfl: 0  •  gabapentin (NEURONTIN) 300 mg capsule, Take 1 capsule (300 mg total) by mouth 2 (two) times a day (Patient not "taking: Reported on 3/27/2023), Disp: 60 capsule, Rfl: 2  •  guaifenesin-codeine (GUAIFENESIN AC) 100-10 MG/5ML liquid, Take 5 mL by mouth 3 (three) times a day as needed for cough (Patient not taking: Reported on 3/27/2023), Disp: 120 mL, Rfl: 0  Review of Systems   Constitutional: Negative for activity change, appetite change, fatigue and unexpected weight change  HENT: Negative for trouble swallowing  Eyes: Negative for visual disturbance  Respiratory: Negative for shortness of breath  Cardiovascular: Negative for chest pain and palpitations  Gastrointestinal: Negative for constipation and diarrhea (loose stools; diet dependent)  Endocrine: Negative for polydipsia and polyuria  Musculoskeletal: Positive for back pain and neck pain  Skin: Negative for wound  Neurological: Positive for numbness  Psychiatric/Behavioral: Negative  Physical Exam:  Body mass index is 38 88 kg/m²  /64   Pulse 80   Temp 98 1 °F (36 7 °C) (Skin)   Ht 5' 10\" (1 778 m)   Wt 123 kg (271 lb)   BMI 38 88 kg/m²    Wt Readings from Last 3 Encounters:   03/27/23 123 kg (271 lb)   02/06/23 122 kg (269 lb)   12/19/22 122 kg (269 lb)       Physical Exam  Vitals and nursing note reviewed  Constitutional:       Appearance: He is well-developed  HENT:      Head: Normocephalic  Eyes:      General: No scleral icterus  Pupils: Pupils are equal, round, and reactive to light  Neck:      Thyroid: No thyromegaly  Cardiovascular:      Rate and Rhythm: Normal rate and regular rhythm  Pulses:           Radial pulses are 2+ on the right side and 2+ on the left side  Heart sounds: No murmur heard  Pulmonary:      Effort: Pulmonary effort is normal  No respiratory distress  Breath sounds: Normal breath sounds  No wheezing  Musculoskeletal:      Cervical back: Neck supple  Skin:     General: Skin is warm and dry  Neurological:      Mental Status: He is alert         Patient's shoes and socks " were not removed  Labs:   Component      Latest Ref Rng & Units 8/16/2022 8/16/2022 3/17/2023           9:54 AM  9:54 AM    Sodium      135 - 147 mmol/L 137  137   Potassium      3 5 - 5 3 mmol/L 4 3  4 3   Chloride      96 - 108 mmol/L 104  102   CO2      21 - 32 mmol/L 26  30   Anion Gap      4 - 13 mmol/L 7  5   BUN      5 - 25 mg/dL 14  15   Creatinine      0 60 - 1 30 mg/dL 0 64  0 81   GLUCOSE FASTING      65 - 99 mg/dL 91  79   Calcium      8 4 - 10 2 mg/dL 9 0  9 5   AST      13 - 39 U/L 14     ALT      7 - 52 U/L 19     Alkaline Phosphatase      34 - 104 U/L 75     Total Protein      6 4 - 8 4 g/dL 6 7     Albumin      3 5 - 5 0 g/dL 3 8     TOTAL BILIRUBIN      0 20 - 1 00 mg/dL 0 47     eGFR      ml/min/1 73sq m 102  91   Cholesterol      See Comment mg/dL 117     Triglycerides      See Comment mg/dL 183 (H)     HDL      >=40 mg/dL 37 (L)     LDL Calculated      0 - 100 mg/dL 43     EXT Creatinine Urine      mg/dL 111 8 130 0    MICROALBUM ,U,RANDOM      0 0 - 20 0 mg/L  1,580 0 (H)    MICROALBUMIN/CREATININE RATIO      0 - 30 mg/g creatinine  1,215 (H)    Protein Urine Random      mg/dL 191     Prot/Creat Ratio, Ur      0 00 - 0 10 1 71 (H)     Hemoglobin A1C      Normal 3 8-5 6%; PreDiabetic 5 7-6 4%; Diabetic >=6 5%; Glycemic control for adults with diabetes <7 0% % 7 0 (H)  7 1 (H)   eAG, EST AVG Glucose      mg/dl 154  157   Free T4      0 76 - 1 46 ng/dL 0 99     TSH 3RD GENERATON      0 450 - 4 500 uIU/mL 3 498         Plan:    Diagnoses and all orders for this visit:    Type 2 diabetes mellitus with stage 3a chronic kidney disease, without long-term current use of insulin ([facility])  HGA1C 7 1%  Worsened  Treatment regimen: BG levels sometimes drop after breakfast  Decrease glimepiride at breakfast to 2 mg  Continue other treatments  Discussed intensive insulin regimen does increase risk for hypoglycemia  Episodes of hypoglycemia can lead to permanent disability and death    Discussed risks/complications associated with uncontrolled diabetes  Advised to adhere to diabetic diet, and recommended staying active/exercising routinely as tolerated  Keep carbohydrates consistent to limit blood glucose fluctuations  Advised to call if blood sugars less than 70 mg/dl or over 300 mg/dl  Check blood glucose 2 times a day  Discussed symptoms and treatment of hypoglycemia  Discussed use of CGM to collect additional blood glucose data to reveal trends and patterns that can be used to optimize treatment plan  Recommended routine follow-up with podiatry and ophthalmology  Send log in 2 weeks  Ordered blood work to complete prior to next visit  -     Hemoglobin A1C; Future  -     Basic metabolic panel; Future  -     semaglutide, 0 25 or 0 5 mg/dose, (Ozempic, 0 25 or 0 5 MG/DOSE,) 2 mg/1 5 mL injection pen; Inject 0 5 mg once a week  -     glimepiride (AMARYL) 4 mg tablet; Take 1/2 tablet with breakfast and a full tablet with dinner  Essential hypertension  Blood pressure adequately controlled, continue current treatment    Mixed hyperlipidemia  LDL previously 43  Continue statin therapy  Managed by PCP    Acquired hypothyroidism  TSH 3 498 and free T4 0 99  Continue current dose of levothyroxine  Monitor labs  -     T4, free; Future  -     TSH, 3rd generation; Future           Discussed with the patient diagnosis and treatment and all questions fully answered  He will call me if any problems arise  Counseled patient on diagnostic results, prognosis, risk and benefit of treatment options, instruction for management, importance of treatment compliance, risk factor reduction and impressions        Perla Velasquez PA-C

## 2023-03-27 NOTE — PATIENT INSTRUCTIONS
Hypoglycemia instructions   [facility]  3/27/2023  [id number]    Low Blood Sugar    Steps to treat low blood sugar  1  Test blood sugar if you have symptoms of low blood sugar:   Low Blood Sugar Symptoms:  o Sweaty  o Dizzy  o Rapid heartbeat  o Shaky  o Bad mood  o Hungry      2  Treat blood sugar less than 70 with 15 grams of fast-acting carbohydrate:   Examples of 15 grams Fast-Acting Carbohydrate:  o 4 oz juice  o 4 oz regular soda  o 3-4 glucose tablets (chew)  o 3-4 hard candies (chew)          3  Wait 15 minutes and test your blood sugar again     4   If blood sugar is less than 100, repeat steps 2-3     5  When your blood sugar is 100 or more, eat a snack if it will be longer than one hour until your next meal  The snack should be 15 grams of carbohydrate and a protein:   Examples of snacks:  o ½ sandwich  o 6 crackers with cheese  o Piece of fruit with cheese or peanut butter  o 6 crackers with peanut butter

## 2023-04-04 DIAGNOSIS — N52.9 ERECTILE DYSFUNCTION, UNSPECIFIED ERECTILE DYSFUNCTION TYPE: ICD-10-CM

## 2023-04-04 RX ORDER — SILDENAFIL 50 MG/1
50 TABLET, FILM COATED ORAL AS NEEDED
Qty: 10 TABLET | Refills: 0 | Status: SHIPPED | OUTPATIENT
Start: 2023-04-04

## 2023-04-26 DIAGNOSIS — N18.31 TYPE 2 DIABETES MELLITUS WITH STAGE 3A CHRONIC KIDNEY DISEASE, WITHOUT LONG-TERM CURRENT USE OF INSULIN (HCC): ICD-10-CM

## 2023-04-26 DIAGNOSIS — E11.22 TYPE 2 DIABETES MELLITUS WITH STAGE 3A CHRONIC KIDNEY DISEASE, WITHOUT LONG-TERM CURRENT USE OF INSULIN (HCC): ICD-10-CM

## 2023-04-26 DIAGNOSIS — E11.65 UNCONTROLLED TYPE 2 DIABETES MELLITUS WITH HYPERGLYCEMIA (HCC): ICD-10-CM

## 2023-04-27 DIAGNOSIS — E11.22 TYPE 2 DIABETES MELLITUS WITH STAGE 3A CHRONIC KIDNEY DISEASE, WITHOUT LONG-TERM CURRENT USE OF INSULIN (HCC): ICD-10-CM

## 2023-04-27 DIAGNOSIS — N18.31 TYPE 2 DIABETES MELLITUS WITH STAGE 3A CHRONIC KIDNEY DISEASE, WITHOUT LONG-TERM CURRENT USE OF INSULIN (HCC): ICD-10-CM

## 2023-04-27 DIAGNOSIS — E11.65 UNCONTROLLED TYPE 2 DIABETES MELLITUS WITH HYPERGLYCEMIA (HCC): ICD-10-CM

## 2023-04-27 RX ORDER — SEMAGLUTIDE 1.34 MG/ML
INJECTION, SOLUTION SUBCUTANEOUS
Qty: 1.5 ML | Refills: 0 | Status: SHIPPED | OUTPATIENT
Start: 2023-04-27 | End: 2023-04-27 | Stop reason: SDUPTHER

## 2023-04-28 ENCOUNTER — TELEPHONE (OUTPATIENT)
Dept: ENDOCRINOLOGY | Facility: CLINIC | Age: 68
End: 2023-04-28

## 2023-04-28 DIAGNOSIS — N18.31 TYPE 2 DIABETES MELLITUS WITH STAGE 3A CHRONIC KIDNEY DISEASE, WITHOUT LONG-TERM CURRENT USE OF INSULIN (HCC): Primary | ICD-10-CM

## 2023-04-28 DIAGNOSIS — E11.22 TYPE 2 DIABETES MELLITUS WITH STAGE 3A CHRONIC KIDNEY DISEASE, WITHOUT LONG-TERM CURRENT USE OF INSULIN (HCC): Primary | ICD-10-CM

## 2023-04-28 RX ORDER — DULAGLUTIDE 0.75 MG/.5ML
0.75 INJECTION, SOLUTION SUBCUTANEOUS
Qty: 6 ML | Refills: 1 | Status: SHIPPED | OUTPATIENT
Start: 2023-04-28

## 2023-04-28 NOTE — TELEPHONE ENCOUNTER
We can send Jardiance 25 mg po daily ( in place of Farxiga) to see it it is covered, and Trulcity 0 75 mg once a week SQ to  Pharmacy to see if it is covered, please make RX ready     [place]

## 2023-04-28 NOTE — TELEPHONE ENCOUNTER
Pt called left msg on clerical vm  His ozempic went up to 400 00 a month  He cannot afford that    And also the Jamia Ask is over 200  Can he be switched to something else?     Please leave msg he said

## 2023-05-07 DIAGNOSIS — E11.22 TYPE 2 DIABETES MELLITUS WITH STAGE 3A CHRONIC KIDNEY DISEASE, WITHOUT LONG-TERM CURRENT USE OF INSULIN (HCC): ICD-10-CM

## 2023-05-07 DIAGNOSIS — N18.31 TYPE 2 DIABETES MELLITUS WITH STAGE 3A CHRONIC KIDNEY DISEASE, WITHOUT LONG-TERM CURRENT USE OF INSULIN (HCC): ICD-10-CM

## 2023-05-07 DIAGNOSIS — E03.9 HYPOTHYROIDISM, UNSPECIFIED TYPE: ICD-10-CM

## 2023-05-08 RX ORDER — METFORMIN HYDROCHLORIDE 500 MG/1
1000 TABLET, EXTENDED RELEASE ORAL 2 TIMES DAILY WITH MEALS
Qty: 360 TABLET | Refills: 0 | Status: SHIPPED | OUTPATIENT
Start: 2023-05-08

## 2023-05-08 RX ORDER — LEVOTHYROXINE SODIUM 0.07 MG/1
75 TABLET ORAL DAILY
Qty: 90 TABLET | Refills: 1 | Status: SHIPPED | OUTPATIENT
Start: 2023-05-08

## 2023-05-18 ENCOUNTER — VBI (OUTPATIENT)
Dept: ADMINISTRATIVE | Facility: OTHER | Age: 68
End: 2023-05-18

## 2023-05-26 ENCOUNTER — RA CDI HCC (OUTPATIENT)
Dept: OTHER | Facility: HOSPITAL | Age: 68
End: 2023-05-26

## 2023-05-26 NOTE — PROGRESS NOTES
NOT on the BPA- please assess using MEAT for 2023 billing        [facility] [address]  coding opportunities          Chart Reviewed number of suggestions sent to Provider: 1     Patients Insurance     Medicare Insurance: [employer]

## 2023-06-06 ENCOUNTER — OFFICE VISIT (OUTPATIENT)
Dept: INTERNAL MEDICINE CLINIC | Facility: CLINIC | Age: 68
End: 2023-06-06

## 2023-06-06 VITALS
TEMPERATURE: 95.5 F | SYSTOLIC BLOOD PRESSURE: 140 MMHG | BODY MASS INDEX: 39.51 KG/M2 | DIASTOLIC BLOOD PRESSURE: 80 MMHG | OXYGEN SATURATION: 98 % | WEIGHT: 276 LBS | HEIGHT: 70 IN | HEART RATE: 66 BPM

## 2023-06-06 DIAGNOSIS — G89.29 CHRONIC MIDLINE LOW BACK PAIN WITH RIGHT-SIDED SCIATICA: ICD-10-CM

## 2023-06-06 DIAGNOSIS — G89.29 CHRONIC NECK PAIN: ICD-10-CM

## 2023-06-06 DIAGNOSIS — G47.33 OSA (OBSTRUCTIVE SLEEP APNEA): ICD-10-CM

## 2023-06-06 DIAGNOSIS — E11.42 TYPE 2 DIABETES MELLITUS WITH DIABETIC POLYNEUROPATHY, WITHOUT LONG-TERM CURRENT USE OF INSULIN (HCC): ICD-10-CM

## 2023-06-06 DIAGNOSIS — M54.2 CHRONIC NECK PAIN: ICD-10-CM

## 2023-06-06 DIAGNOSIS — M54.41 CHRONIC MIDLINE LOW BACK PAIN WITH RIGHT-SIDED SCIATICA: ICD-10-CM

## 2023-06-06 DIAGNOSIS — N18.31 TYPE 2 DIABETES MELLITUS WITH STAGE 3A CHRONIC KIDNEY DISEASE, WITHOUT LONG-TERM CURRENT USE OF INSULIN (HCC): ICD-10-CM

## 2023-06-06 DIAGNOSIS — N18.2 CHRONIC KIDNEY DISEASE, STAGE 2 (MILD): ICD-10-CM

## 2023-06-06 DIAGNOSIS — E11.22 TYPE 2 DIABETES MELLITUS WITH STAGE 3A CHRONIC KIDNEY DISEASE, WITHOUT LONG-TERM CURRENT USE OF INSULIN (HCC): ICD-10-CM

## 2023-06-06 DIAGNOSIS — M25.511 CHRONIC PAIN OF BOTH SHOULDERS: ICD-10-CM

## 2023-06-06 DIAGNOSIS — E78.2 MIXED HYPERLIPIDEMIA: ICD-10-CM

## 2023-06-06 DIAGNOSIS — E03.9 ACQUIRED HYPOTHYROIDISM: ICD-10-CM

## 2023-06-06 DIAGNOSIS — G89.29 CHRONIC PAIN OF BOTH SHOULDERS: ICD-10-CM

## 2023-06-06 DIAGNOSIS — Z00.00 MEDICARE ANNUAL WELLNESS VISIT, SUBSEQUENT: Primary | ICD-10-CM

## 2023-06-06 DIAGNOSIS — M25.512 CHRONIC PAIN OF BOTH SHOULDERS: ICD-10-CM

## 2023-06-06 DIAGNOSIS — I10 ESSENTIAL HYPERTENSION: ICD-10-CM

## 2023-06-06 PROBLEM — U07.1 COVID-19: Status: RESOLVED | Noted: 2021-11-05 | Resolved: 2023-06-06

## 2023-06-06 NOTE — ASSESSMENT & PLAN NOTE
Lab Results   Component Value Date    CREATININE 0 81 03/17/2023    CREATININE 0 64 08/16/2022    CREATININE 0 96 01/05/2022    EGFR 91 03/17/2023    EGFR 102 08/16/2022    EGFR 82 01/05/2022   stable  Sees nephrology  Avoid nsaids

## 2023-06-06 NOTE — PROGRESS NOTES
Assessment and Plan:     Problem List Items Addressed This Visit        Endocrine    Type 2 diabetes mellitus with diabetic polyneuropathy, without long-term current use of insulin ([email or web address] )       Lab Results   Component Value Date    HGBA1C 7 1 (H) 03/17/2023   off farxiga and ozempic due to cose  Switch to jardiance and trulicity which he has not been taking  Provided samples today and encouraged him to take as prescribed  Continue glimepiride  Sees endo  Relevant Orders    Lipid Panel with Direct LDL reflex    Albumin / creatinine urine ratio    Hypothyroid     Continue levothyroxine  Type 2 diabetes mellitus with diabetic chronic kidney disease ([facility] )     See above            Respiratory    JOSE RAUL (obstructive sleep apnea)     Referral to sleep medicine previously provided, encouraged him to schedule  Cardiovascular and Mediastinum    Essential hypertension     Stable per home readings   continue carvedilol and amlodipine            Nervous and Auditory    Chronic midline low back pain with right-sided sciatica     Takes tylenol as needed, Hydrocodone rarely  Sees pain management               Genitourinary    Chronic kidney disease, stage 2 (mild)     Lab Results   Component Value Date    CREATININE 0 81 03/17/2023    CREATININE 0 64 08/16/2022    CREATININE 0 96 01/05/2022    EGFR 91 03/17/2023    EGFR 102 08/16/2022    EGFR 82 01/05/2022   stable  Sees nephrology  Avoid nsaids            Other    Mixed hyperlipidemia     Continue statin        Other Visit Diagnoses     Medicare annual wellness visit, subsequent    -  Primary    Chronic neck pain        Relevant Orders    XR spine cervical complete 4 or 5 vw non injury    XR shoulder 2+ vw right    XR shoulder 2+ vw left    Ambulatory Referral to Physical Therapy    Chronic pain of both shoulders        Relevant Orders    XR spine cervical complete 4 or 5 vw non injury    XR shoulder 2+ vw right    XR shoulder 2+ vw left Ambulatory Referral to Physical Therapy        BMI Counseling: Body mass index is 39 6 kg/m²  The BMI is above normal  Nutrition recommendations include decreasing portion sizes  Exercise recommendations include exercising 3-5 times per week  Rationale for BMI follow-up plan is due to patient being overweight or obese  Depression Screening and Follow-up Plan: Patient was screened for depression during today's encounter  They screened negative with a PHQ-2 score of 0  Preventive health issues were discussed with patient, and age appropriate screening tests were ordered as noted in patient's After Visit Summary  Personalized health advice and appropriate referrals for health education or preventive services given if needed, as noted in patient's After Visit Summary  History of Present Illness:     Patient presents for a Medicare Wellness Visit    Rosaura Blue is here today for follow up  He is doing ok  His sugars have been worse recently  He wasn't able to afford ozempic and farxiga  He was switched to trulicity and Jardiance which he hasn't been taking  His sugar this morning was 127 but they have been running in the 200's  Blood pressures have been 120-130/70's  He continues to have chronic back pain  He sees pain management  He plans on getting an injection after his granddaughter leaves to go back to [place] in a few weeks  He takes tylenol as needed which provides him relief  He has been avoiding nsaids due to his kidney function  He has been having increased neck and bilateral shoulder pain recently  He feels numbness in his fingers on both hands at times  He had a recent massage which improved the pain                Patient Care Team:  Mariam Vail as PCP - General (Internal Medicine)  MD Addison Woodward MD Maryl Rosette, MD     Review of Systems:     Review of Systems   Constitutional: Negative for activity change, appetite change, fatigue and unexpected weight change  Eyes: Negative for visual disturbance  Respiratory: Negative for cough and shortness of breath  Cardiovascular: Negative for chest pain, palpitations and leg swelling  Gastrointestinal: Negative for abdominal pain, blood in stool, constipation and diarrhea  Genitourinary: Negative for difficulty urinating  Musculoskeletal: Positive for arthralgias and back pain  Skin: Negative for rash  Neurological: Positive for numbness  Negative for dizziness, weakness, light-headedness and headaches  Psychiatric/Behavioral: Negative for sleep disturbance          Problem List:     Patient Active Problem List   Diagnosis   • Type 2 diabetes mellitus with diabetic polyneuropathy, without long-term current use of insulin ([facility])   • JOSE RAUL (obstructive sleep apnea)   • Essential hypertension   • Mixed hyperlipidemia   • Periodic limb movement   • Hypothyroid   • Chronic midline low back pain with right-sided sciatica   • Other male erectile dysfunction   • Microalbuminuria   • Chronic kidney disease, stage 2 (mild)   • Type 2 diabetes mellitus with diabetic chronic kidney disease (HCC)   • Restless leg syndrome   • Persistent proteinuria   • Elevated serum creatinine   • Cervical radiculopathy   • Intervertebral disc disorder with radiculopathy of lumbar region      Past Medical and Surgical History:     Past Medical History:   Diagnosis Date   • BPH (benign prostatic hyperplasia)    • Chronic kidney disease    • CPAP (continuous positive airway pressure) dependence    • Diabetes mellitus (HCC)    • Disease of thyroid gland    • Hyperlipidemia    • Hypertension    • Sleep apnea    • Type 2 diabetes mellitus with diabetic chronic kidney disease ([facility] ) 6/24/2021     Past Surgical History:   Procedure Laterality Date   • COLONOSCOPY      2016   • ME EGD TRANSORAL BIOPSY SINGLE/MULTIPLE N/A 5/17/2017    Procedure: ESOPHAGOGASTRODUODENOSCOPY (EGD) with bx;  Surgeon: Yasmin Chamberlain MD;  Location: [facility]; Service: Bariatrics   • TONSILLECTOMY        Family History:     Family History   Problem Relation Age of Onset   • Lung cancer Mother    • Diabetes Father    • Cancer Brother    • Alcohol abuse Neg Hx    • Substance Abuse Neg Hx    • Mental illness Neg Hx    • Depression Neg Hx       Social History:     Social History     Socioeconomic History   • Marital status: /Civil Union     Spouse name: None   • Number of children: None   • Years of education: None   • Highest education level: None   Occupational History   • None   Tobacco Use   • Smoking status: Former     Packs/day: 2 00     Years: 0 00     Total pack years: 0 00     Types: Cigarettes     Quit date:      Years since quittin 4   • Smokeless tobacco: Never   Vaping Use   • Vaping Use: Never used   Substance and Sexual Activity   • Alcohol use: Yes     Comment: social   • Drug use: No   • Sexual activity: Yes     Partners: Female     Birth control/protection: None   Other Topics Concern   • None   Social History Narrative         Social Determinants of Health     Financial Resource Strain: Low Risk  (2023)    Overall Financial Resource Strain (CARDIA)    • Difficulty of Paying Living Expenses: Not very hard   Food Insecurity: Not on file   Transportation Needs: No Transportation Needs (2023)    PRAPARE - Transportation    • Lack of Transportation (Medical): No    • Lack of Transportation (Non-Medical):  No   Physical Activity: Not on file   Stress: Not on file   Social Connections: Not on file   Intimate Partner Violence: Not on file   Housing Stability: Not on file      Medications and Allergies:     Current Outpatient Medications   Medication Sig Dispense Refill   • amLODIPine (NORVASC) 10 mg tablet Take 1 tablet (10 mg total) by mouth daily 90 tablet 0   • aspirin (ECOTRIN LOW STRENGTH) 81 mg EC tablet Take 1 tablet (81 mg total) by mouth daily 90 tablet 1   • atorvastatin (LIPITOR) 20 mg tablet Take 1 tablet (20 mg total) by mouth daily 90 tablet 0   • carvedilol (COREG) 12 5 mg tablet Take 1 tablet (12 5 mg total) by mouth 2 (two) times a day with meals 180 tablet 0   • Continuous Blood Gluc  (FreeStyle Paul 14 Day [place]) LEO USE TO CHECK BLOOD GLUCOSE     • Continuous Blood Gluc Sensor (FreeStyle Paul 14 Day Sensor) MISC Inject 1 application  under the skin every 14 (fourteen) days 6 each 0   • dulaglutide (Trulicity) 7 87 JY/6 1VD injection Inject 0 5 mL (0 75 mg total) under the skin every 7 days 6 mL 1   • Empagliflozin (Jardiance) 25 MG TABS Take 1 tablet (25 mg total) by mouth every morning 90 tablet 1   • gabapentin (NEURONTIN) 300 mg capsule Take 1 capsule (300 mg total) by mouth 2 (two) times a day (Patient not taking: Reported on 3/27/2023) 60 capsule 2   • glimepiride (AMARYL) 4 mg tablet Take 1/2 tablet with breakfast and 1/2 tablet with dinner   180 tablet 1   • guaifenesin-codeine (GUAIFENESIN AC) 100-10 MG/5ML liquid Take 5 mL by mouth 3 (three) times a day as needed for cough (Patient not taking: Reported on 3/27/2023) 120 mL 0   • HYDROcodone-acetaminophen (NORCO) 5-325 mg per tablet Take 1 tablet by mouth daily as needed for pain Max Daily Amount: 1 tablet 30 tablet 0   • insulin detemir (LEVEMIR FLEXTOUCH) 100 Units/mL injection pen Inject 16 Units under the skin daily at bedtime 15 mL 0   • Insulin Pen Needle (CareOne Unifine Pentips Plus) 31G X 5 MM MISC Inject under the skin in the morning 100 each 0   • levothyroxine 75 mcg tablet Take 1 tablet (75 mcg total) by mouth daily 90 tablet 1   • metFORMIN (GLUCOPHAGE-XR) 500 mg 24 hr tablet Take 2 tablets (1,000 mg total) by mouth 2 (two) times a day with meals 360 tablet 0   • semaglutide, 0 25 or 0 5 mg/dose, (Ozempic, 0 25 or 0 5 MG/DOSE,) 2 mg/1 5 mL injection pen Inject 0 38 mL (0 5 mg total) under the skin every 7 days (Patient not taking: Reported on 6/6/2023) 6 mL 1   • sildenafil (VIAGRA) 50 MG tablet Take 1 tablet (50 mg total) by mouth as needed for erectile dysfunction 10 tablet 0     No current facility-administered medications for this visit  Allergies   Allergen Reactions   • Penicillins      SYNCOPE, but has taken amoxicillin/augmentin in past      Immunizations:     Immunization History   Administered Date(s) Administered   • COVID-19, unspecified 03/03/2021, 03/24/2021   • INFLUENZA 01/17/2011, 09/26/2011, 10/01/2012, 10/14/2013, 10/30/2014, 09/12/2017   • Influenza, high dose seasonal 0 7 mL 09/28/2021, 10/06/2022   • Influenza, injectable, quadrivalent, preservative free 0 5 mL 08/20/2020   • Influenza, recombinant, quadrivalent,injectable, preservative free 09/17/2018, 11/13/2019   • Pneumococcal Conjugate Vaccine 20-valent (Pcv20), Polysace 10/06/2022   • Pneumococcal Polysaccharide PPV23 01/17/2011   • Tdap 09/12/2017      Health Maintenance:         Topic Date Due   • Colorectal Cancer Screening  06/13/2023   • Hepatitis C Screening  Completed         Topic Date Due   • COVID-19 Vaccine (3 - Mixed Product series) 05/19/2021      Medicare Screening Tests and Risk Assessments:     Timbo Matias is here for his Subsequent Wellness visit  Last Medicare Wellness visit information reviewed, patient interviewed and updates made to the record today  Health Risk Assessment:   Patient rates overall health as good  Patient feels that their physical health rating is same  Patient is satisfied with their life  Eyesight was rated as same  Hearing was rated as same  Patient feels that their emotional and mental health rating is same  Patients states they are never, rarely angry  Patient states they are never, rarely unusually tired/fatigued  Pain experienced in the last 7 days has been none  Patient states that he has experienced no weight loss or gain in last 6 months  Depression Screening:   PHQ-2 Score: 0      Fall Risk Screening:    In the past year, patient has experienced: no history of falling in past year      Home Safety:  Patient does not have trouble with stairs inside or outside of their home  Patient has working smoke alarms and has working carbon monoxide detector  Home safety hazards include: none  Nutrition:   Current diet is Regular  Medications:   Patient is currently taking over-the-counter supplements  OTC medications include: see medication list  Patient is able to manage medications  Activities of Daily Living (ADLs)/Instrumental Activities of Daily Living (IADLs):   Walk and transfer into and out of bed and chair?: Yes  Dress and groom yourself?: Yes    Bathe or shower yourself?: Yes    Feed yourself? Yes  Do your laundry/housekeeping?: Yes  Manage your money, pay your bills and track your expenses?: Yes  Make your own meals?: Yes    Do your own shopping?: Yes    Previous Hospitalizations:   Any hospitalizations or ED visits within the last 12 months?: No      Advance Care Planning:   Living will: No    Durable POA for healthcare: No      PREVENTIVE SCREENINGS      Cardiovascular Screening:    General: Screening Not Indicated and History Lipid Disorder      Diabetes Screening:     General: Screening Not Indicated and History Diabetes      Colorectal Cancer Screening:     General: Screening Current      Prostate Cancer Screening:    General: Screening Current      Osteoporosis Screening:    General: Screening Not Indicated      Abdominal Aortic Aneurysm (AAA) Screening:    Risk factors include: age between 73-69 yo and tobacco use        General: Screening Not Indicated      Lung Cancer Screening:     General: Screening Not Indicated      Hepatitis C Screening:    General: Screening Current    Screening, Brief Intervention, and Referral to Treatment (SBIRT)    Screening  Typical number of drinks in a day: 0  Typical number of drinks in a week: 0  Interpretation: Low risk drinking behavior      Single Item Drug Screening:  How often have you used an illegal drug (including marijuana) or a prescription medication for non-medical "reasons in the past year? never    Single Item Drug Screen Score: 0  Interpretation: Negative screen for possible drug use disorder    Review of Current Opioid Use    Opioid Risk Tool (ORT) Interpretation: Complete Opioid Risk Tool (ORT)    No results found  Physical Exam:     /80   Pulse 66   Temp (!) 95 5 °F (35 3 °C)   Ht 5' 10\" (1 778 m)   Wt 125 kg (276 lb)   SpO2 98%   BMI 39 60 kg/m²     Physical Exam  Vitals reviewed  Constitutional:       Appearance: Normal appearance  HENT:      Head: Normocephalic and atraumatic  Eyes:      Conjunctiva/sclera: Conjunctivae normal    Cardiovascular:      Rate and Rhythm: Normal rate and regular rhythm  Heart sounds: Normal heart sounds  Pulmonary:      Effort: Pulmonary effort is normal       Breath sounds: Normal breath sounds  Abdominal:      General: Bowel sounds are normal       Palpations: Abdomen is soft  Musculoskeletal:      Right shoulder: Decreased range of motion  Decreased strength  Normal pulse  Left shoulder: Decreased range of motion  Decreased strength  Normal pulse  Cervical back: Tenderness present  Decreased range of motion  Right lower leg: No edema  Left lower leg: No edema  Skin:     General: Skin is warm and dry  Neurological:      Mental Status: He is alert and oriented to person, place, and time     Psychiatric:         Mood and Affect: Mood normal          Behavior: Behavior normal           ROLAND Hankins  "

## 2023-06-06 NOTE — ASSESSMENT & PLAN NOTE
Lab Results   Component Value Date    HGBA1C 7 1 (H) 03/17/2023   off farxiga and ozempic due to cose  Switch to jardiance and trulicity which he has not been taking  Provided samples today and encouraged him to take as prescribed  Continue glimepiride  Sees endo

## 2023-06-10 DIAGNOSIS — E11.65 UNCONTROLLED TYPE 2 DIABETES MELLITUS WITH HYPERGLYCEMIA (HCC): ICD-10-CM

## 2023-06-10 DIAGNOSIS — I10 ESSENTIAL HYPERTENSION: ICD-10-CM

## 2023-06-10 DIAGNOSIS — I10 BENIGN ESSENTIAL HYPERTENSION: ICD-10-CM

## 2023-06-12 RX ORDER — CARVEDILOL 12.5 MG/1
12.5 TABLET ORAL 2 TIMES DAILY WITH MEALS
Qty: 180 TABLET | Refills: 3 | Status: SHIPPED | OUTPATIENT
Start: 2023-06-12

## 2023-06-12 RX ORDER — PEN NEEDLE, DIABETIC 31 GX3/16"
NEEDLE, DISPOSABLE MISCELLANEOUS DAILY
Qty: 100 EACH | Refills: 0 | Status: SHIPPED | OUTPATIENT
Start: 2023-06-12

## 2023-06-12 RX ORDER — ASPIRIN 81 MG/1
81 TABLET ORAL DAILY
Qty: 90 TABLET | Refills: 0 | Status: SHIPPED | OUTPATIENT
Start: 2023-06-12

## 2023-06-12 RX ORDER — FLASH GLUCOSE SENSOR
1 KIT MISCELLANEOUS
Qty: 6 EACH | Refills: 0 | Status: SHIPPED | OUTPATIENT
Start: 2023-06-12

## 2023-06-12 RX ORDER — AMLODIPINE BESYLATE 10 MG/1
10 TABLET ORAL DAILY
Qty: 90 TABLET | Refills: 0 | Status: SHIPPED | OUTPATIENT
Start: 2023-06-12

## 2023-06-26 DIAGNOSIS — E78.2 MIXED HYPERLIPIDEMIA: ICD-10-CM

## 2023-06-26 RX ORDER — ATORVASTATIN CALCIUM 20 MG/1
20 TABLET, FILM COATED ORAL DAILY
Qty: 90 TABLET | Refills: 0 | Status: SHIPPED | OUTPATIENT
Start: 2023-06-26

## 2023-06-29 DIAGNOSIS — G89.29 CHRONIC MIDLINE LOW BACK PAIN WITH RIGHT-SIDED SCIATICA: ICD-10-CM

## 2023-06-29 DIAGNOSIS — N52.9 ERECTILE DYSFUNCTION, UNSPECIFIED ERECTILE DYSFUNCTION TYPE: ICD-10-CM

## 2023-06-29 DIAGNOSIS — M54.41 CHRONIC MIDLINE LOW BACK PAIN WITH RIGHT-SIDED SCIATICA: ICD-10-CM

## 2023-06-30 RX ORDER — SILDENAFIL 50 MG/1
50 TABLET, FILM COATED ORAL AS NEEDED
Qty: 10 TABLET | Refills: 0 | Status: SHIPPED | OUTPATIENT
Start: 2023-06-30

## 2023-07-02 RX ORDER — HYDROCODONE BITARTRATE AND ACETAMINOPHEN 5; 325 MG/1; MG/1
1 TABLET ORAL DAILY PRN
Qty: 30 TABLET | Refills: 0 | Status: SHIPPED | OUTPATIENT
Start: 2023-07-02

## 2023-07-05 ENCOUNTER — TELEPHONE (OUTPATIENT)
Dept: PAIN MEDICINE | Facility: CLINIC | Age: 68
End: 2023-07-05

## 2023-07-05 DIAGNOSIS — M79.18 MYOFASCIAL PAIN SYNDROME: Primary | ICD-10-CM

## 2023-07-05 NOTE — TELEPHONE ENCOUNTER
Okay to schedule repeat right L5-S1 transforaminal epidural steroid injection.   Please see if you can schedule patient for tomorrow morning

## 2023-07-05 NOTE — TELEPHONE ENCOUNTER
Caller: patient    Doctor: HORTENCIA    Reason for call: patient is looking to get in injection by next week or what he can do in the meantime for back pain    Call back#: [phone]

## 2023-07-05 NOTE — TELEPHONE ENCOUNTER
Pt said he has had 2 injections in the past that had helped. He said he over did with his back over the weekend. He was asking if there was any chance he could get an injection before he leaves for a 2.5 week trip next Wed /12/23? His pain is in the center of the LB and towards the Rt side and it radiates to his Rt sciatica area. He has constant dull pain when he tries to stand up and sneezes and sharp, shooting, stabbing pain with movements. If he is in any one position too long that aggravates his pain. His PCP has prescribed Norco 5/325 mg 1 tab daily PRN which he says he typically doesn't take. He did say once 2 wks ago he took (2) at a time and that worked better. Told pt he would need to call the PCP about permission to take more than prescribed since we did not prescribe it. He said he has tried 3000 mg - 4000 mg of tylenol per day but it doesn't help much. He said yrs ago he went to a chiropractor for his back and they used a TENS machine and he asked if that would be ok to try again? Told pt yes he could go to chiropractor. He asked for a recommendation for a chiropractor. Provided pt with [facility] with Dr. Mohan Nye and Fredo Rodriguez. Told pt I would send his request to  about the possibility of an injection before his trip on 7/12/23, pt aware it may not be possible and if not does  have any other recommendations? Last inj was Rt L5-S1 TFESI on 11/16/22, BRIAN hannah/ Carito Haney was 10/25/22.

## 2023-07-06 ENCOUNTER — VBI (OUTPATIENT)
Dept: ADMINISTRATIVE | Facility: OTHER | Age: 68
End: 2023-07-06

## 2023-07-06 ENCOUNTER — HOSPITAL ENCOUNTER (OUTPATIENT)
Dept: RADIOLOGY | Facility: CLINIC | Age: 68
Discharge: HOME | End: 2023-07-06
Admitting: ANESTHESIOLOGY
Payer: COMMERCIAL

## 2023-07-06 VITALS
HEART RATE: 69 BPM | RESPIRATION RATE: 20 BRPM | SYSTOLIC BLOOD PRESSURE: 145 MMHG | OXYGEN SATURATION: 95 % | TEMPERATURE: 98 F | DIASTOLIC BLOOD PRESSURE: 81 MMHG

## 2023-07-06 DIAGNOSIS — M51.16 INTERVERTEBRAL DISC DISORDER WITH RADICULOPATHY OF LUMBAR REGION: ICD-10-CM

## 2023-07-06 PROCEDURE — 64483 NJX AA&/STRD TFRM EPI L/S 1: CPT | Performed by: ANESTHESIOLOGY

## 2023-07-06 PROCEDURE — 64484 NJX AA&/STRD TFRM EPI L/S EA: CPT | Performed by: ANESTHESIOLOGY

## 2023-07-06 RX ORDER — 0.9 % SODIUM CHLORIDE 0.9 %
4 VIAL (ML) INJECTION ONCE
Status: COMPLETED | OUTPATIENT
Start: 2023-07-06 | End: 2023-07-06

## 2023-07-06 RX ORDER — BUPIVACAINE HCL/PF 2.5 MG/ML
2 VIAL (ML) INJECTION ONCE
Status: COMPLETED | OUTPATIENT
Start: 2023-07-06 | End: 2023-07-06

## 2023-07-06 RX ORDER — METHYLPREDNISOLONE ACETATE 80 MG/ML
80 INJECTION, SUSPENSION INTRA-ARTICULAR; INTRALESIONAL; INTRAMUSCULAR; PARENTERAL; SOFT TISSUE ONCE
Status: COMPLETED | OUTPATIENT
Start: 2023-07-06 | End: 2023-07-06

## 2023-07-06 RX ADMIN — BUPIVACAINE HYDROCHLORIDE 2 ML: 2.5 INJECTION, SOLUTION EPIDURAL; INFILTRATION; INTRACAUDAL at 09:22

## 2023-07-06 RX ADMIN — IOHEXOL 1 ML: 300 INJECTION, SOLUTION INTRAVENOUS at 09:22

## 2023-07-06 RX ADMIN — Medication 4 ML: at 09:19

## 2023-07-06 RX ADMIN — METHYLPREDNISOLONE ACETATE 80 MG: 80 INJECTION, SUSPENSION INTRA-ARTICULAR; INTRALESIONAL; INTRAMUSCULAR; PARENTERAL; SOFT TISSUE at 09:22

## 2023-07-06 NOTE — DISCHARGE INSTR - LAB
Epidural Steroid Injection   WHAT YOU NEED TO KNOW:   An epidural steroid injection (MATILDA) is a procedure to inject steroid medicine into the epidural space. The epidural space is between your spinal cord and vertebrae. Steroids reduce inflammation and fluid buildup in your spine that may be causing pain. You may be given pain medicine along with the steroids. ACTIVITY  Do not drive or operate machinery today. No strenuous activity today - bending, lifting, etc.  You may resume normal activites starting tomorrow - start slowly and as tolerated. You may shower today, but no tub baths or hot tubs. You may have numbness for several hours from the local anesthetic. Please use caution and common sense, especially with weight-bearing activities. CARE OF THE INJECTION SITE  If you have soreness or pain, apply ice to the area today (20 minutes on/20 minutes off). Starting tomorrow, you may use warm, moist heat or ice if needed. You may have an increase or change in your discomfort for 36-48 hours after your treatment. Apply ice and continue with any pain medication you have been prescribed. Notify the [facility] if you have any of the following: redness, drainage, swelling, headache, stiff neck or fever above 100°F.    SPECIAL INSTRUCTIONS  Our office will contact you in approximately 7 days for a progress report. MEDICATIONS  Continue to take all routine medications. Our office may have instructed you to hold some medications. As no general anesthesia was used in today's procedure, you should not experience any side effects related to anesthesia. If you are diabetic, the steroids used in today's injection may temporarily increase your blood sugar levels after the first few days after your injection. Please keep a close eye on your sugars and alert the doctor who manages your diabetes if your sugars are significantly high from your baseline or you are symptomatic.      If you have a problem specifically related to your procedure, please call our office at [phone]. Problems not related to your procedure should be directed to your primary care physician.

## 2023-07-06 NOTE — H&P
History of Present Illness: The patient is a 80 y.o. male who presents with complaints of right lower back and leg pain and is here today for right L5-S1 transforaminal epidural steroid injection    Past Medical History:   Diagnosis Date   • BPH (benign prostatic hyperplasia)    • Chronic kidney disease    • CPAP (continuous positive airway pressure) dependence    • Diabetes mellitus (HCC)    • Disease of thyroid gland    • Hyperlipidemia    • Hypertension    • Sleep apnea    • Type 2 diabetes mellitus with diabetic chronic kidney disease ([address]) 6/24/2021       Past Surgical History:   Procedure Laterality Date   • COLONOSCOPY      2016   • AR EGD TRANSORAL BIOPSY SINGLE/MULTIPLE N/A 5/17/2017    Procedure: ESOPHAGOGASTRODUODENOSCOPY (EGD) with bx;  Surgeon: Cielo Horan MD;  Location: [facility]; Service: Bariatrics   • TONSILLECTOMY           Current Outpatient Medications:   •  HYDROcodone-acetaminophen (NORCO) 5-325 mg per tablet, Take 1 tablet by mouth daily as needed for pain Max Daily Amount: 1 tablet, Disp: 30 tablet, Rfl: 0  •  amLODIPine (NORVASC) 10 mg tablet, Take 1 tablet (10 mg total) by mouth daily, Disp: 90 tablet, Rfl: 0  •  aspirin (ECOTRIN LOW STRENGTH) 81 mg EC tablet, Take 1 tablet (81 mg total) by mouth daily, Disp: 90 tablet, Rfl: 0  •  atorvastatin (LIPITOR) 20 mg tablet, Take 1 tablet (20 mg total) by mouth daily, Disp: 90 tablet, Rfl: 0  •  carvedilol (COREG) 12.5 mg tablet, Take 1 tablet (12.5 mg total) by mouth 2 (two) times a day with meals, Disp: 180 tablet, Rfl: 3  •  Continuous Blood Gluc  (FreeStyle Paul 14 Day [place]) LEO, USE TO CHECK BLOOD GLUCOSE, Disp: , Rfl:   •  Continuous Blood Gluc Sensor (FreeStyle Paul 14 Day Sensor) MISC, Inject 1 application.  under the skin every 14 (fourteen) days, Disp: 6 each, Rfl: 0  •  dulaglutide (Trulicity) 1.82 GH/6.5OX injection, Inject 0.5 mL (0.75 mg total) under the skin every 7 days, Disp: 6 mL, Rfl: 1  •  Empagliflozin (Jardiance) 25 MG TABS, Take 1 tablet (25 mg total) by mouth every morning, Disp: 90 tablet, Rfl: 1  •  gabapentin (NEURONTIN) 300 mg capsule, Take 1 capsule (300 mg total) by mouth 2 (two) times a day (Patient not taking: Reported on 3/27/2023), Disp: 60 capsule, Rfl: 2  •  glimepiride (AMARYL) 4 mg tablet, Take 1/2 tablet with breakfast and 1/2 tablet with dinner., Disp: 180 tablet, Rfl: 1  •  guaifenesin-codeine (GUAIFENESIN AC) 100-10 MG/5ML liquid, Take 5 mL by mouth 3 (three) times a day as needed for cough (Patient not taking: Reported on 3/27/2023), Disp: 120 mL, Rfl: 0  •  insulin detemir (LEVEMIR FLEXTOUCH) 100 Units/mL injection pen, Inject 16 Units under the skin daily at bedtime, Disp: 15 mL, Rfl: 0  •  Insulin Pen Needle (CareOne Unifine Pentips Plus) 31G X 5 MM MISC, Inject under the skin in the morning, Disp: 100 each, Rfl: 0  •  levothyroxine 75 mcg tablet, Take 1 tablet (75 mcg total) by mouth daily, Disp: 90 tablet, Rfl: 1  •  metFORMIN (GLUCOPHAGE-XR) 500 mg 24 hr tablet, Take 2 tablets (1,000 mg total) by mouth 2 (two) times a day with meals, Disp: 360 tablet, Rfl: 0  •  semaglutide, 0.25 or 0.5 mg/dose, (Ozempic, 0.25 or 0.5 MG/DOSE,) 2 mg/1.5 mL injection pen, Inject 0.38 mL (0.5 mg total) under the skin every 7 days (Patient not taking: Reported on 6/6/2023), Disp: 6 mL, Rfl: 1  •  sildenafil (VIAGRA) 50 MG tablet, Take 1 tablet (50 mg total) by mouth as needed for erectile dysfunction, Disp: 10 tablet, Rfl: 0    Current Facility-Administered Medications:   •  bupivacaine (PF) (MARCAINE) 0.25 % injection 2 mL, 2 mL, Epidural, Once, Kassie Subramanian MD  •  iohexol (OMNIPAQUE) 300 mg/mL injection 1 mL, 1 mL, Epidural, Once, Kassie Subramanian MD  •  lidocaine (PF) (XYLOCAINE-MPF) 2 % injection 4 mL, 4 mL, Infiltration, Once, Kassie Subramanian MD  •  methylPREDNISolone acetate (DEPO-MEDROL) injection 80 mg, 80 mg, Epidural, Once, Kassie Subramanian MD  •  sodium chloride (PF) 0.9 % injection 4 mL, 4 mL, Infiltration, Once, Christie Mcintosh MD    Allergies   Allergen Reactions   • Penicillins      SYNCOPE, but has taken amoxicillin/augmentin in past       Physical Exam:   Vitals:    07/06/23 0907   BP: 131/77   Pulse: 67   Resp: 20   Temp: 98 °F (36.7 °C)   SpO2: 96%     General: Awake, Alert, Oriented x 3, Mood and affect appropriate  Respiratory: Respirations even and unlabored  Cardiovascular: Peripheral pulses intact; no edema  Musculoskeletal Exam: Right lower back pain    ASA Score: 3    Patient/Chart Verification  Patient ID Verified: Verbal  Consents Confirmed: To be obtained in the Pre-Procedure area  Interval H&P(within 24 hr) Complete (required for Outpatients and Surgery Admit only): To be obtained in the Pre-Procedure area  Allergies Reviewed: Yes  Anticoag/NSAID held?: NA  Currently on antibiotics?: No    Assessment:   1.  Intervertebral disc disorder with radiculopathy of lumbar region        Plan: Rt L5-S1 TFESI

## 2023-07-06 NOTE — TELEPHONE ENCOUNTER
Left message for patient to call so we can get him in, also checked with auth team, no auth required.

## 2023-07-07 ENCOUNTER — TELEPHONE (OUTPATIENT)
Dept: ENDOCRINOLOGY | Facility: CLINIC | Age: 68
End: 2023-07-07

## 2023-07-07 NOTE — TELEPHONE ENCOUNTER
Pt l/m requesting call back. He received steroid injection in his back yesterday and his BG levels are running high. BG levels last night and this morning are running between 184-250. Please review.

## 2023-07-07 NOTE — TELEPHONE ENCOUNTER
Pt called back. Notified of Dr. Franki Zuniga message. Pt understood. He will call back Monday if sugars are still elevated.

## 2023-07-07 NOTE — TELEPHONE ENCOUNTER
Please inform patient that his blood sugars will be elevated for today and tomorrow, because of steroid injection , he can take glimepiride 1 tablet with breakfast and 1 tablet with dinner, today and tomorrow, will back to his original dose of half tablet with breakfast and half tablet with dinner starting from Sunday.     Estela Beebe

## 2023-07-10 ENCOUNTER — TELEPHONE (OUTPATIENT)
Dept: INTERNAL MEDICINE CLINIC | Facility: CLINIC | Age: 68
End: 2023-07-10

## 2023-07-10 RX ORDER — METHOCARBAMOL 750 MG/1
750 TABLET, FILM COATED ORAL 2 TIMES DAILY PRN
Qty: 60 TABLET | Refills: 2 | Status: SHIPPED | OUTPATIENT
Start: 2023-07-10 | End: 2023-10-08

## 2023-07-10 NOTE — TELEPHONE ENCOUNTER
Pt had back injection on Thursday. He's been experiencing stabbing pain in his lower back since the injection. On Saturday, the pain was so bad he doubled his oxycodone. Pt wants to know if this is okay to continue to do for when the pain is bad.

## 2023-07-10 NOTE — TELEPHONE ENCOUNTER
I would recommend calling pain management if he's in that much pain after the injection- did that happen in the past as well? I would not double up on his norco- but he can add an extra tylenol 325 mg with it.

## 2023-07-10 NOTE — TELEPHONE ENCOUNTER
Caller: Jordy Boles PT    Doctor: Dr Qamar Delarosa    Reason for call: Pt called in let the doctor know that he is in pain and will be leaving for [place] on tomorrow     Call back#: [phone]

## 2023-07-10 NOTE — TELEPHONE ENCOUNTER
Caller: Bernice Cheek PT    Doctor: Dr Dhruv Montiel     Reason for call: Pt called in for an update on the request     Call back#: [phone]

## 2023-07-10 NOTE — TELEPHONE ENCOUNTER
Probably experiencing a muscle spasm so I will send in prescription for methocarbamol 500 mg twice daily as needed for spasms which [facility]

## 2023-07-10 NOTE — TELEPHONE ENCOUNTER
S/w pt who is s/p Rt L5-S1 TFESI on 7/6/23 FQ. Pt states he has stabbing pain in RT-mid LB since inj. Pt denies B/B or saddle anesthesia. Pt states he did not have this pain w/ prev inj. RN advise pt that each inj can yield a diff result. RN advised pt that it can take up to 2wks for the full efficacy of the inj and to cont taking OTC and prescr pain med regim. Pt states he was given [place] 5/325 by PCP. Pt states that taking 1 Norco tab was min helping pain but when pt takes a total 10mg (2 tabs) he has incr pain relief. Pt asking if taking a total of 10mg is safe. RN advised pt that taking anything beyond the prescr dosage is not advised. RN advised pt that  may not advise on Norco due to not being the ordering provider. Pt asking if muscle relaxer can be prescr prior to pt leaving for [place] tomorrow? Pls advise. Thank you!

## 2023-07-10 NOTE — TELEPHONE ENCOUNTER
RN advised pt that FQ is seeing pts in the office throughout the day and will advise on msg when time is allotted.  Pt verbalized understanding and appreciative of c/b.

## 2023-07-13 ENCOUNTER — TELEPHONE (OUTPATIENT)
Dept: PAIN MEDICINE | Facility: CLINIC | Age: 68
End: 2023-07-13

## 2023-07-13 NOTE — TELEPHONE ENCOUNTER
Patient reports 60-70% improvement post inj  Pain level 2-3/10  Felt a stabbing pain in the low back after procedure.

## 2023-08-12 DIAGNOSIS — E03.9 HYPOTHYROIDISM, UNSPECIFIED TYPE: ICD-10-CM

## 2023-08-12 DIAGNOSIS — E11.22 TYPE 2 DIABETES MELLITUS WITH STAGE 3A CHRONIC KIDNEY DISEASE, WITHOUT LONG-TERM CURRENT USE OF INSULIN (HCC): ICD-10-CM

## 2023-08-12 DIAGNOSIS — N18.31 TYPE 2 DIABETES MELLITUS WITH STAGE 3A CHRONIC KIDNEY DISEASE, WITHOUT LONG-TERM CURRENT USE OF INSULIN (HCC): ICD-10-CM

## 2023-08-12 DIAGNOSIS — E11.65 UNCONTROLLED TYPE 2 DIABETES MELLITUS WITH HYPERGLYCEMIA (HCC): ICD-10-CM

## 2023-08-14 ENCOUNTER — OFFICE VISIT (OUTPATIENT)
Dept: ENDOCRINOLOGY | Facility: CLINIC | Age: 68
End: 2023-08-14
Payer: COMMERCIAL

## 2023-08-14 VITALS
HEIGHT: 70 IN | DIASTOLIC BLOOD PRESSURE: 78 MMHG | HEART RATE: 56 BPM | BODY MASS INDEX: 38.39 KG/M2 | OXYGEN SATURATION: 96 % | SYSTOLIC BLOOD PRESSURE: 112 MMHG | WEIGHT: 268.2 LBS

## 2023-08-14 DIAGNOSIS — E11.42 TYPE 2 DIABETES MELLITUS WITH DIABETIC POLYNEUROPATHY, WITHOUT LONG-TERM CURRENT USE OF INSULIN (HCC): Primary | ICD-10-CM

## 2023-08-14 DIAGNOSIS — E78.2 MIXED HYPERLIPIDEMIA: ICD-10-CM

## 2023-08-14 DIAGNOSIS — E03.9 ACQUIRED HYPOTHYROIDISM: ICD-10-CM

## 2023-08-14 DIAGNOSIS — I10 ESSENTIAL HYPERTENSION: ICD-10-CM

## 2023-08-14 LAB — SL AMB POCT HEMOGLOBIN AIC: 7.5 (ref ?–6.5)

## 2023-08-14 PROCEDURE — 99214 OFFICE O/P EST MOD 30 MIN: CPT | Performed by: PHYSICIAN ASSISTANT

## 2023-08-14 PROCEDURE — 83036 HEMOGLOBIN GLYCOSYLATED A1C: CPT | Performed by: PHYSICIAN ASSISTANT

## 2023-08-14 RX ORDER — METFORMIN HYDROCHLORIDE 500 MG/1
1000 TABLET, EXTENDED RELEASE ORAL 2 TIMES DAILY WITH MEALS
Qty: 360 TABLET | Refills: 0 | Status: SHIPPED | OUTPATIENT
Start: 2023-08-14

## 2023-08-14 RX ORDER — LEVOTHYROXINE SODIUM 0.07 MG/1
75 TABLET ORAL DAILY
Qty: 90 TABLET | Refills: 0 | Status: SHIPPED | OUTPATIENT
Start: 2023-08-14

## 2023-08-14 RX ORDER — FLASH GLUCOSE SENSOR
1 KIT MISCELLANEOUS
Qty: 6 EACH | Refills: 0 | Status: SHIPPED | OUTPATIENT
Start: 2023-08-14

## 2023-08-14 RX ORDER — GLIMEPIRIDE 4 MG/1
TABLET ORAL
Qty: 180 TABLET | Refills: 1
Start: 2023-08-14

## 2023-08-14 NOTE — PROGRESS NOTES
Patient Progress Note      CC: DM      Referring Provider  Cecil Kearney, [address] [facility],  [address]     History of Present Illness:   Jeremiah Ochoa is a 80 y.o. male with a history of type 2 diabetes with long term use of insulin. Diabetes course has been stable. Complications of DM: CKD. Denies recent illness or hospitalizations. Denies recent severe hypoglycemic or severe hyperglycemic episodes. Denies any issues with his current regimen. Home glucose monitoring: are performed regularly, using Freestyle Paul. Average glucose 163 mg/dl  In target range 67%, above range 32%, below range 0%     Current regimen: Levemir 16 units QHS, Trulicity 5.84 mg weekly (not taking), Jardiance 25 mg daily (not taking), glimepiride 2 mg with breakfast and 4 mg with dinner, metformin 1000 mg BID  Not taking Trulicity and Jardiance due to cost.   compliant most of the time, denies any side effects from medications. Injects in: abdomen. Rotates sites: Yes  Hypoglycemic episodes: No, rare  H/o of hypoglycemia causing hospitalization or Intervention such as glucagon injection or ambulance call : No  Hypoglycemia symptoms: jitteriness  Treatment of hypoglycemia: soda, glucose tablets     Medic alert tag: recommended: Yes  Diabetes education: No  Diet: 2-3 meals per day, 2 snacks per day. Timing of meals is predictable. Diabetic diet compliance: noncompliant some of the time  Activity: Daily activity is predictable: Yes. No routine exercise. Ophthamology: August 2022. [employer]. Podiatry: Feb 2023     Has hypertension: not on ACE inhibitor/ARB  Has hyperlipidemia: on statin - tolerating well, no myalgias. compliant most of the time, denies any side effects from medications. Thyroid disorders: Hypothyroidism. Is taking levothyroxine 75 mcg daily.   History of pancreatitis: No    Patient Active Problem List   Diagnosis   • Type 2 diabetes mellitus with diabetic polyneuropathy, without long-term current use of insulin (HCC)   • JOSE RAUL (obstructive sleep apnea)   • Essential hypertension   • Mixed hyperlipidemia   • Periodic limb movement   • Hypothyroid   • Chronic midline low back pain with right-sided sciatica   • Other male erectile dysfunction   • Microalbuminuria   • Chronic kidney disease, stage 2 (mild)   • Type 2 diabetes mellitus with diabetic chronic kidney disease (HCC)   • Restless leg syndrome   • Persistent proteinuria   • Elevated serum creatinine   • Cervical radiculopathy   • Intervertebral disc disorder with radiculopathy of lumbar region      Past Medical History:   Diagnosis Date   • BPH (benign prostatic hyperplasia)    • Chronic kidney disease    • CPAP (continuous positive airway pressure) dependence    • Diabetes mellitus ([address])    • Disease of thyroid gland    • Hyperlipidemia    • Hypertension    • Sleep apnea    • Type 2 diabetes mellitus with diabetic chronic kidney disease ([address]) 2021      Past Surgical History:   Procedure Laterality Date   • COLONOSCOPY         • [place] EGD TRANSORAL BIOPSY SINGLE/MULTIPLE N/A 2017    Procedure: ESOPHAGOGASTRODUODENOSCOPY (EGD) with bx;  Surgeon: Troy Puckett MD;  Location: [facility];   Service: Bariatrics   • TONSILLECTOMY        Family History   Problem Relation Age of Onset   • Lung cancer Mother    • Diabetes Father    • Cancer Brother    • Alcohol abuse Neg Hx    • Substance Abuse Neg Hx    • Mental illness Neg Hx    • Depression Neg Hx      Social History     Tobacco Use   • Smoking status: Former     Packs/day: 2.00     Years: 0.00     Total pack years: 0.00     Types: Cigarettes     Quit date:      Years since quittin.6   • Smokeless tobacco: Never   Substance Use Topics   • Alcohol use: Yes     Comment: social     Allergies   Allergen Reactions   • Penicillins      SYNCOPE, but has taken amoxicillin/augmentin in past         Current Outpatient Medications:   •  amLODIPine (NORVASC) 10 mg tablet, Take 1 tablet (10 mg total) by mouth daily, Disp: 90 tablet, Rfl: 0  •  aspirin (ECOTRIN LOW STRENGTH) 81 mg EC tablet, Take 1 tablet (81 mg total) by mouth daily, Disp: 90 tablet, Rfl: 0  •  atorvastatin (LIPITOR) 20 mg tablet, Take 1 tablet (20 mg total) by mouth daily, Disp: 90 tablet, Rfl: 0  •  carvedilol (COREG) 12.5 mg tablet, Take 1 tablet (12.5 mg total) by mouth 2 (two) times a day with meals, Disp: 180 tablet, Rfl: 3  •  Continuous Blood Gluc  (FreeStyle Paul 14 Day [place]) LEO, USE TO CHECK BLOOD GLUCOSE, Disp: , Rfl:   •  Continuous Blood Gluc Sensor (FreeStyle Paul 14 Day Sensor) MISC, Inject 1 application.  under the skin every 14 (fourteen) days, Disp: 6 each, Rfl: 0  •  glimepiride (AMARYL) 4 mg tablet, Take 1/2 tablet with breakfast and 1 tablet with dinner., Disp: 180 tablet, Rfl: 1  •  HYDROcodone-acetaminophen (NORCO) 5-325 mg per tablet, Take 1 tablet by mouth daily as needed for pain Max Daily Amount: 1 tablet (Patient taking differently: Take 1 tablet by mouth daily as needed for pain Daily PRN), Disp: 30 tablet, Rfl: 0  •  insulin detemir (LEVEMIR FLEXTOUCH) 100 Units/mL injection pen, Inject 16 Units under the skin daily at bedtime, Disp: 15 mL, Rfl: 0  •  Insulin Pen Needle (CareOne Unifine Pentips Plus) 31G X 5 MM MISC, Inject under the skin in the morning, Disp: 100 each, Rfl: 0  •  levothyroxine 75 mcg tablet, Take 1 tablet (75 mcg total) by mouth daily, Disp: 90 tablet, Rfl: 0  •  methocarbamol (ROBAXIN) 750 mg tablet, Take 1 tablet (750 mg total) by mouth 2 (two) times a day as needed for muscle spasms, Disp: 60 tablet, Rfl: 2  •  sildenafil (VIAGRA) 50 MG tablet, Take 1 tablet (50 mg total) by mouth as needed for erectile dysfunction, Disp: 10 tablet, Rfl: 0  •  gabapentin (NEURONTIN) 300 mg capsule, Take 1 capsule (300 mg total) by mouth 2 (two) times a day (Patient not taking: Reported on 3/27/2023), Disp: 60 capsule, Rfl: 2  •  guaifenesin-codeine (GUAIFENESIN AC) 100-10 MG/5ML liquid, Take 5 mL by mouth 3 (three) times a day as needed for cough (Patient not taking: Reported on 3/27/2023), Disp: 120 mL, Rfl: 0  •  metFORMIN (GLUCOPHAGE-XR) 500 mg 24 hr tablet, Take 2 tablets (1,000 mg total) by mouth 2 (two) times a day with meals, Disp: 360 tablet, Rfl: 0  Review of Systems   Constitutional: Negative for activity change, appetite change, fatigue and unexpected weight change. HENT: Negative for trouble swallowing. Eyes: Negative for visual disturbance. Respiratory: Negative for shortness of breath. Cardiovascular: Negative for chest pain and palpitations. Gastrointestinal: Positive for diarrhea (improved). Negative for constipation. Endocrine: Negative for polydipsia and polyuria. Musculoskeletal: Positive for arthralgias, back pain and neck pain. Skin: Negative for wound. Neurological: Positive for numbness. Psychiatric/Behavioral: Negative. Physical Exam:  Body mass index is 38.48 kg/m². /78 (BP Location: Right arm, Patient Position: Sitting, Cuff Size: Large)   Pulse 56   Ht 5' 10" (1.778 m)   Wt 122 kg (268 lb 3.2 oz)   SpO2 96%   BMI 38.48 kg/m²    Wt Readings from Last 3 Encounters:   08/14/23 122 kg (268 lb 3.2 oz)   06/06/23 125 kg (276 lb)   03/27/23 123 kg (271 lb)       Physical Exam  Vitals and nursing note reviewed. Constitutional:       Appearance: He is well-developed. HENT:      Head: Normocephalic. Eyes:      General: No scleral icterus. Pupils: Pupils are equal, round, and reactive to light. Neck:      Thyroid: No thyromegaly. Cardiovascular:      Rate and Rhythm: Normal rate and regular rhythm. Pulses:           Radial pulses are 2+ on the right side and 2+ on the left side. Heart sounds: No murmur heard. Pulmonary:      Effort: Pulmonary effort is normal. No respiratory distress. Breath sounds: Normal breath sounds. No wheezing. Musculoskeletal:      Cervical back: Neck supple. Skin:     General: Skin is warm and dry. Neurological:      Mental Status: He is alert. Patient's shoes and socks were not removed. Labs:   Lab Results   Component Value Date    HGBA1C 7.5 (A) 08/14/2023     Component      Latest Ref Rng 3/17/2023   Sodium      135 - 147 mmol/L 137    Potassium      3.5 - 5.3 mmol/L 4.3    Chloride      96 - 108 mmol/L 102    CO2      21 - 32 mmol/L 30    Anion Gap      4 - 13 mmol/L 5    BUN      5 - 25 mg/dL 15    Creatinine      0.60 - 1.30 mg/dL 0.81    GLUCOSE FASTING      65 - 99 mg/dL 79    Calcium      8.4 - 10.2 mg/dL 9.5    eGFR      ml/min/1.73sq m 91    Hemoglobin A1C      Normal 3.8-5.6%; PreDiabetic 5.7-6.4%; Diabetic >=6.5%; Glycemic control for adults with diabetes <7.0% % 7.1 (H)    eAG, EST AVG Glucose      mg/dl 157       Legend:  (H) High      Plan:    Diagnoses and all orders for this visit:    Type 2 diabetes mellitus with diabetic polyneuropathy, without long-term current use of insulin (HCC)  HGA1C 7.5%. Worsened. Treatment regimen: continue current treatment and work on dietary changes. If Aubree Venegas is affordable, he will consider restarting and will notify us. Discussed intensive insulin regimen does increase risk for hypoglycemia. Episodes of hypoglycemia can lead to permanent disability and death. Discussed risks/complications associated with uncontrolled diabetes. Advised to adhere to diabetic diet, and recommended staying active/exercising routinely. Keep carbohydrates consistent to limit blood glucose fluctuations. Advised to call if blood sugars less than 70 mg/dl or over 300 mg/dl. Check blood glucose 3+ times a day  Discussed symptoms and treatment of hypoglycemia. Discussed use of CGM to collect additional blood glucose data to reveal trends and patterns that can be used to optimize treatment plan. Recommended routine follow-up with podiatry and ophthalmology.    Ordered blood work to complete prior to next visit.  -     Hemoglobin A1C; Future  -     Basic metabolic panel; Future  -     POCT hemoglobin A1c  -     glimepiride (AMARYL) 4 mg tablet; Take 1/2 tablet with breakfast and 1 tablet with dinner. Essential hypertension  Blood pressure is well controlled, continue current treatment  -     Basic metabolic panel; Future    Mixed hyperlipidemia  LDL previously 43  Continue statin therapy   Managed by PCP    Acquired hypothyroidism  TSH previously 3.498 and free T4 0.99  Continue current dose of levothyroxine  Monitor labs                Discussed with the patient diagnosis and treatment and all questions fully answered. He will call me if any problems arise. Counseled patient on diagnostic results, prognosis, risk and benefit of treatment options, instruction for management, importance of treatment compliance, risk factor reduction and impressions.       LOUISE NevarezC

## 2023-08-14 NOTE — PATIENT INSTRUCTIONS
Hypoglycemia instructions   Chelsea Cuellar  8/14/2023  [id number]    Low Blood Sugar    Steps to treat low blood sugar. 1. Test blood sugar if you have symptoms of low blood sugar:   Low Blood Sugar Symptoms:  o Sweaty  o Dizzy  o Rapid heartbeat  o Shaky  o Bad mood  o Hungry      2. Treat blood sugar less than 70 with 15 grams of fast-acting carbohydrate:   Examples of 15 grams Fast-Acting Carbohydrate:  o 4 oz juice  o 4 oz regular soda  o 3-4 glucose tablets (chew)  o 3-4 hard candies (chew)          3. Wait 15 minutes and test your blood sugar again     4.  If blood sugar is less than 100, repeat steps 2-3.    5. When your blood sugar is 100 or more, eat a snack if it will be longer than one hour until your next meal. The snack should be 15 grams of carbohydrate and a protein:   Examples of snacks:  o ½ sandwich  o 6 crackers with cheese  o Piece of fruit with cheese or peanut butter  o 6 crackers with peanut butter

## 2023-08-28 DIAGNOSIS — N52.9 ERECTILE DYSFUNCTION, UNSPECIFIED ERECTILE DYSFUNCTION TYPE: ICD-10-CM

## 2023-08-28 DIAGNOSIS — E11.42 TYPE 2 DIABETES MELLITUS WITH DIABETIC POLYNEUROPATHY, WITHOUT LONG-TERM CURRENT USE OF INSULIN (HCC): ICD-10-CM

## 2023-08-28 DIAGNOSIS — G89.29 CHRONIC MIDLINE LOW BACK PAIN WITH RIGHT-SIDED SCIATICA: ICD-10-CM

## 2023-08-28 DIAGNOSIS — M54.41 CHRONIC MIDLINE LOW BACK PAIN WITH RIGHT-SIDED SCIATICA: ICD-10-CM

## 2023-08-28 RX ORDER — GLIMEPIRIDE 4 MG/1
TABLET ORAL
Qty: 180 TABLET | Refills: 0 | Status: SHIPPED | OUTPATIENT
Start: 2023-08-28

## 2023-08-29 RX ORDER — SILDENAFIL 50 MG/1
50 TABLET, FILM COATED ORAL AS NEEDED
Qty: 10 TABLET | Refills: 0 | Status: SHIPPED | OUTPATIENT
Start: 2023-08-29

## 2023-08-29 RX ORDER — HYDROCODONE BITARTRATE AND ACETAMINOPHEN 5; 325 MG/1; MG/1
1 TABLET ORAL DAILY PRN
Qty: 30 TABLET | Refills: 0 | Status: SHIPPED | OUTPATIENT
Start: 2023-08-29

## 2023-09-21 DIAGNOSIS — I10 ESSENTIAL HYPERTENSION: ICD-10-CM

## 2023-09-22 RX ORDER — CARVEDILOL 12.5 MG/1
12.5 TABLET ORAL 2 TIMES DAILY WITH MEALS
Qty: 180 TABLET | Refills: 3 | Status: SHIPPED | OUTPATIENT
Start: 2023-09-22

## 2023-10-05 DIAGNOSIS — G89.29 CHRONIC MIDLINE LOW BACK PAIN WITH RIGHT-SIDED SCIATICA: ICD-10-CM

## 2023-10-05 DIAGNOSIS — E11.65 TYPE 2 DIABETES MELLITUS WITH HYPERGLYCEMIA, WITH LONG-TERM CURRENT USE OF INSULIN (HCC): ICD-10-CM

## 2023-10-05 DIAGNOSIS — M54.41 CHRONIC MIDLINE LOW BACK PAIN WITH RIGHT-SIDED SCIATICA: ICD-10-CM

## 2023-10-05 DIAGNOSIS — Z79.4 TYPE 2 DIABETES MELLITUS WITH HYPERGLYCEMIA, WITH LONG-TERM CURRENT USE OF INSULIN (HCC): ICD-10-CM

## 2023-10-05 RX ORDER — HYDROCODONE BITARTRATE AND ACETAMINOPHEN 5; 325 MG/1; MG/1
1 TABLET ORAL DAILY PRN
Qty: 30 TABLET | Refills: 0 | Status: SHIPPED | OUTPATIENT
Start: 2023-10-05

## 2023-10-18 ENCOUNTER — VBI (OUTPATIENT)
Dept: ADMINISTRATIVE | Facility: OTHER | Age: 68
End: 2023-10-18

## 2023-10-20 NOTE — PATIENT INSTRUCTIONS
Hypoglycemia instructions   Hollie Diaz  8/17/2022  [id number]    Low Blood Sugar    Steps to treat low blood sugar  1  Test blood sugar if you have symptoms of low blood sugar:   Low Blood Sugar Symptoms:  o Sweaty  o Dizzy  o Rapid heartbeat  o Shaky  o Bad mood  o Hungry      2  Treat blood sugar less than 70 with 15 grams of fast-acting carbohydrate:   Examples of 15 grams Fast-Acting Carbohydrate:  o 4 oz juice  o 4 oz regular soda  o 3-4 glucose tablets (chew)  o 3-4 hard candies (chew)          3  Wait 15 minutes and test your blood sugar again     4   If blood sugar is less than 100, repeat steps 2-3     5  When your blood sugar is 100 or more, eat a snack if it will be longer than one hour until your next meal  The snack should be 15 grams of carbohydrate and a protein:   Examples of snacks:  o ½ sandwich  o 6 crackers with cheese  o Piece of fruit with cheese or peanut butter  o 6 crackers with peanut butter Number Of Freeze-Thaw Cycles: 3 freeze-thaw cycles Application Tool (Optional): Liquid Nitrogen Sprayer Duration Of Freeze Thaw-Cycle (Seconds): 0 Show Aperture Variable?: Yes Detail Level: Simple Post-Care Instructions: I reviewed with the patient in detail post-care instructions. Patient is to wear sunprotection, and avoid picking at any of the treated lesions. Pt may apply Vaseline to crusted or scabbing areas. Render Note In Bullet Format When Appropriate: No Consent: The patient's consent was obtained including but not limited to risks of crusting, scabbing, blistering, scarring, darker or lighter pigmentary change, recurrence, incomplete removal and infection.

## 2023-10-27 DIAGNOSIS — N18.31 TYPE 2 DIABETES MELLITUS WITH STAGE 3A CHRONIC KIDNEY DISEASE, WITHOUT LONG-TERM CURRENT USE OF INSULIN (HCC): ICD-10-CM

## 2023-10-27 DIAGNOSIS — E11.22 TYPE 2 DIABETES MELLITUS WITH STAGE 3A CHRONIC KIDNEY DISEASE, WITHOUT LONG-TERM CURRENT USE OF INSULIN (HCC): ICD-10-CM

## 2023-10-27 RX ORDER — METFORMIN HYDROCHLORIDE 500 MG/1
1000 TABLET, EXTENDED RELEASE ORAL 2 TIMES DAILY WITH MEALS
Qty: 360 TABLET | Refills: 0 | Status: SHIPPED | OUTPATIENT
Start: 2023-10-27

## 2023-11-24 DIAGNOSIS — E11.65 UNCONTROLLED TYPE 2 DIABETES MELLITUS WITH HYPERGLYCEMIA (HCC): ICD-10-CM

## 2023-11-24 DIAGNOSIS — G89.29 CHRONIC MIDLINE LOW BACK PAIN WITH RIGHT-SIDED SCIATICA: ICD-10-CM

## 2023-11-24 DIAGNOSIS — M54.41 CHRONIC MIDLINE LOW BACK PAIN WITH RIGHT-SIDED SCIATICA: ICD-10-CM

## 2023-11-24 RX ORDER — FLASH GLUCOSE SENSOR
1 KIT MISCELLANEOUS
Qty: 6 EACH | Refills: 1 | Status: SHIPPED | OUTPATIENT
Start: 2023-11-24

## 2023-11-27 ENCOUNTER — TELEPHONE (OUTPATIENT)
Dept: INTERNAL MEDICINE CLINIC | Facility: CLINIC | Age: 68
End: 2023-11-27

## 2023-11-27 RX ORDER — HYDROCODONE BITARTRATE AND ACETAMINOPHEN 5; 325 MG/1; MG/1
1 TABLET ORAL DAILY PRN
Qty: 30 TABLET | Refills: 0 | Status: SHIPPED | OUTPATIENT
Start: 2023-11-27

## 2023-11-27 RX ORDER — PEN NEEDLE, DIABETIC 31 GX3/16"
NEEDLE, DISPOSABLE MISCELLANEOUS
Qty: 100 EACH | Refills: 0 | Status: SHIPPED | OUTPATIENT
Start: 2023-11-27

## 2023-11-27 RX ORDER — PEN NEEDLE, DIABETIC 31 GX3/16"
NEEDLE, DISPOSABLE MISCELLANEOUS DAILY
Qty: 100 EACH | Refills: 0 | Status: SHIPPED | OUTPATIENT
Start: 2023-11-27

## 2023-11-28 ENCOUNTER — TELEPHONE (OUTPATIENT)
Dept: ENDOCRINOLOGY | Facility: CLINIC | Age: 68
End: 2023-11-28

## 2023-11-28 DIAGNOSIS — E11.65 TYPE 2 DIABETES MELLITUS WITH HYPERGLYCEMIA, WITH LONG-TERM CURRENT USE OF INSULIN (HCC): Primary | ICD-10-CM

## 2023-11-28 DIAGNOSIS — Z79.4 TYPE 2 DIABETES MELLITUS WITH HYPERGLYCEMIA, WITH LONG-TERM CURRENT USE OF INSULIN (HCC): Primary | ICD-10-CM

## 2023-11-28 NOTE — TELEPHONE ENCOUNTER
Patient called due to his insurance is no longer covering his levimeire. Would like to change.  Please advise

## 2023-11-30 RX ORDER — INSULIN GLARGINE 100 [IU]/ML
14 INJECTION, SOLUTION SUBCUTANEOUS
Qty: 15 ML | Refills: 0 | Status: SHIPPED | OUTPATIENT
Start: 2023-11-30

## 2023-11-30 NOTE — TELEPHONE ENCOUNTER
Let's try Lantus in place of Levemir (Please let us know if alternative to Lantus (I.e Cathern Flakes) is preferred instead by insurance). Recommend slight down titration of dose from 16 to 14 units at bedtime out of precaution. Is he sharing [place] 2 CGM with us? If so please help him link to our clinic. Would be helpful to view a report in 2 weeks to ensure stability of BG values (please set notification to print data in 2 weeks). If not using CGM, please encourage to keep track of BG and submit log to us in 1-2 weeks for review.

## 2023-11-30 NOTE — PATIENT INSTRUCTIONS
1  aquatherapy  2  Blood work next week  3  Check your blood sugar when you get home today and call me tomorrow with this reading  4   Continue checking blood sugars  5  recommend meeting with diabetes educator The patient was signed out to me by Dr. Whitten for follow-up on disposition.  The sign-out included relevant details of the history, physical, and work-up to date. The chart has been reviewed, new test results have been noted, and the patient has been re-evaluated.      Physical Exam   Physical Exam      Lab Results     Results for orders placed or performed during the hospital encounter of 11/29/23   Light Blue Top   Result Value Ref Range    Extra Tube Hold for Add Ons    Light Green Top   Result Value Ref Range    Extra Tube Hold for Add Ons    Lavender Top   Result Value Ref Range    Extra Tube Hold for Add Ons    Gold Top   Result Value Ref Range    Extra Tube Hold for Add Ons    Comprehensive Metabolic Panel   Result Value Ref Range    Fasting Status      Sodium 139 135 - 145 mmol/L    Potassium 3.6 3.4 - 5.1 mmol/L    Chloride 107 97 - 110 mmol/L    Carbon Dioxide 29 21 - 32 mmol/L    Anion Gap 7 7 - 19 mmol/L    Glucose 94 70 - 99 mg/dL    BUN 14 6 - 20 mg/dL    Creatinine 0.76 0.38 - 1.15 mg/dL    Glomerular Filtration Rate      BUN/Cr 18 7 - 25    Calcium 9.4 8.0 - 11.0 mg/dL    Bilirubin, Total 0.8 0.2 - 1.0 mg/dL    GOT/AST 34 10 - 45 Units/L    GPT/ALT 23 10 - 50 Units/L    Alkaline Phosphatase 108 55 - 220 Units/L    Albumin 4.5 3.6 - 5.1 g/dL    Protein, Total 7.9 6.0 - 8.3 g/dL    Globulin 3.4 2.0 - 4.0 g/dL    A/G Ratio 1.3 1.0 - 2.4   Magnesium   Result Value Ref Range    Magnesium 2.5 (H) 1.7 - 2.4 mg/dL   Acetaminophen Level   Result Value Ref Range    Acetaminophen <2 (L) 10 - 30 mcg/mL   Salicylate Level   Result Value Ref Range    Salicylate <2.8 <=30.0 mg/dL   Alcohol   Result Value Ref Range    Alcohol None Detected None Detected mg/dL   CBC with Automated Differential (performable only)   Result Value Ref Range    WBC 10.5 4.2 - 11.0 K/mcL    RBC 5.43 (H) 3.90 - 5.30 mil/mcL    HGB 15.6 13.0 - 17.0 g/dL    HCT 44.0 39.0 - 51.0 %    MCV 81.0 78.0 - 100.0 fl    MCH 28.7 26.0 - 34.0 pg     MCHC 35.5 32.0 - 36.5 g/dL    RDW-CV 12.3 11.0 - 15.0 %    RDW-SD 35.5 (L) 39.0 - 50.0 fL     140 - 450 K/mcL    NRBC 0 <=0 /100 WBC    Neutrophil, Percent 64 %    Lymphocytes, Percent 27 %    Mono, Percent 8 %    Eosinophils, Percent 0 %    Basophils, Percent 1 %    Immature Granulocytes 0 %    Absolute Neutrophils 6.7 1.8 - 8.0 K/mcL    Absolute Lymphocytes 2.8 1.2 - 5.2 K/mcL    Absolute Monocytes 0.9 0.3 - 0.9 K/mcL    Absolute Eosinophils  0.0 0.0 - 0.5 K/mcL    Absolute Basophils 0.1 0.0 - 0.3 K/mcL    Absolute Immature Granulocytes 0.0 0.0 - 0.2 K/mcL   Thyroid Stimulating Hormone   Result Value Ref Range    TSH 1.299 0.463 - 4.130 mcUnits/mL   ISTAT8 VENOUS  POINT OF CARE   Result Value Ref Range    BUN - POINT OF CARE 14 6 - 20 mg/dL    SODIUM - POINT OF CARE 141 135 - 145 mmol/L    POTASSIUM - POINT OF CARE 3.6 3.4 - 5.1 mmol/L    CHLORIDE - POINT OF CARE 100 97 - 110 mmol/L    TCO2 - POINT OF CARE 27 (H) 19 - 24 mmol/L    ANION GAP - POINT OF CARE 19 7 - 19 mmol/L    HEMATOCRIT - POINT OF CARE 47.0 39.0 - 51.0 %    HEMOGLOBIN - POINT OF CARE 16.0 13.0 - 17.0 g/dL    GLUCOSE - POINT OF CARE 84 70 - 99 mg/dL    CALCIUM, IONIZED - POINT OF CARE 1.16 1.15 - 1.29 mmol/L    Creatinine 0.90 0.38 - 1.15 mg/dL    Glomerular Filtration Rate         EKG         Radiology Results     Imaging Results    None         ED Medication Orders (From admission, onward)    Ordered Start     Status Ordering Provider    11/30/23 0401 11/30/23 0415  sodium chloride (NORMAL SALINE) 0.9 % bolus 1,000 mL  ONCE         Last MAR action: Completed PRAVIN DEGROOT    11/30/23 0058 11/30/23 0059  sodium chloride (NORMAL SALINE) 0.9 % bolus 1,000 mL  ONCE         Last MAR action: PRAVIN Fuller    11/29/23 2336 11/29/23 2337  OLANZapine (ZyPREXA ZYDIS) disintegrating tablet 10 mg  ONCE         Last MAR action: Not Given PRAVIN DEGROOT    11/29/23 2331 11/29/23 2332  sodium chloride (NORMAL SALINE) 0.9 %  bolus 1,000 mL  ONCE         Last MAR action: Completed PRAVIN DEGROOT          ED Course     This patient is a 18-year-old male with a history of schizophrenia and noncompliance who was brought in by mom for psychiatric evaluation.  Apparently the police were contacted on the previous shift and declined to chapter the patient.  There is concern that the patient is a safety problem at home.  He has not been taking his meds in the past week, and mom states that he is extremely paranoid.  Our evaluation here in the emergency department reveals normal lab work.  He has not yet provided a urine sample despite 3 liters of IV fluids.    [facility], behavioral health  has attempted to interview the patient and he has refused to speak to her or any of the other staff.  The patient is not voluntary for psychiatric admission so cannot be transferred to [facility] at this time.  I have discussed the case with the physician at [facility] and she recommended that we contact the police again to try to chapter the patient.  If a chapter is placed, he can be sent to [facility] for TDS placement and then transferred to [facility].     The police have been called to interview the patient and mother about a possible chapter 51.  After discussion with them and with me they are refusing to chapter the patient stating that they do not have enough evidence that the patient is a danger to himself or others.    I have informed the mother of this discussion and that I have no choice but to discharge the patient.  Patient's mother is aware of resources available to her in the community.        Cardiac Monitor Review: 6:13 AM  I have independently reviewed the patients cardiac monitor. The patient's rhythm shows normal sinus rhythm  with rate of 70 bpm.      Consults                ED Consults done in the ED course          Disposition     Critical Care time spent on this patient outside of billable procedures: none    Diagnosis  The encounter  diagnosis was Psychosis, unspecified psychosis type (CMD).    Follow Up:  No follow-up provider specified.     New Prescriptions    No medications on file       Pt is discharged in stable condition.        There is no disposition no dispo time  There is no comment     Portia Salazar MD  11/30/23 0614       Portia Salazar MD  11/30/23 0616       Portia Salazar MD  11/30/23 0795

## 2023-12-01 ENCOUNTER — TELEPHONE (OUTPATIENT)
Dept: ENDOCRINOLOGY | Facility: CLINIC | Age: 68
End: 2023-12-01

## 2023-12-01 ENCOUNTER — RA CDI HCC (OUTPATIENT)
Dept: OTHER | Facility: HOSPITAL | Age: 68
End: 2023-12-01

## 2023-12-01 NOTE — PROGRESS NOTES
[address] [facility] coding opportunities       Chart reviewed, no opportunity found: CHART REVIEWED, NO OPPORTUNITY FOUND        Patients Insurance        Commercial Insurance: Elieser Chong

## 2023-12-01 NOTE — TELEPHONE ENCOUNTER
Report does not include enough data to make recommendations (only 15% active). Recommend we download report again in 2 weeks - will schedule reminder , send request to clinical pool in 2 weeks. In the meantime, please encourage him to scan sensor 3-4 times a day.  Ideally with each meal and at bedtime in order to capture/save all data

## 2023-12-01 NOTE — TELEPHONE ENCOUNTER
Apul downloaded attached to chart.  Was having issue during the 14 days had a button fall off the monitor

## 2023-12-04 ENCOUNTER — APPOINTMENT (OUTPATIENT)
Dept: LAB | Facility: CLINIC | Age: 68
End: 2023-12-04
Payer: COMMERCIAL

## 2023-12-04 DIAGNOSIS — E11.42 TYPE 2 DIABETES MELLITUS WITH DIABETIC POLYNEUROPATHY, WITHOUT LONG-TERM CURRENT USE OF INSULIN (HCC): ICD-10-CM

## 2023-12-04 DIAGNOSIS — I10 ESSENTIAL HYPERTENSION: ICD-10-CM

## 2023-12-04 LAB
ANION GAP SERPL CALCULATED.3IONS-SCNC: 5 MMOL/L
BUN SERPL-MCNC: 18 MG/DL (ref 5–25)
CALCIUM SERPL-MCNC: 8.8 MG/DL (ref 8.4–10.2)
CHLORIDE SERPL-SCNC: 105 MMOL/L (ref 96–108)
CHOLEST SERPL-MCNC: 167 MG/DL
CO2 SERPL-SCNC: 26 MMOL/L (ref 21–32)
CREAT SERPL-MCNC: 0.75 MG/DL (ref 0.6–1.3)
CREAT UR-MCNC: 72.9 MG/DL
EST. AVERAGE GLUCOSE BLD GHB EST-MCNC: 186 MG/DL
GFR SERPL CREATININE-BSD FRML MDRD: 94 ML/MIN/1.73SQ M
GLUCOSE P FAST SERPL-MCNC: 103 MG/DL (ref 65–99)
HBA1C MFR BLD: 8.1 %
HDLC SERPL-MCNC: 39 MG/DL
LDLC SERPL CALC-MCNC: 80 MG/DL (ref 0–100)
MICROALBUMIN UR-MCNC: 1232.6 MG/L
MICROALBUMIN/CREAT 24H UR: 1691 MG/G CREATININE (ref 0–30)
POTASSIUM SERPL-SCNC: 4.5 MMOL/L (ref 3.5–5.3)
SODIUM SERPL-SCNC: 136 MMOL/L (ref 135–147)
TRIGL SERPL-MCNC: 239 MG/DL

## 2023-12-04 PROCEDURE — 83036 HEMOGLOBIN GLYCOSYLATED A1C: CPT

## 2023-12-04 PROCEDURE — 82043 UR ALBUMIN QUANTITATIVE: CPT

## 2023-12-04 PROCEDURE — 80048 BASIC METABOLIC PNL TOTAL CA: CPT

## 2023-12-04 PROCEDURE — 82570 ASSAY OF URINE CREATININE: CPT

## 2023-12-04 PROCEDURE — 80061 LIPID PANEL: CPT

## 2023-12-04 PROCEDURE — 36415 COLL VENOUS BLD VENIPUNCTURE: CPT

## 2023-12-07 ENCOUNTER — OFFICE VISIT (OUTPATIENT)
Dept: NEPHROLOGY | Facility: CLINIC | Age: 68
End: 2023-12-07
Payer: COMMERCIAL

## 2023-12-07 VITALS
WEIGHT: 280.4 LBS | DIASTOLIC BLOOD PRESSURE: 72 MMHG | HEIGHT: 70 IN | BODY MASS INDEX: 40.14 KG/M2 | SYSTOLIC BLOOD PRESSURE: 140 MMHG

## 2023-12-07 DIAGNOSIS — E66.01 OBESITY, MORBID, BMI 40.0-49.9 (HCC): ICD-10-CM

## 2023-12-07 DIAGNOSIS — R60.9 PERIPHERAL EDEMA: ICD-10-CM

## 2023-12-07 DIAGNOSIS — N18.2 CHRONIC KIDNEY DISEASE, STAGE 2 (MILD): ICD-10-CM

## 2023-12-07 DIAGNOSIS — R80.1 PERSISTENT PROTEINURIA: Primary | ICD-10-CM

## 2023-12-07 DIAGNOSIS — E11.29 TYPE 2 DIABETES MELLITUS WITH PROTEINURIA: ICD-10-CM

## 2023-12-07 DIAGNOSIS — I10 ESSENTIAL HYPERTENSION: ICD-10-CM

## 2023-12-07 DIAGNOSIS — R80.9 TYPE 2 DIABETES MELLITUS WITH PROTEINURIA: ICD-10-CM

## 2023-12-07 PROCEDURE — 99214 OFFICE O/P EST MOD 30 MIN: CPT | Performed by: INTERNAL MEDICINE

## 2023-12-07 RX ORDER — FUROSEMIDE 20 MG/1
20 TABLET ORAL 3 TIMES WEEKLY
Qty: 36 TABLET | Refills: 3 | Status: SHIPPED | OUTPATIENT
Start: 2023-12-08

## 2023-12-07 RX ORDER — LOSARTAN POTASSIUM 25 MG/1
25 TABLET ORAL DAILY
Qty: 90 TABLET | Refills: 3 | Status: SHIPPED | OUTPATIENT
Start: 2023-12-07

## 2023-12-07 NOTE — PATIENT INSTRUCTIONS
Renal function is stable. Recommend low potassium diet  Blood pressure is acceptable but in the setting of persistent proteinuria started on losartan 25 mg daily check blood work in 2 weeks  -Recommend 2 g sodium diet. -Recommend daily exercise and weight loss  -Discussed home monitoring of BP and maintaining a log of blood pressure.  -Recommend goal BP less than 130/80. Please inform me if SBP below 110 or above 140's persistently. Labs in 2 weeks, 3 months     Follow up: 6  months with repeat Lab work within a week of the scheduled office visit. Will discuss the results of the previsit Labs during the office visit. Potassium Content of Foods List   WHAT YOU NEED TO KNOW:   Potassium is a mineral that is found in most foods. Potassium helps to balance fluids and minerals in your body. It also helps your body maintain a normal blood pressure. Potassium helps your muscles contract and your nerves function normally. DISCHARGE INSTRUCTIONS:   Why you may need to change the amount of potassium you eat: You may need more potassium  if you have hypokalemia (low potassium levels) or high blood pressure. You may also need more potassium if you are taking diuretics. Diuretics and certain medicines cause your body to lose potassium. You may need less potassium  in your diet if you have hyperkalemia (high potassium levels) or kidney disease. Potassium content of fruit:  The amount of potassium in milligrams (mg) contained in each fruit or serving of fruit is listed beside the item.   High-potassium foods (more than 200 mg per serving):      1 medium banana (425)    ½ of a papaya (390)    ½ cup of prune juice (370)    ¼ cup of raisins (270)    1 medium michael (325) or kiwi (240)    1 small orange (240) or ½ cup of orange juice (235)    ½ cup of cubed cantaloupe (215) or diced honeydew melon (200)    1 medium pear (200)    Medium-potassium foods (50 to 200 mg per serving):      1 medium peach (185)    1 small apple or ½ cup of apple juice (150)    ½ cup of peaches canned in juice (120)    ½ cup of canned pineapple (100)    ½ cup of fresh, sliced strawberries (325)    ½ cup of watermelon (85)    Low-potassium foods (less than 50 mg per serving):      ½ cup of cranberries (45) or cranberry juice cocktail (20)    ½ cup of nectar of papaya, michael, or pear (35)    Potassium content of vegetables:   High-potassium foods (more than 200 mg per serving):      1 medium baked potato, with skin (925)    1 baked medium sweet potato, with skin (450)    ½ cup of tomato or vegetable juice (275), or 1 medium raw tomato (290)    ½ cup of mushrooms (280)    ½ cup of fresh brussels sprouts (250)    ½ cup of cooked zucchini (220) or winter squash (250)    ¼ of a medium avocado (245)    ½ cup of broccoli (230)    Medium-potassium foods (50 to 200 mg per serving):      ½ cup of corn (195)    ½ cup of fresh or cooked carrots (180)    ½ cup of fresh cauliflower (150)    ½ cup of asparagus (155)    ½ cup of canned peas (90)     1 cup of lettuce, all types (100)    ½ cup of fresh green beans (90)    ½ cup of frozen green beans (85)    ½ cup of cucumber (80)    Potassium content of protein foods:   High-potassium foods (more than 200 mg per serving):      ½ cup of cooked amador beans (400) or lentils (365)    1 cup of soy milk (300)    3 ounces of baked or broiled salmon (319)    3 ounces of roasted turkey, dark meat (250)    ¼ cup of sunflower seeds (241)    3 ounces of cooked lean beef (224)    2 tablespoons of smooth peanut butter (210)    Medium-potassium foods (50 to 200 mg per serving):      1 ounce of salted peanuts, almonds, or cashews (200)    1 large egg (60 mg)    Potassium content of dairy foods:   High-potassium foods (more than 200 mg per serving):      6 ounces of yogurt (260 to 435)    1 cup of nonfat, low-fat, or whole milk (350 to 380)    Medium-potassium foods (50 to 200 mg per serving):      ½ cup of ricotta cheese (154)    ½ cup of vanilla ice cream (131)    ½ cup of low-fat (2%) cottage cheese (110)    Low-potassium foods (less than 50 mg per serving):      1 ounce of cheese (20 to 30)      Potassium content of grains:   1 slice of white bread (30)    ½ cup of white or brown rice (50)    ½ cup of spaghetti or macaroni (30)    1 flour or corn tortilla (50)    1 four-inch waffle (50)    Potassium content of other foods:   1 tablespoon of molasses (295)    1½ ounces of chocolate (165)    Some salt substitutes may contain a high amount of potassium. Check the food label to find the amount of potassium it contains. © Copyright Rosalie Mar 2023 Information is for End User's use only and may not be sold, redistributed or otherwise used for commercial purposes. The above information is an  only. It is not intended as medical advice for individual conditions or treatments. Talk to your doctor, nurse or pharmacist before following any medical regimen to see if it is safe and effective for you.

## 2023-12-07 NOTE — PROGRESS NOTES
NEPHROLOGY OUTPATIENT PROGRESS NOTE   [place] House 80 y.o. male MRN: [id number]  DATE: 12/7/2023  Reason for visit:   Chief Complaint   Patient presents with    Follow-up     Type 2 diabetes mellitus with diabetic polyneuropathy, without long-term current use of insulin       ASSESSMENT and PLAN:  Persistent Proteinuria  -Proteinuria worsening to albumin creatinine ratio 1.6 g from previous level of 1.2 g.  Previous UPC ratio was 1.7 g in August 2022. Proteinuria worsening likely due to patient being off ACE inhibitor/ARB and spironolactone due to hyperkalemia with a potassium of 6.4 in August 2021.  -Potassium level now at normal range at 4.5, renal function is stable at creatinine 0.75 mg/dL  -peak microalbumin creatinine ratio from March 2, 2021   was 3.0 g.  -Due to potassium level improved in normal range and finding of persistent proteinuria, restarting on losartan 25 mg daily and will monitor with repeat BMP in 2 weeks. Recommend low potassium diet  -discussed with patient that  proteinuria is likely due to uncontrolled diabetes mellitus type 2 with peak A1c 10.4 in September 2020, now trending down to 8.1 with treatment, recommend goal A1c less than 7 and also recommend goal blood pressure less than 130/80  -discussed about avoiding NSAIDs  -recommended goal blood pressure less than 130/80  -continue to monitor with repeat upc ratio before the next office visit     Primary Hypertension:   -Current medication: Amlodipine 10 mg, Coreg 12.5 mg bid  -was taken off aldactone and ACEI due to hyperkalemia on blood work in august 23 rd, 2021   -Current blood pressure: on higher side   -Also has Sleep apnea- could be contributing to elevated BP but not tolerating the mask and so not using it.    -renal duplex:  Showed less than 60% stenosis in the right main renal artery, no evidence of stenosis in the left main renal artery.  -Plan:    Started on losartan 25 mg daily and check bmp in two weeks.    -Recommend 2 g sodium diet. -Recommend daily exercise and weight loss  -Discussed home monitoring of BP and maintaining a log of blood pressure.  -Recommend goal BP less than 130/80. Chronic kidney disease stage 2 with persistent proteinuria  -baseline creatinine 08-1.0   -Renal Function currently stable, last blood work showed creatinine 0.75 mg/dL, continue to monitor  -stressed on importance of better diabetic control and blood pressure control and patient verbalized understanding,   A1c already improving but not at goal , continue to monitor per PCP    Lower extremity edema  -possibly due to amlodipine  -started on lasix 20 mg MWF  -limb elevated      DM-2 with proteinuria  -on insulin, Metformin, Glimipiride  - peak A1c was 10.4 in sept 2020 and now improving to  8.1   -recommend goal A1c less than 7.0, discussed about risk of worsening renal function and proteinuria with uncontrolled diabetes mellitus and patient verbalized understanding  -stressed on better diabetic control  -daily exercise and weight loss   -Okay from nephrology side to restart on Sharee Big, patient will discuss with endocrinologist     Hyperlipidemia, mixed  -lipid panel  - LDL at goal at 80.   -Currently on Lipitor 20 mg daily  -Continue follow-up with PCP and management per PCP     Obesity:  Body mass index is 40.23 kg/m². - daily exercise       Health maintenance:  Had colonoscopy in 2013, next one is due in 2023    Patient Instructions   Renal function is stable. Recommend low potassium diet  Blood pressure is acceptable but in the setting of persistent proteinuria started on losartan 25 mg daily check blood work in 2 weeks  -Recommend 2 g sodium diet. -Recommend daily exercise and weight loss  -Discussed home monitoring of BP and maintaining a log of blood pressure.  -Recommend goal BP less than 130/80. Please inform me if SBP below 110 or above 140's persistently.        Labs in 2 weeks, 3 months     Follow up: 6  months with repeat Lab work within a week of the scheduled office visit. Will discuss the results of the previsit Labs during the office visit. Potassium Content of Foods List   WHAT YOU NEED TO KNOW:   Potassium is a mineral that is found in most foods. Potassium helps to balance fluids and minerals in your body. It also helps your body maintain a normal blood pressure. Potassium helps your muscles contract and your nerves function normally. DISCHARGE INSTRUCTIONS:   Why you may need to change the amount of potassium you eat: You may need more potassium  if you have hypokalemia (low potassium levels) or high blood pressure. You may also need more potassium if you are taking diuretics. Diuretics and certain medicines cause your body to lose potassium. You may need less potassium  in your diet if you have hyperkalemia (high potassium levels) or kidney disease. Potassium content of fruit:  The amount of potassium in milligrams (mg) contained in each fruit or serving of fruit is listed beside the item.   High-potassium foods (more than 200 mg per serving):      1 medium banana (425)    ½ of a papaya (390)    ½ cup of prune juice (370)    ¼ cup of raisins (270)    1 medium michael (325) or kiwi (240)    1 small orange (240) or ½ cup of orange juice (235)    ½ cup of cubed cantaloupe (215) or diced honeydew melon (200)    1 medium pear (200)    Medium-potassium foods (50 to 200 mg per serving):      1 medium peach (185)    1 small apple or ½ cup of apple juice (150)    ½ cup of peaches canned in juice (120)    ½ cup of canned pineapple (100)    ½ cup of fresh, sliced strawberries (742)    ½ cup of watermelon (85)    Low-potassium foods (less than 50 mg per serving):      ½ cup of cranberries (45) or cranberry juice cocktail (20)    ½ cup of nectar of papaya, michael, or pear (35)    Potassium content of vegetables:   High-potassium foods (more than 200 mg per serving):      1 medium baked potato, with skin (925)    1 baked medium sweet potato, with skin (450)    ½ cup of tomato or vegetable juice (275), or 1 medium raw tomato (290)    ½ cup of mushrooms (280)    ½ cup of fresh brussels sprouts (250)    ½ cup of cooked zucchini (220) or winter squash (250)    ¼ of a medium avocado (245)    ½ cup of broccoli (230)    Medium-potassium foods (50 to 200 mg per serving):      ½ cup of corn (195)    ½ cup of fresh or cooked carrots (180)    ½ cup of fresh cauliflower (150)    ½ cup of asparagus (155)    ½ cup of canned peas (90)     1 cup of lettuce, all types (100)    ½ cup of fresh green beans (90)    ½ cup of frozen green beans (85)    ½ cup of cucumber (80)    Potassium content of protein foods:   High-potassium foods (more than 200 mg per serving):      ½ cup of cooked amador beans (400) or lentils (365)    1 cup of soy milk (300)    3 ounces of baked or broiled salmon (319)    3 ounces of roasted turkey, dark meat (250)    ¼ cup of sunflower seeds (241)    3 ounces of cooked lean beef (224)    2 tablespoons of smooth peanut butter (210)    Medium-potassium foods (50 to 200 mg per serving):      1 ounce of salted peanuts, almonds, or cashews (200)    1 large egg (60 mg)    Potassium content of dairy foods:   High-potassium foods (more than 200 mg per serving):      6 ounces of yogurt (260 to 435)    1 cup of nonfat, low-fat, or whole milk (350 to 380)    Medium-potassium foods (50 to 200 mg per serving):      ½ cup of ricotta cheese (154)    ½ cup of vanilla ice cream (131)    ½ cup of low-fat (2%) cottage cheese (110)    Low-potassium foods (less than 50 mg per serving):      1 ounce of cheese (20 to 30)      Potassium content of grains:   1 slice of white bread (30)    ½ cup of white or brown rice (50)    ½ cup of spaghetti or macaroni (30)    1 flour or corn tortilla (50)    1 four-inch waffle (50)    Potassium content of other foods:   1 tablespoon of molasses (295)    1½ ounces of chocolate (165)    Some salt substitutes may contain a high amount of potassium. Check the food label to find the amount of potassium it contains. © Copyright Maryan Mosqueda 2023 Information is for End User's use only and may not be sold, redistributed or otherwise used for commercial purposes. The above information is an  only. It is not intended as medical advice for individual conditions or treatments. Talk to your doctor, nurse or pharmacist before following any medical regimen to see if it is safe and effective for you. Diagnoses and all orders for this visit:    Persistent proteinuria  -     losartan (COZAAR) 25 mg tablet; Take 1 tablet (25 mg total) by mouth daily  -     Basic metabolic panel; Future  -     Basic metabolic panel; Future  -     Basic metabolic panel; Future  -     Protein / creatinine ratio, urine; Future  -     Urinalysis with microscopic; Future  -     Albumin / creatinine urine ratio; Future    Essential hypertension  -     losartan (COZAAR) 25 mg tablet; Take 1 tablet (25 mg total) by mouth daily  -     Basic metabolic panel; Future  -     Basic metabolic panel; Future  -     Basic metabolic panel; Future  -     furosemide (LASIX) 20 mg tablet; Take 1 tablet (20 mg total) by mouth 3 (three) times a week    Chronic kidney disease, stage 2 (mild)  -     Basic metabolic panel; Future  -     Basic metabolic panel; Future    Peripheral edema  -     Basic metabolic panel; Future  -     furosemide (LASIX) 20 mg tablet; Take 1 tablet (20 mg total) by mouth 3 (three) times a week    Type 2 diabetes mellitus with proteinuria   -     Basic metabolic panel; Future  -     Protein / creatinine ratio, urine; Future    Obesity, morbid, BMI 40.0-49.9 ([address])            SUBJECTIVE / HPI:  Johnathon Goldmann is a 80 y.o.  male with medical issues of  Diabetes mellitus type 2 x 10 yrs on oral pills with DN+, hypertension x 5 yrs who presents for  Follow-up of proteinuria.      patient had microalbumin creatinine ratio 327 milligram/gram in July 2018 and since then has gradually worsened with   microalbumin creatinine ratio of 3 g in March 2021 and so referred to Nephrology . Was seen by Nephrology in April 2021, was started on spironolactone in addition to lisinopril 40 mg at that time to help with blood pressure as well as proteinuria. Blood work on 08/23/2021 showed potassium elevated to 6.4 after which he was sent to the ED for medical management of hyperkalemia, stressed on low-potassium diet, was taken off spironolactone and lisinopril due to hyperkalemia . Was last seen by advanced practitioner in October 2022. Reviewed office visit note. No changes, was not restarted on ACE inhibitor or ARB at that time    Reviewed blood work from 12//2023, creatinine was 0.75 mg/dL, potassium 4.5 meq/L. eGFR 94. LDL 80, triglyceride 239. Last hemoglobin was 16.2 in March 2023. Last A1c was 8.1 on 12/4/2023. Microalbumin creatinine ratio was 1.6 g slightly worsening likely due to being off ARB and spironolactone     -no new complaints       REVIEW OF SYSTEMS:    Review of Systems   Constitutional:  Negative for chills and fever. HENT:  Negative for ear pain and sore throat. Eyes:  Negative for pain and visual disturbance. Respiratory:  Negative for cough and shortness of breath. Cardiovascular:  Negative for chest pain and palpitations. Gastrointestinal:  Negative for abdominal pain and vomiting. Genitourinary:  Negative for dysuria and hematuria. Musculoskeletal:  Negative for arthralgias and back pain. Skin:  Negative for color change and rash. Neurological:  Negative for seizures and syncope. All other systems reviewed and are negative. More than 10 point review of systems were obtained and discussed in length with the patient. Complete review of systems were negative / unremarkable except mentioned above.        PAST MEDICAL HISTORY:  Past Medical History:   Diagnosis Date    BPH (benign prostatic hyperplasia) Chronic kidney disease     CPAP (continuous positive airway pressure) dependence     Diabetes mellitus (HCC)     Disease of thyroid gland     Hyperlipidemia     Hypertension     Sleep apnea     Type 2 diabetes mellitus with diabetic chronic kidney disease ([address] [facility]) 2021       PAST SURGICAL HISTORY:  Past Surgical History:   Procedure Laterality Date    COLONOSCOPY      2016    MS EGD TRANSORAL BIOPSY SINGLE/MULTIPLE N/A 2017    Procedure: ESOPHAGOGASTRODUODENOSCOPY (EGD) with bx;  Surgeon: David Powers MD;  Location: [facility];   Service: Bariatrics    TONSILLECTOMY         SOCIAL HISTORY:  Social History     Substance and Sexual Activity   Alcohol Use Yes    Comment: social     Social History     Substance and Sexual Activity   Drug Use No     Social History     Tobacco Use   Smoking Status Former    Packs/day: 2.00    Years: 0.00    Total pack years: 0.00    Types: Cigarettes    Quit date:     Years since quittin.9   Smokeless Tobacco Never       FAMILY HISTORY:  Family History   Problem Relation Age of Onset    Lung cancer Mother     Diabetes Father     Cancer Brother     Alcohol abuse Neg Hx     Substance Abuse Neg Hx     Mental illness Neg Hx     Depression Neg Hx        MEDICATIONS:    Current Outpatient Medications:     amLODIPine (NORVASC) 10 mg tablet, Take 1 tablet (10 mg total) by mouth daily, Disp: 90 tablet, Rfl: 0    aspirin (ECOTRIN LOW STRENGTH) 81 mg EC tablet, Take 1 tablet (81 mg total) by mouth daily, Disp: 90 tablet, Rfl: 0    atorvastatin (LIPITOR) 20 mg tablet, Take 1 tablet (20 mg total) by mouth daily, Disp: 90 tablet, Rfl: 0    [employer] Unifine Pentips Plus 31G X 5 MM MISC, INJECT UNDER THE SKIN EVERY MORNING, Disp: 100 each, Rfl: 0    carvedilol (COREG) 12.5 mg tablet, Take 1 tablet (12.5 mg total) by mouth 2 (two) times a day with meals, Disp: 180 tablet, Rfl: 3    Continuous Blood Gluc  (FreeStyle Paul 14 Day [place]) LEO, USE TO CHECK BLOOD GLUCOSE, Disp: , Rfl:     Continuous Blood Gluc Sensor (FreeStyle Paul 14 Day Sensor) MISC, Inject 1 application.  under the skin every 14 (fourteen) days, Disp: 6 each, Rfl: 1    [START ON 12/8/2023] furosemide (LASIX) 20 mg tablet, Take 1 tablet (20 mg total) by mouth 3 (three) times a week, Disp: 36 tablet, Rfl: 3    glimepiride (AMARYL) 4 mg tablet, Take 1/2 tablet with breakfast and 1 tablet with dinner., Disp: 180 tablet, Rfl: 0    HYDROcodone-acetaminophen (NORCO) 5-325 mg per tablet, Take 1 tablet by mouth daily as needed for pain Max Daily Amount: 1 tablet, Disp: 30 tablet, Rfl: 0    Insulin Glargine Solostar (Lantus SoloStar) 100 UNIT/ML SOPN, Inject 0.14 mL (14 Units total) under the skin daily at bedtime Or as directed, TDD up to 20 units daily, Disp: 15 mL, Rfl: 0    Insulin Pen Needle ([employer]One Unifine Pentips Plus) 31G X 5 MM MISC, Inject under the skin in the morning, Disp: 100 each, Rfl: 0    levothyroxine 75 mcg tablet, Take 1 tablet (75 mcg total) by mouth daily, Disp: 90 tablet, Rfl: 0    losartan (COZAAR) 25 mg tablet, Take 1 tablet (25 mg total) by mouth daily, Disp: 90 tablet, Rfl: 3    metFORMIN (GLUCOPHAGE-XR) 500 mg 24 hr tablet, Take 2 tablets (1,000 mg total) by mouth 2 (two) times a day with meals, Disp: 360 tablet, Rfl: 0    methocarbamol (ROBAXIN) 750 mg tablet, Take 1 tablet (750 mg total) by mouth 2 (two) times a day as needed for muscle spasms, Disp: 60 tablet, Rfl: 2    sildenafil (VIAGRA) 50 MG tablet, Take 1 tablet (50 mg total) by mouth as needed for erectile dysfunction, Disp: 10 tablet, Rfl: 2    gabapentin (NEURONTIN) 300 mg capsule, Take 1 capsule (300 mg total) by mouth 2 (two) times a day (Patient not taking: Reported on 12/7/2023), Disp: 60 capsule, Rfl: 2    guaifenesin-codeine (GUAIFENESIN AC) 100-10 MG/5ML liquid, Take 5 mL by mouth 3 (three) times a day as needed for cough (Patient not taking: Reported on 3/27/2023), Disp: 120 mL, Rfl: 0      PHYSICAL EXAM:  Vitals:    12/07/23 1135 BP: 140/72   BP Location: Right arm   Patient Position: Sitting   Cuff Size: Large   Weight: 127 kg (280 lb 6.4 oz)   Height: 5' 10" (1.778 m)     Body mass index is 40.23 kg/m². Physical Exam  Constitutional:       General: He is not in acute distress. Appearance: He is well-developed. He is not diaphoretic. HENT:      Head: Normocephalic and atraumatic. Mouth/Throat:      Mouth: Mucous membranes are moist.   Eyes:      General: No scleral icterus. Conjunctiva/sclera: Conjunctivae normal.      Pupils: Pupils are equal, round, and reactive to light. Neck:      Thyroid: No thyromegaly. Cardiovascular:      Rate and Rhythm: Normal rate and regular rhythm. Heart sounds: Normal heart sounds. No murmur heard. No friction rub. Pulmonary:      Effort: Pulmonary effort is normal. No respiratory distress. Breath sounds: Normal breath sounds. No wheezing or rales. Abdominal:      General: Bowel sounds are normal. There is no distension. Palpations: Abdomen is soft. Tenderness: There is no abdominal tenderness. Musculoskeletal:         General: No deformity. Cervical back: Neck supple. Right lower leg: Edema present. Left lower leg: Edema present. Lymphadenopathy:      Cervical: No cervical adenopathy. Skin:     Coloration: Skin is not pale. Nails: There is no clubbing. Neurological:      Mental Status: He is alert and oriented to person, place, and time. He is not disoriented. Psychiatric:         Mood and Affect: Mood normal. Mood is not anxious. Affect is not inappropriate. Behavior: Behavior normal.         Thought Content:  Thought content normal.         Lab Results:   Results for orders placed or performed in visit on 12/04/23   Lipid Panel with Direct LDL reflex   Result Value Ref Range    Cholesterol 167 See Comment mg/dL    Triglycerides 239 (H) See Comment mg/dL    HDL, Direct 39 (L) >=40 mg/dL    LDL Calculated 80 0 - 100 mg/dL Albumin / creatinine urine ratio   Result Value Ref Range    Creatinine, Ur 72.9 Reference range not established. mg/dL    Albumin,U,Random 1,232.6 (H) <20.0 mg/L    Albumin Creat Ratio 1,691 (H) 0 - 30 mg/g creatinine   Hemoglobin A1C   Result Value Ref Range    Hemoglobin A1C 8.1 (H) Normal 4.0-5.6%; PreDiabetic 5.7-6.4%;  Diabetic >=6.5%; Glycemic control for adults with diabetes <7.0% %     mg/dl   Basic metabolic panel   Result Value Ref Range    Sodium 136 135 - 147 mmol/L    Potassium 4.5 3.5 - 5.3 mmol/L    Chloride 105 96 - 108 mmol/L    CO2 26 21 - 32 mmol/L    ANION GAP 5 mmol/L    BUN 18 5 - 25 mg/dL    Creatinine 0.75 0.60 - 1.30 mg/dL    Glucose, Fasting 103 (H) 65 - 99 mg/dL    Calcium 8.8 8.4 - 10.2 mg/dL    eGFR 94 ml/min/1.73sq m

## 2023-12-11 ENCOUNTER — OFFICE VISIT (OUTPATIENT)
Dept: INTERNAL MEDICINE CLINIC | Facility: CLINIC | Age: 68
End: 2023-12-11
Payer: COMMERCIAL

## 2023-12-11 VITALS
HEART RATE: 57 BPM | DIASTOLIC BLOOD PRESSURE: 80 MMHG | WEIGHT: 279 LBS | TEMPERATURE: 96.1 F | BODY MASS INDEX: 39.94 KG/M2 | HEIGHT: 70 IN | OXYGEN SATURATION: 98 % | SYSTOLIC BLOOD PRESSURE: 142 MMHG

## 2023-12-11 DIAGNOSIS — Z12.5 SCREENING FOR PROSTATE CANCER: ICD-10-CM

## 2023-12-11 DIAGNOSIS — G89.29 CHRONIC MIDLINE LOW BACK PAIN WITH RIGHT-SIDED SCIATICA: ICD-10-CM

## 2023-12-11 DIAGNOSIS — E11.42 TYPE 2 DIABETES MELLITUS WITH DIABETIC POLYNEUROPATHY, WITHOUT LONG-TERM CURRENT USE OF INSULIN (HCC): Primary | ICD-10-CM

## 2023-12-11 DIAGNOSIS — Z12.11 SCREENING FOR COLON CANCER: ICD-10-CM

## 2023-12-11 DIAGNOSIS — M54.41 CHRONIC MIDLINE LOW BACK PAIN WITH RIGHT-SIDED SCIATICA: ICD-10-CM

## 2023-12-11 DIAGNOSIS — M54.12 CERVICAL RADICULOPATHY: ICD-10-CM

## 2023-12-11 DIAGNOSIS — E78.2 MIXED HYPERLIPIDEMIA: ICD-10-CM

## 2023-12-11 DIAGNOSIS — N18.2 CHRONIC KIDNEY DISEASE, STAGE 2 (MILD): ICD-10-CM

## 2023-12-11 DIAGNOSIS — I10 ESSENTIAL HYPERTENSION: ICD-10-CM

## 2023-12-11 DIAGNOSIS — E03.9 ACQUIRED HYPOTHYROIDISM: ICD-10-CM

## 2023-12-11 DIAGNOSIS — G47.33 OSA (OBSTRUCTIVE SLEEP APNEA): ICD-10-CM

## 2023-12-11 DIAGNOSIS — M51.16 LUMBAR DISC DISEASE WITH RADICULOPATHY: ICD-10-CM

## 2023-12-11 PROBLEM — R79.89 ELEVATED SERUM CREATININE: Status: RESOLVED | Noted: 2021-08-31 | Resolved: 2023-12-11

## 2023-12-11 PROCEDURE — 99214 OFFICE O/P EST MOD 30 MIN: CPT | Performed by: NURSE PRACTITIONER

## 2023-12-11 RX ORDER — GABAPENTIN 300 MG/1
300 CAPSULE ORAL 2 TIMES DAILY
Qty: 60 CAPSULE | Refills: 2 | Status: SHIPPED | OUTPATIENT
Start: 2023-12-11

## 2023-12-11 NOTE — ASSESSMENT & PLAN NOTE
Lab Results   Component Value Date    EGFR 94 12/04/2023    EGFR 91 03/17/2023    EGFR 102 08/16/2022    CREATININE 0.75 12/04/2023    CREATININE 0.81 03/17/2023    CREATININE 0.64 08/16/2022   Stable  Sees nephrology   Recently started on furosemide

## 2023-12-11 NOTE — ASSESSMENT & PLAN NOTE
Restart gabapentin. Discussed PT but he is unable to commit currently due to frequent travel to [place].

## 2023-12-11 NOTE — PROGRESS NOTES
Name: Trevon Colby      : 1955      MRN: [id number]  Encounter Provider: ROLAND Meier  Encounter Date: 2023   Encounter department: [id number] [facility]     1. Type 2 diabetes mellitus with diabetic polyneuropathy, without long-term current use of insulin [facility])  Assessment & Plan:    Lab Results   Component Value Date    HGBA1C 8.1 (H) 2023   Sugars are worse. On metformin and glimepiride. Discussed increasing lantus but he states this is no longer covered by his insurance. Sees endocrine next month. 2. Acquired hypothyroidism  Assessment & Plan:  Adequately replaced. 3. JOSE RAUL (obstructive sleep apnea)  Assessment & Plan:  Sleep medicine referral previously provided. 4. Essential hypertension  Assessment & Plan:  Blood pressures above goal  On amlodipine and carvedilol   Start losartan and furosemide per nephrology       5. Chronic midline low back pain with right-sided sciatica  Assessment & Plan:  Sees pain management  Had prior injections. Takes tylenol as needed, hydrocodone for severe pain- uses rarely. 6. Chronic kidney disease, stage 2 (mild)  Assessment & Plan:  Lab Results   Component Value Date    EGFR 94 2023    EGFR 91 2023    EGFR 102 2022    CREATININE 0.75 2023    CREATININE 0.81 2023    CREATININE 0.64 2022   Stable  Sees nephrology   Recently started on furosemide       7. Mixed hyperlipidemia  Assessment & Plan: On a statin. 8. Lumbar disc disease with radiculopathy  -     gabapentin (NEURONTIN) 300 mg capsule; Take 1 capsule (300 mg total) by mouth 2 (two) times a day    9. Screening for prostate cancer  -     PSA, Total Screen; Future    10. Screening for colon cancer  -     Cologuard    11. Cervical radiculopathy  Assessment & Plan:  Restart gabapentin. Discussed PT but he is unable to commit currently due to frequent travel to [place]. Jerona Boast is here today for follow up  He is doing ok. His sugars are all over the place. He has been visiting his grandchildren in [place], eating out more. He saw nephrology, will be starting lasix 3 times a week and losartan. He continues to have upper and lower back pain. Sometimes the pain radiates down his arms. He is off gabapentin, he's unsure why. He'd like to try and restart the medication. Review of Systems   Constitutional:  Negative for activity change, appetite change, fatigue and unexpected weight change. Eyes:  Negative for visual disturbance. Respiratory:  Negative for cough and shortness of breath. Cardiovascular:  Negative for chest pain, palpitations and leg swelling. Gastrointestinal:  Negative for abdominal pain, blood in stool, constipation and diarrhea. Genitourinary:  Negative for difficulty urinating. Musculoskeletal:  Positive for arthralgias and back pain. Skin:  Negative for rash. Neurological:  Negative for dizziness, light-headedness and headaches. Psychiatric/Behavioral:  Negative for sleep disturbance. Current Outpatient Medications on File Prior to Visit   Medication Sig    amLODIPine (NORVASC) 10 mg tablet Take 1 tablet (10 mg total) by mouth daily    aspirin (ECOTRIN LOW STRENGTH) 81 mg EC tablet Take 1 tablet (81 mg total) by mouth daily    atorvastatin (LIPITOR) 20 mg tablet Take 1 tablet (20 mg total) by mouth daily    CareOne Unifine Pentips Plus 31G X 5 MM MISC INJECT UNDER THE SKIN EVERY MORNING    carvedilol (COREG) 12.5 mg tablet Take 1 tablet (12.5 mg total) by mouth 2 (two) times a day with meals    Continuous Blood Gluc  (FreeStyle Paul 14 Day [place]) LEO USE TO CHECK BLOOD GLUCOSE    Continuous Blood Gluc Sensor (FreeStyle Paul 14 Day Sensor) MISC Inject 1 application.  under the skin every 14 (fourteen) days    furosemide (LASIX) 20 mg tablet Take 1 tablet (20 mg total) by mouth 3 (three) times a week glimepiride (AMARYL) 4 mg tablet Take 1/2 tablet with breakfast and 1 tablet with dinner. HYDROcodone-acetaminophen (NORCO) 5-325 mg per tablet Take 1 tablet by mouth daily as needed for pain Max Daily Amount: 1 tablet    Insulin Glargine Solostar (Lantus SoloStar) 100 UNIT/ML SOPN Inject 0.14 mL (14 Units total) under the skin daily at bedtime Or as directed, TDD up to 20 units daily    Insulin Pen Needle (CareOne Unifine Pentips Plus) 31G X 5 MM MISC Inject under the skin in the morning    levothyroxine 75 mcg tablet Take 1 tablet (75 mcg total) by mouth daily    losartan (COZAAR) 25 mg tablet Take 1 tablet (25 mg total) by mouth daily    metFORMIN (GLUCOPHAGE-XR) 500 mg 24 hr tablet Take 2 tablets (1,000 mg total) by mouth 2 (two) times a day with meals    methocarbamol (ROBAXIN) 750 mg tablet Take 1 tablet (750 mg total) by mouth 2 (two) times a day as needed for muscle spasms    sildenafil (VIAGRA) 50 MG tablet Take 1 tablet (50 mg total) by mouth as needed for erectile dysfunction    [DISCONTINUED] gabapentin (NEURONTIN) 300 mg capsule Take 1 capsule (300 mg total) by mouth 2 (two) times a day    [DISCONTINUED] guaifenesin-codeine (GUAIFENESIN AC) 100-10 MG/5ML liquid Take 5 mL by mouth 3 (three) times a day as needed for cough       Objective     /80   Pulse 57   Temp (!) 96.1 °F (35.6 °C)   Ht 5' 10" (1.778 m)   Wt 127 kg (279 lb)   SpO2 98%   BMI 40.03 kg/m²     Physical Exam  Vitals reviewed. Constitutional:       Appearance: Normal appearance. HENT:      Head: Normocephalic and atraumatic. Eyes:      Conjunctiva/sclera: Conjunctivae normal.   Cardiovascular:      Rate and Rhythm: Normal rate and regular rhythm. Heart sounds: Normal heart sounds. Pulmonary:      Effort: Pulmonary effort is normal.      Breath sounds: Normal breath sounds. Musculoskeletal:         General: Normal range of motion. Right lower leg: Edema present. Left lower leg: Edema present. Comments: Mild pitting edema   Skin:     General: Skin is warm and dry. Neurological:      Mental Status: He is alert and oriented to person, place, and time.    Psychiatric:         Mood and Affect: Mood normal.         Behavior: Behavior normal.       ROLAND Hurley

## 2023-12-11 NOTE — ASSESSMENT & PLAN NOTE
Sees pain management  Had prior injections. Takes tylenol as needed, hydrocodone for severe pain- uses rarely.

## 2023-12-11 NOTE — ASSESSMENT & PLAN NOTE
Blood pressures above goal  On amlodipine and carvedilol   Start losartan and furosemide per nephrology

## 2023-12-11 NOTE — ASSESSMENT & PLAN NOTE
Lab Results   Component Value Date    HGBA1C 8.1 (H) 12/04/2023   Sugars are worse. On metformin and glimepiride. Discussed increasing lantus but he states this is no longer covered by his insurance. Sees endocrine next month.

## 2023-12-14 DIAGNOSIS — I10 BENIGN ESSENTIAL HYPERTENSION: ICD-10-CM

## 2023-12-14 DIAGNOSIS — N52.9 ERECTILE DYSFUNCTION, UNSPECIFIED ERECTILE DYSFUNCTION TYPE: ICD-10-CM

## 2023-12-14 DIAGNOSIS — E11.42 TYPE 2 DIABETES MELLITUS WITH DIABETIC POLYNEUROPATHY, WITHOUT LONG-TERM CURRENT USE OF INSULIN (HCC): ICD-10-CM

## 2023-12-14 DIAGNOSIS — E03.9 HYPOTHYROIDISM, UNSPECIFIED TYPE: ICD-10-CM

## 2023-12-14 DIAGNOSIS — E78.2 MIXED HYPERLIPIDEMIA: ICD-10-CM

## 2023-12-14 RX ORDER — AMLODIPINE BESYLATE 10 MG/1
10 TABLET ORAL DAILY
Qty: 90 TABLET | Refills: 0 | Status: SHIPPED | OUTPATIENT
Start: 2023-12-14

## 2023-12-14 RX ORDER — GLIMEPIRIDE 4 MG/1
TABLET ORAL
Qty: 180 TABLET | Refills: 0 | Status: SHIPPED | OUTPATIENT
Start: 2023-12-14

## 2023-12-14 RX ORDER — SILDENAFIL 50 MG/1
50 TABLET, FILM COATED ORAL AS NEEDED
Qty: 10 TABLET | Refills: 0 | Status: SHIPPED | OUTPATIENT
Start: 2023-12-14

## 2023-12-14 RX ORDER — LEVOTHYROXINE SODIUM 0.07 MG/1
75 TABLET ORAL DAILY
Qty: 30 TABLET | Refills: 0 | Status: SHIPPED | OUTPATIENT
Start: 2023-12-14

## 2023-12-14 RX ORDER — ATORVASTATIN CALCIUM 20 MG/1
20 TABLET, FILM COATED ORAL DAILY
Qty: 90 TABLET | Refills: 0 | Status: SHIPPED | OUTPATIENT
Start: 2023-12-14

## 2023-12-18 ENCOUNTER — VBI (OUTPATIENT)
Dept: ADMINISTRATIVE | Facility: OTHER | Age: 68
End: 2023-12-18

## 2023-12-22 DIAGNOSIS — G89.29 CHRONIC MIDLINE LOW BACK PAIN WITH RIGHT-SIDED SCIATICA: ICD-10-CM

## 2023-12-22 DIAGNOSIS — M54.41 CHRONIC MIDLINE LOW BACK PAIN WITH RIGHT-SIDED SCIATICA: ICD-10-CM

## 2023-12-22 RX ORDER — HYDROCODONE BITARTRATE AND ACETAMINOPHEN 5; 325 MG/1; MG/1
1 TABLET ORAL DAILY PRN
Qty: 30 TABLET | Refills: 0 | Status: SHIPPED | OUTPATIENT
Start: 2023-12-22

## 2023-12-22 NOTE — TELEPHONE ENCOUNTER
Reason for call:   [x] Refill   [] Prior Auth  [] Other:     Office:   [x] PCP/Provider - Nupur   [] Specialty/Provider -     Medication: Norco     Dose/Frequency: 5-325 QD     Quantity: 30    Pharmacy: [facility]     Does the patient have enough for 3 days?   [x] Yes   [] No - Send as HP to POD

## 2023-12-26 ENCOUNTER — TELEMEDICINE (OUTPATIENT)
Dept: INTERNAL MEDICINE CLINIC | Facility: CLINIC | Age: 68
End: 2023-12-26
Payer: COMMERCIAL

## 2023-12-26 ENCOUNTER — NURSE TRIAGE (OUTPATIENT)
Age: 68
End: 2023-12-26

## 2023-12-26 VITALS — HEIGHT: 70 IN | BODY MASS INDEX: 40.03 KG/M2

## 2023-12-26 DIAGNOSIS — U07.1 COVID-19: Primary | ICD-10-CM

## 2023-12-26 LAB — SARS-COV-2 AG UPPER RESP QL IA: ABNORMAL

## 2023-12-26 PROCEDURE — 99213 OFFICE O/P EST LOW 20 MIN: CPT | Performed by: INTERNAL MEDICINE

## 2023-12-26 RX ORDER — MOLNUPIRAVIR 200 MG/1
800 CAPSULE ORAL EVERY 12 HOURS
Qty: 40 CAPSULE | Refills: 0 | Status: SHIPPED | OUTPATIENT
Start: 2023-12-26 | End: 2023-12-27 | Stop reason: ALTCHOICE

## 2023-12-26 NOTE — TELEPHONE ENCOUNTER
"Were you within 6 feet or less, for up to 15 minutes or more with a person that has a confirmed COVID-19 test? Community exposure   What was the date of your exposure? Unknown   Are you experiencing any symptoms attributed to the virus?  (Assess for SOB, cough, fever, difficulty breathing) congestion   HIGH RISK: Do you have any history heart or lung conditions, weakened immune system, diabetes, Asthma, CHF, HIV, COPD, Chemo, renal failure, sickle cell, etc? no  PREGNANCY: Are you pregnant or did you recently give birth? no  VACCINE: \"Have you gotten the COVID-19 vaccine?\" If Yes ask: \"Which one, how many shots, when did you get it?\" Vaccinated    Pt already had telehealth visit today.  He forgot to ask how long he needs to isolate with covid.  CDC recommendations reviewed with pt.  All questions answered.   Reason for Disposition  • [1] COVID-19 diagnosed by positive lab test (e.g., PCR, rapid self-test kit) AND [2] mild symptoms (e.g., cough, fever, others) AND [3] no complications or SOB    Protocols used: Coronavirus (COVID-19) Diagnosed or Suspected-ADULT-[place]    "

## 2023-12-26 NOTE — PROGRESS NOTES
COVID-19 Outpatient Progress Note    Assessment/Plan:    Problem List Items Addressed This Visit    None  Visit Diagnoses     COVID-19    -  Primary    Relevant Medications    molnupiravir (Lagevrio) 200 mg capsule           Disposition:     Discussed symptom directed medication options with patient. Discussed vitamin D, vitamin C, and/or zinc supplementation with patient.     Since with ongoing loose stools/ diarrhea, recommend against Paxlovid. Also with drug interactions.    Patient meets criteria for Molnupiravir and they have been counseled according to EUA documentation provided by the FDA. After discussion, patient agrees to treatment.    Molnupiravir is an investigational medicine used to treat mild-to-moderate COVID-19 in adults with positive results of direct SARS-CoV-2 viral testing, and who are at high risk for progressing to severe COVID-19 including hospitalization or death, and for whom other COVID-19 treatment options authorized by the FDA are not accessible or clinically appropriate.    The FDA has authorized the emergency use of molnupiravir for the treatment of mild-tomoderate COVID-19 in adults under an EUA.    Molnupiravir is not authorized:  - for use in people less than 18 years of age.  - for prevention of COVID-19.  - for people needing hospitalization for COVID-19.  - for use for longer than 5 consecutive days    What is the most important information I should know about molnupiravir?    Molnupiravir may cause serious side effects, including:    Molnupiravir may cause harm to your unborn baby. It is not known if molnupiravir will harm your baby if you take molnupiravir during pregnancy.  - Molnupiravir is not recommended for use in pregnancy.  - Molnupiravir has not been studied in pregnancy. Molnupiravir was studied in pregnant animals only. When molnupiravir was given to pregnant animals, molnupiravir caused harm to their unborn babies.  - You and your healthcare provider may decide that  you should take molnupiravir duringpregnancy if there are no other COVID-19 treatment options authorized by the FDA that are accessible or clinically appropriate for you.  - If you and your healthcare provider decide that you should take molnupiravir during pregnancy, you and your healthcare provider should discuss the known and potential benefits and the potential risks of taking molnupiravir during pregnancy.    For individuals who are able to become pregnant:  - You should use a reliable method of birth control (contraception) consistently and correctly during treatment with molnupiravir and for 4 days after the last dose of molnupiravir. Talk to your healthcare provider about reliable birth control methods.  - Before starting treatment with molnupiravir your healthcare provider may do a pregnancy test to see if you are pregnant before starting treatment with molnupiravir.  - Tell your healthcare provider right away if you become pregnant or think you may be pregnant during treatment with molnupiravir.    Pregnancy Surveillance Program:  - There is a pregnancy surveillance program for individuals who take molnupiravir during pregnancy. The purpose of this program is to collect information about the health of you and your baby. Talk to your healthcare provider about how to take part in this program.  - If you take molnupiravir during pregnancy and you agree to participate in the pregnancy surveillance program and allow your healthcare provider to share your information with [employer], then your healthcare provider will report your use of molnupiravir during pregnancy to [employer]. by calling [phone] or [email or web address].    For individuals who are sexually active with partners who are able to become pregnant:  - It is not known if molnupiravir can affect sperm. While the risk is regarded as low, animal studies to fully assess the potential for molnupiravir to affect the  babies of males treated with molnupiravir have not been completed. A reliable method of birth control (contraception) should be used consistently and correctly during treatment with molnupiravir and for at least 3 months after the last dose. The risk to sperm beyond 3 months is not known. Studies to understand the risk to sperm beyond 3 months are ongoing. Talk to your healthcare provider about reliable birth control methods. Talk to your healthcare provider if you have questions or concerns about how molnupiravir may affect sperm.    How do I take molnupiravir?    - Take molnupiravir exactly as your healthcare provider tells you to take it.  - Take 4 capsules of molnupiravir every 12 hours (for example, at 8 am and at 8 pm)  - Take molnupiravir for 5 days. It is important that you complete the full 5 days of treatment with molnupiravir. Do not stop taking molnupiravir before you complete the full 5 days of treatment, even if you feel better.  - Take molnupiravir with or without food.  - You should stay in isolation for as long as your healthcare provider tells you to. Talk to your healthcare provider if you are not sure about how to properly isolate while you have COVID-19.  - Swallow molnupiravir capsules whole. Do not open, break, or crush the capsules. If you cannot swallow capsules whole, tell your healthcare provider.    What to do if you miss a dose:  - If it has been less than 10 hours since the missed dose, take it as soon as you remember  - If it has been more than 10 hours since the missed dose, skip the missed dose and take your dose at the next scheduled time.  - Do not double the dose of molnupiravir to make up for a missed dose.    What are the important possible side effects of molnupiravir?    Possible side effects of molnupiravir are:  - See, “What is the most important information I should know about molnupiravir?”  - Diarrhea  - Nausea  - Dizziness    These are not all the possible side effects  of molnupiravir. Not many people have taken molnupiravir. Serious and unexpected side effects may happen. This medicine is still being studied, so it is possible that all of the risks are not known at this time.    What other treatment choices are there?    Like molnupiravir, FDA may allow for the emergency use of other medicines to treat people with COVID-19. Go to https://www.fda.gov/emergency-preparedness-and-response/mcm-legalregulatory-and-policy-framework/emergency-use-authorization for more information.  It is your choice to be treated or not to be treated with molnupiravir. Should you decide not to take it, it will not change your standard medical care.    What if I am breastfeeding?  Breastfeeding is not recommended during treatment with molnupiravir and for 4 days after the last dose of molnupiravir. If you are breastfeeding or plan to breastfeed, talk to your healthcare provider about your options and specific situation before taking molnupiravir.    How do I report side effects with molnupiravir?    Contact your healthcare provider if you have any side effects that bother you or do not go away. Report side effects to FDA MedWatch at www.fda.gov/medwatch or call 8-328-WXY-9603 (1-719.782.5563).    How should I store molnupiravir?  - Store molnupiravir capsules at room temperature between 68°F to 77°F (20°C to 25°C).  - Keep molnupiravir and all medicines out of the reach of children and pets.    Full fact sheet for patients, parents, and caregivers can be found at: https://www.fda.gov/media/050632/download    I have spent a total time of 5 minutes on the day of the encounter for this patient including patient and family education, importance of treatment compliance, risk factor reductions and impressions.       Encounter provider: Sherley William MD     Provider located at: [facility]  [address] [id number]     Recent  Visits  No visits were found meeting these conditions.  Showing recent visits within past 7 days and meeting all other requirements  Today's Visits  Date Type Provider Dept   12/26/23 Telemedicine Sherley William MD [facility]   Showing today's visits and meeting all other requirements  Future Appointments  No visits were found meeting these conditions.  Showing future appointments within next 150 days and meeting all other requirements     This virtual check-in was done via [employer] Embedded and patient was informed that this is a secure, HIPAA-compliant platform. He agrees to proceed.    Patient agrees to participate in a virtual check in via telephone or video visit instead of presenting to the office to address urgent/immediate medical needs. Patient is aware this is a billable service. He acknowledged consent and understanding of privacy and security of the video platform. The patient has agreed to participate and understands they can discontinue the visit at any time.    After connecting through [employer], the patient was identified by name and date of birth. Raphael Pack was informed that this was a telemedicine visit and that the exam was being conducted confidentially over secure lines. My office door was closed. No one else was in the room. Raphael Pakc acknowledged consent and understanding of privacy and security of the telemedicine visit. I informed the patient that I have reviewed his record in Epic and presented the opportunity for him to ask any questions regarding the visit today. The patient agreed to participate.     Verification of patient location:  Patient is located in the following state in which I hold an active license: [place]    Subjective:   Raphael Pack is a 68 y.o. male who is concerned about COVID-19. Patient's symptoms include nasal congestion, rhinorrhea, sore throat, cough and diarrhea. Patient denies fever, chills, fatigue, malaise, shortness of  breath, chest tightness, nausea and headaches.     - Date of symptom onset: 12/25/2023      COVID-19 vaccination status: Fully vaccinated (primary series)    Exposure:   Contact with a person who is under investigation (PUI) for or who is positive for COVID-19 within the last 14 days?: Yes    Hospitalized recently for fever and/or lower respiratory symptoms?: No      Currently a healthcare worker that is involved in direct patient care?: No      His wife tested (+) for COVID last week.  He started feeling sick yesterday, complains of a head cold. He has some post nasal drip, occasional dry cough. He has a sore throat for the drip and coughing. No fever or chills, no GI symptoms. He has ongoing loose stools /diarrhea.  His wife gave him zinc and Allegra-D.    Lab Results   Component Value Date    SARSCORONAVI Detected (A) 11/03/2021    SARSCOVAGH Positive (Patient Reported) (A) 12/26/2023       Review of Systems   Constitutional:  Negative for chills, fatigue and fever.   HENT:  Positive for congestion, rhinorrhea and sore throat.    Respiratory:  Positive for cough. Negative for chest tightness and shortness of breath.    Gastrointestinal:  Positive for diarrhea. Negative for nausea.   Neurological:  Negative for dizziness and headaches.     Current Outpatient Medications on File Prior to Visit   Medication Sig   • amLODIPine (NORVASC) 10 mg tablet Take 1 tablet (10 mg total) by mouth daily   • aspirin (ECOTRIN LOW STRENGTH) 81 mg EC tablet Take 1 tablet (81 mg total) by mouth daily   • atorvastatin (LIPITOR) 20 mg tablet Take 1 tablet (20 mg total) by mouth daily   • CareOne Unifine Pentips Plus 31G X 5 MM MISC INJECT UNDER THE SKIN EVERY MORNING   • carvedilol (COREG) 12.5 mg tablet Take 1 tablet (12.5 mg total) by mouth 2 (two) times a day with meals   • Continuous Blood Gluc  (GrexItyle Paul 14 Day [place]) LEO USE TO CHECK BLOOD GLUCOSE   • Continuous Blood Gluc Sensor (FreeStyle Paul 14 Day Sensor)  "MISC Inject 1 application. under the skin every 14 (fourteen) days   • furosemide (LASIX) 20 mg tablet Take 1 tablet (20 mg total) by mouth 3 (three) times a week   • gabapentin (NEURONTIN) 300 mg capsule Take 1 capsule (300 mg total) by mouth 2 (two) times a day   • glimepiride (AMARYL) 4 mg tablet Take 1/2 tablet with breakfast and 1 tablet with dinner.   • HYDROcodone-acetaminophen (NORCO) 5-325 mg per tablet Take 1 tablet by mouth daily as needed for pain Max Daily Amount: 1 tablet   • Insulin Glargine Solostar (Lantus SoloStar) 100 UNIT/ML SOPN Inject 0.14 mL (14 Units total) under the skin daily at bedtime Or as directed, TDD up to 20 units daily   • Insulin Pen Needle (CareOne Unifine Pentips Plus) 31G X 5 MM MISC Inject under the skin in the morning   • levothyroxine 75 mcg tablet Take 1 tablet (75 mcg total) by mouth daily   • losartan (COZAAR) 25 mg tablet Take 1 tablet (25 mg total) by mouth daily   • metFORMIN (GLUCOPHAGE-XR) 500 mg 24 hr tablet Take 2 tablets (1,000 mg total) by mouth 2 (two) times a day with meals   • methocarbamol (ROBAXIN) 750 mg tablet Take 1 tablet (750 mg total) by mouth 2 (two) times a day as needed for muscle spasms   • sildenafil (VIAGRA) 50 MG tablet Take 1 tablet (50 mg total) by mouth as needed for erectile dysfunction       Objective:    Ht 5' 10\" (1.778 m)   BMI 40.03 kg/m²        Physical Exam  Constitutional:       Appearance: Normal appearance.   HENT:      Head: Normocephalic and atraumatic.      Mouth/Throat:      Mouth: Mucous membranes are moist.   Eyes:      Pupils: Pupils are equal, round, and reactive to light.   Pulmonary:      Effort: Pulmonary effort is normal. No respiratory distress.   Neurological:      General: No focal deficit present.      Mental Status: He is alert and oriented to person, place, and time.   Psychiatric:         Mood and Affect: Mood normal.         Behavior: Behavior normal.       Sherley William MD    "

## 2023-12-27 RX ORDER — NIRMATRELVIR AND RITONAVIR 300-100 MG
3 KIT ORAL 2 TIMES DAILY
Qty: 30 TABLET | Refills: 0 | Status: SHIPPED | OUTPATIENT
Start: 2023-12-27 | End: 2023-12-27 | Stop reason: SDUPTHER

## 2023-12-27 RX ORDER — NIRMATRELVIR AND RITONAVIR 300-100 MG
3 KIT ORAL 2 TIMES DAILY
Qty: 30 TABLET | Refills: 0 | Status: SHIPPED | OUTPATIENT
Start: 2023-12-27 | End: 2024-01-01

## 2023-12-27 NOTE — TELEPHONE ENCOUNTER
Pt wife called regarding the Paxlovid. She states she went earlier to the pharmacy and they stated that they have not received the Rx yet. I did inform wife that per the E-prescribing status on Rx that the Receipt confirmed by pharmacy on 12/27/23 @ 3:23pm. She states she will call pharmacy again to see if they received it now. Wife requesting if Rx could be re faxed? Pt wife requesting a call with update. Please advise. Thank you!

## 2023-12-27 NOTE — TELEPHONE ENCOUNTER
Please call patient or wife.  Paxlovid sent to the pharmacy.   He needs to stop taking atorvastatin for 10 days when he starts.  Do not take sildenafil (Viagra) and limit use of hydrocodone (pain pill).  Monitor for worsening of diarrhea, loose stools.

## 2023-12-27 NOTE — TELEPHONE ENCOUNTER
Patient's wife calling stating she cannot get the medication (molnupiravir) that was called in for him anywhere- she's called multiple pharmacies and no one has it.  She is wondering if he can go on paxlovid?  Please advise, she is afraid he will get sicker without medication.

## 2023-12-27 NOTE — TELEPHONE ENCOUNTER
Patient was calling to to check if Paxlovid was sent to the pharmacy. Informed patient it was sent to the [facility] pharmacy today. No further action needed at this time.

## 2023-12-27 NOTE — TELEPHONE ENCOUNTER
Please jensen patient/ wife.  What anti diarrhea medication does he take? Imodium? Ok to take as needed only.  If not leaving the house, prefer not to take it.

## 2024-01-03 DIAGNOSIS — I10 BENIGN ESSENTIAL HYPERTENSION: ICD-10-CM

## 2024-01-03 DIAGNOSIS — I10 ESSENTIAL HYPERTENSION: ICD-10-CM

## 2024-01-03 RX ORDER — CARVEDILOL 12.5 MG/1
12.5 TABLET ORAL 2 TIMES DAILY WITH MEALS
Qty: 180 TABLET | Refills: 0 | Status: SHIPPED | OUTPATIENT
Start: 2024-01-03

## 2024-01-03 RX ORDER — AMLODIPINE BESYLATE 10 MG/1
10 TABLET ORAL DAILY
Qty: 90 TABLET | Refills: 0 | Status: SHIPPED | OUTPATIENT
Start: 2024-01-03

## 2024-01-04 ENCOUNTER — VBI (OUTPATIENT)
Dept: ADMINISTRATIVE | Facility: OTHER | Age: 69
End: 2024-01-04

## 2024-01-05 ENCOUNTER — OFFICE VISIT (OUTPATIENT)
Dept: ENDOCRINOLOGY | Facility: CLINIC | Age: 69
End: 2024-01-05
Payer: COMMERCIAL

## 2024-01-05 VITALS
OXYGEN SATURATION: 98 % | BODY MASS INDEX: 40.32 KG/M2 | HEART RATE: 67 BPM | DIASTOLIC BLOOD PRESSURE: 72 MMHG | WEIGHT: 281 LBS | SYSTOLIC BLOOD PRESSURE: 136 MMHG

## 2024-01-05 DIAGNOSIS — E11.65 TYPE 2 DIABETES MELLITUS WITH HYPERGLYCEMIA, WITH LONG-TERM CURRENT USE OF INSULIN (HCC): ICD-10-CM

## 2024-01-05 DIAGNOSIS — E11.65 UNCONTROLLED TYPE 2 DIABETES MELLITUS WITH HYPERGLYCEMIA (HCC): ICD-10-CM

## 2024-01-05 DIAGNOSIS — I10 ESSENTIAL HYPERTENSION: ICD-10-CM

## 2024-01-05 DIAGNOSIS — Z79.4 TYPE 2 DIABETES MELLITUS WITH HYPERGLYCEMIA, WITH LONG-TERM CURRENT USE OF INSULIN (HCC): ICD-10-CM

## 2024-01-05 DIAGNOSIS — E66.01 OBESITY, MORBID, BMI 40.0-49.9 (HCC): ICD-10-CM

## 2024-01-05 DIAGNOSIS — N18.31 TYPE 2 DIABETES MELLITUS WITH STAGE 3A CHRONIC KIDNEY DISEASE, WITHOUT LONG-TERM CURRENT USE OF INSULIN (HCC): ICD-10-CM

## 2024-01-05 DIAGNOSIS — E03.9 ACQUIRED HYPOTHYROIDISM: ICD-10-CM

## 2024-01-05 DIAGNOSIS — E53.8 VITAMIN B 12 DEFICIENCY: ICD-10-CM

## 2024-01-05 DIAGNOSIS — E11.42 TYPE 2 DIABETES MELLITUS WITH DIABETIC POLYNEUROPATHY, WITHOUT LONG-TERM CURRENT USE OF INSULIN (HCC): Primary | ICD-10-CM

## 2024-01-05 DIAGNOSIS — E11.22 TYPE 2 DIABETES MELLITUS WITH STAGE 3A CHRONIC KIDNEY DISEASE, WITHOUT LONG-TERM CURRENT USE OF INSULIN (HCC): ICD-10-CM

## 2024-01-05 DIAGNOSIS — I10 PRIMARY HYPERTENSION: ICD-10-CM

## 2024-01-05 DIAGNOSIS — E78.2 MIXED HYPERLIPIDEMIA: ICD-10-CM

## 2024-01-05 PROCEDURE — 95251 CONT GLUC MNTR ANALYSIS I&R: CPT | Performed by: INTERNAL MEDICINE

## 2024-01-05 PROCEDURE — 99214 OFFICE O/P EST MOD 30 MIN: CPT | Performed by: INTERNAL MEDICINE

## 2024-01-05 RX ORDER — INSULIN GLARGINE 100 [IU]/ML
INJECTION, SOLUTION SUBCUTANEOUS
Qty: 15 ML | Refills: 1 | Status: SHIPPED | OUTPATIENT
Start: 2024-01-05

## 2024-01-05 NOTE — PROGRESS NOTES
Raphael Pack 68 y.o. male MRN: [id number]    Encounter: [id number]      Assessment/Plan     Assessment:  This is a 68 y.o.-year-old male with diabetes with hyperglycemia.    Plan:  Diagnoses and all orders for this visit:    Type 2 diabetes mellitus with diabetic polyneuropathy, without long-term current use of insulin (Formerly [facility])  Lab Results   Component Value Date    HGBA1C 8.1 (H) 12/04/2023   A1C is 8.1%, uncontrolled.  Patient has not been taking Trulicity as well as Jardiance/Farxiga because of cost reasons and insurance coverage.  Based on fasting blood sugars elevation, will increase Lantus to 22 units at bedtime, continue glimepiride and metformin at current dose  Discussed with patient to keep carbohydrate consistent with meals to avoid fluctuation in blood sugars.  Discussed to start exercise routine as tolerated.  Discussed the importance of follow-up with ophthalmology and podiatry    -     Hemoglobin A1C; Future  -     Basic metabolic panel; Future  -     Albumin / creatinine urine ratio; Future  -     Vitamin B12; Future    Type 2 diabetes mellitus with hyperglycemia, with long-term current use of insulin (Formerly [facility])    Lab Results   Component Value Date    HGBA1C 8.1 (H) 12/04/2023   A1c is uncontrolled.  Adjustment made to insulin regimen    -     Insulin Glargine Solostar (Lantus SoloStar) 100 UNIT/ML SOPN; Inject 22 units at bedtime    Acquired hypothyroidism  No recent thyroid blood work.  Will order TSH and free T4 before next appointment.  Patient is asymptomatic.  Continue current dose of levothyroxine 75 mcg daily  -     T4, free; Future  -     TSH, 3rd generation; Future    Essential hypertension  Blood pressure well-controlled.  Continue current management  Uncontrolled type 2 diabetes mellitus with hyperglycemia (HCC)  Adjustment made to insulin regimen  Mixed hyperlipidemia  Lab Results   Component Value Date    LDLCALC 80 12/04/2023   LDL is 80  Continue statins  Primary hypertension  Blood  pressure well-controlled, continue current management  Obesity, morbid, BMI 40.0-49.9 ([facility])  Educated about lifestyle modifications  Type 2 diabetes mellitus with stage 3a chronic kidney disease, without long-term current use of insulin ([facility])  Lab Results   Component Value Date    CREATININE 0.75 12/04/2023     Stable, continue to monitor  She denies microalbuminuria recently started on angiotensin receptor blocker  If microalbuminuria worsens will refer to nephrology    Vitamin B 12 deficiency  Obtain vitamin B12 level  -     Vitamin B12; Future         CC: Diabetes    History of Present Illness     HPI:    Raphael Pack is 68-year-old male with medical history of type 2 diabetes with long-term insulin use is here for follow-up.  Diabetes course has been stable.  He has complications of diabetes such as CKD, neuropathy.  Denies recent hospitalization for hyperglycemia or hypoglycemia..    Current regimen includes Levemir 16 units at bedtime,  glimepiride 2 mg with breakfast and 4 mg with dinner, metformin 1000 mg twice a day/  Checks blood sugars 2-3 times daily.  His blood sugars usually average in 180 to 250 mg per DL range/  Admits compliance with diet, keeps carbohydrate consistent with meals/  Uses continuous glucose monitor sensor by reji 2 weeks ago review from December 22 to January 4, 2024 reviewed than 72 hours of data reviewed.  Average glucose is 193 mg per DL, glucose variability is 33.6%  53% blood sugars are in target range, 0% blood sugars are low, 26% blood sugars are high, 21% blood sugars are very high  Patient has pattern of elevated blood sugars in the morning from 3 AM to 9 AM, also patient has elevated blood sugars in between 3 PM to 6 PM  Ophthalmology appointment- needs to make appt    Podiatry  appointment- needs to make appt     For hypertension, he takes Cozaar 25 mg daily carvedilol 12.5 mg twice a day  For hyperlipidemia, he takes Lipitor 20 mg daily    Lab Results   Component  Value Date    [id number] 3.498 08/16/2022       1 HM Topic      Component 8/23/22 10:42 AM   Right Eye Diabetic Retinopathy None   Left Eye Diabetic Retinopathy None        Lab Results   Component Value Date    HGBA1C 8.1 (H) 12/04/2023     Component      Latest Ref Rng 12/4/2023   Sodium      135 - 147 mmol/L 136    Potassium      3.5 - 5.3 mmol/L 4.5    Chloride      96 - 108 mmol/L 105    CO2      21 - 32 mmol/L 26    Anion Gap      mmol/L 5    BUN      5 - 25 mg/dL 18    Creatinine      0.60 - 1.30 mg/dL 0.75    GLUCOSE FASTING      65 - 99 mg/dL 103 (H)    Calcium      8.4 - 10.2 mg/dL 8.8    eGFR      ml/min/1.73sq m 94    Cholesterol      See Comment mg/dL 167    Triglycerides      See Comment mg/dL 239 (H)    HDL      >=40 mg/dL 39 (L)    LDL Calculated      0 - 100 mg/dL 80    EXT Creatinine Urine      Reference range not established. mg/dL 72.9    Albumin,U,Random      <20.0 mg/L 1,232.6 (H)    Albumin Creat Ratio      0 - 30 mg/g creatinine 1,691 (H)    Hemoglobin A1C      Normal 4.0-5.6%; PreDiabetic 5.7-6.4%; Diabetic >=6.5%; Glycemic control for adults with diabetes <7.0% % 8.1 (H)    eAG, EST AVG Glucose      mg/dl 186    EXT SARS-CoV-2 Ag Home Testing      Negative (Patient Reported)             Review of Systems   Constitutional:  Positive for fatigue (Was recently diagnosed with COVID 19 infection). Negative for activity change, diaphoresis, fever and unexpected weight change.   HENT:  Positive for postnasal drip and rhinorrhea.    Eyes:  Negative for visual disturbance.   Respiratory:  Positive for cough. Negative for chest tightness and shortness of breath.    Cardiovascular:  Negative for chest pain, palpitations and leg swelling.   Gastrointestinal:  Negative for abdominal pain, blood in stool, constipation, diarrhea, nausea and vomiting.   Endocrine: Negative for cold intolerance, heat intolerance, polydipsia, polyphagia and polyuria.   Genitourinary:  Negative for dysuria, enuresis,  frequency and urgency.   Musculoskeletal:  Negative for arthralgias and myalgias.   Skin:  Negative for pallor, rash and wound.   Allergic/Immunologic: Negative.    Neurological:  Negative for dizziness, tremors, weakness and numbness.   Hematological: Negative.    Psychiatric/Behavioral: Negative.         Historical Information   Past Medical History:   Diagnosis Date    BPH (benign prostatic hyperplasia)     Chronic kidney disease     CPAP (continuous positive airway pressure) dependence     Diabetes mellitus (HCC)     Disease of thyroid gland     Hyperlipidemia     Hypertension     Sleep apnea     Type 2 diabetes mellitus with diabetic chronic kidney disease (HCC) 2021     Past Surgical History:   Procedure Laterality Date    COLONOSCOPY      2016    PA EGD TRANSORAL BIOPSY SINGLE/MULTIPLE N/A 2017    Procedure: ESOPHAGOGASTRODUODENOSCOPY (EGD) with bx;  Surgeon: Patel Chaudhary MD;  Location: [facility];  Service: Bariatrics    TONSILLECTOMY       Social History   Social History     Substance and Sexual Activity   Alcohol Use Yes    Comment: social     Social History     Substance and Sexual Activity   Drug Use No     Social History     Tobacco Use   Smoking Status Former    Current packs/day: 0.00    Types: Cigarettes    Start date:     Quit date:     Years since quittin.0   Smokeless Tobacco Never     Family History:   Family History   Problem Relation Age of Onset    Lung cancer Mother     Diabetes Father     Cancer Brother     Alcohol abuse Neg Hx     Substance Abuse Neg Hx     Mental illness Neg Hx     Depression Neg Hx        Meds/Allergies   Current Outpatient Medications   Medication Sig Dispense Refill    amLODIPine (NORVASC) 10 mg tablet Take 1 tablet (10 mg total) by mouth daily 90 tablet 0    aspirin (ECOTRIN LOW STRENGTH) 81 mg EC tablet Take 1 tablet (81 mg total) by mouth daily 90 tablet 0    atorvastatin (LIPITOR) 20 mg tablet Take 1 tablet (20 mg total) by mouth daily 90  tablet 0    [employer] Unifine Pentips Plus 31G X 5 MM MISC INJECT UNDER THE SKIN EVERY MORNING 100 each 0    carvedilol (COREG) 12.5 mg tablet Take 1 tablet (12.5 mg total) by mouth 2 (two) times a day with meals 180 tablet 0    Continuous Blood Gluc  (FreeStyle Paul 14 Day [place]) LEO USE TO CHECK BLOOD GLUCOSE      Continuous Blood Gluc Sensor (FreeStyle Paul 14 Day Sensor) MISC Inject 1 application. under the skin every 14 (fourteen) days 6 each 1    furosemide (LASIX) 20 mg tablet Take 1 tablet (20 mg total) by mouth 3 (three) times a week 36 tablet 3    gabapentin (NEURONTIN) 300 mg capsule Take 1 capsule (300 mg total) by mouth 2 (two) times a day 60 capsule 2    glimepiride (AMARYL) 4 mg tablet Take 1/2 tablet with breakfast and 1 tablet with dinner. 180 tablet 0    HYDROcodone-acetaminophen (NORCO) 5-325 mg per tablet Take 1 tablet by mouth daily as needed for pain Max Daily Amount: 1 tablet 30 tablet 0    Insulin Glargine Solostar (Lantus SoloStar) 100 UNIT/ML SOPN Inject 22 units at bedtime 15 mL 1    Insulin Pen Needle ([employer]One Unifine Pentips Plus) 31G X 5 MM MISC Inject under the skin in the morning 100 each 0    levothyroxine 75 mcg tablet Take 1 tablet (75 mcg total) by mouth daily 30 tablet 0    losartan (COZAAR) 25 mg tablet Take 1 tablet (25 mg total) by mouth daily 90 tablet 3    metFORMIN (GLUCOPHAGE-XR) 500 mg 24 hr tablet Take 2 tablets (1,000 mg total) by mouth 2 (two) times a day with meals 360 tablet 0    methocarbamol (ROBAXIN) 750 mg tablet Take 1 tablet (750 mg total) by mouth 2 (two) times a day as needed for muscle spasms 60 tablet 2    sildenafil (VIAGRA) 50 MG tablet Take 1 tablet (50 mg total) by mouth as needed for erectile dysfunction 10 tablet 0     No current facility-administered medications for this visit.     Allergies   Allergen Reactions    Penicillins      SYNCOPE, but has taken amoxicillin/augmentin in past       Objective   Vitals: Blood pressure 136/72, pulse  "67, weight 127 kg (281 lb), SpO2 98%.    Physical Exam  Constitutional:       General: He is not in acute distress.     Appearance: Normal appearance. He is not ill-appearing.   HENT:      Head: Normocephalic and atraumatic.      Nose: Nose normal.   Eyes:      Extraocular Movements: Extraocular movements intact.      Conjunctiva/sclera: Conjunctivae normal.   Pulmonary:      Effort: No respiratory distress.   Musculoskeletal:      Cervical back: Normal range of motion.   Neurological:      General: No focal deficit present.      Mental Status: He is alert and oriented to person, place, and time.   Psychiatric:         Mood and Affect: Mood normal.         Behavior: Behavior normal.         The history was obtained from the review of the chart, patient.    Lab Results:   Lab Results   Component Value Date/Time    Hemoglobin A1C 8.1 (H) 12/04/2023 09:50 AM    Hemoglobin A1C 7.5 (A) 08/14/2023 09:26 AM    Hemoglobin A1C 7.1 (H) 03/17/2023 08:39 AM    WBC 9.73 03/17/2023 08:39 AM    Hemoglobin 16.2 03/17/2023 08:39 AM    Hematocrit 49.6 (H) 03/17/2023 08:39 AM    MCV 92 03/17/2023 08:39 AM    Platelets 230 03/17/2023 08:39 AM    BUN 18 12/04/2023 09:50 AM    BUN 15 03/17/2023 08:39 AM    Potassium 4.5 12/04/2023 09:50 AM    Potassium 4.3 03/17/2023 08:39 AM    Chloride 105 12/04/2023 09:50 AM    Chloride 102 03/17/2023 08:39 AM    CO2 26 12/04/2023 09:50 AM    CO2 30 03/17/2023 08:39 AM    Creatinine 0.75 12/04/2023 09:50 AM    Creatinine 0.81 03/17/2023 08:39 AM    HDL, Direct 39 (L) 12/04/2023 09:50 AM    Triglycerides 239 (H) 12/04/2023 09:50 AM           Imaging Studies: I have personally reviewed pertinent reports.      Portions of the record may have been created with voice recognition software. Occasional wrong word or \"sound a like\" substitutions may have occurred due to the inherent limitations of voice recognition software. Read the chart carefully and recognize, using context, where substitutions have " occurred.

## 2024-01-12 ENCOUNTER — TELEPHONE (OUTPATIENT)
Dept: ENDOCRINOLOGY | Facility: CLINIC | Age: 69
End: 2024-01-12

## 2024-01-12 DIAGNOSIS — Z79.4 TYPE 2 DIABETES MELLITUS WITH HYPERGLYCEMIA, WITH LONG-TERM CURRENT USE OF INSULIN (HCC): ICD-10-CM

## 2024-01-12 DIAGNOSIS — E11.65 TYPE 2 DIABETES MELLITUS WITH HYPERGLYCEMIA, WITH LONG-TERM CURRENT USE OF INSULIN (HCC): ICD-10-CM

## 2024-01-12 RX ORDER — INSULIN GLARGINE 100 [IU]/ML
INJECTION, SOLUTION SUBCUTANEOUS
Status: CANCELLED | OUTPATIENT
Start: 2024-01-12

## 2024-01-12 RX ORDER — INSULIN GLARGINE 100 [IU]/ML
INJECTION, SOLUTION SUBCUTANEOUS
Qty: 15 ML | Refills: 1 | Status: CANCELLED | OUTPATIENT
Start: 2024-01-12

## 2024-01-12 RX ORDER — INSULIN GLARGINE 100 [IU]/ML
INJECTION, SOLUTION SUBCUTANEOUS
Qty: 15 ML | Refills: 1 | Status: SHIPPED | OUTPATIENT
Start: 2024-01-12

## 2024-01-12 NOTE — TELEPHONE ENCOUNTER
Yes we can switch to Lantus Solostar 22 units at bedtime  Please make prescription ready    Omer Xavier

## 2024-01-12 NOTE — TELEPHONE ENCOUNTER
A prior authorization is required for Insulin Glargine Solostar 100unit/mL pen-injectors.   No prio authorization is required for Lantus, Lantus Solostar, or NovoLOG flexpen. Would the patient be able to be switched to one of these alternative insulins?

## 2024-01-31 DIAGNOSIS — N52.9 ERECTILE DYSFUNCTION, UNSPECIFIED ERECTILE DYSFUNCTION TYPE: ICD-10-CM

## 2024-01-31 DIAGNOSIS — M54.41 CHRONIC MIDLINE LOW BACK PAIN WITH RIGHT-SIDED SCIATICA: ICD-10-CM

## 2024-01-31 DIAGNOSIS — G89.29 CHRONIC MIDLINE LOW BACK PAIN WITH RIGHT-SIDED SCIATICA: ICD-10-CM

## 2024-01-31 DIAGNOSIS — M51.16 LUMBAR DISC DISEASE WITH RADICULOPATHY: ICD-10-CM

## 2024-01-31 RX ORDER — SILDENAFIL 50 MG/1
50 TABLET, FILM COATED ORAL AS NEEDED
Qty: 10 TABLET | Refills: 0 | Status: SHIPPED | OUTPATIENT
Start: 2024-01-31

## 2024-01-31 RX ORDER — GABAPENTIN 300 MG/1
300 CAPSULE ORAL 2 TIMES DAILY
Qty: 60 CAPSULE | Refills: 0 | Status: SHIPPED | OUTPATIENT
Start: 2024-01-31

## 2024-02-01 RX ORDER — HYDROCODONE BITARTRATE AND ACETAMINOPHEN 5; 325 MG/1; MG/1
1 TABLET ORAL DAILY PRN
Qty: 30 TABLET | Refills: 0 | Status: SHIPPED | OUTPATIENT
Start: 2024-02-01

## 2024-02-17 DIAGNOSIS — E11.22 TYPE 2 DIABETES MELLITUS WITH STAGE 3A CHRONIC KIDNEY DISEASE, WITHOUT LONG-TERM CURRENT USE OF INSULIN (HCC): ICD-10-CM

## 2024-02-17 DIAGNOSIS — R80.1 PERSISTENT PROTEINURIA: ICD-10-CM

## 2024-02-17 DIAGNOSIS — R60.9 PERIPHERAL EDEMA: ICD-10-CM

## 2024-02-17 DIAGNOSIS — N18.31 TYPE 2 DIABETES MELLITUS WITH STAGE 3A CHRONIC KIDNEY DISEASE, WITHOUT LONG-TERM CURRENT USE OF INSULIN (HCC): ICD-10-CM

## 2024-02-17 DIAGNOSIS — E11.65 UNCONTROLLED TYPE 2 DIABETES MELLITUS WITH HYPERGLYCEMIA (HCC): ICD-10-CM

## 2024-02-17 DIAGNOSIS — I10 ESSENTIAL HYPERTENSION: ICD-10-CM

## 2024-02-18 RX ORDER — FLASH GLUCOSE SENSOR
1 KIT MISCELLANEOUS
Qty: 6 EACH | Refills: 0 | Status: SHIPPED | OUTPATIENT
Start: 2024-02-18

## 2024-02-19 RX ORDER — LOSARTAN POTASSIUM 25 MG/1
25 TABLET ORAL DAILY
Qty: 90 TABLET | Refills: 3 | Status: SHIPPED | OUTPATIENT
Start: 2024-02-19

## 2024-02-19 RX ORDER — METFORMIN HYDROCHLORIDE 500 MG/1
1000 TABLET, EXTENDED RELEASE ORAL 2 TIMES DAILY WITH MEALS
Qty: 360 TABLET | Refills: 0 | Status: SHIPPED | OUTPATIENT
Start: 2024-02-19

## 2024-02-19 RX ORDER — FUROSEMIDE 20 MG/1
20 TABLET ORAL 3 TIMES WEEKLY
Qty: 36 TABLET | Refills: 3 | Status: SHIPPED | OUTPATIENT
Start: 2024-02-19

## 2024-03-18 ENCOUNTER — OFFICE VISIT (OUTPATIENT)
Dept: INTERNAL MEDICINE CLINIC | Facility: CLINIC | Age: 69
End: 2024-03-18
Payer: COMMERCIAL

## 2024-03-18 VITALS
WEIGHT: 283 LBS | TEMPERATURE: 97.3 F | HEIGHT: 70 IN | DIASTOLIC BLOOD PRESSURE: 82 MMHG | OXYGEN SATURATION: 95 % | BODY MASS INDEX: 40.52 KG/M2 | SYSTOLIC BLOOD PRESSURE: 122 MMHG | HEART RATE: 78 BPM

## 2024-03-18 DIAGNOSIS — J01.00 ACUTE NON-RECURRENT MAXILLARY SINUSITIS: Primary | ICD-10-CM

## 2024-03-18 PROCEDURE — G2211 COMPLEX E/M VISIT ADD ON: HCPCS | Performed by: NURSE PRACTITIONER

## 2024-03-18 PROCEDURE — 99213 OFFICE O/P EST LOW 20 MIN: CPT | Performed by: NURSE PRACTITIONER

## 2024-03-18 RX ORDER — DOXYCYCLINE HYCLATE 100 MG/1
100 CAPSULE ORAL EVERY 12 HOURS SCHEDULED
Qty: 14 CAPSULE | Refills: 0 | Status: SHIPPED | OUTPATIENT
Start: 2024-03-18 | End: 2024-03-25

## 2024-03-18 NOTE — PROGRESS NOTES
Name: Raphael Pack      : 1955      MRN: [id number]  Encounter Provider: ROLAND Mendez  Encounter Date: 3/18/2024   Encounter department: [facility] INTERNAL MEDICINE    Assessment & Plan     1. Acute non-recurrent maxillary sinusitis  -     doxycycline hyclate (VIBRAMYCIN) 100 mg capsule; Take 1 capsule (100 mg total) by mouth every 12 (twelve) hours for 7 days    Take antibiotics as prescribed with food. Start flonase 1 spray in each nostril daily. Ok to take robitussin or delsym as needed for cough.        David Chavez is here today with complaints of nasal congestion  Symptoms started about 3 weeks ago   He started with a cold- nasal congestion and cough.   He thought it was getting better but then over the last week, worsening sinus pressure and cough.   No fevers.   He tried dayquil and allegra with some relief.       Review of Systems   Constitutional:  Positive for fatigue. Negative for activity change, appetite change and fever.   HENT:  Positive for congestion, postnasal drip, rhinorrhea, sinus pressure and sinus pain. Negative for ear pain and sore throat.    Respiratory:  Positive for cough. Negative for chest tightness, shortness of breath and wheezing.    Gastrointestinal:  Negative for abdominal pain, diarrhea, nausea and vomiting.   Musculoskeletal:  Negative for myalgias.   Neurological:  Positive for headaches. Negative for dizziness and light-headedness.       Current Outpatient Medications on File Prior to Visit   Medication Sig    Lantus SoloStar 100 units/mL SOPN Inject 22 units at bedtime    amLODIPine (NORVASC) 10 mg tablet Take 1 tablet (10 mg total) by mouth daily    aspirin (ECOTRIN LOW STRENGTH) 81 mg EC tablet Take 1 tablet (81 mg total) by mouth daily    atorvastatin (LIPITOR) 20 mg tablet Take 1 tablet (20 mg total) by mouth daily    [employer] Unifine Pentips Plus 31G X 5 MM MISC INJECT UNDER THE SKIN EVERY MORNING    carvedilol (COREG) 12.5 mg  "tablet Take 1 tablet (12.5 mg total) by mouth 2 (two) times a day with meals    Continuous Blood Gluc  (FreeStyle Paul 14 Day [place]) LEO USE TO CHECK BLOOD GLUCOSE    Continuous Blood Gluc Sensor (FreeStyle Paul 14 Day Sensor) MISC Inject 1 application. under the skin every 14 (fourteen) days    furosemide (LASIX) 20 mg tablet Take 1 tablet (20 mg total) by mouth 3 (three) times a week    gabapentin (NEURONTIN) 300 mg capsule Take 1 capsule (300 mg total) by mouth 2 (two) times a day    glimepiride (AMARYL) 4 mg tablet Take 1/2 tablet with breakfast and 1 tablet with dinner.    HYDROcodone-acetaminophen (NORCO) 5-325 mg per tablet Take 1 tablet by mouth daily as needed for pain Max Daily Amount: 1 tablet    Insulin Glargine Solostar (Lantus SoloStar) 100 UNIT/ML SOPN Inject 22 units at bedtime    Insulin Pen Needle ([employer]One Unifine Pentips Plus) 31G X 5 MM MISC Inject under the skin in the morning    levothyroxine 75 mcg tablet Take 1 tablet (75 mcg total) by mouth daily    losartan (COZAAR) 25 mg tablet Take 1 tablet (25 mg total) by mouth daily    metFORMIN (GLUCOPHAGE-XR) 500 mg 24 hr tablet Take 2 tablets (1,000 mg total) by mouth 2 (two) times a day with meals    methocarbamol (ROBAXIN) 750 mg tablet Take 1 tablet (750 mg total) by mouth 2 (two) times a day as needed for muscle spasms    sildenafil (VIAGRA) 50 MG tablet Take 1 tablet (50 mg total) by mouth as needed for erectile dysfunction       Objective     /82   Pulse 78   Temp (!) 97.3 °F (36.3 °C)   Ht 5' 10\" (1.778 m)   Wt 128 kg (283 lb)   SpO2 95%   BMI 40.61 kg/m²     Physical Exam  Vitals reviewed.   Constitutional:       Appearance: Normal appearance.   HENT:      Head: Normocephalic and atraumatic.      Right Ear: Tympanic membrane, ear canal and external ear normal.      Left Ear: Tympanic membrane, ear canal and external ear normal.      Nose: Congestion present.      Mouth/Throat:      Pharynx: Posterior oropharyngeal " erythema present. No oropharyngeal exudate.   Eyes:      Conjunctiva/sclera: Conjunctivae normal.   Cardiovascular:      Rate and Rhythm: Normal rate and regular rhythm.      Heart sounds: Normal heart sounds.   Pulmonary:      Effort: Pulmonary effort is normal.      Breath sounds: Normal breath sounds.   Neurological:      Mental Status: He is alert and oriented to person, place, and time.   Psychiatric:         Mood and Affect: Mood normal.         Behavior: Behavior normal.       ROLAND Mendez

## 2024-03-25 DIAGNOSIS — N52.9 ERECTILE DYSFUNCTION, UNSPECIFIED ERECTILE DYSFUNCTION TYPE: ICD-10-CM

## 2024-03-25 DIAGNOSIS — M51.16 LUMBAR DISC DISEASE WITH RADICULOPATHY: ICD-10-CM

## 2024-03-25 DIAGNOSIS — G89.29 CHRONIC MIDLINE LOW BACK PAIN WITH RIGHT-SIDED SCIATICA: ICD-10-CM

## 2024-03-25 DIAGNOSIS — E11.42 TYPE 2 DIABETES MELLITUS WITH DIABETIC POLYNEUROPATHY, WITHOUT LONG-TERM CURRENT USE OF INSULIN (HCC): ICD-10-CM

## 2024-03-25 DIAGNOSIS — M54.41 CHRONIC MIDLINE LOW BACK PAIN WITH RIGHT-SIDED SCIATICA: ICD-10-CM

## 2024-03-25 RX ORDER — SILDENAFIL 50 MG/1
50 TABLET, FILM COATED ORAL AS NEEDED
Qty: 10 TABLET | Refills: 0 | Status: SHIPPED | OUTPATIENT
Start: 2024-03-25

## 2024-03-25 RX ORDER — GABAPENTIN 300 MG/1
300 CAPSULE ORAL 2 TIMES DAILY
Qty: 60 CAPSULE | Refills: 5 | Status: SHIPPED | OUTPATIENT
Start: 2024-03-25

## 2024-03-26 RX ORDER — GLIMEPIRIDE 4 MG/1
TABLET ORAL
Qty: 180 TABLET | Refills: 1 | Status: SHIPPED | OUTPATIENT
Start: 2024-03-26

## 2024-03-26 RX ORDER — HYDROCODONE BITARTRATE AND ACETAMINOPHEN 5; 325 MG/1; MG/1
1 TABLET ORAL DAILY PRN
Qty: 30 TABLET | Refills: 0 | Status: SHIPPED | OUTPATIENT
Start: 2024-03-26

## 2024-04-03 ENCOUNTER — RA CDI HCC (OUTPATIENT)
Dept: OTHER | Facility: HOSPITAL | Age: 69
End: 2024-04-03

## 2024-04-03 NOTE — PROGRESS NOTES
HCC coding opportunities          Chart Reviewed number of suggestions sent to Provider: 1     Patients Insurance     Medicare Insurance: [employer]

## 2024-04-11 ENCOUNTER — OFFICE VISIT (OUTPATIENT)
Dept: INTERNAL MEDICINE CLINIC | Facility: CLINIC | Age: 69
End: 2024-04-11

## 2024-04-11 VITALS
HEART RATE: 71 BPM | BODY MASS INDEX: 40.2 KG/M2 | WEIGHT: 280.8 LBS | OXYGEN SATURATION: 98 % | DIASTOLIC BLOOD PRESSURE: 78 MMHG | HEIGHT: 70 IN | TEMPERATURE: 96.8 F | SYSTOLIC BLOOD PRESSURE: 124 MMHG

## 2024-04-11 DIAGNOSIS — M54.41 CHRONIC MIDLINE LOW BACK PAIN WITH RIGHT-SIDED SCIATICA: ICD-10-CM

## 2024-04-11 DIAGNOSIS — I10 ESSENTIAL HYPERTENSION: ICD-10-CM

## 2024-04-11 DIAGNOSIS — N18.31 TYPE 2 DIABETES MELLITUS WITH STAGE 3A CHRONIC KIDNEY DISEASE, WITHOUT LONG-TERM CURRENT USE OF INSULIN (HCC): Primary | ICD-10-CM

## 2024-04-11 DIAGNOSIS — F11.20 UNCOMPLICATED OPIOID DEPENDENCE (HCC): ICD-10-CM

## 2024-04-11 DIAGNOSIS — E03.9 ACQUIRED HYPOTHYROIDISM: ICD-10-CM

## 2024-04-11 DIAGNOSIS — N18.2 CHRONIC KIDNEY DISEASE, STAGE 2 (MILD): ICD-10-CM

## 2024-04-11 DIAGNOSIS — E11.22 TYPE 2 DIABETES MELLITUS WITH STAGE 3A CHRONIC KIDNEY DISEASE, WITHOUT LONG-TERM CURRENT USE OF INSULIN (HCC): Primary | ICD-10-CM

## 2024-04-11 DIAGNOSIS — G47.33 OSA (OBSTRUCTIVE SLEEP APNEA): ICD-10-CM

## 2024-04-11 DIAGNOSIS — E78.2 MIXED HYPERLIPIDEMIA: ICD-10-CM

## 2024-04-11 DIAGNOSIS — M54.12 CERVICAL RADICULOPATHY: ICD-10-CM

## 2024-04-11 DIAGNOSIS — E11.42 TYPE 2 DIABETES MELLITUS WITH DIABETIC POLYNEUROPATHY, WITHOUT LONG-TERM CURRENT USE OF INSULIN (HCC): ICD-10-CM

## 2024-04-11 DIAGNOSIS — G89.29 CHRONIC MIDLINE LOW BACK PAIN WITH RIGHT-SIDED SCIATICA: ICD-10-CM

## 2024-04-11 DIAGNOSIS — E03.9 HYPOTHYROIDISM, UNSPECIFIED TYPE: ICD-10-CM

## 2024-04-11 RX ORDER — LEVOTHYROXINE SODIUM 0.07 MG/1
75 TABLET ORAL DAILY
Qty: 90 TABLET | Refills: 0 | Status: SHIPPED | OUTPATIENT
Start: 2024-04-11

## 2024-04-11 NOTE — ASSESSMENT & PLAN NOTE
Lab Results   Component Value Date    EGFR 94 12/04/2023    EGFR 91 03/17/2023    EGFR 102 08/16/2022    CREATININE 0.75 12/04/2023    CREATININE 0.81 03/17/2023    CREATININE 0.64 08/16/2022   Stable  Sees nephrology

## 2024-04-11 NOTE — PROGRESS NOTES
Name: Raphael Pack      : 1955      MRN: [id number]  Encounter Provider: ROLAND Mendez  Encounter Date: 2024   Encounter department: [facility] INTERNAL MEDICINE    Assessment & Plan     1. Type 2 diabetes mellitus with stage 3a chronic kidney disease, without long-term current use of insulin (HCC)  Assessment & Plan:  On lantus, metformin, and glimepiride.   Sees endo.       Orders:  -     CBC and differential; Future; Expected date: 2024    2. Type 2 diabetes mellitus with diabetic polyneuropathy, without long-term current use of insulin (HCC)  Assessment & Plan:  See above.   Foot exam done today.         3. Essential hypertension  Assessment & Plan:  Well controlled  On amlodipine, carvedilol, losartan, and furosemide.       4. JOSE RAUL (obstructive sleep apnea)  Assessment & Plan:  Schedule with sleep medicine.       5. Acquired hypothyroidism  Assessment & Plan:  On levothyroxine.       6. Chronic midline low back pain with right-sided sciatica  Assessment & Plan:  Had injections in the past with pain management.   Takes tylenol for mild pain and hydrocodone for severe pain- taking more often recently. Pain contract updated today.       7. Cervical radiculopathy  Assessment & Plan:  On gabapentin.       8. Chronic kidney disease, stage 2 (mild)  Assessment & Plan:  Lab Results   Component Value Date    EGFR 94 2023    EGFR 91 2023    EGFR 102 2022    CREATININE 0.75 2023    CREATININE 0.81 2023    CREATININE 0.64 2022   Stable  Sees nephrology       9. Mixed hyperlipidemia  Assessment & Plan:  Continue statin       10. Hypothyroidism, unspecified type  Assessment & Plan:  On levothyroxine.     Orders:  -     levothyroxine 75 mcg tablet; Take 1 tablet (75 mcg total) by mouth daily           Subjective      Scott is here today for follow up  He is doing ok.  He continues to have post nasal drip, slowly improving.     Sugars have been  better, on average about 130.     He denies any chest pain or shortness of breath.     Still with chronic back pain, norco provides relief.               Review of Systems   Constitutional:  Negative for activity change, appetite change, fatigue and unexpected weight change.   Eyes:  Negative for visual disturbance.   Respiratory:  Negative for cough and shortness of breath.    Cardiovascular:  Negative for chest pain, palpitations and leg swelling.   Gastrointestinal:  Negative for abdominal pain, blood in stool, constipation and diarrhea.   Genitourinary:  Negative for difficulty urinating.   Musculoskeletal:  Negative for arthralgias.   Skin:  Negative for rash.   Neurological:  Negative for dizziness, weakness, light-headedness and headaches.   Psychiatric/Behavioral:  Negative for sleep disturbance.        Current Outpatient Medications on File Prior to Visit   Medication Sig    amLODIPine (NORVASC) 10 mg tablet Take 1 tablet (10 mg total) by mouth daily    aspirin (ECOTRIN LOW STRENGTH) 81 mg EC tablet Take 1 tablet (81 mg total) by mouth daily    atorvastatin (LIPITOR) 20 mg tablet Take 1 tablet (20 mg total) by mouth daily    [employer]One Unifine Pentips Plus 31G X 5 MM MISC INJECT UNDER THE SKIN EVERY MORNING    carvedilol (COREG) 12.5 mg tablet Take 1 tablet (12.5 mg total) by mouth 2 (two) times a day with meals    Continuous Blood Gluc  (FreeStyle Paul 14 Day [place]) LEO USE TO CHECK BLOOD GLUCOSE    Continuous Blood Gluc Sensor (FreeStyle Paul 14 Day Sensor) MISC Inject 1 application. under the skin every 14 (fourteen) days    furosemide (LASIX) 20 mg tablet Take 1 tablet (20 mg total) by mouth 3 (three) times a week    gabapentin (NEURONTIN) 300 mg capsule Take 1 capsule (300 mg total) by mouth 2 (two) times a day    glimepiride (AMARYL) 4 mg tablet Take 1/2 tablet with breakfast and 1 tablet with dinner.    HYDROcodone-acetaminophen (NORCO) 5-325 mg per tablet Take 1 tablet by mouth daily as  "needed for pain Max Daily Amount: 1 tablet    Insulin Glargine Solostar (Lantus SoloStar) 100 UNIT/ML SOPN Inject 22 units at bedtime    Insulin Pen Needle ([employer]One Unifine Pentips Plus) 31G X 5 MM MISC Inject under the skin in the morning    Lantus SoloStar 100 units/mL SOPN Inject 22 units at bedtime    losartan (COZAAR) 25 mg tablet Take 1 tablet (25 mg total) by mouth daily    metFORMIN (GLUCOPHAGE-XR) 500 mg 24 hr tablet Take 2 tablets (1,000 mg total) by mouth 2 (two) times a day with meals    sildenafil (VIAGRA) 50 MG tablet Take 1 tablet (50 mg total) by mouth as needed for erectile dysfunction    [DISCONTINUED] levothyroxine 75 mcg tablet Take 1 tablet (75 mcg total) by mouth daily    methocarbamol (ROBAXIN) 750 mg tablet Take 1 tablet (750 mg total) by mouth 2 (two) times a day as needed for muscle spasms       Objective     /78   Pulse 71   Temp (!) 96.8 °F (36 °C)   Ht 5' 10\" (1.778 m)   Wt 127 kg (280 lb 12.8 oz)   SpO2 98%   BMI 40.29 kg/m²     Physical Exam  Vitals reviewed.   Constitutional:       Appearance: Normal appearance. He is well-developed.   HENT:      Head: Normocephalic and atraumatic.   Eyes:      Conjunctiva/sclera: Conjunctivae normal.   Neck:      Thyroid: No thyromegaly.   Cardiovascular:      Rate and Rhythm: Normal rate and regular rhythm.      Heart sounds: Normal heart sounds.   Pulmonary:      Effort: Pulmonary effort is normal.      Breath sounds: Normal breath sounds.   Abdominal:      General: Bowel sounds are normal.      Palpations: Abdomen is soft.   Musculoskeletal:         General: Normal range of motion.      Right lower leg: No edema.      Left lower leg: No edema.   Skin:     General: Skin is warm and dry.   Neurological:      Mental Status: He is alert and oriented to person, place, and time.   Psychiatric:         Mood and Affect: Mood normal.         Behavior: Behavior normal.       ROLAND Mendez    "

## 2024-04-11 NOTE — PATIENT INSTRUCTIONS
Goals of care:  Maximize your health and quality of life by:   Increasing your level of function and activity  Decreasing the negative effects of pain on your life  Minimizing the risks and side effects of medications and ensuring safe use of opioid medication     Ways for you to help meet your goals:  Maintain a healthy lifestyle. This includes proper nutrition, regular physical activity as able, try for 8 hours of sleep per night, use stress reduction strategies, avoid triggers.      Risks and side effects of opioid use:  Prescription opioids carry serious risks of addiction and  overdose, especially with prolonged use. An opioid overdose,  often marked by slowed breathing, can cause sudden death. The  use of prescription opioids can have a number of side effects as  well, even when taken as directed:  Tolerance--meaning you might need to take more of a medication for the same pain relief  Physical dependence--meaning you have symptoms of withdrawal when a medication is stopped  Increased sensitivity to pain  Constipation  Nausea, vomiting, dry mouth  Sleepiness and dizziness   Confusion  Depression  Low levels of testosterone that can result in lower sex drive, energy, and strength  Itching and sweating    If you are prescribed opioids for pain:  Never take opioids in greater amounts or more often than prescribed.  Help prevent misuse and abuse.        - Never sell or share prescription opioids.        - Never use another person’s prescription opioids.  ‡Store prescription opioids in a secure place and out of reach of others (this may include visitors, children, friends, and family).  Safely dispose of unused prescription opioids: Find your community drug take-back program or your pharmacy mail-back program, or flush them down the toilet, following guidance from the Food and Drug Administration (www.fda.gov/Drugs/ResourcesForYou).  ‡Visit www.cdc.gov/drugoverdose to learn about the risks of opioid abuse and  overdose.  If you believe you may be struggling with addiction, tell your health care provider and ask for guidance or call [facility]’s National Helpline at 9-554-951-SVPN.

## 2024-04-11 NOTE — ASSESSMENT & PLAN NOTE
On norco daily as needed, does not take everyday.   Pain contract UTD.  Pain assessment complete. PDMP appropriate.

## 2024-04-11 NOTE — PROGRESS NOTES
Assessment/Plan     Problem List Items Addressed This Visit       Type 2 diabetes mellitus with diabetic polyneuropathy, without long-term current use of insulin (HCC)     See above.   Foot exam done today.            JOSE RAUL (obstructive sleep apnea)     Schedule with sleep medicine.          Essential hypertension     Well controlled  On amlodipine, carvedilol, losartan, and furosemide.          Mixed hyperlipidemia     Continue statin          Hypothyroid     On levothyroxine.          Relevant Medications    levothyroxine 75 mcg tablet    Chronic midline low back pain with right-sided sciatica     Had injections in the past with pain management.   Takes tylenol for mild pain and hydrocodone for severe pain- taking more often recently. Pain contract updated today.          Chronic kidney disease, stage 2 (mild)     Lab Results   Component Value Date    EGFR 94 12/04/2023    EGFR 91 03/17/2023    EGFR 102 08/16/2022    CREATININE 0.75 12/04/2023    CREATININE 0.81 03/17/2023    CREATININE 0.64 08/16/2022   Stable  Sees nephrology          Type 2 diabetes mellitus with stage 3a chronic kidney disease, without long-term current use of insulin (HCC) - Primary     On lantus, metformin, and glimepiride.   Sees endo.            Relevant Orders    CBC and differential    Cervical radiculopathy     On gabapentin.          Uncomplicated opioid dependence (HCC)     On norco daily as needed, does not take everyday.   Pain contract UTD.  Pain assessment complete. PDMP appropriate.            Opioid Management Plan      Subjective     Opioid Management:   Type of visit: Follow-up    Pain related diagnoses: chronic back pain    Aberrant behavior?: No      Adverse effects from medication?: No      Screening Tools/Assessments:    PHQ-2/9:  PHQ-2 score: 0    Brief Pain Inventory (BPI):  1) Throughout our lives, most of us have had pain from time to time (such as minor headaches, sprains, and toothaches). Have you had pain other than  these everyday kinds of pain today? Yes  2) Where is your pain located? Above [place]; Lower Back  3) Rate your pain at its worst in the last 24 hours: 8  4) Rate your pain at its least in the last 24 hours: 2  5) Rate your average level of pain: 5  6) Rate your pain right now: 5  7) What treatments or medications are you receiving for your pain? Hydrocodone/Tylenol; Physical Therapy  8) In the past 24 hours, how much relief have pain treatments or medication provided? 50%  9) During the past 24 hours, pain has interfered with your:     A) General activity: 3     B) Mood: 4     C) Walking ability: 6     D) Normal work (work outside the home & housework): 5     E) Relations with other people: 1     F) Sleep: 6     G) Enjoyment of life: 4    COMM:  Current COMM Score: 4 (negative, low risk patient)    Opioid agreement:  Active Opioid agreement on file?: No    Opioid agreement signed date: 2/8/2023  Opioid agreement expiration date: 2/8/2024    Naloxone:  Currently prescribed Naloxone (Narcan): No    Reason not prescribing Naloxone: patient declines    Scott is here today for follow up  He is doing ok.  He continues to have post nasal drip, slowly improving.     Sugars have been better, on average about 130.     He denies any chest pain or shortness of breath.     Still with chronic back pain, norco provides relief.       Pain Medications               aspirin (ECOTRIN LOW STRENGTH) 81 mg EC tablet Take 1 tablet (81 mg total) by mouth daily    gabapentin (NEURONTIN) 300 mg capsule Take 1 capsule (300 mg total) by mouth 2 (two) times a day    HYDROcodone-acetaminophen (NORCO) 5-325 mg per tablet Take 1 tablet by mouth daily as needed for pain Max Daily Amount: 1 tablet    methocarbamol (ROBAXIN) 750 mg tablet Take 1 tablet (750 mg total) by mouth 2 (two) times a day as needed for muscle spasms           Outpatient Morphine Milligram Equivalents Per Day       4/11/24 and after 5 MME/Day      Order Name Dose Route Frequency  "Maximum MME/Day     HYDROcodone-acetaminophen (NORCO) 5-325 mg per tablet 1 tablet Oral Daily PRN 5 MME/Day    Total Potential Morphine Milligram Equivalents Per Day 5 MME/Day      Calculation Information          HYDROcodone-acetaminophen (NORCO) 5-325 mg per tablet    HYDROcodone-acetaminophen 5-325 mg Tabs: maximum daily dose of 5 mg of opioid in combo * morphine equivalence factor of 1 = 5 MME/Day                                 PDMP Review         Value Time User    PDMP Reviewed  Yes 3/26/2024  8:54 AM ROLAND Mendez           Review of Systems   Constitutional:  Negative for activity change, appetite change, fatigue and unexpected weight change.   HENT:  Positive for postnasal drip and rhinorrhea.    Eyes:  Negative for visual disturbance.   Respiratory:  Negative for cough, chest tightness, shortness of breath and wheezing.    Cardiovascular:  Negative for chest pain, palpitations and leg swelling.   Gastrointestinal:  Negative for abdominal pain, blood in stool, constipation and diarrhea.   Genitourinary:  Negative for difficulty urinating.   Musculoskeletal:  Positive for arthralgias.   Skin:  Negative for rash.   Neurological:  Negative for dizziness, light-headedness and headaches.   Psychiatric/Behavioral:  Negative for sleep disturbance.      Objective     /78   Pulse 71   Temp (!) 96.8 °F (36 °C)   Ht 5' 10\" (1.778 m)   Wt 127 kg (280 lb 12.8 oz)   SpO2 98%   BMI 40.29 kg/m²     Physical Exam  Vitals reviewed.   Constitutional:       Appearance: Normal appearance.   HENT:      Head: Normocephalic and atraumatic.   Eyes:      Conjunctiva/sclera: Conjunctivae normal.   Cardiovascular:      Rate and Rhythm: Normal rate and regular rhythm.      Heart sounds: Normal heart sounds.   Pulmonary:      Effort: Pulmonary effort is normal.      Breath sounds: Normal breath sounds.   Abdominal:      General: Bowel sounds are normal.      Palpations: Abdomen is soft.   Musculoskeletal:         " General: Normal range of motion.      Cervical back: Neck supple.      Right lower leg: No edema.      Left lower leg: No edema.   Skin:     General: Skin is warm and dry.   Neurological:      Mental Status: He is alert and oriented to person, place, and time.   Psychiatric:         Mood and Affect: Mood normal.         Behavior: Behavior normal.         ROLAND Mendez

## 2024-04-11 NOTE — PROGRESS NOTES
Diabetic Foot Exam    Patient's shoes and socks removed.    Right Foot/Ankle   Right Foot Inspection  Skin Exam: skin normal and skin intact. No dry skin, no warmth, no callus, no erythema, no maceration, no abnormal color, no pre-ulcer, no ulcer and no callus.     Toe Exam: ROM and strength within normal limits.     Sensory   Monofilament testing: diminished    Vascular  Capillary refills: < 3 seconds  The right DP pulse is 1+. The right PT pulse is 1+.     Left Foot/Ankle  Left Foot Inspection  Skin Exam: skin normal and skin intact. No dry skin, no warmth, no erythema, no maceration, normal color, no pre-ulcer, no ulcer and no callus.     Toe Exam: ROM and strength within normal limits.     Sensory   Monofilament testing: diminished    Vascular  Capillary refills: < 3 seconds  The left DP pulse is 1+. The left PT pulse is 1+.     Assign Risk Category  No deformity present  No loss of protective sensation  No weak pulses  Risk: 0

## 2024-04-11 NOTE — ASSESSMENT & PLAN NOTE
Had injections in the past with pain management.   Takes tylenol for mild pain and hydrocodone for severe pain- taking more often recently. Pain contract updated today.

## 2024-04-24 DIAGNOSIS — E11.65 TYPE 2 DIABETES MELLITUS WITH HYPERGLYCEMIA, WITH LONG-TERM CURRENT USE OF INSULIN (HCC): ICD-10-CM

## 2024-04-24 DIAGNOSIS — Z79.4 TYPE 2 DIABETES MELLITUS WITH HYPERGLYCEMIA, WITH LONG-TERM CURRENT USE OF INSULIN (HCC): ICD-10-CM

## 2024-04-24 RX ORDER — INSULIN GLARGINE 100 [IU]/ML
INJECTION, SOLUTION SUBCUTANEOUS
Qty: 15 ML | Refills: 1 | Status: SHIPPED | OUTPATIENT
Start: 2024-04-24

## 2024-04-28 DIAGNOSIS — N52.9 ERECTILE DYSFUNCTION, UNSPECIFIED ERECTILE DYSFUNCTION TYPE: ICD-10-CM

## 2024-04-28 DIAGNOSIS — M54.41 CHRONIC MIDLINE LOW BACK PAIN WITH RIGHT-SIDED SCIATICA: ICD-10-CM

## 2024-04-28 DIAGNOSIS — I10 ESSENTIAL HYPERTENSION: ICD-10-CM

## 2024-04-28 DIAGNOSIS — G89.29 CHRONIC MIDLINE LOW BACK PAIN WITH RIGHT-SIDED SCIATICA: ICD-10-CM

## 2024-04-28 RX ORDER — SILDENAFIL 50 MG/1
50 TABLET, FILM COATED ORAL AS NEEDED
Qty: 10 TABLET | Refills: 0 | Status: SHIPPED | OUTPATIENT
Start: 2024-04-28

## 2024-04-29 RX ORDER — CARVEDILOL 12.5 MG/1
12.5 TABLET ORAL 2 TIMES DAILY WITH MEALS
Qty: 180 TABLET | Refills: 1 | Status: SHIPPED | OUTPATIENT
Start: 2024-04-29

## 2024-04-29 RX ORDER — HYDROCODONE BITARTRATE AND ACETAMINOPHEN 5; 325 MG/1; MG/1
1 TABLET ORAL DAILY PRN
Qty: 30 TABLET | Refills: 0 | Status: SHIPPED | OUTPATIENT
Start: 2024-04-29

## 2024-05-13 DIAGNOSIS — E11.65 UNCONTROLLED TYPE 2 DIABETES MELLITUS WITH HYPERGLYCEMIA (HCC): ICD-10-CM

## 2024-05-13 DIAGNOSIS — I10 ESSENTIAL HYPERTENSION: ICD-10-CM

## 2024-05-13 DIAGNOSIS — E78.2 MIXED HYPERLIPIDEMIA: ICD-10-CM

## 2024-05-13 DIAGNOSIS — R60.0 PERIPHERAL EDEMA: ICD-10-CM

## 2024-05-13 RX ORDER — FUROSEMIDE 20 MG/1
20 TABLET ORAL 3 TIMES WEEKLY
Qty: 36 TABLET | Refills: 1 | Status: SHIPPED | OUTPATIENT
Start: 2024-05-13

## 2024-05-14 RX ORDER — FLASH GLUCOSE SENSOR
1 KIT MISCELLANEOUS
Qty: 6 EACH | Refills: 1 | Status: SHIPPED | OUTPATIENT
Start: 2024-05-14

## 2024-05-14 RX ORDER — ATORVASTATIN CALCIUM 20 MG/1
20 TABLET, FILM COATED ORAL DAILY
Qty: 90 TABLET | Refills: 1 | Status: SHIPPED | OUTPATIENT
Start: 2024-05-14

## 2024-05-22 ENCOUNTER — OFFICE VISIT (OUTPATIENT)
Dept: ENDOCRINOLOGY | Facility: CLINIC | Age: 69
End: 2024-05-22
Payer: COMMERCIAL

## 2024-05-22 ENCOUNTER — ESTABLISHED COMPREHENSIVE EXAM (OUTPATIENT)
Dept: URBAN - METROPOLITAN AREA CLINIC 6 | Facility: CLINIC | Age: 69
End: 2024-05-22

## 2024-05-22 VITALS
SYSTOLIC BLOOD PRESSURE: 122 MMHG | HEIGHT: 70 IN | WEIGHT: 280.2 LBS | BODY MASS INDEX: 40.11 KG/M2 | DIASTOLIC BLOOD PRESSURE: 72 MMHG | OXYGEN SATURATION: 97 % | HEART RATE: 68 BPM

## 2024-05-22 DIAGNOSIS — I10 ESSENTIAL HYPERTENSION: ICD-10-CM

## 2024-05-22 DIAGNOSIS — E78.2 MIXED HYPERLIPIDEMIA: ICD-10-CM

## 2024-05-22 DIAGNOSIS — H40.013: ICD-10-CM

## 2024-05-22 DIAGNOSIS — H25.813: ICD-10-CM

## 2024-05-22 DIAGNOSIS — E03.9 ACQUIRED HYPOTHYROIDISM: ICD-10-CM

## 2024-05-22 DIAGNOSIS — Z79.4: ICD-10-CM

## 2024-05-22 DIAGNOSIS — E11.42 TYPE 2 DIABETES MELLITUS WITH DIABETIC POLYNEUROPATHY, WITHOUT LONG-TERM CURRENT USE OF INSULIN (HCC): Primary | ICD-10-CM

## 2024-05-22 DIAGNOSIS — H52.03: ICD-10-CM

## 2024-05-22 DIAGNOSIS — E11.9: ICD-10-CM

## 2024-05-22 PROBLEM — N18.2 TYPE 2 DIABETES MELLITUS WITH STAGE 2 CHRONIC KIDNEY DISEASE, WITHOUT LONG-TERM CURRENT USE OF INSULIN  (HCC): Status: ACTIVE | Noted: 2021-06-24

## 2024-05-22 LAB
LEFT EYE DIABETIC RETINOPATHY: NORMAL
RIGHT EYE DIABETIC RETINOPATHY: NORMAL
SEVERITY (EYE EXAM): NORMAL

## 2024-05-22 PROCEDURE — 92133 CPTRZD OPH DX IMG PST SGM ON: CPT

## 2024-05-22 PROCEDURE — 92015 DETERMINE REFRACTIVE STATE: CPT

## 2024-05-22 PROCEDURE — 92014 COMPRE OPH EXAM EST PT 1/>: CPT

## 2024-05-22 PROCEDURE — 95251 CONT GLUC MNTR ANALYSIS I&R: CPT | Performed by: PHYSICIAN ASSISTANT

## 2024-05-22 PROCEDURE — 76514 ECHO EXAM OF EYE THICKNESS: CPT

## 2024-05-22 PROCEDURE — 99214 OFFICE O/P EST MOD 30 MIN: CPT | Performed by: PHYSICIAN ASSISTANT

## 2024-05-22 ASSESSMENT — KERATOMETRY
OD_AXISANGLE2_DEGREES: 79
OS_K1POWER_DIOPTERS: 41.50
OD_AXISANGLE_DEGREES: 169
OD_K2POWER_DIOPTERS: 42.25
OD_K1POWER_DIOPTERS: 41.50
OS_K2POWER_DIOPTERS: 43.25
OS_AXISANGLE2_DEGREES: 98
OS_AXISANGLE_DEGREES: 8

## 2024-05-22 ASSESSMENT — TONOMETRY
OS_IOP_MMHG: 20
OD_IOP_MMHG: 17

## 2024-05-22 ASSESSMENT — PACHYMETRY
OS_CT_UM: 615
OD_CT_UM: 604

## 2024-05-22 ASSESSMENT — VISUAL ACUITY
OD_PH: 20/40
OS_SC: 20/40
OD_SC: 20/50+2
OS_PH: 20/30

## 2024-05-22 NOTE — PATIENT INSTRUCTIONS
Hypoglycemia instructions   Raphael Pack  5/22/2024  [id number]    Low Blood Sugar    Steps to treat low blood sugar.    1. Test blood sugar if you have symptoms of low blood sugar:   Low Blood Sugar Symptoms:  o Sweaty  o Dizzy  o Rapid heartbeat  o Shaky  o Bad mood  o Hungry      2. Treat blood sugar less than 70 with 15 grams of fast-acting carbohydrate:   Examples of 15 grams Fast-Acting Carbohydrate:  o 4 oz juice  o 4 oz regular soda  o 3-4 glucose tablets (chew)  o 3-4 hard candies (chew)          3.  Wait 15 minutes and test your blood sugar again     4. If blood sugar is less than 100, repeat steps 2-3.    5. When your blood sugar is 100 or more, eat a snack if it will be longer than one hour until your next meal. The snack should be 15 grams of carbohydrate and a protein:   Examples of snacks:  o ½ sandwich  o 6 crackers with cheese  o Piece of fruit with cheese or peanut butter  o 6 crackers with peanut butter

## 2024-05-22 NOTE — PROGRESS NOTES
Patient Progress Note      CC: DM      Referring Provider  Marge Mendez  [address]  [address]  [place] [address]     History of Present Illness:   Raphael Pack is a 69 y.o. male with a history of type 2 diabetes with long term use of insulin. Diabetes course has been stable. Complications of DM: CKD. Denies recent illness or hospitalizations. Denies recent severe hypoglycemic or severe hyperglycemic episodes. Denies any issues with his current regimen. Home glucose monitoring: are performed regularly, using Freestyle Paul.      Average glucose 156 mg/dl  In target range 67%, above range 30%, below range 3%     Current regimen: Levemir 22 units QHS, glimepiride 2 mg with breakfast and 4 mg with dinner, metformin 1000 mg BID  Not taking Trulicity and Jardiance due to cost.   compliant most of the time, denies any side effects from medications.  Injects in: abdomen. Rotates sites: Yes  Hypoglycemic episodes: Yes, rare (likely because he did not have enough carbs for dinner)  H/o of hypoglycemia causing hospitalization or Intervention such as glucagon injection or ambulance call : No  Hypoglycemia symptoms: jitteriness  Treatment of hypoglycemia: soda, glucose tablets     Medic alert tag: recommended: Yes    Diabetes education: No  Diet: 2-3 meals per day, 1-2 snacks per day. Timing of meals is predictable.   Diabetic diet compliance: noncompliant some of the time  Activity: Daily activity is predictable: Yes. No routine exercise.     Ophthamology: August 2023. [facility].  Podiatry: April 2024     Has hypertension: not on ACE inhibitor/ARB  Has hyperlipidemia: on statin - tolerating well, no myalgias. compliant most of the time, denies any side effects from medications.  Thyroid disorders: Hypothyroidism. Is taking levothyroxine 75 mcg daily.  History of pancreatitis: No      Patient Active Problem List   Diagnosis    Type 2 diabetes mellitus with diabetic polyneuropathy,  without long-term current use of insulin (HCC)    JOSE RAUL (obstructive sleep apnea)    Essential hypertension    Mixed hyperlipidemia    Periodic limb movement    Hypothyroid    Chronic midline low back pain with right-sided sciatica    Obesity, morbid, BMI 40.0-49.9 (HCC)    Other male erectile dysfunction    Microalbuminuria    Chronic kidney disease, stage 2 (mild)    Type 2 diabetes mellitus with stage 3a chronic kidney disease, without long-term current use of insulin (HCC)    Restless leg syndrome    Persistent proteinuria    Cervical radiculopathy    Intervertebral disc disorder with radiculopathy of lumbar region    Uncomplicated opioid dependence ([facility])      Past Medical History:   Diagnosis Date    BPH (benign prostatic hyperplasia)     Chronic kidney disease     CPAP (continuous positive airway pressure) dependence     Diabetes mellitus (HCC)     Disease of thyroid gland     Hyperlipidemia     Hypertension     Sleep apnea     Type 2 diabetes mellitus with diabetic chronic kidney disease (HCC) 2021      Past Surgical History:   Procedure Laterality Date    COLONOSCOPY          CT EGD TRANSORAL BIOPSY SINGLE/MULTIPLE N/A 2017    Procedure: ESOPHAGOGASTRODUODENOSCOPY (EGD) with bx;  Surgeon: Patel Chaudhary MD;  Location: [facility];  Service: Bariatrics    TONSILLECTOMY        Family History   Problem Relation Age of Onset    Lung cancer Mother     Diabetes Father     Cancer Brother     Alcohol abuse Neg Hx     Substance Abuse Neg Hx     Mental illness Neg Hx     Depression Neg Hx      Social History     Tobacco Use    Smoking status: Former     Current packs/day: 0.00     Types: Cigarettes     Start date:      Quit date:      Years since quittin.4    Smokeless tobacco: Never   Substance Use Topics    Alcohol use: Yes     Comment: social     Allergies   Allergen Reactions    Penicillins      SYNCOPE, but has taken amoxicillin/augmentin in past         Current Outpatient Medications:      amLODIPine (NORVASC) 10 mg tablet, Take 1 tablet (10 mg total) by mouth daily, Disp: 90 tablet, Rfl: 0    aspirin (ECOTRIN LOW STRENGTH) 81 mg EC tablet, Take 1 tablet (81 mg total) by mouth daily, Disp: 90 tablet, Rfl: 0    atorvastatin (LIPITOR) 20 mg tablet, Take 1 tablet (20 mg total) by mouth daily, Disp: 90 tablet, Rfl: 1    [employer] Unifine Pentips Plus 31G X 5 MM MISC, INJECT UNDER THE SKIN EVERY MORNING, Disp: 100 each, Rfl: 0    carvedilol (COREG) 12.5 mg tablet, Take 1 tablet (12.5 mg total) by mouth 2 (two) times a day with meals, Disp: 180 tablet, Rfl: 1    Continuous Blood Gluc  (FreeStyle Paul 14 Day [place]) LEO, USE TO CHECK BLOOD GLUCOSE, Disp: , Rfl:     Continuous Glucose Sensor (FreeStyle Paul 14 Day Sensor) MISC, Inject 1 application. under the skin every 14 (fourteen) days, Disp: 6 each, Rfl: 1    furosemide (LASIX) 20 mg tablet, Take 1 tablet (20 mg total) by mouth 3 (three) times a week, Disp: 36 tablet, Rfl: 1    gabapentin (NEURONTIN) 300 mg capsule, Take 1 capsule (300 mg total) by mouth 2 (two) times a day, Disp: 60 capsule, Rfl: 5    glimepiride (AMARYL) 4 mg tablet, Take 1/2 tablet with breakfast and 1 tablet with dinner., Disp: 180 tablet, Rfl: 1    HYDROcodone-acetaminophen (NORCO) 5-325 mg per tablet, Take 1 tablet by mouth daily as needed for pain Max Daily Amount: 1 tablet, Disp: 30 tablet, Rfl: 0    Insulin Glargine Solostar (Lantus SoloStar) 100 UNIT/ML SOPN, Inject 22 units at bedtime, Disp: 15 mL, Rfl: 1    Insulin Pen Needle ([employer]One Unifine Pentips Plus) 31G X 5 MM MISC, Inject under the skin in the morning, Disp: 100 each, Rfl: 0    Lantus SoloStar 100 units/mL SOPN, Inject 22 units at bedtime, Disp: 15 mL, Rfl: 1    levothyroxine 75 mcg tablet, Take 1 tablet (75 mcg total) by mouth daily, Disp: 90 tablet, Rfl: 0    losartan (COZAAR) 25 mg tablet, Take 1 tablet (25 mg total) by mouth daily, Disp: 90 tablet, Rfl: 3    metFORMIN (GLUCOPHAGE-XR) 500 mg 24 hr tablet,  "Take 2 tablets (1,000 mg total) by mouth 2 (two) times a day with meals, Disp: 360 tablet, Rfl: 0    sildenafil (VIAGRA) 50 MG tablet, Take 1 tablet (50 mg total) by mouth as needed for erectile dysfunction, Disp: 10 tablet, Rfl: 0    methocarbamol (ROBAXIN) 750 mg tablet, Take 1 tablet (750 mg total) by mouth 2 (two) times a day as needed for muscle spasms, Disp: 60 tablet, Rfl: 2  Review of Systems   Constitutional:  Negative for activity change, appetite change, fatigue and unexpected weight change.   HENT:  Positive for trouble swallowing (occasional; has had workup in the past).    Eyes:  Negative for visual disturbance.   Respiratory:  Negative for shortness of breath.    Cardiovascular:  Negative for chest pain and palpitations.   Gastrointestinal:  Positive for constipation and diarrhea.        Fluctuates   Endocrine: Negative for polydipsia and polyuria.   Musculoskeletal:  Positive for arthralgias, back pain and neck pain.   Skin:  Negative for wound.   Neurological:  Positive for numbness.   Psychiatric/Behavioral: Negative.         Physical Exam:  Body mass index is 40.2 kg/m².  /72   Pulse 68   Ht 5' 10\" (1.778 m)   Wt 127 kg (280 lb 3.2 oz)   SpO2 97%   BMI 40.20 kg/m²    Wt Readings from Last 3 Encounters:   05/22/24 127 kg (280 lb 3.2 oz)   04/11/24 127 kg (280 lb 12.8 oz)   03/18/24 128 kg (283 lb)       Physical Exam  Vitals and nursing note reviewed.   Constitutional:       Appearance: He is well-developed.   HENT:      Head: Normocephalic.   Eyes:      General: No scleral icterus.     Extraocular Movements: EOM normal.      Pupils: Pupils are equal, round, and reactive to light.   Neck:      Thyroid: No thyromegaly.   Cardiovascular:      Rate and Rhythm: Normal rate and regular rhythm.      Pulses:           Radial pulses are 2+ on the right side and 2+ on the left side.      Heart sounds: No murmur heard.  Pulmonary:      Effort: Pulmonary effort is normal. No respiratory distress. " "     Breath sounds: Normal breath sounds. No wheezing.   Musculoskeletal:      Cervical back: Neck supple.   Skin:     General: Skin is warm and dry.   Neurological:      Mental Status: He is alert.   Psychiatric:         Mood and Affect: Mood and affect normal.       Patient's shoes and socks were not removed.          Labs:   Lab Results   Component Value Date    HGBA1C 8.1 (H) 12/04/2023     Lab Results   Component Value Date    CALCIUM 8.8 12/04/2023    K 4.5 12/04/2023    CO2 26 12/04/2023     12/04/2023    BUN 18 12/04/2023    CREATININE 0.75 12/04/2023     No results found for: \"MICROALBUR\", \"ESQR43GMM\"  eGFR   Date Value Ref Range Status   12/04/2023 94 ml/min/1.73sq m Final     No components found for: \"MALBCRER\"  Lab Results   Component Value Date    HDL 39 (L) 12/04/2023    TRIG 239 (H) 12/04/2023     Lab Results   Component Value Date    ALT 19 08/16/2022    AST 14 08/16/2022    ALKPHOS 75 08/16/2022     Lab Results   Component Value Date    HTF9EVCBZQOF 3.498 08/16/2022       Plan:    Diagnoses and all orders for this visit:    Type 2 diabetes mellitus with diabetic polyneuropathy, without long-term current use of insulin ([facility])  HGA1C 8.1%. Due now.  Treatment regimen: continue current treatment. Readings are in variable range due to varying carb intake. Patient agrees his diet could improved and be more consistent. He will work on dietary changes.   Discussed intensive insulin regimen does increase risk for hypoglycemia. Episodes of hypoglycemia can lead to permanent disability and death.  Discussed risks/complications associated with uncontrolled diabetes.    Advised to adhere to diabetic diet, and recommended staying active/exercising routinely as tolerated.  Keep carbohydrates consistent to limit blood glucose fluctuations.  Advised to call if blood sugars less than 70 mg/dl or over 300 mg/dl.   Check blood glucose 3+ times a day  Discussed symptoms and treatment of hypoglycemia.   Discussed " use of CGM to collect additional blood glucose data to reveal trends and patterns  Recommended routine follow-up with podiatry and ophthalmology.   Download CGM in 2 weeks.    Ordered blood work to complete now and prior to next visit.  -     Hemoglobin A1C; Future  -     Basic metabolic panel; Future    Essential hypertension  Blood pressure well controlled, continue current treatment    Mixed hyperlipidemia  LDL 80  Continue statin therapy  Managed by PCP    Acquired hypothyroidism  TSH 3.498 and free T4 0.99  Check labs now  Continue current treatment   Monitor labs          Discussed with the patient diagnosis and treatment and all questions fully answered. He will call me if any problems arise.    Counseled patient on diagnostic results, prognosis, risk and benefit of treatment options, instruction for management, importance of treatment compliance, risk factor reduction and impressions.      LOUISE NevarezC

## 2024-06-03 ENCOUNTER — VBI (OUTPATIENT)
Dept: ADMINISTRATIVE | Facility: OTHER | Age: 69
End: 2024-06-03

## 2024-06-03 DIAGNOSIS — G89.29 CHRONIC MIDLINE LOW BACK PAIN WITH RIGHT-SIDED SCIATICA: ICD-10-CM

## 2024-06-03 DIAGNOSIS — N52.9 ERECTILE DYSFUNCTION, UNSPECIFIED ERECTILE DYSFUNCTION TYPE: ICD-10-CM

## 2024-06-03 DIAGNOSIS — M54.41 CHRONIC MIDLINE LOW BACK PAIN WITH RIGHT-SIDED SCIATICA: ICD-10-CM

## 2024-06-03 DIAGNOSIS — M51.16 LUMBAR DISC DISEASE WITH RADICULOPATHY: ICD-10-CM

## 2024-06-03 RX ORDER — SILDENAFIL 50 MG/1
50 TABLET, FILM COATED ORAL AS NEEDED
Qty: 10 TABLET | Refills: 0 | Status: SHIPPED | OUTPATIENT
Start: 2024-06-03

## 2024-06-03 RX ORDER — GABAPENTIN 300 MG/1
300 CAPSULE ORAL 2 TIMES DAILY
Qty: 60 CAPSULE | Refills: 0 | Status: SHIPPED | OUTPATIENT
Start: 2024-06-03

## 2024-06-04 RX ORDER — HYDROCODONE BITARTRATE AND ACETAMINOPHEN 5; 325 MG/1; MG/1
1 TABLET ORAL DAILY PRN
Qty: 30 TABLET | Refills: 0 | Status: SHIPPED | OUTPATIENT
Start: 2024-06-04

## 2024-06-09 ENCOUNTER — HOSPITAL ENCOUNTER (EMERGENCY)
Facility: HOSPITAL | Age: 69
Discharge: HOME | End: 2024-06-09
Attending: EMERGENCY MEDICINE
Payer: COMMERCIAL

## 2024-06-09 VITALS
TEMPERATURE: 98 F | SYSTOLIC BLOOD PRESSURE: 156 MMHG | DIASTOLIC BLOOD PRESSURE: 72 MMHG | OXYGEN SATURATION: 97 % | HEART RATE: 69 BPM | RESPIRATION RATE: 20 BRPM

## 2024-06-09 DIAGNOSIS — R09.A2 FOREIGN BODY SENSATION IN THROAT: Primary | ICD-10-CM

## 2024-06-09 PROCEDURE — 99282 EMERGENCY DEPT VISIT SF MDM: CPT

## 2024-06-09 PROCEDURE — 99284 EMERGENCY DEPT VISIT MOD MDM: CPT | Performed by: EMERGENCY MEDICINE

## 2024-06-09 RX ORDER — SUCRALFATE 1 G/1
1 TABLET ORAL 4 TIMES DAILY
Qty: 20 TABLET | Refills: 0 | Status: SHIPPED | OUTPATIENT
Start: 2024-06-09 | End: 2024-06-09 | Stop reason: CLARIF

## 2024-06-09 NOTE — ED PROVIDER NOTES
History  Chief Complaint   Patient presents with    Foreign Body in Throat     Reports was taking night time medication and believes he has pills stuck in throat. Attempted to move pill by drinking water, that was unsuccessful. Denies SOB, reporting pain w/ swallowing.      69-year-old male presenting to the ED with a sensation of foreign body in his throat.  Patient states that he was taking his nightly pills when he took his metformin pill he felt that it did not fully go down his throat.  Patient tried multiple different fluids to flush the pill down his throat however stated he still felt the irritation.  Approximately 2 hours after ingesting the pill the patient came to the ED to be assessed.  At the time of assessment patient states that he has soreness when swallowing and it still feels as if there is something in his throat.  Patient does endorse hypersalivation however does not endorse vomiting.  Patient states that when he was drinking the fluids at home they were able to pass.  Patient states that he has had this sensation multiple times in the past and he does find himself feeling like he is choking on food while eating dinner intermittently.  Patient denies any recent fever, chills, nausea, vomiting, diarrhea, chest pain, palpitations, abdominal pain, shortness of breath, difficulty breathing.        Prior to Admission Medications   Prescriptions Last Dose Informant Patient Reported? Taking?   CareOne Unifine Pentips Plus 31G X 5 MM MISC  Self No No   Sig: INJECT UNDER THE SKIN EVERY MORNING   Continuous Blood Gluc  (FreeStyle Paul 14 Day [place]) LEO  Self Yes No   Sig: USE TO CHECK BLOOD GLUCOSE   Continuous Glucose Sensor (FreeStyle Paul 14 Day Sensor) MISC   No No   Sig: Inject 1 application. under the skin every 14 (fourteen) days   HYDROcodone-acetaminophen (NORCO) 5-325 mg per tablet   No No   Sig: Take 1 tablet by mouth daily as needed for pain Max Daily Amount: 1 tablet   Insulin  Glargine Solostar (Lantus SoloStar) 100 UNIT/ML SOPN   No No   Sig: Inject 22 units at bedtime   Insulin Pen Needle (CareOne Unifine Pentips Plus) 31G X 5 MM MISC  Self No No   Sig: Inject under the skin in the morning   Lantus SoloStar 100 units/mL SOPN   No No   Sig: Inject 22 units at bedtime   amLODIPine (NORVASC) 10 mg tablet  Self No No   Sig: Take 1 tablet (10 mg total) by mouth daily   aspirin (ECOTRIN LOW STRENGTH) 81 mg EC tablet  Self No No   Sig: Take 1 tablet (81 mg total) by mouth daily   atorvastatin (LIPITOR) 20 mg tablet   No No   Sig: Take 1 tablet (20 mg total) by mouth daily   carvedilol (COREG) 12.5 mg tablet   No No   Sig: Take 1 tablet (12.5 mg total) by mouth 2 (two) times a day with meals   furosemide (LASIX) 20 mg tablet   No No   Sig: Take 1 tablet (20 mg total) by mouth 3 (three) times a week   gabapentin (NEURONTIN) 300 mg capsule   No No   Sig: Take 1 capsule (300 mg total) by mouth 2 (two) times a day   glimepiride (AMARYL) 4 mg tablet   No No   Sig: Take 1/2 tablet with breakfast and 1 tablet with dinner.   levothyroxine 75 mcg tablet   No No   Sig: Take 1 tablet (75 mcg total) by mouth daily   losartan (COZAAR) 25 mg tablet   No No   Sig: Take 1 tablet (25 mg total) by mouth daily   metFORMIN (GLUCOPHAGE-XR) 500 mg 24 hr tablet   No No   Sig: Take 2 tablets (1,000 mg total) by mouth 2 (two) times a day with meals   methocarbamol (ROBAXIN) 750 mg tablet  Self No No   Sig: Take 1 tablet (750 mg total) by mouth 2 (two) times a day as needed for muscle spasms   sildenafil (VIAGRA) 50 MG tablet   No No   Sig: Take 1 tablet (50 mg total) by mouth as needed for erectile dysfunction      Facility-Administered Medications: None       Past Medical History:   Diagnosis Date    BPH (benign prostatic hyperplasia)     Chronic kidney disease     CPAP (continuous positive airway pressure) dependence     Diabetes mellitus (HCC)     Disease of thyroid gland     Hyperlipidemia     Hypertension      Sleep apnea     Type 2 diabetes mellitus with diabetic chronic kidney disease (HCC) 2021       Past Surgical History:   Procedure Laterality Date    COLONOSCOPY      2016    [place] EGD TRANSORAL BIOPSY SINGLE/MULTIPLE N/A 2017    Procedure: ESOPHAGOGASTRODUODENOSCOPY (EGD) with bx;  Surgeon: Patel Chaudhary MD;  Location: [facility];  Service: Bariatrics    TONSILLECTOMY         Family History   Problem Relation Age of Onset    Lung cancer Mother     Diabetes Father     Cancer Brother     Alcohol abuse Neg Hx     Substance Abuse Neg Hx     Mental illness Neg Hx     Depression Neg Hx      I have reviewed and agree with the history as documented.    E-Cigarette/Vaping    E-Cigarette Use Never User      E-Cigarette/Vaping Substances    Nicotine No     THC No     CBD No     Flavoring No     Other No     Unknown No      Social History     Tobacco Use    Smoking status: Former     Current packs/day: 0.00     Types: Cigarettes     Start date:      Quit date:      Years since quittin.4    Smokeless tobacco: Never   Vaping Use    Vaping status: Never Used   Substance Use Topics    Alcohol use: Yes     Comment: social    Drug use: No        Review of Systems   Constitutional: Negative.    HENT:  Positive for sore throat and trouble swallowing. Negative for dental problem, drooling, facial swelling, mouth sores, nosebleeds, postnasal drip, rhinorrhea, sinus pressure, sinus pain, sneezing, tinnitus and voice change.    Eyes:  Negative for visual disturbance.   Respiratory:  Negative for chest tightness and shortness of breath.    Cardiovascular:  Negative for chest pain and palpitations.   Gastrointestinal:  Negative for abdominal pain, constipation, diarrhea, nausea and vomiting.   Endocrine: Negative for polyuria.   Genitourinary:  Negative for difficulty urinating.   Musculoskeletal:  Negative for back pain and myalgias.   Neurological:  Negative for dizziness and headaches.       Physical Exam  ED Triage  Vitals   Temperature Pulse Respirations Blood Pressure SpO2   06/09/24 0205 06/09/24 0205 06/09/24 0205 06/09/24 0209 06/09/24 0205   98 °F (36.7 °C) 69 20 156/72 97 %      Temp src Heart Rate Source Patient Position - Orthostatic VS BP Location FiO2 (%)   -- -- -- -- --             Pain Score       --                    Orthostatic Vital Signs  Vitals:    06/09/24 0205 06/09/24 0209   BP:  156/72   Pulse: 69        Physical Exam  Constitutional:       Appearance: Normal appearance. He is obese.   HENT:      Head: Normocephalic and atraumatic.      Right Ear: External ear normal.      Left Ear: External ear normal.      Nose: Nose normal.      Mouth/Throat:      Mouth: Mucous membranes are moist.      Pharynx: Oropharynx is clear. No oropharyngeal exudate or posterior oropharyngeal erythema.   Eyes:      Extraocular Movements: Extraocular movements intact.   Cardiovascular:      Rate and Rhythm: Normal rate.      Pulses: Normal pulses.   Pulmonary:      Effort: Pulmonary effort is normal.      Breath sounds: Normal breath sounds.   Abdominal:      General: Bowel sounds are normal.      Palpations: Abdomen is soft.   Musculoskeletal:         General: Normal range of motion.      Cervical back: Normal range of motion.   Skin:     General: Skin is warm.      Capillary Refill: Capillary refill takes less than 2 seconds.   Neurological:      General: No focal deficit present.      Mental Status: He is alert and oriented to person, place, and time.   Psychiatric:         Mood and Affect: Mood normal.         Behavior: Behavior normal.         ED Medications  Medications - No data to display    Diagnostic Studies  Results Reviewed       None                   No orders to display         Procedures  Procedures      ED Course                             SBIRT 22yo+      Flowsheet Row Most Recent Value   Initial Alcohol Screen: US AUDIT-C     1. How often do you have a drink containing alcohol? 0 Filed at: 06/09/2024 0204    2. How many drinks containing alcohol do you have on a typical day you are drinking?  0 Filed at: 06/09/2024 0207   3a. Male UNDER 65: How often do you have five or more drinks on one occasion? 0 Filed at: 06/09/2024 0207   3b. FEMALE Any Age, or MALE 65+: How often do you have 4 or more drinks on one occassion? 0 Filed at: 06/09/2024 0207   Audit-C Score 0 Filed at: 06/09/2024 0207   UNRULY: How many times in the past year have you...    Used an illegal drug or used a prescription medication for non-medical reasons? Never Filed at: 06/09/2024 0207                  Medical Decision Making  69-year-old male presenting to the ED with a sensation of a foreign body in his throat.    DDx including but not limited to: Esophageal foreign body, esophageal mucosal irritation, doubt infectious process or airway compromise.      Patient states that he was able to get fluids by.  A sandwich was brought to the patient and patient was trialed on p.o.  Patient was able to eat the sandwich without a feeling of impaction or without vomiting.  Patient states that when he ate the first big bite of sandwich he did feel to relieve the sensation in his throat.  Patient was no longer complaining of soreness in throat and states that the sensation of foreign body had gone away.  At this time it is likely the symptoms have resolved themselves upon p.o.  Patient is comfortable with discharge at this time.  Patient given referral to gastroenterology for follow-up.  Patient states he will follow-up this week.  Patient discharged at this time.              Disposition  Final diagnoses:   Foreign body sensation in throat     Time reflects when diagnosis was documented in both MDM as applicable and the Disposition within this note       Time User Action Codes Description Comment    6/9/2024  2:59 AM Mike Schofield Add [R09.A2] Foreign body sensation in throat           ED Disposition       ED Disposition   Discharge    Condition   Stable     Date/Time   Sun Jun 9, 2024 0259    Comment   Raphael Gonzálesgianna discharge to home/self care.                   Follow-up Information       Follow up With Specialties Details Why Contact Info Additional Information    [facility], Nurse Practitioner   [address]  [address]  [facility] [address]  [phone]       [facility]   [address]  [facility] [address] [phone] [facility] [place], [address], [place], [place], [address] [phone]            Discharge Medication List as of 6/9/2024  3:04 AM        CONTINUE these medications which have NOT CHANGED    Details   amLODIPine (NORVASC) 10 mg tablet Take 1 tablet (10 mg total) by mouth daily, Starting Wed 1/3/2024, Normal      aspirin (ECOTRIN LOW STRENGTH) 81 mg EC tablet Take 1 tablet (81 mg total) by mouth daily, Starting Mon 6/12/2023, Normal      atorvastatin (LIPITOR) 20 mg tablet Take 1 tablet (20 mg total) by mouth daily, Starting Tue 5/14/2024, Normal      !! CareOne Unifine Pentips Plus 31G X 5 MM MISC INJECT UNDER THE SKIN EVERY MORNING, Normal      carvedilol (COREG) 12.5 mg tablet Take 1 tablet (12.5 mg total) by mouth 2 (two) times a day with meals, Starting Mon 4/29/2024, Normal      Continuous Blood Gluc  (FreeStyle Paul 14 Day [place]) LEO USE TO CHECK BLOOD GLUCOSE, Historical Med      Continuous Glucose Sensor (FreeStyle Paul 14 Day Sensor) MISC Inject 1 application. under the skin every 14 (fourteen) days, Starting Tue 5/14/2024, Normal      furosemide (LASIX) 20 mg tablet Take 1 tablet (20 mg total) by mouth 3 (three) times a week, Starting Mon 5/13/2024, Normal      gabapentin (NEURONTIN) 300 mg capsule Take 1 capsule (300 mg total) by mouth 2 (two) times a day, Starting Mon 6/3/2024, Normal      glimepiride (AMARYL) 4 mg tablet Take 1/2 tablet with breakfast and  1 tablet with dinner., Normal      HYDROcodone-acetaminophen (NORCO) 5-325 mg per tablet Take 1 tablet by mouth daily as needed for pain Max Daily Amount: 1 tablet, Starting Tue 6/4/2024, Normal      !! Insulin Glargine Solostar (Lantus SoloStar) 100 UNIT/ML SOPN Inject 22 units at bedtime, Normal      !! Insulin Pen Needle (CareOne Unifine Pentips Plus) 31G X 5 MM MISC Inject under the skin in the morning, Starting Mon 11/27/2023, Normal      !! Lantus SoloStar 100 units/mL SOPN Inject 22 units at bedtime, Normal      levothyroxine 75 mcg tablet Take 1 tablet (75 mcg total) by mouth daily, Starting Thu 4/11/2024, Normal      losartan (COZAAR) 25 mg tablet Take 1 tablet (25 mg total) by mouth daily, Starting Mon 2/19/2024, Normal      metFORMIN (GLUCOPHAGE-XR) 500 mg 24 hr tablet Take 2 tablets (1,000 mg total) by mouth 2 (two) times a day with meals, Starting Mon 2/19/2024, Normal      methocarbamol (ROBAXIN) 750 mg tablet Take 1 tablet (750 mg total) by mouth 2 (two) times a day as needed for muscle spasms, Starting Mon 7/10/2023, Until Fri 1/5/2024 at 2359, Normal      sildenafil (VIAGRA) 50 MG tablet Take 1 tablet (50 mg total) by mouth as needed for erectile dysfunction, Starting Mon 6/3/2024, Normal       !! - Potential duplicate medications found. Please discuss with provider.            PDMP Review         Value Time User    PDMP Reviewed  Yes 6/9/2024  2:25 AM Be Neal MD             ED Provider  Attending physically available and evaluated Raphael Pack. I managed the patient along with the ED Attending.    Electronically Signed by           Mike Shelby MD  06/09/24 8701

## 2024-06-09 NOTE — DISCHARGE INSTRUCTIONS
Today we assessed you for your foreign body sensation in your throat.  While here in the ED, we trialed food by mouth.  It appears you are able to swallow with no reflux or vomiting.  He was stated that it felt that you had dislodged the food out of your throat.  At this time we are going to refer you to gastroenterology.  You should discuss with gastroenterology the foreign body sensation you are having as well as the choking on food that you have been feeling for the past few years.  Please know that we are here 24/7 should she need us.

## 2024-06-09 NOTE — ED ATTENDING ATTESTATION
6/9/2024  I, Be Neal MD, saw and evaluated the patient. I have discussed the patient with the resident/non-physician practitioner and agree with the resident's/non-physician practitioner's findings, Plan of Care, and MDM as documented in the resident's/non-physician practitioner's note, except where noted. All available labs and Radiology studies were reviewed.  I was present for key portions of any procedure(s) performed by the resident/non-physician practitioner and I was immediately available to provide assistance.       At this point I agree with the current assessment done in the Emergency Department.  I have conducted an independent evaluation of this patient a history and physical is as follows: Patient is a 69 year old male who states his metformin pill got stuck tonight. Able to drink water. (+) odynophagia. No sob. No fever. No N/V. Was last seen at  [facility] [place] on 5/22/24 for type 2 DM. [employer] website checked on this patient and last Rx filled was on 6/4/24 for vicodin for 30 day supply. NCAT. Moist mucous membranes. Oropharynx clear. Neck supple. Lungs clear. Heart regular without murmur. Abdomen soft and nontender. Good bowel sounds. No edema. No rash noted. DDx including but not limited to: esophageal foreign body, achalasia, Schatzski's ring, esophageal stricture, esophageal dysmotility; doubt tumor, thyroid disease, other intrathoracic etiology, intracranial etiology; doubt airway compromise. Will try to have patient eat and if patient can tolerate po patient can be discharged with ENT/GI follow up.     ED Course         Critical Care Time  Procedures

## 2024-06-11 ENCOUNTER — VBI (OUTPATIENT)
Dept: ADMINISTRATIVE | Facility: OTHER | Age: 69
End: 2024-06-11

## 2024-06-24 DIAGNOSIS — R80.1 PERSISTENT PROTEINURIA: ICD-10-CM

## 2024-06-24 DIAGNOSIS — E11.42 TYPE 2 DIABETES MELLITUS WITH DIABETIC POLYNEUROPATHY, WITHOUT LONG-TERM CURRENT USE OF INSULIN (HCC): ICD-10-CM

## 2024-06-24 DIAGNOSIS — E11.22 TYPE 2 DIABETES MELLITUS WITH STAGE 3A CHRONIC KIDNEY DISEASE, WITHOUT LONG-TERM CURRENT USE OF INSULIN (HCC): ICD-10-CM

## 2024-06-24 DIAGNOSIS — N18.31 TYPE 2 DIABETES MELLITUS WITH STAGE 3A CHRONIC KIDNEY DISEASE, WITHOUT LONG-TERM CURRENT USE OF INSULIN (HCC): ICD-10-CM

## 2024-06-24 DIAGNOSIS — I10 ESSENTIAL HYPERTENSION: ICD-10-CM

## 2024-06-24 RX ORDER — LOSARTAN POTASSIUM 25 MG/1
25 TABLET ORAL DAILY
Qty: 90 TABLET | Refills: 1 | Status: SHIPPED | OUTPATIENT
Start: 2024-06-24

## 2024-06-24 RX ORDER — METFORMIN HYDROCHLORIDE 500 MG/1
1000 TABLET, EXTENDED RELEASE ORAL 2 TIMES DAILY WITH MEALS
Qty: 360 TABLET | Refills: 1 | Status: SHIPPED | OUTPATIENT
Start: 2024-06-24

## 2024-06-24 RX ORDER — GLIMEPIRIDE 4 MG/1
TABLET ORAL
Qty: 180 TABLET | Refills: 1 | Status: SHIPPED | OUTPATIENT
Start: 2024-06-24

## 2024-07-10 DIAGNOSIS — E11.65 UNCONTROLLED TYPE 2 DIABETES MELLITUS WITH HYPERGLYCEMIA (HCC): ICD-10-CM

## 2024-07-10 DIAGNOSIS — G89.29 CHRONIC MIDLINE LOW BACK PAIN WITH RIGHT-SIDED SCIATICA: ICD-10-CM

## 2024-07-10 DIAGNOSIS — E11.65 TYPE 2 DIABETES MELLITUS WITH HYPERGLYCEMIA, WITH LONG-TERM CURRENT USE OF INSULIN (HCC): ICD-10-CM

## 2024-07-10 DIAGNOSIS — M54.41 CHRONIC MIDLINE LOW BACK PAIN WITH RIGHT-SIDED SCIATICA: ICD-10-CM

## 2024-07-10 DIAGNOSIS — N52.9 ERECTILE DYSFUNCTION, UNSPECIFIED ERECTILE DYSFUNCTION TYPE: ICD-10-CM

## 2024-07-10 DIAGNOSIS — M51.16 LUMBAR DISC DISEASE WITH RADICULOPATHY: ICD-10-CM

## 2024-07-10 DIAGNOSIS — Z79.4 TYPE 2 DIABETES MELLITUS WITH HYPERGLYCEMIA, WITH LONG-TERM CURRENT USE OF INSULIN (HCC): ICD-10-CM

## 2024-07-10 RX ORDER — PEN NEEDLE, DIABETIC 31 GX3/16"
NEEDLE, DISPOSABLE MISCELLANEOUS EVERY MORNING
Qty: 100 EACH | Refills: 0 | Status: SHIPPED | OUTPATIENT
Start: 2024-07-10

## 2024-07-10 RX ORDER — INSULIN GLARGINE 100 [IU]/ML
INJECTION, SOLUTION SUBCUTANEOUS
Qty: 15 ML | Refills: 1 | Status: SHIPPED | OUTPATIENT
Start: 2024-07-10

## 2024-07-10 RX ORDER — SILDENAFIL 50 MG/1
50 TABLET, FILM COATED ORAL AS NEEDED
Qty: 10 TABLET | Refills: 0 | Status: SHIPPED | OUTPATIENT
Start: 2024-07-10

## 2024-07-10 RX ORDER — GABAPENTIN 300 MG/1
300 CAPSULE ORAL 2 TIMES DAILY
Qty: 60 CAPSULE | Refills: 0 | Status: SHIPPED | OUTPATIENT
Start: 2024-07-10

## 2024-07-11 RX ORDER — HYDROCODONE BITARTRATE AND ACETAMINOPHEN 5; 325 MG/1; MG/1
1 TABLET ORAL DAILY PRN
Qty: 30 TABLET | Refills: 0 | Status: SHIPPED | OUTPATIENT
Start: 2024-07-11

## 2024-07-22 DIAGNOSIS — I10 BENIGN ESSENTIAL HYPERTENSION: ICD-10-CM

## 2024-07-22 RX ORDER — AMLODIPINE BESYLATE 10 MG/1
10 TABLET ORAL DAILY
Qty: 90 TABLET | Refills: 1 | Status: SHIPPED | OUTPATIENT
Start: 2024-07-22

## 2024-07-23 ENCOUNTER — VBI (OUTPATIENT)
Dept: ADMINISTRATIVE | Facility: OTHER | Age: 69
End: 2024-07-23

## 2024-07-23 NOTE — TELEPHONE ENCOUNTER
07/23/24 10:46 AM     Chart reviewed for Hemoglobin A1c was/were not submitted to the patient's insurance.     Cornell Phillips   [facility]

## 2024-08-01 ENCOUNTER — TELEPHONE (OUTPATIENT)
Dept: NEPHROLOGY | Facility: CLINIC | Age: 69
End: 2024-08-01

## 2024-08-05 ENCOUNTER — TELEPHONE (OUTPATIENT)
Age: 69
End: 2024-08-05

## 2024-08-05 NOTE — TELEPHONE ENCOUNTER
Patient called  metformin has diarrhea  as side effect wants to konw if there is an alternative. he stopped metformin 5 days ago and diarrhea  is gone. sugars are not good now. he is asking for a alternative.   Sugar now getting up to 400.   in the am waking up its 150-160.   can he increase insulin.    or if a virtual visit is better prior to making change.     Please advise.

## 2024-08-06 DIAGNOSIS — E11.65 TYPE 2 DIABETES MELLITUS WITH HYPERGLYCEMIA, WITH LONG-TERM CURRENT USE OF INSULIN (HCC): ICD-10-CM

## 2024-08-06 DIAGNOSIS — E11.42 TYPE 2 DIABETES MELLITUS WITH DIABETIC POLYNEUROPATHY, WITHOUT LONG-TERM CURRENT USE OF INSULIN (HCC): ICD-10-CM

## 2024-08-06 DIAGNOSIS — Z79.4 TYPE 2 DIABETES MELLITUS WITH HYPERGLYCEMIA, WITH LONG-TERM CURRENT USE OF INSULIN (HCC): ICD-10-CM

## 2024-08-06 RX ORDER — INSULIN GLARGINE 100 [IU]/ML
INJECTION, SOLUTION SUBCUTANEOUS
Qty: 15 ML | Refills: 1 | Status: SHIPPED | OUTPATIENT
Start: 2024-08-06

## 2024-08-06 RX ORDER — GLIMEPIRIDE 4 MG/1
TABLET ORAL
Qty: 180 TABLET | Refills: 1 | Status: SHIPPED | OUTPATIENT
Start: 2024-08-06

## 2024-08-06 NOTE — TELEPHONE ENCOUNTER
Please inform pt to continue to hold metformin, Increase lantus to 26 units and increase amaryl to 4 mg , 1 tab with breakfast and continue 1 tab with dinner   He should check blood sugars before breakfast and before dinner and send log in 1 week     Omer Xavier

## 2024-08-07 DIAGNOSIS — E03.9 HYPOTHYROIDISM, UNSPECIFIED TYPE: ICD-10-CM

## 2024-08-07 RX ORDER — LEVOTHYROXINE SODIUM 0.07 MG/1
75 TABLET ORAL DAILY
Qty: 30 TABLET | Refills: 0 | Status: SHIPPED | OUTPATIENT
Start: 2024-08-07

## 2024-08-08 ENCOUNTER — OFFICE VISIT (OUTPATIENT)
Dept: NEPHROLOGY | Facility: CLINIC | Age: 69
End: 2024-08-08
Payer: COMMERCIAL

## 2024-08-08 VITALS
HEART RATE: 72 BPM | WEIGHT: 280 LBS | SYSTOLIC BLOOD PRESSURE: 126 MMHG | DIASTOLIC BLOOD PRESSURE: 60 MMHG | BODY MASS INDEX: 40.09 KG/M2 | HEIGHT: 70 IN

## 2024-08-08 DIAGNOSIS — R80.9 TYPE 2 DIABETES MELLITUS WITH PROTEINURIA  (HCC): ICD-10-CM

## 2024-08-08 DIAGNOSIS — E11.29 TYPE 2 DIABETES MELLITUS WITH PROTEINURIA  (HCC): ICD-10-CM

## 2024-08-08 DIAGNOSIS — I10 ESSENTIAL HYPERTENSION: ICD-10-CM

## 2024-08-08 DIAGNOSIS — N18.2 CHRONIC KIDNEY DISEASE, STAGE 2 (MILD): ICD-10-CM

## 2024-08-08 DIAGNOSIS — R80.1 PERSISTENT PROTEINURIA: Primary | ICD-10-CM

## 2024-08-08 DIAGNOSIS — R60.0 PERIPHERAL EDEMA: ICD-10-CM

## 2024-08-08 DIAGNOSIS — E66.01 OBESITY, MORBID, BMI 40.0-49.9 (HCC): ICD-10-CM

## 2024-08-08 PROCEDURE — 3066F NEPHROPATHY DOC TX: CPT | Performed by: ORTHOPAEDIC SURGERY

## 2024-08-08 PROCEDURE — 99214 OFFICE O/P EST MOD 30 MIN: CPT | Performed by: INTERNAL MEDICINE

## 2024-08-08 NOTE — PATIENT INSTRUCTIONS
-renal function stable  -Blood pressure is stable    -Recommend 2 g sodium diet.    -Recommend daily exercise and weight loss  -Discussed home monitoring of BP and maintaining a log of blood pressure.  -Recommend goal BP less than 130/80. Please inform me if SBP below 110 or above 140's persistently.    Labs this week    Follow up: 6 months with repeat Lab work within a week of the scheduled office visit. Will discuss the results of the previsit Labs during the office visit.

## 2024-08-08 NOTE — PROGRESS NOTES
NEPHROLOGY OUTPATIENT PROGRESS NOTE   Raphael Pack 69 y.o. male MRN: [id number]  DATE: 8/8/2024  Reason for visit:   Chief Complaint   Patient presents with    Follow-up    CKD II       ASSESSMENT and PLAN:  Persistent Proteinuria  -Proteinuria worsening to albumin creatinine ratio 1.6 g in dec 2023 , from previous level of 1.2 g.  Previous UPC ratio was 1.7 g in August 2022.   - Proteinuria worsening likely due to patient being off ACE inhibitor/ARB and spironolactone due to hyperkalemia with a potassium of 6.4 in August 2021.  - Was Started on losartan 25 mg daily in dec 2023. No new labs. Expect proteinuria to improve with losartan. Continue same dose of losartan.   - Recommend low potassium diet  -discussed with patient that  proteinuria is likely due to uncontrolled diabetes mellitus type 2 with peak A1c 10.4 in September 2020, now trending down to 8.1 with treatment, recommend goal A1c less than 7 and also recommend goal blood pressure less than 130/80  -discussed about avoiding NSAIDs  -recommended goal blood pressure less than 130/80  -Check UPC ratio this week and again before the next office visit in 6 months.  Continue losartan at current dose.  Recommend goal A1c less than 7.  Stressed on weight loss.  Continue to avoid NSAIDs     Primary Hypertension:   -Current medication: Amlodipine 10 mg, Coreg 12.5 mg bid, losartan 25 mg daily   -was taken off aldactone and ACEI due to hyperkalemia on blood work in august 23 rd, 2021   -Current blood pressure: stable and at home.   -Continue same medication  -Also has Sleep apnea- could be contributing to elevated BP but not tolerating the mask and so not using it.    -renal duplex:  Showed less than 60% stenosis in the right main renal artery, no evidence of stenosis in the left main renal artery.  -Plan:     Continue same medications  -Recommend 2 g sodium diet.    -Recommend daily exercise and weight loss  -Discussed home monitoring of BP and maintaining a  log of blood pressure.  -Recommend goal BP less than 130/80.      Chronic kidney disease stage 2 with persistent proteinuria  -baseline creatinine 0.8-1.0   -Renal Function currently stable.  last blood work showed creatinine 0.75 mg/dL, continue to monitor  -stressed on importance of better diabetic control and blood pressure control and patient verbalized understanding,   A1c already improving but not at goal was 8.1 in dec 2023 , continue to monitor per PCP.  Check BMP this week to monitor renal function especially with initiation of losartan in December.  Recommended low potassium diet    Lower extremity edema  -possibly due to amlodipine  -Continue lasix 20 mg MWF.  Has trace lower extremity edema but stable recommended limb elevation and compression stockings  -okay to take extra lasix if edema worsen  -limb elevated      DM-2 with proteinuria  -on insulin, Glimipiride.  As per patient he stopped metformin due to GI complaints of diarrhea  - peak A1c was 10.4 in sept 2020 and now improving to  8.1   -recommend goal A1c less than 7.0, discussed about risk of worsening renal function and proteinuria with uncontrolled diabetes mellitus and patient verbalized understanding  -Recommend need for better diabetes control with goal A1c less than 7, stressed on daily exercise and weight loss  -Okay from nephrology side to restart on Farxiga, recommend discussion with endocrine about Farxiga/SGLT2 inhibitor.       Hyperlipidemia, mixed  -lipid panel  - LDL at goal at 80.   -Currently on Lipitor 20 mg daily  -Continue follow-up with PCP and management per PCP.  Stressed on daily exercise and weight loss     Obesity:  Body mass index is 40.18 kg/m².  - stressed daily exercise.        Patient Instructions   -renal function stable  -Blood pressure is stable    -Recommend 2 g sodium diet.    -Recommend daily exercise and weight loss  -Discussed home monitoring of BP and maintaining a log of blood pressure.  -Recommend  goal BP less than 130/80. Please inform me if SBP below 110 or above 140's persistently.    Labs this week    Follow up: 6 months with repeat Lab work within a week of the scheduled office visit. Will discuss the results of the previsit Labs during the office visit.       Diagnoses and all orders for this visit:    Persistent proteinuria  -     Basic metabolic panel; Future  -     Protein / creatinine ratio, urine; Future  -     Urinalysis with microscopic; Future  -     Albumin / creatinine urine ratio; Future  -     Basic metabolic panel; Future  -     Protein / creatinine ratio, urine; Future  -     Urinalysis with microscopic; Future  -     Magnesium; Future    Essential hypertension  -     Basic metabolic panel; Future  -     Basic metabolic panel; Future    Chronic kidney disease, stage 2 (mild)  -     Basic metabolic panel; Future  -     Protein / creatinine ratio, urine; Future  -     Urinalysis with microscopic; Future  -     Albumin / creatinine urine ratio; Future  -     Basic metabolic panel; Future  -     Protein / creatinine ratio, urine; Future    Peripheral edema    Type 2 diabetes mellitus with proteinuria  (HCC)  -     Basic metabolic panel; Future  -     Protein / creatinine ratio, urine; Future  -     Albumin / creatinine urine ratio; Future  -     Basic metabolic panel; Future  -     Protein / creatinine ratio, urine; Future    Obesity, morbid, BMI 40.0-49.9 (HCC)  -     Basic metabolic panel; Future  -     Protein / creatinine ratio, urine; Future              SUBJECTIVE / HPI:  Raphael Pack is a 69 y.o.  male with medical issues of  Diabetes mellitus type 2 x 10 yrs on oral pills with DN+, hypertension x 5 yrs who presents for  Follow-up of proteinuria.     patient had microalbumin creatinine ratio 327 milligram/gram in July 2018 and since then has gradually worsened with   microalbumin creatinine ratio of 3 g in March 2021 and so referred to Nephrology .  Was seen by Nephrology in April  2021, was started on spironolactone in addition to lisinopril 40 mg at that time to help with blood pressure as well as proteinuria.        Blood work on 08/23/2021 showed potassium elevated to 6.4 after which he was sent to the ED for medical management of hyperkalemia, stressed on low-potassium diet, was taken off spironolactone and lisinopril due to hyperkalemia .    Was last seen by advanced practitioner in October 2022.  Reviewed office visit note.  No changes, was not restarted on ACE inhibitor or ARB at that time     Reviewed blood work from December 4, 2023 again.  Creatinine was 0.75 mg/dL, potassium 4.5 meq/L.  eGFR 94.  LDL 80, triglyceride 239.  Last hemoglobin was 16.2 in March 2023.  Last A1c was 8.1 on 12/4/2023.   Microalbumin creatinine ratio was 1.6 g slightly worsening likely due to being off ARB and spironolactone        No recent labs, did not do any previsit labs which were ordered.  He had COVID in December 2023  Reviewed last endocrine note from May 2024.  Was told to continue same treatment   -reviewed PCP note from April 2024     REVIEW OF SYSTEMS:    Review of Systems   Constitutional:  Negative for activity change, appetite change, chills, diaphoresis, fatigue and fever.   HENT:  Negative for congestion, ear pain, facial swelling, nosebleeds and sore throat.    Eyes:  Negative for pain and visual disturbance.   Respiratory:  Negative for cough, chest tightness and shortness of breath.    Cardiovascular:  Negative for chest pain and palpitations.   Gastrointestinal:  Negative for abdominal distention, abdominal pain, diarrhea, nausea and vomiting.   Genitourinary:  Negative for difficulty urinating, dysuria, flank pain, frequency and hematuria.   Musculoskeletal:  Positive for arthralgias and back pain. Negative for joint swelling.   Skin:  Negative for color change and rash.   Neurological:  Negative for dizziness, seizures, syncope, weakness and headaches.   Psychiatric/Behavioral:   Negative for agitation and confusion. The patient is not nervous/anxious.    All other systems reviewed and are negative.      More than 10 point review of systems were obtained and discussed in length with the patient. Complete review of systems were negative / unremarkable except mentioned above.       PAST MEDICAL HISTORY:  Past Medical History:   Diagnosis Date    BPH (benign prostatic hyperplasia)     Chronic kidney disease     CPAP (continuous positive airway pressure) dependence     Diabetes mellitus (HCC)     Disease of thyroid gland     Hyperlipidemia     Hypertension     Sleep apnea     Type 2 diabetes mellitus with diabetic chronic kidney disease (HCC) 2021       PAST SURGICAL HISTORY:  Past Surgical History:   Procedure Laterality Date    COLONOSCOPY      2016    DC EGD TRANSORAL BIOPSY SINGLE/MULTIPLE N/A 2017    Procedure: ESOPHAGOGASTRODUODENOSCOPY (EGD) with bx;  Surgeon: Patel Chaudhary MD;  Location: [facility];  Service: Bariatrics    TONSILLECTOMY         SOCIAL HISTORY:  Social History     Substance and Sexual Activity   Alcohol Use Yes    Comment: social     Social History     Substance and Sexual Activity   Drug Use No     Social History     Tobacco Use   Smoking Status Former    Current packs/day: 0.00    Types: Cigarettes    Start date:     Quit date:     Years since quittin.6   Smokeless Tobacco Never       FAMILY HISTORY:  Family History   Problem Relation Age of Onset    Lung cancer Mother     Diabetes Father     Cancer Brother     Alcohol abuse Neg Hx     Substance Abuse Neg Hx     Mental illness Neg Hx     Depression Neg Hx        MEDICATIONS:    Current Outpatient Medications:     amLODIPine (NORVASC) 10 mg tablet, Take 1 tablet (10 mg total) by mouth daily, Disp: 90 tablet, Rfl: 1    aspirin (ECOTRIN LOW STRENGTH) 81 mg EC tablet, Take 1 tablet (81 mg total) by mouth daily, Disp: 90 tablet, Rfl: 0    atorvastatin (LIPITOR) 20 mg tablet, Take 1 tablet (20 mg  total) by mouth daily, Disp: 90 tablet, Rfl: 1    carvedilol (COREG) 12.5 mg tablet, Take 1 tablet (12.5 mg total) by mouth 2 (two) times a day with meals, Disp: 180 tablet, Rfl: 1    Continuous Blood Gluc  (FreeStyle Paul 14 Day [place]) LEO, USE TO CHECK BLOOD GLUCOSE, Disp: , Rfl:     Continuous Glucose Sensor (FreeStyle Paul 14 Day Sensor) MISC, Inject 1 application. under the skin every 14 (fourteen) days, Disp: 6 each, Rfl: 1    furosemide (LASIX) 20 mg tablet, Take 1 tablet (20 mg total) by mouth 3 (three) times a week, Disp: 36 tablet, Rfl: 1    gabapentin (NEURONTIN) 300 mg capsule, Take 1 capsule (300 mg total) by mouth 2 (two) times a day, Disp: 60 capsule, Rfl: 0    glimepiride (AMARYL) 4 mg tablet, Take 1 tablet with breakfast and 1 tablet with dinner., Disp: 180 tablet, Rfl: 1    HYDROcodone-acetaminophen (NORCO) 5-325 mg per tablet, Take 1 tablet by mouth daily as needed for pain Max Daily Amount: 1 tablet, Disp: 30 tablet, Rfl: 0    Insulin Glargine Solostar (Lantus SoloStar) 100 UNIT/ML SOPN, Inject 22 units at bedtime, Disp: 15 mL, Rfl: 1    Insulin Pen Needle (CareOne Unifine Pentips Plus) 31G X 5 MM MISC, Inject under the skin in the morning, Disp: 100 each, Rfl: 0    Insulin Pen Needle (CareOne Unifine Pentips Plus) 31G X 5 MM MISC, Inject under the skin every morning, Disp: 100 each, Rfl: 0    Lantus SoloStar 100 units/mL SOPN, Inject 26 units at bedtime, Disp: 15 mL, Rfl: 1    levothyroxine 75 mcg tablet, Take 1 tablet (75 mcg total) by mouth daily, Disp: 30 tablet, Rfl: 0    losartan (COZAAR) 25 mg tablet, Take 1 tablet (25 mg total) by mouth daily, Disp: 90 tablet, Rfl: 1    methocarbamol (ROBAXIN) 750 mg tablet, Take 1 tablet (750 mg total) by mouth 2 (two) times a day as needed for muscle spasms, Disp: 60 tablet, Rfl: 2    sildenafil (VIAGRA) 50 MG tablet, Take 1 tablet (50 mg total) by mouth as needed for erectile dysfunction, Disp: 10 tablet, Rfl: 0    metFORMIN (GLUCOPHAGE-XR)  "500 mg 24 hr tablet, Take 2 tablets (1,000 mg total) by mouth 2 (two) times a day with meals (Patient not taking: Reported on 8/8/2024), Disp: 360 tablet, Rfl: 1      PHYSICAL EXAM:  Vitals:    08/08/24 1142   BP: 126/60   BP Location: Left arm   Patient Position: Sitting   Cuff Size: Standard   Pulse: 72   Weight: 127 kg (280 lb)   Height: 5' 10\" (1.778 m)       Body mass index is 40.18 kg/m².    Physical Exam  Constitutional:       General: He is not in acute distress.     Appearance: He is well-developed. He is obese. He is not diaphoretic.   HENT:      Head: Normocephalic and atraumatic.      Mouth/Throat:      Mouth: Mucous membranes are moist.   Eyes:      General: No scleral icterus.     Conjunctiva/sclera: Conjunctivae normal.      Pupils: Pupils are equal, round, and reactive to light.   Neck:      Thyroid: No thyromegaly.   Cardiovascular:      Rate and Rhythm: Normal rate and regular rhythm.      Heart sounds: Normal heart sounds. No murmur heard.     No friction rub.   Pulmonary:      Effort: Pulmonary effort is normal. No respiratory distress.      Breath sounds: Normal breath sounds. No wheezing or rales.   Abdominal:      General: Bowel sounds are normal. There is no distension.      Palpations: Abdomen is soft.      Tenderness: There is no abdominal tenderness.   Musculoskeletal:         General: No deformity.      Cervical back: Neck supple.      Right lower leg: Edema present.      Left lower leg: Edema present.   Lymphadenopathy:      Cervical: No cervical adenopathy.   Skin:     General: Skin is dry.      Coloration: Skin is not jaundiced or pale.      Nails: There is no clubbing.   Neurological:      Mental Status: He is alert and oriented to person, place, and time. He is not disoriented.   Psychiatric:         Mood and Affect: Mood normal. Mood is not anxious. Affect is not inappropriate.         Behavior: Behavior normal.         Thought Content: Thought content normal.         Lab Results: " "  Results for orders placed or performed in visit on 06/03/24    Diabetes Eye Exam   Result Value Ref Range    Severity Normal     Right Eye Diabetic Retinopathy None     Left Eye Diabetic Retinopathy None      Lab Results:         Invalid input(s): \"ALBUMIN\"    Laboratory Results:  Lab Results   Component Value Date    WBC 9.73 03/17/2023    HGB 16.2 03/17/2023    HCT 49.6 (H) 03/17/2023    MCV 92 03/17/2023     03/17/2023     Lab Results   Component Value Date    SODIUM 136 12/04/2023    K 4.5 12/04/2023     12/04/2023    CO2 26 12/04/2023    BUN 18 12/04/2023    CREATININE 0.75 12/04/2023    GLUC 244 (H) 09/01/2021    CALCIUM 8.8 12/04/2023     Lab Results   Component Value Date    CALCIUM 8.8 12/04/2023     No results found for: \"LABPROT\"  [unfilled]  Lab Results   Component Value Date    CALCIUM 8.8 12/04/2023     [unfilled]     "

## 2024-08-11 DIAGNOSIS — G89.29 CHRONIC MIDLINE LOW BACK PAIN WITH RIGHT-SIDED SCIATICA: ICD-10-CM

## 2024-08-11 DIAGNOSIS — E11.65 UNCONTROLLED TYPE 2 DIABETES MELLITUS WITH HYPERGLYCEMIA (HCC): ICD-10-CM

## 2024-08-11 DIAGNOSIS — M54.41 CHRONIC MIDLINE LOW BACK PAIN WITH RIGHT-SIDED SCIATICA: ICD-10-CM

## 2024-08-11 DIAGNOSIS — N52.9 ERECTILE DYSFUNCTION, UNSPECIFIED ERECTILE DYSFUNCTION TYPE: ICD-10-CM

## 2024-08-11 RX ORDER — FLASH GLUCOSE SENSOR
1 KIT MISCELLANEOUS
Qty: 6 EACH | Refills: 0 | Status: SHIPPED | OUTPATIENT
Start: 2024-08-11

## 2024-08-11 RX ORDER — SILDENAFIL 50 MG/1
50 TABLET, FILM COATED ORAL AS NEEDED
Qty: 10 TABLET | Refills: 0 | Status: SHIPPED | OUTPATIENT
Start: 2024-08-11

## 2024-08-13 RX ORDER — HYDROCODONE BITARTRATE AND ACETAMINOPHEN 5; 325 MG/1; MG/1
1 TABLET ORAL DAILY PRN
Qty: 30 TABLET | Refills: 0 | OUTPATIENT
Start: 2024-08-13

## 2024-08-13 RX ORDER — HYDROCODONE BITARTRATE AND ACETAMINOPHEN 5; 325 MG/1; MG/1
1 TABLET ORAL DAILY PRN
Qty: 30 TABLET | Refills: 0 | Status: SHIPPED | OUTPATIENT
Start: 2024-08-13 | End: 2024-08-15 | Stop reason: ALTCHOICE

## 2024-08-14 ENCOUNTER — TELEPHONE (OUTPATIENT)
Age: 69
End: 2024-08-14

## 2024-08-14 DIAGNOSIS — M54.41 CHRONIC MIDLINE LOW BACK PAIN WITH RIGHT-SIDED SCIATICA: ICD-10-CM

## 2024-08-14 DIAGNOSIS — I10 ESSENTIAL HYPERTENSION: ICD-10-CM

## 2024-08-14 DIAGNOSIS — G89.29 CHRONIC MIDLINE LOW BACK PAIN WITH RIGHT-SIDED SCIATICA: ICD-10-CM

## 2024-08-14 RX ORDER — HYDROCODONE BITARTRATE AND ACETAMINOPHEN 5; 325 MG/1; MG/1
1 TABLET ORAL DAILY PRN
Qty: 30 TABLET | Refills: 0 | Status: CANCELLED | OUTPATIENT
Start: 2024-08-14

## 2024-08-14 RX ORDER — CARVEDILOL 12.5 MG/1
12.5 TABLET ORAL 2 TIMES DAILY WITH MEALS
Qty: 180 TABLET | Refills: 1 | Status: SHIPPED | OUTPATIENT
Start: 2024-08-14

## 2024-08-14 NOTE — TELEPHONE ENCOUNTER
Please call patient.  Let him know medication out of stock. Dose available, can take 1/2 tablet only. Let me know if ok to send

## 2024-08-14 NOTE — TELEPHONE ENCOUNTER
Received call from [facility] pharmacy to inform provider that recent order for HYDROcodone-acetaminophen (NORCO) 5-325 mg per tablet is on a national shortage; has not been able to receive medication of the past 3 weeks. Pharmacist reports having Hydrocodone-acetaminophen  mg available if provider wants to order 15 tablets and have the patient take 0.5 tablet daily. Please follow up with new order if appropriate.

## 2024-08-15 ENCOUNTER — TELEPHONE (OUTPATIENT)
Dept: INTERNAL MEDICINE CLINIC | Facility: CLINIC | Age: 69
End: 2024-08-15

## 2024-08-15 RX ORDER — HYDROCODONE BITARTRATE AND ACETAMINOPHEN 10; 325 MG/1; MG/1
TABLET ORAL
Qty: 15 TABLET | Refills: 0 | Status: SHIPPED | OUTPATIENT
Start: 2024-08-15

## 2024-08-19 ENCOUNTER — LAB (OUTPATIENT)
Dept: LAB | Facility: CLINIC | Age: 69
End: 2024-08-19
Payer: COMMERCIAL

## 2024-08-19 DIAGNOSIS — N18.2 CHRONIC KIDNEY DISEASE, STAGE 2 (MILD): ICD-10-CM

## 2024-08-19 DIAGNOSIS — R80.1 PERSISTENT PROTEINURIA: ICD-10-CM

## 2024-08-19 DIAGNOSIS — E53.8 VITAMIN B 12 DEFICIENCY: ICD-10-CM

## 2024-08-19 DIAGNOSIS — N18.31 TYPE 2 DIABETES MELLITUS WITH STAGE 3A CHRONIC KIDNEY DISEASE, WITHOUT LONG-TERM CURRENT USE OF INSULIN (HCC): ICD-10-CM

## 2024-08-19 DIAGNOSIS — E11.29 TYPE 2 DIABETES MELLITUS WITH PROTEINURIA  (HCC): ICD-10-CM

## 2024-08-19 DIAGNOSIS — E11.42 TYPE 2 DIABETES MELLITUS WITH DIABETIC POLYNEUROPATHY, WITHOUT LONG-TERM CURRENT USE OF INSULIN (HCC): ICD-10-CM

## 2024-08-19 DIAGNOSIS — E11.22 TYPE 2 DIABETES MELLITUS WITH STAGE 3A CHRONIC KIDNEY DISEASE, WITHOUT LONG-TERM CURRENT USE OF INSULIN (HCC): ICD-10-CM

## 2024-08-19 DIAGNOSIS — I10 ESSENTIAL HYPERTENSION: ICD-10-CM

## 2024-08-19 DIAGNOSIS — Z12.5 SCREENING FOR PROSTATE CANCER: ICD-10-CM

## 2024-08-19 DIAGNOSIS — R60.0 PERIPHERAL EDEMA: ICD-10-CM

## 2024-08-19 DIAGNOSIS — R80.9 TYPE 2 DIABETES MELLITUS WITH PROTEINURIA  (HCC): ICD-10-CM

## 2024-08-19 DIAGNOSIS — E03.9 ACQUIRED HYPOTHYROIDISM: ICD-10-CM

## 2024-08-19 LAB
ANION GAP SERPL CALCULATED.3IONS-SCNC: 6 MMOL/L (ref 4–13)
BACTERIA UR QL AUTO: ABNORMAL /HPF
BASOPHILS # BLD AUTO: 0.04 THOUSANDS/ÂΜL (ref 0–0.1)
BASOPHILS NFR BLD AUTO: 1 % (ref 0–1)
BILIRUB UR QL STRIP: NEGATIVE
BUN SERPL-MCNC: 13 MG/DL (ref 5–25)
CALCIUM SERPL-MCNC: 9 MG/DL (ref 8.4–10.2)
CHLORIDE SERPL-SCNC: 105 MMOL/L (ref 96–108)
CLARITY UR: CLEAR
CO2 SERPL-SCNC: 27 MMOL/L (ref 21–32)
COLOR UR: ABNORMAL
CREAT SERPL-MCNC: 0.79 MG/DL (ref 0.6–1.3)
CREAT UR-MCNC: 128.6 MG/DL
CREAT UR-MCNC: 130.8 MG/DL
EOSINOPHIL # BLD AUTO: 0.39 THOUSAND/ÂΜL (ref 0–0.61)
EOSINOPHIL NFR BLD AUTO: 6 % (ref 0–6)
ERYTHROCYTE [DISTWIDTH] IN BLOOD BY AUTOMATED COUNT: 13.1 % (ref 11.6–15.1)
EST. AVERAGE GLUCOSE BLD GHB EST-MCNC: 203 MG/DL
GFR SERPL CREATININE-BSD FRML MDRD: 92 ML/MIN/1.73SQ M
GLUCOSE P FAST SERPL-MCNC: 127 MG/DL (ref 65–99)
GLUCOSE UR STRIP-MCNC: ABNORMAL MG/DL
HBA1C MFR BLD: 8.7 %
HCT VFR BLD AUTO: 41.8 % (ref 36.5–49.3)
HGB BLD-MCNC: 13.5 G/DL (ref 12–17)
HGB UR QL STRIP.AUTO: NEGATIVE
HYALINE CASTS #/AREA URNS LPF: ABNORMAL /LPF
IMM GRANULOCYTES # BLD AUTO: 0.04 THOUSAND/UL (ref 0–0.2)
IMM GRANULOCYTES NFR BLD AUTO: 1 % (ref 0–2)
KETONES UR STRIP-MCNC: NEGATIVE MG/DL
LEUKOCYTE ESTERASE UR QL STRIP: NEGATIVE
LYMPHOCYTES # BLD AUTO: 2.65 THOUSANDS/ÂΜL (ref 0.6–4.47)
LYMPHOCYTES NFR BLD AUTO: 38 % (ref 14–44)
MCH RBC QN AUTO: 29.4 PG (ref 26.8–34.3)
MCHC RBC AUTO-ENTMCNC: 32.3 G/DL (ref 31.4–37.4)
MCV RBC AUTO: 91 FL (ref 82–98)
MICROALBUMIN UR-MCNC: 2454.2 MG/L
MICROALBUMIN/CREAT 24H UR: 1908 MG/G CREATININE (ref 0–30)
MONOCYTES # BLD AUTO: 0.44 THOUSAND/ÂΜL (ref 0.17–1.22)
MONOCYTES NFR BLD AUTO: 6 % (ref 4–12)
NEUTROPHILS # BLD AUTO: 3.38 THOUSANDS/ÂΜL (ref 1.85–7.62)
NEUTS SEG NFR BLD AUTO: 48 % (ref 43–75)
NITRITE UR QL STRIP: NEGATIVE
NON-SQ EPI CELLS URNS QL MICRO: ABNORMAL /HPF
NRBC BLD AUTO-RTO: 0 /100 WBCS
PH UR STRIP.AUTO: 6 [PH]
PLATELET # BLD AUTO: 200 THOUSANDS/UL (ref 149–390)
PMV BLD AUTO: 10.1 FL (ref 8.9–12.7)
POTASSIUM SERPL-SCNC: 4.3 MMOL/L (ref 3.5–5.3)
PROT UR STRIP-MCNC: ABNORMAL MG/DL
PROT UR-MCNC: 352.2 MG/DL
PROT/CREAT UR: 2.7 MG/G{CREAT} (ref 0–0.1)
PSA SERPL-MCNC: 1.47 NG/ML (ref 0–4)
RBC # BLD AUTO: 4.59 MILLION/UL (ref 3.88–5.62)
RBC #/AREA URNS AUTO: ABNORMAL /HPF
SODIUM SERPL-SCNC: 138 MMOL/L (ref 135–147)
SP GR UR STRIP.AUTO: 1.02 (ref 1–1.03)
T4 FREE SERPL-MCNC: 0.83 NG/DL (ref 0.61–1.12)
TSH SERPL DL<=0.05 MIU/L-ACNC: 3.23 UIU/ML (ref 0.45–4.5)
UROBILINOGEN UR STRIP-ACNC: <2 MG/DL
VIT B12 SERPL-MCNC: 162 PG/ML (ref 180–914)
WBC # BLD AUTO: 6.94 THOUSAND/UL (ref 4.31–10.16)
WBC #/AREA URNS AUTO: ABNORMAL /HPF

## 2024-08-19 PROCEDURE — 82570 ASSAY OF URINE CREATININE: CPT

## 2024-08-19 PROCEDURE — 3062F POS MACROALBUMINURIA REV: CPT | Performed by: ORTHOPAEDIC SURGERY

## 2024-08-19 PROCEDURE — G0103 PSA SCREENING: HCPCS

## 2024-08-19 PROCEDURE — 80048 BASIC METABOLIC PNL TOTAL CA: CPT

## 2024-08-19 PROCEDURE — 81001 URINALYSIS AUTO W/SCOPE: CPT

## 2024-08-19 PROCEDURE — 82043 UR ALBUMIN QUANTITATIVE: CPT

## 2024-08-19 PROCEDURE — 85025 COMPLETE CBC W/AUTO DIFF WBC: CPT

## 2024-08-19 PROCEDURE — 84439 ASSAY OF FREE THYROXINE: CPT

## 2024-08-19 PROCEDURE — 82607 VITAMIN B-12: CPT

## 2024-08-19 PROCEDURE — 84443 ASSAY THYROID STIM HORMONE: CPT

## 2024-08-19 PROCEDURE — 83036 HEMOGLOBIN GLYCOSYLATED A1C: CPT

## 2024-08-19 PROCEDURE — 84156 ASSAY OF PROTEIN URINE: CPT

## 2024-08-19 PROCEDURE — 36415 COLL VENOUS BLD VENIPUNCTURE: CPT

## 2024-08-19 NOTE — RESULT ENCOUNTER NOTE
Please inform patient that renal function is stable at creatinine 0.79 mg/dL but urine proteinuria slightly worsened to 2.7 grams, he is already on treatment with losartan which should help.  Recommend need for better diabetes control as fasting glucose was elevated.

## 2024-08-20 ENCOUNTER — TELEPHONE (OUTPATIENT)
Dept: NEPHROLOGY | Facility: CLINIC | Age: 69
End: 2024-08-20

## 2024-08-20 ENCOUNTER — OFFICE VISIT (OUTPATIENT)
Dept: INTERNAL MEDICINE CLINIC | Facility: CLINIC | Age: 69
End: 2024-08-20
Payer: COMMERCIAL

## 2024-08-20 VITALS
HEART RATE: 67 BPM | HEIGHT: 70 IN | TEMPERATURE: 97.1 F | SYSTOLIC BLOOD PRESSURE: 142 MMHG | WEIGHT: 280 LBS | DIASTOLIC BLOOD PRESSURE: 72 MMHG | OXYGEN SATURATION: 98 % | BODY MASS INDEX: 40.09 KG/M2

## 2024-08-20 DIAGNOSIS — Z00.00 MEDICARE ANNUAL WELLNESS VISIT, SUBSEQUENT: Primary | ICD-10-CM

## 2024-08-20 DIAGNOSIS — M79.642 BILATERAL HAND PAIN: ICD-10-CM

## 2024-08-20 DIAGNOSIS — N18.2 CHRONIC KIDNEY DISEASE, STAGE 2 (MILD): ICD-10-CM

## 2024-08-20 DIAGNOSIS — E53.8 VITAMIN B 12 DEFICIENCY: ICD-10-CM

## 2024-08-20 DIAGNOSIS — E11.29 TYPE 2 DIABETES MELLITUS WITH PROTEINURIA  (HCC): ICD-10-CM

## 2024-08-20 DIAGNOSIS — R80.9 TYPE 2 DIABETES MELLITUS WITH PROTEINURIA  (HCC): ICD-10-CM

## 2024-08-20 DIAGNOSIS — I10 ESSENTIAL HYPERTENSION: ICD-10-CM

## 2024-08-20 DIAGNOSIS — G89.29 CHRONIC MIDLINE LOW BACK PAIN WITH RIGHT-SIDED SCIATICA: ICD-10-CM

## 2024-08-20 DIAGNOSIS — M54.41 CHRONIC MIDLINE LOW BACK PAIN WITH RIGHT-SIDED SCIATICA: ICD-10-CM

## 2024-08-20 DIAGNOSIS — L98.9 SKIN LESION OF FACE: ICD-10-CM

## 2024-08-20 DIAGNOSIS — E78.2 MIXED HYPERLIPIDEMIA: ICD-10-CM

## 2024-08-20 DIAGNOSIS — M79.641 BILATERAL HAND PAIN: ICD-10-CM

## 2024-08-20 DIAGNOSIS — F11.20 UNCOMPLICATED OPIOID DEPENDENCE (HCC): ICD-10-CM

## 2024-08-20 DIAGNOSIS — E03.9 ACQUIRED HYPOTHYROIDISM: ICD-10-CM

## 2024-08-20 PROCEDURE — G0439 PPPS, SUBSEQ VISIT: HCPCS | Performed by: NURSE PRACTITIONER

## 2024-08-20 PROCEDURE — 96372 THER/PROPH/DIAG INJ SC/IM: CPT | Performed by: NURSE PRACTITIONER

## 2024-08-20 PROCEDURE — 99214 OFFICE O/P EST MOD 30 MIN: CPT | Performed by: NURSE PRACTITIONER

## 2024-08-20 RX ORDER — CYANOCOBALAMIN 1000 UG/ML
1000 INJECTION, SOLUTION INTRAMUSCULAR; SUBCUTANEOUS
Status: SHIPPED | OUTPATIENT
Start: 2024-08-20

## 2024-08-20 RX ADMIN — CYANOCOBALAMIN 1000 MCG: 1000 INJECTION, SOLUTION INTRAMUSCULAR; SUBCUTANEOUS at 09:46

## 2024-08-20 NOTE — ASSESSMENT & PLAN NOTE
Pain contract up to date.   Takes hydrocodone as needed for severe pain.  Takes tylenol for mild/moderate pain.

## 2024-08-20 NOTE — ASSESSMENT & PLAN NOTE
Lab Results   Component Value Date    EGFR 92 08/19/2024    EGFR 94 12/04/2023    EGFR 91 03/17/2023    CREATININE 0.79 08/19/2024    CREATININE 0.75 12/04/2023    CREATININE 0.81 03/17/2023   stable

## 2024-08-20 NOTE — TELEPHONE ENCOUNTER
----- Message from Michael Rainey MD sent at 8/19/2024  5:04 PM EDT -----  Please inform patient that renal function is stable at creatinine 0.79 mg/dL but urine proteinuria slightly worsened to 2.7 grams, he is already on treatment with losartan which should help.  Recommend need for better diabetes control as fasting glucose was elevated.

## 2024-08-20 NOTE — ASSESSMENT & PLAN NOTE
Lab Results   Component Value Date    HGBA1C 8.7 (H) 08/19/2024   Proteinuria and sugars are worse.   Discussed increasing lantus. He plans to call his endocrinologist today to discuss.   Sees nephrology, on losartan.

## 2024-08-20 NOTE — TELEPHONE ENCOUNTER
I spoke to Scott he is aware of recent lab results. He did not have any questions or concerns for our office at this time.

## 2024-08-20 NOTE — PROGRESS NOTES
Ambulatory Visit  Name: Raphael Pack      : 1955      MRN: [id number]  Encounter Provider: ROLAND Mendez  Encounter Date: 2024   Encounter department: [facility] INTERNAL MEDICINE    Assessment & Plan   1. Medicare annual wellness visit, subsequent  2. Essential hypertension  Assessment & Plan:  Stable  Continue current medication regimen   3. Type 2 diabetes mellitus with proteinuria  (HCC)  Assessment & Plan:    Lab Results   Component Value Date    HGBA1C 8.7 (H) 2024   Proteinuria and sugars are worse.   Discussed increasing lantus. He plans to call his endocrinologist today to discuss.   Sees nephrology, on losartan.   Orders:  -     Hemoglobin A1C; Future; Expected date: 2024  -     Comprehensive metabolic panel; Future; Expected date: 2024  4. Acquired hypothyroidism  Assessment & Plan:  Adequately replaced  5. Chronic midline low back pain with right-sided sciatica  Assessment & Plan:  Pain contract up to date.   Takes hydrocodone as needed for severe pain.  Takes tylenol for mild/moderate pain.   6. Chronic kidney disease, stage 2 (mild)  Assessment & Plan:  Lab Results   Component Value Date    EGFR 92 2024    EGFR 94 2023    EGFR 91 2023    CREATININE 0.79 2024    CREATININE 0.75 2023    CREATININE 0.81 2023   stable  7. Uncomplicated opioid dependence (HCC)  Assessment & Plan:  Pain contract UTD.   Reviewed BPI and COMM, appropriate use.   8. Mixed hyperlipidemia  Assessment & Plan:  On a statin   9. Bilateral hand pain  -     Ambulatory Referral to Orthopedic Surgery; Future  -     XR hand 3+ vw right; Future; Expected date: 2024  -     XR hand 3+ vw left; Future; Expected date: 2024  10. Vitamin B 12 deficiency  Assessment & Plan:  Weekly B12 injections in office X 6.  Then recommend vit b12 500 mcg daily   Orders:  -     cyanocobalamin injection 1,000 mcg  11. Skin lesion of face  -     Ambulatory  Referral to Dermatology; Future       Preventive health issues were discussed with patient, and age appropriate screening tests were ordered as noted in patient's After Visit Summary. Personalized health advice and appropriate referrals for health education or preventive services given if needed, as noted in patient's After Visit Summary.    History of Present Illness     Scott is here today for follow up  He is doing well.   Planning a trip to [place] next month.     Sugars have been up, over 300 after meals. Fasting sugars around 130.   He has been on the road a lot, eating out, more fast food.     He has been having more back pain, needing hydrocodone more often. The pain radiates down his right leg, worse when sitting on hard chair for an extended period of time.     He has been having bilateral hand pain, numbness in his fingers. Worse when he's doing activity. Does balloon work often, feels its making it worse.                 Patient Care Team:  ROLAND Mendez as PCP - General (Internal Medicine)  MD Agustin Feliciano MD Sagar V. Mehta, MD Ravi Agrawal, MD (Nephrology)  Perla Velasquez PA-C as Physician Assistant (Endocrinology)    Review of Systems   Constitutional:  Negative for activity change, appetite change, fatigue and unexpected weight change.   Eyes:  Negative for visual disturbance.   Respiratory:  Negative for cough and shortness of breath.    Cardiovascular:  Negative for chest pain, palpitations and leg swelling.   Gastrointestinal:  Negative for abdominal pain, blood in stool, constipation and diarrhea.   Genitourinary:  Negative for difficulty urinating.   Musculoskeletal:  Positive for arthralgias and back pain.   Skin:  Negative for rash.   Neurological:  Negative for dizziness, light-headedness and headaches.   Psychiatric/Behavioral:  Negative for sleep disturbance.      Medical History Reviewed by provider this encounter:  Tobacco  Allergies  Meds  Problems  Med  Hx  Surg Hx  Fam Hx       Annual Wellness Visit Questionnaire   Raphael is here for his Subsequent Wellness visit. Last Medicare Wellness visit information reviewed, patient interviewed and updates made to the record today.      Health Risk Assessment:   Patient rates overall health as good. Patient feels that their physical health rating is slightly worse. Patient is satisfied with their life. Eyesight was rated as same. Hearing was rated as same. Patient feels that their emotional and mental health rating is same. Patients states they are never, rarely angry. Patient states they are never, rarely unusually tired/fatigued. Pain experienced in the last 7 days has been none. Patient states that he has experienced no weight loss or gain in last 6 months.     Depression Screening:   PHQ-2 Score: 0      Fall Risk Screening:   In the past year, patient has experienced: history of falling in past year    Number of falls: 2 or more  Injured during fall?: No    Feels unsteady when standing or walking?: No    Worried about falling?: No      Home Safety:  Patient does not have trouble with stairs inside or outside of their home. Patient has working smoke alarms and has working carbon monoxide detector. Home safety hazards include: none.     Nutrition:   Current diet is Diabetic.     Medications:   Patient is currently taking over-the-counter supplements. OTC medications include: see medication list. Patient is able to manage medications.     Activities of Daily Living (ADLs)/Instrumental Activities of Daily Living (IADLs):   Walk and transfer into and out of bed and chair?: Yes  Dress and groom yourself?: Yes    Bathe or shower yourself?: Yes    Feed yourself? Yes  Do your laundry/housekeeping?: Yes  Manage your money, pay your bills and track your expenses?: Yes  Make your own meals?: Yes    Do your own shopping?: Yes    Previous Hospitalizations:   Any hospitalizations or ED visits within the last 12 months?: Yes    How  many hospitalizations have you had in the last year?: 1-2    Advance Care Planning:   Living will: No    Durable POA for healthcare: No      PREVENTIVE SCREENINGS      Cardiovascular Screening:    General: Screening Not Indicated and History Lipid Disorder      Diabetes Screening:     General: Screening Not Indicated and History Diabetes      Colorectal Cancer Screening:       Due for: Colonoscopy - Low Risk      Prostate Cancer Screening:    General: Screening Current      Osteoporosis Screening:    General: Screening Not Indicated      Abdominal Aortic Aneurysm (AAA) Screening:    Risk factors include: age between 65-76 yo and tobacco use        General: Screening Not Indicated      Lung Cancer Screening:     General: Screening Not Indicated      Hepatitis C Screening:    General: Screening Current    Screening, Brief Intervention, and Referral to Treatment (SBIRT)    Screening  Typical number of drinks in a day: 0  Typical number of drinks in a week: 0  Interpretation: Low risk drinking behavior.    AUDIT-C Screenin) How often did you have a drink containing alcohol in the past year? monthly or less  2) How many drinks did you have on a typical day when you were drinking in the past year? 1 to 2  3) How often did you have 6 or more drinks on one occasion in the past year? never    AUDIT-C Score: 1  Interpretation: Score 0-3 (male): Negative screen for alcohol misuse    Single Item Drug Screening:  How often have you used an illegal drug (including marijuana) or a prescription medication for non-medical reasons in the past year? never    Single Item Drug Screen Score: 0  Interpretation: Negative screen for possible drug use disorder    Social Determinants of Health     Financial Resource Strain: Low Risk  (2023)    Overall Financial Resource Strain (CARDIA)     Difficulty of Paying Living Expenses: Not very hard   Food Insecurity: No Food Insecurity (2024)    Hunger Vital Sign     Worried About  "Running Out of Food in the Last Year: Never true     Ran Out of Food in the Last Year: Never true   Transportation Needs: No Transportation Needs (8/20/2024)    PRAPARE - Transportation     Lack of Transportation (Medical): No     Lack of Transportation (Non-Medical): No   Housing Stability: Low Risk  (8/20/2024)    Housing Stability Vital Sign     Unable to Pay for Housing in the Last Year: No     Number of Times Moved in the Last Year: 1     Homeless in the Last Year: No   Utilities: Not At Risk (8/20/2024)    [facility] Utilities     Threatened with loss of utilities: No     No results found.    Objective     /72   Pulse 67   Temp (!) 97.1 °F (36.2 °C)   Ht 5' 10\" (1.778 m)   Wt 127 kg (280 lb)   SpO2 98%   BMI 40.18 kg/m²     Physical Exam  Vitals reviewed.   Constitutional:       Appearance: Normal appearance. He is well-developed.   HENT:      Head: Normocephalic and atraumatic.      Right Ear: Tympanic membrane, ear canal and external ear normal.      Left Ear: Tympanic membrane, ear canal and external ear normal.   Eyes:      Conjunctiva/sclera: Conjunctivae normal.   Neck:      Thyroid: No thyromegaly.   Cardiovascular:      Rate and Rhythm: Normal rate and regular rhythm.      Heart sounds: Normal heart sounds.   Pulmonary:      Effort: Pulmonary effort is normal.      Breath sounds: Normal breath sounds.   Abdominal:      General: Bowel sounds are normal.      Palpations: Abdomen is soft.   Musculoskeletal:         General: Normal range of motion.      Cervical back: Neck supple.      Right lower leg: Edema present.      Left lower leg: Edema present.      Comments: Mild pitting edema    Skin:     General: Skin is warm and dry.   Neurological:      Mental Status: He is alert and oriented to person, place, and time.   Psychiatric:         Mood and Affect: Mood normal.         Behavior: Behavior normal.           "

## 2024-08-27 ENCOUNTER — CLINICAL SUPPORT (OUTPATIENT)
Dept: INTERNAL MEDICINE CLINIC | Facility: CLINIC | Age: 69
End: 2024-08-27
Payer: COMMERCIAL

## 2024-08-27 DIAGNOSIS — E53.8 B12 DEFICIENCY: Primary | ICD-10-CM

## 2024-08-27 PROCEDURE — 96372 THER/PROPH/DIAG INJ SC/IM: CPT

## 2024-08-27 RX ADMIN — CYANOCOBALAMIN 1000 MCG: 1000 INJECTION, SOLUTION INTRAMUSCULAR; SUBCUTANEOUS at 10:12

## 2024-09-03 ENCOUNTER — CLINICAL SUPPORT (OUTPATIENT)
Dept: INTERNAL MEDICINE CLINIC | Facility: CLINIC | Age: 69
End: 2024-09-03
Payer: COMMERCIAL

## 2024-09-03 ENCOUNTER — TELEPHONE (OUTPATIENT)
Dept: INTERNAL MEDICINE CLINIC | Facility: CLINIC | Age: 69
End: 2024-09-03

## 2024-09-03 DIAGNOSIS — E53.8 B12 DEFICIENCY: Primary | ICD-10-CM

## 2024-09-03 PROCEDURE — 96372 THER/PROPH/DIAG INJ SC/IM: CPT

## 2024-09-03 RX ADMIN — CYANOCOBALAMIN 1000 MCG: 1000 INJECTION, SOLUTION INTRAMUSCULAR; SUBCUTANEOUS at 10:18

## 2024-09-03 NOTE — TELEPHONE ENCOUNTER
Restless leg medication should only be taken prior to bedtime. It will not help with leg pain. If it is a long flight, I would recommend getting up every hour to stand, walk up and down the isle if he can. He can try the norco prior the flight as well.

## 2024-09-03 NOTE — TELEPHONE ENCOUNTER
Has he taken anything in the past when flying?     He can take his norco before flying to see if that helps with the leg pain.

## 2024-09-03 NOTE — TELEPHONE ENCOUNTER
Patient called back, stated he had never flown before so he had no taken anything. Patient stated he has does not know if norco will help with flying. Patient stated he used to be on another medication for restless legs but cannot remember the name of it. Patient is concerned about having leg pain while flying for 5 hours.    Please advise, thank you

## 2024-09-03 NOTE — TELEPHONE ENCOUNTER
Pt. Is flying in three wks. And states that he always gets restless leg and leg pain in his lt. Leg. Is wondering if Joann can order something for him for this.

## 2024-09-05 ENCOUNTER — OFFICE VISIT (OUTPATIENT)
Dept: OBGYN CLINIC | Facility: CLINIC | Age: 69
End: 2024-09-05
Payer: COMMERCIAL

## 2024-09-05 ENCOUNTER — APPOINTMENT (OUTPATIENT)
Dept: RADIOLOGY | Facility: AMBULARY SURGERY CENTER | Age: 69
End: 2024-09-05
Attending: ORTHOPAEDIC SURGERY
Payer: COMMERCIAL

## 2024-09-05 VITALS
HEART RATE: 58 BPM | HEIGHT: 70 IN | BODY MASS INDEX: 40.09 KG/M2 | WEIGHT: 280 LBS | DIASTOLIC BLOOD PRESSURE: 76 MMHG | SYSTOLIC BLOOD PRESSURE: 151 MMHG

## 2024-09-05 DIAGNOSIS — G56.03 CARPAL TUNNEL SYNDROME, BILATERAL: ICD-10-CM

## 2024-09-05 DIAGNOSIS — M54.2 CERVICAL PAIN (NECK): ICD-10-CM

## 2024-09-05 DIAGNOSIS — M54.12 RADICULOPATHY, CERVICAL REGION: Primary | ICD-10-CM

## 2024-09-05 PROCEDURE — 1160F RVW MEDS BY RX/DR IN RCRD: CPT | Performed by: ORTHOPAEDIC SURGERY

## 2024-09-05 PROCEDURE — 72050 X-RAY EXAM NECK SPINE 4/5VWS: CPT

## 2024-09-05 PROCEDURE — 3078F DIAST BP <80 MM HG: CPT | Performed by: ORTHOPAEDIC SURGERY

## 2024-09-05 PROCEDURE — 3077F SYST BP >= 140 MM HG: CPT | Performed by: ORTHOPAEDIC SURGERY

## 2024-09-05 PROCEDURE — 99204 OFFICE O/P NEW MOD 45 MIN: CPT | Performed by: ORTHOPAEDIC SURGERY

## 2024-09-05 NOTE — PROGRESS NOTES
Assessment:  1. Radiculopathy, cervical region  XR spine cervical complete 4 or 5 vw non injury    EMG 2 Limb Upper Extremity      2. Carpal tunnel syndrome, bilateral  Ambulatory Referral to Orthopedic Surgery    EMG 2 Limb Upper Extremity        Patient Active Problem List   Diagnosis    Type 2 diabetes mellitus with diabetic polyneuropathy, without long-term current use of insulin ([facility])    JOSE RAUL (obstructive sleep apnea)    Essential hypertension    Mixed hyperlipidemia    Periodic limb movement    Hypothyroid    Chronic midline low back pain with right-sided sciatica    Obesity, morbid, BMI 40.0-49.9 ([facility])    Other male erectile dysfunction    Microalbuminuria    Chronic kidney disease, stage 2 (mild)    Type 2 diabetes mellitus with stage 2 chronic kidney disease, without long-term current use of insulin  ([facility])    Restless leg syndrome    Persistent proteinuria    Radiculopathy, cervical region    Intervertebral disc disorder with radiculopathy of lumbar region    Uncomplicated opioid dependence ([facility])    Peripheral edema    Type 2 diabetes mellitus with proteinuria  ([facility])    Vitamin B 12 deficiency    Carpal tunnel syndrome, bilateral       Plan:    69 y.o. male with cervical radiculopathy possible bilateral carpal tunnel syndrome    Discussed x-ray findings of the cervical spine discussed EMG of the bilateral upper extremities to assess for  Discussed underlying pathology of diagnosis  Discussed EMG of the bilateral upper extremities to assess for carpal tunnel syndrome.   patient expressed understanding.  EMG order placed today.  Discussed underlying symptoms secondary to cervical spine given physical exam and x-ray findings.  Patient to follow-up with spine and pain for assessment of the cervical spine.  Patient expressed understanding of the above.  Patient to follow-up with spine and pain to assess cervical spine.    The patient was seen and examined by Dr. Dalal and myself. The assessment and plan were  formulated by Dr. Dalal and I assisted in carrying it out.    Subjective:   Patient ID: Raphael Pack is a 69 y.o. male .    HPI    Patient comes in today with regards to bilateral numbness and tingling of the upper extremities.  Patient is referred to us by Joann Espitia CRNP for further evaluation.  Patient notes bilateral numbness and tingling of the upper extremities for several weeks.  Notes increase in pain with lying flat at night.  Denies previous injury or treatment.  Denies previous surgery or CSI.  States history of lumbar radiculopathy to follow-up with spine and pain next week.    The following portions of the patient's history were reviewed and updated as appropriate: allergies, current medications, past family history, past social history, past surgical history and problem list.    Social History     Socioeconomic History    Marital status: /Civil Union     Spouse name: Not on file    Number of children: Not on file    Years of education: Not on file    Highest education level: Not on file   Occupational History    Not on file   Tobacco Use    Smoking status: Former     Current packs/day: 0.00     Types: Cigarettes     Start date:      Quit date:      Years since quittin.7    Smokeless tobacco: Never   Vaping Use    Vaping status: Never Used   Substance and Sexual Activity    Alcohol use: Yes     Comment: social    Drug use: No    Sexual activity: Yes     Partners: Female     Birth control/protection: None   Other Topics Concern    Not on file   Social History Narrative         Social Determinants of Health     Financial Resource Strain: Low Risk  (2023)    Overall Financial Resource Strain (CARDIA)     Difficulty of Paying Living Expenses: Not very hard   Food Insecurity: No Food Insecurity (2024)    Hunger Vital Sign     Worried About Running Out of Food in the Last Year: Never true     Ran Out of Food in the Last Year: Never true   Transportation  Needs: No Transportation Needs (8/20/2024)    PRAPARE - Transportation     Lack of Transportation (Medical): No     Lack of Transportation (Non-Medical): No   Physical Activity: Not on file   Stress: Not on file   Social Connections: Not on file   Intimate Partner Violence: Not on file   Housing Stability: Low Risk  (8/20/2024)    Housing Stability Vital Sign     Unable to Pay for Housing in the Last Year: No     Number of Times Moved in the Last Year: 1     Homeless in the Last Year: No     Past Medical History:   Diagnosis Date    BPH (benign prostatic hyperplasia)     Chronic kidney disease     CPAP (continuous positive airway pressure) dependence     Diabetes mellitus (HCC)     Disease of thyroid gland     Hyperlipidemia     Hypertension     Sleep apnea     Type 2 diabetes mellitus with diabetic chronic kidney disease (HCC) 6/24/2021     Past Surgical History:   Procedure Laterality Date    COLONOSCOPY      2016    UT EGD TRANSORAL BIOPSY SINGLE/MULTIPLE N/A 5/17/2017    Procedure: ESOPHAGOGASTRODUODENOSCOPY (EGD) with bx;  Surgeon: Patel Chaudhary MD;  Location: [facility];  Service: Bariatrics    TONSILLECTOMY       Allergies   Allergen Reactions    Penicillins      SYNCOPE, but has taken amoxicillin/augmentin in past     Current Outpatient Medications on File Prior to Visit   Medication Sig Dispense Refill    amLODIPine (NORVASC) 10 mg tablet Take 1 tablet (10 mg total) by mouth daily 90 tablet 1    aspirin (ECOTRIN LOW STRENGTH) 81 mg EC tablet Take 1 tablet (81 mg total) by mouth daily 90 tablet 0    atorvastatin (LIPITOR) 20 mg tablet Take 1 tablet (20 mg total) by mouth daily 90 tablet 1    carvedilol (COREG) 12.5 mg tablet Take 1 tablet (12.5 mg total) by mouth 2 (two) times a day with meals 180 tablet 1    Continuous Blood Gluc  (FreeStyle Paul 14 Day [place]) LEO USE TO CHECK BLOOD GLUCOSE      Continuous Glucose Sensor (FreeStyle Paul 14 Day Sensor) MISC Inject 1 application. under the skin  every 14 (fourteen) days 6 each 0    furosemide (LASIX) 20 mg tablet Take 1 tablet (20 mg total) by mouth 3 (three) times a week 36 tablet 1    gabapentin (NEURONTIN) 300 mg capsule Take 1 capsule (300 mg total) by mouth 2 (two) times a day 60 capsule 0    glimepiride (AMARYL) 4 mg tablet Take 1 tablet with breakfast and 1 tablet with dinner. 180 tablet 1    HYDROcodone-acetaminophen (NORCO)  mg per tablet Take 1/2 tablet PO daily prn for pain 15 tablet 0    Insulin Pen Needle (CareOne Unifine Pentips Plus) 31G X 5 MM MISC Inject under the skin in the morning 100 each 0    Insulin Pen Needle (CareOne Unifine Pentips Plus) 31G X 5 MM MISC Inject under the skin every morning 100 each 0    Lantus SoloStar 100 units/mL SOPN Inject 26 units at bedtime 15 mL 1    levothyroxine 75 mcg tablet Take 1 tablet (75 mcg total) by mouth daily 30 tablet 0    losartan (COZAAR) 25 mg tablet Take 1 tablet (25 mg total) by mouth daily 90 tablet 1    methocarbamol (ROBAXIN) 750 mg tablet Take 1 tablet (750 mg total) by mouth 2 (two) times a day as needed for muscle spasms 60 tablet 2    sildenafil (VIAGRA) 50 MG tablet Take 1 tablet (50 mg total) by mouth as needed for erectile dysfunction 10 tablet 0     Current Facility-Administered Medications on File Prior to Visit   Medication Dose Route Frequency Provider Last Rate Last Admin    cyanocobalamin injection 1,000 mcg  1,000 mcg Intramuscular Q30 Days    1,000 mcg at 09/03/24 1018       Review of Systems      Objective:    Vitals:    09/05/24 1026   BP: 151/76   Pulse: 58       Physical Exam    Right Hand Exam     Tests   Phalen’s Sign: negative  Tinel's sign (median nerve): negative      Left Hand Exam     Tests   Phalen’s Sign: negative  Tinel's sign (median nerve): negative  Finkelstein's test: negative    Comments:  Positive Spurling's on the left  Positive ulnar nerve tension            I have personally reviewed pertinent films in PACS and my interpretation is x-ray of the  "cervical spine reveals autofusion of C2 and C6-C7 as well as degenerative changes of C5-C6 and anterior syndesmophytes throughout.    Procedures  No Procedures performed today      Scribe Attestation      I,:  Saida Keys PA-C am acting as a scribe while in the presence of the attending physician.:       I,:  Jeff Dalal, DO personally performed the services described in this documentation    as scribed in my presence.:               Portions of the record may have been created with voice recognition software.  Occasional wrong word or \"sound a like\" substitutions may have occurred due to the inherent limitations of voice recognition software.  Read the chart carefully and recognize, using context, where substitutions have occurred.   "

## 2024-09-08 DIAGNOSIS — E03.9 HYPOTHYROIDISM, UNSPECIFIED TYPE: ICD-10-CM

## 2024-09-08 DIAGNOSIS — M51.16 LUMBAR DISC DISEASE WITH RADICULOPATHY: ICD-10-CM

## 2024-09-08 DIAGNOSIS — R60.0 PERIPHERAL EDEMA: ICD-10-CM

## 2024-09-08 DIAGNOSIS — I10 ESSENTIAL HYPERTENSION: ICD-10-CM

## 2024-09-08 DIAGNOSIS — E78.2 MIXED HYPERLIPIDEMIA: ICD-10-CM

## 2024-09-08 RX ORDER — GABAPENTIN 300 MG/1
300 CAPSULE ORAL 2 TIMES DAILY
Qty: 60 CAPSULE | Refills: 0 | Status: SHIPPED | OUTPATIENT
Start: 2024-09-08

## 2024-09-08 RX ORDER — LEVOTHYROXINE SODIUM 75 UG/1
75 TABLET ORAL DAILY
Qty: 30 TABLET | Refills: 0 | Status: SHIPPED | OUTPATIENT
Start: 2024-09-08

## 2024-09-08 RX ORDER — ATORVASTATIN CALCIUM 20 MG/1
20 TABLET, FILM COATED ORAL DAILY
Qty: 90 TABLET | Refills: 0 | Status: SHIPPED | OUTPATIENT
Start: 2024-09-08

## 2024-09-09 RX ORDER — FUROSEMIDE 20 MG
20 TABLET ORAL 3 TIMES WEEKLY
Qty: 36 TABLET | Refills: 1 | Status: SHIPPED | OUTPATIENT
Start: 2024-09-09

## 2024-09-10 ENCOUNTER — CLINICAL SUPPORT (OUTPATIENT)
Dept: INTERNAL MEDICINE CLINIC | Facility: CLINIC | Age: 69
End: 2024-09-10
Payer: COMMERCIAL

## 2024-09-10 DIAGNOSIS — E53.8 B12 DEFICIENCY: Primary | ICD-10-CM

## 2024-09-10 PROCEDURE — 96372 THER/PROPH/DIAG INJ SC/IM: CPT

## 2024-09-10 RX ADMIN — CYANOCOBALAMIN 1000 MCG: 1000 INJECTION, SOLUTION INTRAMUSCULAR; SUBCUTANEOUS at 10:23

## 2024-09-13 ENCOUNTER — OFFICE VISIT (OUTPATIENT)
Dept: PAIN MEDICINE | Facility: CLINIC | Age: 69
End: 2024-09-13
Payer: COMMERCIAL

## 2024-09-13 ENCOUNTER — TELEPHONE (OUTPATIENT)
Dept: RADIOLOGY | Facility: CLINIC | Age: 69
End: 2024-09-13

## 2024-09-13 VITALS
HEIGHT: 70 IN | WEIGHT: 279.98 LBS | HEART RATE: 79 BPM | SYSTOLIC BLOOD PRESSURE: 168 MMHG | RESPIRATION RATE: 17 BRPM | BODY MASS INDEX: 40.08 KG/M2 | DIASTOLIC BLOOD PRESSURE: 89 MMHG

## 2024-09-13 DIAGNOSIS — M48.061 LUMBAR FORAMINAL STENOSIS: ICD-10-CM

## 2024-09-13 DIAGNOSIS — G89.4 CHRONIC PAIN SYNDROME: ICD-10-CM

## 2024-09-13 DIAGNOSIS — M25.551 RIGHT HIP PAIN: ICD-10-CM

## 2024-09-13 DIAGNOSIS — M51.16 INTERVERTEBRAL DISC DISORDER WITH RADICULOPATHY OF LUMBAR REGION: Primary | ICD-10-CM

## 2024-09-13 PROCEDURE — 99214 OFFICE O/P EST MOD 30 MIN: CPT

## 2024-09-13 NOTE — PROGRESS NOTES
Assessment:  1. Intervertebral disc disorder with radiculopathy of lumbar region    2. Chronic pain syndrome    3. Right hip pain    4. Lumbar foraminal stenosis        Plan:  The patient is a 69-year-old male with a history of chronic pain secondary to low back pain, lumbar intervertebral disc disorder with radiculopathy, neck pain and cervical radiculopathy who presents to the office with worsening right-sided low back pain and pain that radiates into the right hip and into the right lower extremity.    MRI imaging of lumbar spine shows moderate right-sided L5-S1 foraminal encroachment and impingement of the exiting right L5 nerve root.  I did instruct patient we can repeat a right-sided L5-S1 TFESI to help decrease inflammation and provide him relief.  Patient would like to proceed and will be scheduled.  Complete risks and benefits including bleeding, infection, tissue reaction, nerve injury and allergic reaction were discussed.  The approach was demonstrated using models and literature was provided.  Verbal and written consent was obtained.    I will also x-ray his right hip for further evaluation of his right hip pain.  I instructed patient I will call once I receive the results.    My impressions and treatment recommendations were discussed in detail with the patient who verbalized understanding and had no further questions.  Discharge instructions were provided. I personally saw and examined the patient and I agree with the above discussed plan of care.    Orders Placed This Encounter   Procedures    XR hip/pelv 2-3 vws right if performed     Standing Status:   Future     Standing Expiration Date:   9/13/2028     Scheduling Instructions:      Bring along any outside films relating to this procedure.          [place] [facility]     Dr Shepherd     Standing Status:   Future     Standing Expiration Date:   9/13/2028     Order Specific Question:   Reason for Exam:     Answer:   Right L5-S1 TFESI      Order Specific Question:   Anticoagulant hold needed?     Answer:   No     No orders of the defined types were placed in this encounter.      History of Present Illness:  Raphael Pack is a 69 y.o. male with a history of chronic pain secondary to low back pain, lumbar intervertebral disc disorder with radiculopathy, neck pain and cervical radiculopathy.  He was last seen on 7/6/2023 where he underwent a right-sided L5/S1 TFESI.  He presents to the office with worsening right-sided low back pain and pain that radiates into the right hip and into the right lower extremity.    He states his pain is worse since the last office visit and constant but worse in the evening and at night.  He rates quality of his pain is burning and is currently rating his pain an 8/10 on a numeric scale.    urrent pain medications include gabapentin 300mg 1 tablet twice a day and Norco 5/325 mg as prescribed by his PCP.    I have personally reviewed and/or updated the patient's past medical history, past surgical history, family history, social history, current medications, allergies, and vital signs today.     Review of Systems   Respiratory:  Negative for shortness of breath.    Cardiovascular:  Negative for chest pain.   Gastrointestinal:  Positive for diarrhea. Negative for constipation, nausea and vomiting.   Musculoskeletal:  Positive for back pain, gait problem, joint swelling, neck pain and neck stiffness. Negative for arthralgias and myalgias.        Bilateral leg Pain  Decreased Range Of Motion  Bilateral arm numbness   Skin:  Negative for rash.   Neurological:  Negative for dizziness, seizures and weakness.   All other systems reviewed and are negative.      Patient Active Problem List   Diagnosis    Type 2 diabetes mellitus with diabetic polyneuropathy, without long-term current use of insulin (HCC)    JOSE RAUL (obstructive sleep apnea)    Essential hypertension    Mixed hyperlipidemia    Periodic limb movement    Hypothyroid     Chronic midline low back pain with right-sided sciatica    Obesity, morbid, BMI 40.0-49.9 (HCC)    Other male erectile dysfunction    Microalbuminuria    Chronic kidney disease, stage 2 (mild)    Type 2 diabetes mellitus with stage 2 chronic kidney disease, without long-term current use of insulin  (HCC)    Restless leg syndrome    Persistent proteinuria    Radiculopathy, cervical region    Intervertebral disc disorder with radiculopathy of lumbar region    Uncomplicated opioid dependence (HCC)    Peripheral edema    Type 2 diabetes mellitus with proteinuria  (HCC)    Vitamin B 12 deficiency    Carpal tunnel syndrome, bilateral       Past Medical History:   Diagnosis Date    BPH (benign prostatic hyperplasia)     Chronic kidney disease     CPAP (continuous positive airway pressure) dependence     Diabetes mellitus (HCC)     Disease of thyroid gland     Hyperlipidemia     Hypertension     Sleep apnea     Type 2 diabetes mellitus with diabetic chronic kidney disease (HCC) 2021       Past Surgical History:   Procedure Laterality Date    COLONOSCOPY          ME EGD TRANSORAL BIOPSY SINGLE/MULTIPLE N/A 2017    Procedure: ESOPHAGOGASTRODUODENOSCOPY (EGD) with bx;  Surgeon: Patel Chaudhary MD;  Location: [facility];  Service: Bariatrics    TONSILLECTOMY         Family History   Problem Relation Age of Onset    Lung cancer Mother     Diabetes Father     Cancer Brother     Alcohol abuse Neg Hx     Substance Abuse Neg Hx     Mental illness Neg Hx     Depression Neg Hx        Social History     Occupational History    Not on file   Tobacco Use    Smoking status: Former     Current packs/day: 0.00     Types: Cigarettes     Start date:      Quit date:      Years since quittin.7    Smokeless tobacco: Never   Vaping Use    Vaping status: Never Used   Substance and Sexual Activity    Alcohol use: Yes     Comment: social    Drug use: No    Sexual activity: Yes     Partners: Female     Birth  control/protection: None       Current Outpatient Medications on File Prior to Visit   Medication Sig    amLODIPine (NORVASC) 10 mg tablet Take 1 tablet (10 mg total) by mouth daily    aspirin (ECOTRIN LOW STRENGTH) 81 mg EC tablet Take 1 tablet (81 mg total) by mouth daily    atorvastatin (LIPITOR) 20 mg tablet Take 1 tablet (20 mg total) by mouth daily    carvedilol (COREG) 12.5 mg tablet Take 1 tablet (12.5 mg total) by mouth 2 (two) times a day with meals    Continuous Blood Gluc  (FreeStyle Paul 14 Day [place]) LEO USE TO CHECK BLOOD GLUCOSE    Continuous Glucose Sensor (FreeStyle Paul 14 Day Sensor) MISC Inject 1 application. under the skin every 14 (fourteen) days    furosemide (LASIX) 20 mg tablet Take 1 tablet (20 mg total) by mouth 3 (three) times a week    gabapentin (NEURONTIN) 300 mg capsule Take 1 capsule (300 mg total) by mouth 2 (two) times a day    glimepiride (AMARYL) 4 mg tablet Take 1 tablet with breakfast and 1 tablet with dinner.    HYDROcodone-acetaminophen (NORCO)  mg per tablet Take 1/2 tablet PO daily prn for pain    Insulin Pen Needle (CareOne Unifine Pentips Plus) 31G X 5 MM MISC Inject under the skin in the morning    Insulin Pen Needle (CareOne Unifine Pentips Plus) 31G X 5 MM MISC Inject under the skin every morning    Lantus SoloStar 100 units/mL SOPN Inject 26 units at bedtime    levothyroxine 75 mcg tablet Take 1 tablet (75 mcg total) by mouth daily    losartan (COZAAR) 25 mg tablet Take 1 tablet (25 mg total) by mouth daily    sildenafil (VIAGRA) 50 MG tablet Take 1 tablet (50 mg total) by mouth as needed for erectile dysfunction    methocarbamol (ROBAXIN) 750 mg tablet Take 1 tablet (750 mg total) by mouth 2 (two) times a day as needed for muscle spasms     Current Facility-Administered Medications on File Prior to Visit   Medication    cyanocobalamin injection 1,000 mcg       Allergies   Allergen Reactions    Penicillins      SYNCOPE, but has taken  "amoxicillin/augmentin in past       Physical Exam:    /89   Pulse 79   Resp 17   Ht 5' 10\" (1.778 m)   Wt 127 kg (279 lb 15.8 oz)   BMI 40.17 kg/m²     Constitutional:normal, well developed, well nourished, alert, in no distress and non-toxic and no overt pain behavior.  Eyes:anicteric  HEENT:grossly intact  Neck:supple, symmetric, trachea midline and no masses   Pulmonary:even and unlabored  Cardiovascular:No edema or pitting edema present  Skin:Normal without rashes or lesions and well hydrated  Psychiatric:Mood and affect appropriate  Neurologic:Cranial Nerves II-XII grossly intact  Musculoskeletal:antalgic    Lumbar Spine Exam    Appearance:  Normal lordosis  Palpation/Tenderness:  left lumbar paraspinal tenderness  right lumbar paraspinal tenderness  Sensory:  no sensory deficits noted  Range of Motion:  Flexion:  Minimally limited  with pain  Extension:  Minimally limited  with pain  Motor Strength:  Left hip flexion:  5/5  Right hip flexion:  5/5  Left knee flexion:  5/5  Left knee extension:  5/5  Right knee flexion:  5/5  Right knee extension:  5/5  Left foot dorsiflexion:  5/5  Left foot plantar flexion:  5/5  Right foot dorsiflexion:  5/5  Right foot plantar flexion:  5/5      Imaging    "

## 2024-09-13 NOTE — TELEPHONE ENCOUNTER
Caller: derian    Doctor: ovidio    Reason for call: pt is calling back    Call back#: [phone]

## 2024-09-13 NOTE — TELEPHONE ENCOUNTER
----- Message from Indy CANO sent at 9/13/2024 11:05 AM EDT -----  Regarding: Glucose monitor  Patient has been scheduled with Dr. Shepherd on 9/19/24 for TFESI, and wears a glucose monitor on their arm.  Patient advised someone from the clinical staff would reach out  with further instructions regarding this.    Thank you,  Pascual

## 2024-09-13 NOTE — TELEPHONE ENCOUNTER
S/w pt, confirmed he wears glucose monitor on arm. RN advised pt he does not need to remove from arm for TFESI. Pt verbalized understanding

## 2024-09-17 ENCOUNTER — CLINICAL SUPPORT (OUTPATIENT)
Dept: INTERNAL MEDICINE CLINIC | Facility: CLINIC | Age: 69
End: 2024-09-17
Payer: COMMERCIAL

## 2024-09-17 DIAGNOSIS — E53.8 B12 DEFICIENCY: Primary | ICD-10-CM

## 2024-09-17 PROCEDURE — 96372 THER/PROPH/DIAG INJ SC/IM: CPT

## 2024-09-17 RX ADMIN — CYANOCOBALAMIN 1000 MCG: 1000 INJECTION, SOLUTION INTRAMUSCULAR; SUBCUTANEOUS at 15:23

## 2024-09-18 DIAGNOSIS — G89.29 CHRONIC MIDLINE LOW BACK PAIN WITH RIGHT-SIDED SCIATICA: ICD-10-CM

## 2024-09-18 DIAGNOSIS — M54.41 CHRONIC MIDLINE LOW BACK PAIN WITH RIGHT-SIDED SCIATICA: ICD-10-CM

## 2024-09-18 DIAGNOSIS — M79.18 MYOFASCIAL PAIN SYNDROME: ICD-10-CM

## 2024-09-18 DIAGNOSIS — E11.65 UNCONTROLLED TYPE 2 DIABETES MELLITUS WITH HYPERGLYCEMIA (HCC): ICD-10-CM

## 2024-09-18 DIAGNOSIS — N52.9 ERECTILE DYSFUNCTION, UNSPECIFIED ERECTILE DYSFUNCTION TYPE: ICD-10-CM

## 2024-09-18 RX ORDER — METHOCARBAMOL 750 MG/1
750 TABLET, FILM COATED ORAL 2 TIMES DAILY PRN
Qty: 60 TABLET | Refills: 2 | Status: SHIPPED | OUTPATIENT
Start: 2024-09-18 | End: 2024-12-17

## 2024-09-18 RX ORDER — PEN NEEDLE, DIABETIC 31 GX3/16"
NEEDLE, DISPOSABLE MISCELLANEOUS EVERY MORNING
Qty: 100 EACH | Refills: 0 | Status: SHIPPED | OUTPATIENT
Start: 2024-09-18

## 2024-09-19 ENCOUNTER — HOSPITAL ENCOUNTER (OUTPATIENT)
Dept: RADIOLOGY | Facility: CLINIC | Age: 69
End: 2024-09-19
Payer: COMMERCIAL

## 2024-09-19 ENCOUNTER — TELEPHONE (OUTPATIENT)
Dept: ENDOCRINOLOGY | Facility: CLINIC | Age: 69
End: 2024-09-19

## 2024-09-19 ENCOUNTER — DOCUMENTATION (OUTPATIENT)
Dept: ENDOCRINOLOGY | Facility: CLINIC | Age: 69
End: 2024-09-19

## 2024-09-19 VITALS
HEART RATE: 65 BPM | RESPIRATION RATE: 20 BRPM | DIASTOLIC BLOOD PRESSURE: 72 MMHG | TEMPERATURE: 98.2 F | SYSTOLIC BLOOD PRESSURE: 129 MMHG | OXYGEN SATURATION: 96 %

## 2024-09-19 DIAGNOSIS — M51.16 LUMBAR DISC DISEASE WITH RADICULOPATHY: Primary | ICD-10-CM

## 2024-09-19 DIAGNOSIS — M51.16 INTERVERTEBRAL DISC DISORDER WITH RADICULOPATHY OF LUMBAR REGION: ICD-10-CM

## 2024-09-19 PROCEDURE — 64484 NJX AA&/STRD TFRM EPI L/S EA: CPT | Performed by: ANESTHESIOLOGY

## 2024-09-19 PROCEDURE — 64483 NJX AA&/STRD TFRM EPI L/S 1: CPT | Performed by: ANESTHESIOLOGY

## 2024-09-19 RX ORDER — BUPIVACAINE HCL/PF 2.5 MG/ML
2 VIAL (ML) INJECTION ONCE
Status: COMPLETED | OUTPATIENT
Start: 2024-09-19 | End: 2024-09-19

## 2024-09-19 RX ORDER — METHYLPREDNISOLONE ACETATE 80 MG/ML
80 INJECTION, SUSPENSION INTRA-ARTICULAR; INTRALESIONAL; INTRAMUSCULAR; PARENTERAL; SOFT TISSUE ONCE
Status: COMPLETED | OUTPATIENT
Start: 2024-09-19 | End: 2024-09-19

## 2024-09-19 RX ORDER — HYDROCODONE BITARTRATE AND ACETAMINOPHEN 5; 325 MG/1; MG/1
1 TABLET ORAL DAILY PRN
Qty: 30 TABLET | Refills: 0 | Status: SHIPPED | OUTPATIENT
Start: 2024-09-19

## 2024-09-19 RX ORDER — 0.9 % SODIUM CHLORIDE 0.9 %
4 VIAL (ML) INJECTION ONCE
Status: COMPLETED | OUTPATIENT
Start: 2024-09-19 | End: 2024-09-19

## 2024-09-19 RX ORDER — HYDROCODONE BITARTRATE AND ACETAMINOPHEN 10; 325 MG/1; MG/1
TABLET ORAL
Qty: 15 TABLET | Refills: 0 | OUTPATIENT
Start: 2024-09-19

## 2024-09-19 RX ORDER — SILDENAFIL 50 MG/1
50 TABLET, FILM COATED ORAL AS NEEDED
Qty: 10 TABLET | Refills: 0 | Status: SHIPPED | OUTPATIENT
Start: 2024-09-19

## 2024-09-19 RX ADMIN — Medication 4 ML: at 15:03

## 2024-09-19 RX ADMIN — IOHEXOL 1 ML: 300 INJECTION, SOLUTION INTRAVENOUS at 15:05

## 2024-09-19 RX ADMIN — BUPIVACAINE HYDROCHLORIDE 2 ML: 2.5 INJECTION, SOLUTION EPIDURAL; INFILTRATION; INTRACAUDAL at 15:05

## 2024-09-19 RX ADMIN — METHYLPREDNISOLONE ACETATE 80 MG: 80 INJECTION, SUSPENSION INTRA-ARTICULAR; INTRALESIONAL; INTRAMUSCULAR; PARENTERAL; SOFT TISSUE at 15:05

## 2024-09-19 NOTE — H&P
History of Present Illness: The patient is a 69 y.o. male who presents with complaints of right lower back and leg pain and is here today for right L5-S1 transforaminal epidural steroid injection    Past Medical History:   Diagnosis Date    BPH (benign prostatic hyperplasia)     Chronic kidney disease     CPAP (continuous positive airway pressure) dependence     Diabetes mellitus (HCC)     Disease of thyroid gland     Hyperlipidemia     Hypertension     Sleep apnea     Type 2 diabetes mellitus with diabetic chronic kidney disease (HCC) 6/24/2021       Past Surgical History:   Procedure Laterality Date    COLONOSCOPY      2016    [place] EGD TRANSORAL BIOPSY SINGLE/MULTIPLE N/A 5/17/2017    Procedure: ESOPHAGOGASTRODUODENOSCOPY (EGD) with bx;  Surgeon: Patel Chaudhary MD;  Location: [facility];  Service: Bariatrics    TONSILLECTOMY           Current Outpatient Medications:     amLODIPine (NORVASC) 10 mg tablet, Take 1 tablet (10 mg total) by mouth daily, Disp: 90 tablet, Rfl: 1    aspirin (ECOTRIN LOW STRENGTH) 81 mg EC tablet, Take 1 tablet (81 mg total) by mouth daily, Disp: 90 tablet, Rfl: 0    atorvastatin (LIPITOR) 20 mg tablet, Take 1 tablet (20 mg total) by mouth daily, Disp: 90 tablet, Rfl: 0    carvedilol (COREG) 12.5 mg tablet, Take 1 tablet (12.5 mg total) by mouth 2 (two) times a day with meals, Disp: 180 tablet, Rfl: 1    Continuous Blood Gluc  (FreeStyle Paul 14 Day [place]) LEO, USE TO CHECK BLOOD GLUCOSE, Disp: , Rfl:     Continuous Glucose Sensor (FreeStyle Paul 14 Day Sensor) MISC, Inject 1 application. under the skin every 14 (fourteen) days, Disp: 6 each, Rfl: 0    furosemide (LASIX) 20 mg tablet, Take 1 tablet (20 mg total) by mouth 3 (three) times a week, Disp: 36 tablet, Rfl: 1    gabapentin (NEURONTIN) 300 mg capsule, Take 1 capsule (300 mg total) by mouth 2 (two) times a day, Disp: 60 capsule, Rfl: 0    glimepiride (AMARYL) 4 mg tablet, Take 1 tablet with breakfast and 1 tablet with  dinner., Disp: 180 tablet, Rfl: 1    HYDROcodone-acetaminophen (Norco) 5-325 mg per tablet, Take 1 tablet by mouth daily as needed for pain Max Daily Amount: 1 tablet, Disp: 30 tablet, Rfl: 0    Insulin Pen Needle (CareOne Unifine Pentips Plus) 31G X 5 MM MISC, Inject under the skin in the morning, Disp: 100 each, Rfl: 0    Insulin Pen Needle (CareOne Unifine Pentips Plus) 31G X 5 MM MISC, Inject under the skin every morning, Disp: 100 each, Rfl: 0    Lantus SoloStar 100 units/mL SOPN, Inject 26 units at bedtime, Disp: 15 mL, Rfl: 1    levothyroxine 75 mcg tablet, Take 1 tablet (75 mcg total) by mouth daily, Disp: 30 tablet, Rfl: 0    losartan (COZAAR) 25 mg tablet, Take 1 tablet (25 mg total) by mouth daily, Disp: 90 tablet, Rfl: 1    methocarbamol (ROBAXIN) 750 mg tablet, Take 1 tablet (750 mg total) by mouth 2 (two) times a day as needed for muscle spasms, Disp: 60 tablet, Rfl: 2    sildenafil (VIAGRA) 50 MG tablet, Take 1 tablet (50 mg total) by mouth as needed for erectile dysfunction, Disp: 10 tablet, Rfl: 0    Current Facility-Administered Medications:     cyanocobalamin injection 1,000 mcg, 1,000 mcg, Intramuscular, Q30 Days, , 1,000 mcg at 09/17/24 1523    Allergies   Allergen Reactions    Penicillins      SYNCOPE, but has taken amoxicillin/augmentin in past       Physical Exam:   Vitals:    09/19/24 1453   BP: 142/71   Pulse: 68   Resp: 18   Temp: 98.2 °F (36.8 °C)   SpO2: 96%     General: Awake, Alert, Oriented x 3, Mood and affect appropriate  Respiratory: Respirations even and unlabored  Cardiovascular: Peripheral pulses intact; no edema  Musculoskeletal Exam: Lower back and leg pain    ASA Score: 3    Patient/Chart Verification  Patient ID Verified: Verbal  ID Band Applied: No  Consents Confirmed: Procedural, To be obtained in the Pre-Procedure area  Interval H&P(within 24 hr) Complete (required for Outpatients and Surgery Admit only): To be obtained in the Procedural area  Allergies Reviewed:  Yes  Anticoag/NSAID held?: NA  Currently on antibiotics?: No    Assessment:   1. Intervertebral disc disorder with radiculopathy of lumbar region        Plan: Right L5-S1 TFESI

## 2024-09-19 NOTE — TELEPHONE ENCOUNTER
Pt walked into office states he does not take Metformin anymore as he gets diarrhea and claims its been two years.....pt also states he just had spinal Injections yesterday and that he was told it would increase his sugar

## 2024-09-19 NOTE — DISCHARGE INSTR - LAB
Epidural Steroid Injection   WHAT YOU NEED TO KNOW:   An epidural steroid injection (MATILDA) is a procedure to inject steroid medicine into the epidural space. The epidural space is between your spinal cord and vertebrae. Steroids reduce inflammation and fluid buildup in your spine that may be causing pain. You may be given pain medicine along with the steroids.          ACTIVITY  Do not drive or operate machinery today.  No strenuous activity today - bending, lifting, etc.  You may resume normal activites starting tomorrow - start slowly and as tolerated.  You may shower today, but no tub baths or hot tubs.  You may have numbness for several hours from the local anesthetic. Please use caution and common sense, especially with weight-bearing activities.    CARE OF THE INJECTION SITE  If you have soreness or pain, apply ice to the area today (20 minutes on/20 minutes off).  Starting tomorrow, you may use warm, moist heat or ice if needed.  You may have an increase or change in your discomfort for 36-48 hours after your treatment.  Apply ice and continue with any pain medication you have been prescribed.  Notify [facility] if you have any of the following: redness, drainage, swelling, headache, stiff neck or fever above 100°F.    SPECIAL INSTRUCTIONS  Our office will contact you in approximately 14 days for a progress report.    MEDICATIONS  Continue to take all routine medications.  Our office may have instructed you to hold some medications.    As no general anesthesia was used in today's procedure, you should not experience any side effects related to anesthesia.     If you are diabetic, the steroids used in today's injection may temporarily increase your blood sugar levels after the first few days after your injection. Please keep a close eye on your sugars and alert the doctor who manages your diabetes if your sugars are significantly high from your baseline or you are symptomatic.     If you have a  problem specifically related to your procedure, please call our office at [phone].  Problems not related to your procedure should be directed to your primary care physician.

## 2024-09-20 NOTE — TELEPHONE ENCOUNTER
Patient calling back, relayed the above message and he states yes he does take Amaryl 4mg 1 tablet with breakfast and 1 tablet with dinner. He also takes Lantus 26 units at bedtime. Patient states he had a steroid injection yesterday for his back and would like to know if he should adjust any medications due to this?  Please advise

## 2024-09-20 NOTE — TELEPHONE ENCOUNTER
Reviewed sensor download, blood sugars are high after ,meals   Please ask what medications pt is currently taking for diabetes, medlist says he is taking Amaryl ?    Omer Xavier

## 2024-09-20 NOTE — TELEPHONE ENCOUNTER
Reviewed sensor download, blood sugars are high after ,meals     Please inform patient that he should increase Lantus to 32 units for next 2 days, continue glimepiride 4 mg twice a day  He should go back to 30 units at bedtime after 2 days  He should stay away from sugary drinks, sweets, bread, rice and pasta  Eat more  proteins with each meal  Continue glimepiride 4 mg twice a day  Blood sugar will start to come down after 2 days    Omer Xavier

## 2024-10-02 ENCOUNTER — CLINICAL SUPPORT (OUTPATIENT)
Dept: INTERNAL MEDICINE CLINIC | Facility: CLINIC | Age: 69
End: 2024-10-02
Payer: COMMERCIAL

## 2024-10-02 DIAGNOSIS — E53.8 B12 DEFICIENCY: Primary | ICD-10-CM

## 2024-10-02 PROCEDURE — 96372 THER/PROPH/DIAG INJ SC/IM: CPT

## 2024-10-02 RX ADMIN — CYANOCOBALAMIN 1000 MCG: 1000 INJECTION, SOLUTION INTRAMUSCULAR; SUBCUTANEOUS at 13:16

## 2024-10-03 ENCOUNTER — TELEPHONE (OUTPATIENT)
Dept: PAIN MEDICINE | Facility: CLINIC | Age: 69
End: 2024-10-03

## 2024-10-04 NOTE — TELEPHONE ENCOUNTER
Caller: Scott    Doctor: HORTENCIA    Reason for call: pt returning the nurses call. He adv to cb Monday when he has time to speak     Call back#: [phone]

## 2024-10-07 DIAGNOSIS — E03.9 HYPOTHYROIDISM, UNSPECIFIED TYPE: ICD-10-CM

## 2024-10-08 NOTE — TELEPHONE ENCOUNTER
Caller: Raphael     Doctor: Joao    Reason for call: Patient returning call from nurse     Call back#: [phone]

## 2024-10-08 NOTE — TELEPHONE ENCOUNTER
RN s/w pt who reports he had 80 % improvement not 20 % improvement. He said to tell FQ thank you b/c his flight out to [place] went well, he was actually able to sit for 5 hrs. He said he really pushed himself while out in [place] and is just a little stiff.  Told pt to contact the office when the pain starts to return.

## 2024-10-09 RX ORDER — LEVOTHYROXINE SODIUM 75 UG/1
75 TABLET ORAL DAILY
Qty: 30 TABLET | Refills: 5 | Status: SHIPPED | OUTPATIENT
Start: 2024-10-09

## 2024-10-14 ENCOUNTER — HOSPITAL ENCOUNTER (OUTPATIENT)
Dept: NEUROLOGY | Facility: CLINIC | Age: 69
Discharge: HOME | End: 2024-10-14
Payer: COMMERCIAL

## 2024-10-14 DIAGNOSIS — M54.12 RADICULOPATHY, CERVICAL REGION: ICD-10-CM

## 2024-10-14 DIAGNOSIS — G56.03 CARPAL TUNNEL SYNDROME, BILATERAL: ICD-10-CM

## 2024-10-14 PROCEDURE — 95886 MUSC TEST DONE W/N TEST COMP: CPT | Performed by: PSYCHIATRY & NEUROLOGY

## 2024-10-14 PROCEDURE — 95911 NRV CNDJ TEST 9-10 STUDIES: CPT | Performed by: PSYCHIATRY & NEUROLOGY

## 2024-10-15 ENCOUNTER — OFFICE VISIT (OUTPATIENT)
Dept: ENDOCRINOLOGY | Facility: CLINIC | Age: 69
End: 2024-10-15
Payer: COMMERCIAL

## 2024-10-15 VITALS
WEIGHT: 276.8 LBS | HEART RATE: 56 BPM | OXYGEN SATURATION: 97 % | DIASTOLIC BLOOD PRESSURE: 68 MMHG | SYSTOLIC BLOOD PRESSURE: 126 MMHG | HEIGHT: 70 IN | BODY MASS INDEX: 39.63 KG/M2

## 2024-10-15 DIAGNOSIS — E03.9 ACQUIRED HYPOTHYROIDISM: ICD-10-CM

## 2024-10-15 DIAGNOSIS — I10 ESSENTIAL HYPERTENSION: ICD-10-CM

## 2024-10-15 DIAGNOSIS — E11.29 TYPE 2 DIABETES MELLITUS WITH PROTEINURIA  (HCC): Primary | ICD-10-CM

## 2024-10-15 DIAGNOSIS — E78.2 MIXED HYPERLIPIDEMIA: ICD-10-CM

## 2024-10-15 DIAGNOSIS — R80.9 TYPE 2 DIABETES MELLITUS WITH PROTEINURIA  (HCC): Primary | ICD-10-CM

## 2024-10-15 PROCEDURE — 95251 CONT GLUC MNTR ANALYSIS I&R: CPT | Performed by: PHYSICIAN ASSISTANT

## 2024-10-15 PROCEDURE — 99214 OFFICE O/P EST MOD 30 MIN: CPT | Performed by: PHYSICIAN ASSISTANT

## 2024-10-15 RX ORDER — BLOOD-GLUCOSE SENSOR
EACH MISCELLANEOUS
Qty: 2 EACH | Refills: 5 | Status: SHIPPED | OUTPATIENT
Start: 2024-10-15

## 2024-10-15 RX ORDER — METFORMIN HYDROCHLORIDE 500 MG/1
500 TABLET, EXTENDED RELEASE ORAL 2 TIMES DAILY WITH MEALS
Start: 2024-10-15

## 2024-10-15 NOTE — PROGRESS NOTES
Patient Progress Note      CC: DM      Referring Provider  Marge Mendez  [address]  [address]  [place] [address]     History of Present Illness:   Raphael Pack is a 69 y.o. male with a history of type 2 diabetes with long term use of insulin. Diabetes course has been stable. Complications of DM: CKD. Denies recent illness or hospitalizations. Denies recent severe hypoglycemic or severe hyperglycemic episodes. Denies any issues with his current regimen. Home glucose monitoring: are performed regularly, using Freestyle Paul.      Average glucose 195 mg/dl  In target range 46%, above range 54%, below range 0%  CGM is missing a lot of data, active only 24%     Current regimen: Lantus 26 units QHS (using 30 units), glimepiride 4 mg BID with meals  Not taking metformin due to diarrhea  Not taking Trulicity and Jardiance due to cost.   compliant most of the time, denies any side effects from medications.  Injects in: abdomen. Rotates sites: Yes  Hypoglycemic episodes: No, rare  H/o of hypoglycemia causing hospitalization or Intervention such as glucagon injection or ambulance call : No  Hypoglycemia symptoms: jitteriness  Treatment of hypoglycemia: soda, glucose tablets     Medic alert tag: recommended: Yes     Diabetes education: No  Diet: 2-3 meals per day, 1-2 snacks per day. Timing of meals is predictable.   Diabetic diet compliance: noncompliant much of the time recently because he has been traveling a lot lately.  Activity: Daily activity is predictable: Yes. No routine exercise.     Ophthamology: May 2024. [facility].  Podiatry: April 2024     Has hypertension: on ACE inhibitor/ARB  Has hyperlipidemia: on statin - tolerating well, no myalgias. compliant most of the time, denies any side effects from medications.  Thyroid disorders: Hypothyroidism. Is taking levothyroxine 75 mcg daily.  History of pancreatitis: No      Patient Active Problem List   Diagnosis    Type 2  diabetes mellitus with diabetic polyneuropathy, without long-term current use of insulin (HCC)    JOSE RAUL (obstructive sleep apnea)    Essential hypertension    Mixed hyperlipidemia    Periodic limb movement    Hypothyroid    Chronic midline low back pain with right-sided sciatica    Obesity, morbid, BMI 40.0-49.9 (HCC)    Other male erectile dysfunction    Microalbuminuria    Chronic kidney disease, stage 2 (mild)    Type 2 diabetes mellitus with stage 2 chronic kidney disease, without long-term current use of insulin  (HCC)    Restless leg syndrome    Persistent proteinuria    Radiculopathy, cervical region    Intervertebral disc disorder with radiculopathy of lumbar region    Uncomplicated opioid dependence ([facility])    Peripheral edema    Type 2 diabetes mellitus with proteinuria  ([facility])    Vitamin B 12 deficiency    Carpal tunnel syndrome, bilateral      Past Medical History:   Diagnosis Date    BPH (benign prostatic hyperplasia)     Carpal tunnel syndrome     Chronic kidney disease     CPAP (continuous positive airway pressure) dependence     Diabetes mellitus (HCC)     Disease of thyroid gland     Hyperlipidemia     Hypertension     Sleep apnea     Type 2 diabetes mellitus with diabetic chronic kidney disease ([facility]) 2021      Past Surgical History:   Procedure Laterality Date    COLONOSCOPY          [facility] EGD TRANSORAL BIOPSY SINGLE/MULTIPLE N/A 2017    Procedure: ESOPHAGOGASTRODUODENOSCOPY (EGD) with bx;  Surgeon: Patel Chaudhary MD;  Location: [facility];  Service: Bariatrics    TONSILLECTOMY        Family History   Problem Relation Age of Onset    Lung cancer Mother     Diabetes Father     Cancer Brother     Alcohol abuse Neg Hx     Substance Abuse Neg Hx     Mental illness Neg Hx     Depression Neg Hx      Social History     Tobacco Use    Smoking status: Former     Current packs/day: 0.00     Types: Cigarettes     Start date:      Quit date:      Years since quittin.8    Smokeless tobacco:  Never   Substance Use Topics    Alcohol use: Yes     Comment: social     Allergies   Allergen Reactions    Penicillins      SYNCOPE, but has taken amoxicillin/augmentin in past         Current Outpatient Medications:     amLODIPine (NORVASC) 10 mg tablet, Take 1 tablet (10 mg total) by mouth daily, Disp: 90 tablet, Rfl: 1    atorvastatin (LIPITOR) 20 mg tablet, Take 1 tablet (20 mg total) by mouth daily, Disp: 90 tablet, Rfl: 0    carvedilol (COREG) 12.5 mg tablet, Take 1 tablet (12.5 mg total) by mouth 2 (two) times a day with meals, Disp: 180 tablet, Rfl: 1    Continuous Glucose Sensor (FreeStyle Paul 3 Sensor) MISC, Apply every 2 weeks, Disp: 2 each, Rfl: 5    furosemide (LASIX) 20 mg tablet, Take 1 tablet (20 mg total) by mouth 3 (three) times a week, Disp: 36 tablet, Rfl: 1    gabapentin (NEURONTIN) 300 mg capsule, Take 1 capsule (300 mg total) by mouth 2 (two) times a day, Disp: 60 capsule, Rfl: 0    glimepiride (AMARYL) 4 mg tablet, Take 1 tablet with breakfast and 1 tablet with dinner., Disp: 180 tablet, Rfl: 1    HYDROcodone-acetaminophen (Norco) 5-325 mg per tablet, Take 1 tablet by mouth daily as needed for pain Max Daily Amount: 1 tablet, Disp: 30 tablet, Rfl: 0    Insulin Pen Needle (CareOne Unifine Pentips Plus) 31G X 5 MM MISC, Inject under the skin every morning, Disp: 100 each, Rfl: 0    Lantus SoloStar 100 units/mL SOPN, Inject 26 units at bedtime, Disp: 15 mL, Rfl: 1    levothyroxine 75 mcg tablet, TAKE ONE TABLET BY MOUTH EVERY DAY, Disp: 30 tablet, Rfl: 5    losartan (COZAAR) 25 mg tablet, Take 1 tablet (25 mg total) by mouth daily, Disp: 90 tablet, Rfl: 1    metFORMIN (GLUCOPHAGE-XR) 500 mg 24 hr tablet, Take 1 tablet (500 mg total) by mouth 2 (two) times a day with meals, Disp: , Rfl:     methocarbamol (ROBAXIN) 750 mg tablet, Take 1 tablet (750 mg total) by mouth 2 (two) times a day as needed for muscle spasms, Disp: 60 tablet, Rfl: 2    sildenafil (VIAGRA) 50 MG tablet, Take 1 tablet (50 mg  "total) by mouth as needed for erectile dysfunction, Disp: 10 tablet, Rfl: 0    Current Facility-Administered Medications:     cyanocobalamin injection 1,000 mcg, 1,000 mcg, Intramuscular, Q30 Days, , 1,000 mcg at 10/02/24 1316  Review of Systems   Constitutional:  Negative for activity change, appetite change, fatigue and unexpected weight change.   HENT:  Negative for trouble swallowing.    Eyes:  Negative for visual disturbance.   Respiratory:  Positive for shortness of breath (on exertion).    Cardiovascular:  Negative for chest pain and palpitations.   Gastrointestinal:  Positive for diarrhea (was better without metformin but recently his diet may be affecting his BMs). Negative for constipation.   Endocrine: Negative for polydipsia and polyuria.   Musculoskeletal:  Positive for back pain.   Skin:  Negative for wound.   Neurological:  Positive for numbness.   Psychiatric/Behavioral: Negative.         Physical Exam:  Body mass index is 39.72 kg/m².  /68   Pulse 56   Ht 5' 10\" (1.778 m)   Wt 126 kg (276 lb 12.8 oz)   SpO2 97%   BMI 39.72 kg/m²    Wt Readings from Last 3 Encounters:   10/15/24 126 kg (276 lb 12.8 oz)   09/13/24 127 kg (279 lb 15.8 oz)   09/05/24 127 kg (280 lb)       Physical Exam  Vitals and nursing note reviewed.   Constitutional:       Appearance: He is well-developed.   HENT:      Head: Normocephalic.   Eyes:      General: No scleral icterus.     Extraocular Movements: EOM normal.   Neck:      Thyroid: No thyromegaly.   Cardiovascular:      Rate and Rhythm: Normal rate and regular rhythm.      Pulses:           Radial pulses are 2+ on the right side and 2+ on the left side.      Heart sounds: No murmur heard.  Pulmonary:      Effort: Pulmonary effort is normal. No respiratory distress.      Breath sounds: Normal breath sounds. No wheezing.   Musculoskeletal:      Cervical back: Neck supple.   Skin:     General: Skin is warm and dry.   Neurological:      Mental Status: He is alert. "   Psychiatric:         Mood and Affect: Mood and affect normal.           Labs:   Component      Latest Ref Rng 12/4/2023 8/19/2024   Sodium      135 - 147 mmol/L 136  138    Potassium      3.5 - 5.3 mmol/L 4.5  4.3    Chloride      96 - 108 mmol/L 105  105    Carbon Dioxide      21 - 32 mmol/L 26  27    ANION GAP      4 - 13 mmol/L 5  6    BUN      5 - 25 mg/dL 18  13    Creatinine      0.60 - 1.30 mg/dL 0.75  0.79    GLUCOSE, FASTING      65 - 99 mg/dL 103 (H)  127 (H)    Calcium      8.4 - 10.2 mg/dL 8.8  9.0    GFR, Calculated      ml/min/1.73sq m 94  92    Cholesterol      See Comment mg/dL 167     Triglycerides      See Comment mg/dL 239 (H)     HDL      >=40 mg/dL 39 (L)     LDL Calculated      0 - 100 mg/dL 80     EXT Creatinine Urine      Reference range not established. mg/dL 72.9  128.6    Albumin,U,Random      <20.0 mg/L 1,232.6 (H)  2,454.2 (H)    Albumin Creat Ratio      0 - 30 mg/g creatinine 1,691 (H)  1,908 (H)    Hemoglobin A1C      Normal 4.0-5.6%; PreDiabetic 5.7-6.4%; Diabetic >=6.5%; Glycemic control for adults with diabetes <7.0% % 8.1 (H)  8.7 (H)    eAG, EST AVG Glucose      mg/dl 186  203    Vitamin B-12      180 - 914 pg/mL  162 (L)    FREE T4      0.61 - 1.12 ng/dL  0.83    TSH 3RD GENERATON      0.450 - 4.500 uIU/mL  3.234       Legend:  (H) High  (L) Low      Plan:    Diagnoses and all orders for this visit:    Type 2 diabetes mellitus with proteinuria  (HCC)  HGA1C 8.7%. Worsened.  Treatment regimen: his BG levels did increase after stopping metformin. He wants to retry metformin at a lower dose to see if he can tolerate it. Will start metformin  mg BID. He will call us if he experiences any SE. At the start of the new year, his insurance may cover Ozempic. He will confirm that at the next visit and if covered, we may consider starting it.   Discussed intensive insulin regimen does increase risk for hypoglycemia. Episodes of hypoglycemia can lead to permanent disability and  death.  Discussed risks/complications associated with uncontrolled diabetes.    Advised to adhere to diabetic diet, and recommended staying active/exercising routinely as tolerate.  Keep carbohydrates consistent to limit blood glucose fluctuations.  Advised to call if blood sugars less than 70 mg/dl or over 300 mg/dl.   Check blood glucose 3+ times a day  Discussed symptoms and treatment of hypoglycemia.   Discussed use of CGM to collect additional blood glucose data to reveal trends and patterns that can be used to optimize treatment plan.   Recommended routine follow-up with podiatry and ophthalmology.   Download CGM in 2 weeks.    Ordered blood work to complete prior to next visit.  -     metFORMIN (GLUCOPHAGE-XR) 500 mg 24 hr tablet; Take 1 tablet (500 mg total) by mouth 2 (two) times a day with meals  -     Continuous Glucose Sensor (FreeStyle Paul 3 Sensor) MISC; Apply every 2 weeks  -     Hemoglobin A1C; Future  -     Albumin / creatinine urine ratio; Future  -     Basic metabolic panel; Future    Acquired hypothyroidism  TSH 3.234 and free T4 0.83  Continue current dose of levothyroxine   Monitor labs     Essential hypertension  Blood pressure well controlled, continue current treatment    Mixed hyperlipidemia  LDL previously 80  On statin therapy  Managed by PCP          Discussed with the patient diagnosis and treatment and all questions fully answered. He will call me if any problems arise.    Counseled patient on diagnostic results, prognosis, risk and benefit of treatment options, instruction for management, importance of treatment compliance, risk factor reduction and impressions.      Perla Velasquez PA-C

## 2024-10-15 NOTE — PATIENT INSTRUCTIONS
Hypoglycemia instructions   Raphael Pack  10/15/2024  [id number]    Low Blood Sugar    Steps to treat low blood sugar.    1. Test blood sugar if you have symptoms of low blood sugar:   Low Blood Sugar Symptoms:  o Sweaty  o Dizzy  o Rapid heartbeat  o Shaky  o Bad mood  o Hungry      2. Treat blood sugar less than 70 with 15 grams of fast-acting carbohydrate:   Examples of 15 grams Fast-Acting Carbohydrate:  o 4 oz juice  o 4 oz regular soda  o 3-4 glucose tablets (chew)  o 3-4 hard candies (chew)          3.  Wait 15 minutes and test your blood sugar again     4. If blood sugar is less than 100, repeat steps 2-3.    5. When your blood sugar is 100 or more, eat a snack if it will be longer than one hour until your next meal. The snack should be 15 grams of carbohydrate and a protein:   Examples of snacks:  o ½ sandwich  o 6 crackers with cheese  o Piece of fruit with cheese or peanut butter  o 6 crackers with peanut butter

## 2024-10-22 DIAGNOSIS — M51.16 LUMBAR DISC DISEASE WITH RADICULOPATHY: ICD-10-CM

## 2024-10-23 RX ORDER — GABAPENTIN 300 MG/1
300 CAPSULE ORAL 2 TIMES DAILY
Qty: 60 CAPSULE | Refills: 5 | Status: SHIPPED | OUTPATIENT
Start: 2024-10-23

## 2024-10-28 DIAGNOSIS — I10 BENIGN ESSENTIAL HYPERTENSION: ICD-10-CM

## 2024-10-29 RX ORDER — AMLODIPINE BESYLATE 10 MG/1
10 TABLET ORAL DAILY
Qty: 90 TABLET | Refills: 1 | Status: SHIPPED | OUTPATIENT
Start: 2024-10-29

## 2024-11-04 ENCOUNTER — OFFICE VISIT (OUTPATIENT)
Dept: INTERNAL MEDICINE CLINIC | Facility: CLINIC | Age: 69
End: 2024-11-04
Payer: COMMERCIAL

## 2024-11-04 VITALS
HEART RATE: 72 BPM | HEIGHT: 70 IN | DIASTOLIC BLOOD PRESSURE: 66 MMHG | TEMPERATURE: 97.8 F | OXYGEN SATURATION: 98 % | SYSTOLIC BLOOD PRESSURE: 124 MMHG | BODY MASS INDEX: 40.46 KG/M2 | WEIGHT: 282.6 LBS

## 2024-11-04 DIAGNOSIS — H61.23 BILATERAL IMPACTED CERUMEN: Primary | ICD-10-CM

## 2024-11-04 PROCEDURE — 99213 OFFICE O/P EST LOW 20 MIN: CPT | Performed by: NURSE PRACTITIONER

## 2024-11-04 PROCEDURE — 69210 REMOVE IMPACTED EAR WAX UNI: CPT | Performed by: NURSE PRACTITIONER

## 2024-11-04 NOTE — PROGRESS NOTES
"Ambulatory Visit  Name: Raphael Pack      : 1955      MRN: [id number]  Encounter Provider: ROLAND Mendez  Encounter Date: 2024   Encounter department: [facility] INTERNAL MEDICINE    Assessment & Plan  Bilateral impacted cerumen          Ear cerumen removal    Date/Time: 2024 1:30 PM    Performed by: ROLAND Mendez  Authorized by: ROLAND Mendez  Universal Protocol:  Procedure performed by: (Iveth HOFFMAN)  Consent given by: patient  Patient understanding: patient states understanding of the procedure being performed  Patient identity confirmed: verbally with patient    Patient location:  Clinic  Procedure details:     Location:  Both ears    Procedure type: curette    Post-procedure details:     Complication:  None    Hearing quality:  Improved    Patient tolerance of procedure:  Tolerated well, no immediate complications     History of Present Illness     Scott is here today with complaints his left ear feeling clogged  Symptoms started about a week ago  No pain, decreased hearing             Review of Systems   Constitutional:  Negative for fever.   HENT:  Positive for hearing loss. Negative for congestion, ear discharge, ear pain, facial swelling, postnasal drip and sore throat.    Neurological:  Negative for dizziness, light-headedness and headaches.           Objective     /66 (BP Location: Right arm, Patient Position: Sitting, Cuff Size: Standard)   Pulse 72   Temp 97.8 °F (36.6 °C)   Ht 5' 10\" (1.778 m)   Wt 128 kg (282 lb 9.6 oz)   SpO2 98%   BMI 40.55 kg/m²     Physical Exam  Vitals reviewed.   Constitutional:       Appearance: Normal appearance.   HENT:      Head: Normocephalic and atraumatic.      Right Ear: There is impacted cerumen.      Left Ear: There is impacted cerumen.   Eyes:      Conjunctiva/sclera: Conjunctivae normal.   Pulmonary:      Effort: Pulmonary effort is normal.   Skin:     General: Skin is warm and dry. "   Neurological:      Mental Status: He is alert and oriented to person, place, and time.   Psychiatric:         Mood and Affect: Mood normal.         Behavior: Behavior normal.

## 2024-11-05 ENCOUNTER — VBI (OUTPATIENT)
Dept: ADMINISTRATIVE | Facility: OTHER | Age: 69
End: 2024-11-05

## 2024-11-05 NOTE — TELEPHONE ENCOUNTER
11/05/24 10:55 AM    Patient was flagged by a Third Party Payer report as being due for a refill on the following medications, Losartan Potassium 25 mg. Patient was contacted to review these medications. There was no answer and a message was left.     Thank you.  Bhakti Marley MA  [facility]

## 2024-11-05 NOTE — TELEPHONE ENCOUNTER
11/05/24 3:30 PM    Patient was flagged by a Third Party Payer report as being due for a refill on the following medications, Losartan 25 mg. Patient was contacted to review these medications. Patient declined to review this with the .     Thank you.  Bhakti Marley MA  [facility]

## 2024-11-09 DIAGNOSIS — E11.65 TYPE 2 DIABETES MELLITUS WITH HYPERGLYCEMIA, WITH LONG-TERM CURRENT USE OF INSULIN (HCC): ICD-10-CM

## 2024-11-09 DIAGNOSIS — E03.9 HYPOTHYROIDISM, UNSPECIFIED TYPE: ICD-10-CM

## 2024-11-09 DIAGNOSIS — M51.16 LUMBAR DISC DISEASE WITH RADICULOPATHY: ICD-10-CM

## 2024-11-09 DIAGNOSIS — Z79.4 TYPE 2 DIABETES MELLITUS WITH HYPERGLYCEMIA, WITH LONG-TERM CURRENT USE OF INSULIN (HCC): ICD-10-CM

## 2024-11-09 DIAGNOSIS — N52.9 ERECTILE DYSFUNCTION, UNSPECIFIED ERECTILE DYSFUNCTION TYPE: ICD-10-CM

## 2024-11-11 RX ORDER — LEVOTHYROXINE SODIUM 75 UG/1
75 TABLET ORAL DAILY
Qty: 90 TABLET | Refills: 1 | Status: SHIPPED | OUTPATIENT
Start: 2024-11-11

## 2024-11-11 RX ORDER — INSULIN GLARGINE 100 [IU]/ML
INJECTION, SOLUTION SUBCUTANEOUS
Qty: 15 ML | Refills: 2 | Status: SHIPPED | OUTPATIENT
Start: 2024-11-11

## 2024-11-11 RX ORDER — HYDROCODONE BITARTRATE AND ACETAMINOPHEN 5; 325 MG/1; MG/1
1 TABLET ORAL DAILY PRN
Qty: 30 TABLET | Refills: 0 | Status: SHIPPED | OUTPATIENT
Start: 2024-11-11

## 2024-11-11 RX ORDER — SILDENAFIL 50 MG/1
50 TABLET, FILM COATED ORAL AS NEEDED
Qty: 30 TABLET | Refills: 5 | Status: SHIPPED | OUTPATIENT
Start: 2024-11-11

## 2024-11-15 ENCOUNTER — VBI (OUTPATIENT)
Dept: ADMINISTRATIVE | Facility: OTHER | Age: 69
End: 2024-11-15

## 2024-11-15 NOTE — TELEPHONE ENCOUNTER
11/15/24 1:54 PM     Chart reviewed for Blood Pressure was/were submitted to the patient's insurance.     Cornell Phillips MA   [facility]

## 2024-11-21 ENCOUNTER — OFFICE VISIT (OUTPATIENT)
Dept: OBGYN CLINIC | Facility: CLINIC | Age: 69
End: 2024-11-21
Payer: COMMERCIAL

## 2024-11-21 ENCOUNTER — PREP FOR PROCEDURE (OUTPATIENT)
Dept: OBGYN CLINIC | Facility: CLINIC | Age: 69
End: 2024-11-21

## 2024-11-21 VITALS — HEIGHT: 70 IN | BODY MASS INDEX: 41.83 KG/M2 | WEIGHT: 292.2 LBS

## 2024-11-21 DIAGNOSIS — G56.02 CARPAL TUNNEL SYNDROME ON LEFT: ICD-10-CM

## 2024-11-21 DIAGNOSIS — G56.01 CARPAL TUNNEL SYNDROME OF RIGHT WRIST: Primary | ICD-10-CM

## 2024-11-21 DIAGNOSIS — Z01.818 PRE-OP EVALUATION: ICD-10-CM

## 2024-11-21 PROCEDURE — 99214 OFFICE O/P EST MOD 30 MIN: CPT | Performed by: ORTHOPAEDIC SURGERY

## 2024-11-21 RX ORDER — CHLORHEXIDINE GLUCONATE ORAL RINSE 1.2 MG/ML
15 SOLUTION DENTAL ONCE
OUTPATIENT
Start: 2024-11-21 | End: 2024-11-21

## 2024-11-21 RX ORDER — CHLORHEXIDINE GLUCONATE 40 MG/ML
SOLUTION TOPICAL DAILY PRN
OUTPATIENT
Start: 2024-11-21

## 2024-11-21 NOTE — PROGRESS NOTES
Assessment:  1. Carpal tunnel syndrome of right wrist  Case request operating room: RELEASE CARPAL TUNNEL    Inpatient consult to Internal Medicine    Case request operating room: RELEASE CARPAL TUNNEL      2. Pre-op evaluation  CBC and differential    Comprehensive metabolic panel      3. Carpal tunnel syndrome on left          Patient Active Problem List   Diagnosis    Type 2 diabetes mellitus with diabetic polyneuropathy, without long-term current use of insulin ([facility])    JOSE RAUL (obstructive sleep apnea)    Essential hypertension    Mixed hyperlipidemia    Periodic limb movement    Hypothyroid    Chronic midline low back pain with right-sided sciatica    Obesity, morbid, BMI 40.0-49.9 (HCC)    Other male erectile dysfunction    Microalbuminuria    Chronic kidney disease, stage 2 (mild)    Type 2 diabetes mellitus with stage 2 chronic kidney disease, without long-term current use of insulin  ([facility])    Restless leg syndrome    Persistent proteinuria    Radiculopathy, cervical region    Intervertebral disc disorder with radiculopathy of lumbar region    Uncomplicated opioid dependence ([facility])    Peripheral edema    Type 2 diabetes mellitus with proteinuria  ([facility])    Vitamin B 12 deficiency    Carpal tunnel syndrome, bilateral       Plan:    69 y.o. male  with bilateral carpal tunnel syndrome    Diagnosis, EMG results discussed at length.  Patient informed and had opportunity to ask questions.  Treatment options discussed at length, including conservative treatment, surgical intervention.  Patient wishes to proceed with surgical intervention of right carpal tunnel release, staged with left carpal tunnel release.  At this time, patient has tried and failed non-operative management of Right carpal tunnel syndrome including OTC oral analgesics, bracing, and home exercises.  Discussed surgical intervention, intraoperative findings, as well as risks and benefits, expected outcomes and recovery. Risks of the surgery are inclusive  of but not limited to bleeding, infection, nerve injury, blood clot, worsening of symptoms, not achieving the anticipated results, persistent stiffness, weakness and the need for additional surgery.    Surgical consent signed electronically in the office today.   Preoperative vitamins were prescribed.  Patient instructed to  what they are not already taking and start 30 days before surgery.  We will see the patient in the office 2 week post-operatively for re-evaluation/wound check.           The patient was seen and examined by Dr. Dalal and myself. The assessment and plan were formulated by Dr. Dalal and I assisted in carrying it out.    Subjective:   Patient ID: Raphael Pack is a 69 y.o. male .    HPI    Patient presents to the office for follow up of EMG review. Since the last visit, the patient reports pain, numbness bilateral thumb, index, long, and ring finger.  Patient states that both hands are equal in severity.      The following portions of the patient's history were reviewed and updated as appropriate: allergies, current medications, past family history, past social history, past surgical history and problem list.    Social History     Socioeconomic History    Marital status: /Civil Union     Spouse name: Not on file    Number of children: Not on file    Years of education: Not on file    Highest education level: Not on file   Occupational History    Not on file   Tobacco Use    Smoking status: Former     Current packs/day: 0.00     Types: Cigarettes     Start date:      Quit date:      Years since quittin.9    Smokeless tobacco: Never   Vaping Use    Vaping status: Never Used   Substance and Sexual Activity    Alcohol use: Yes     Comment: social    Drug use: No    Sexual activity: Yes     Partners: Female     Birth control/protection: None   Other Topics Concern    Not on file   Social History Narrative         Social Drivers of Health     Financial Resource  Strain: Low Risk  (6/6/2023)    Overall Financial Resource Strain (CARDIA)     Difficulty of Paying Living Expenses: Not very hard   Food Insecurity: No Food Insecurity (8/20/2024)    Nursing - Inadequate Food Risk Classification     Worried About Running Out of Food in the Last Year: Never true     Ran Out of Food in the Last Year: Never true     Ran Out of Food in the Last Year: Not on file   Transportation Needs: No Transportation Needs (8/20/2024)    PRAPARE - Transportation     Lack of Transportation (Medical): No     Lack of Transportation (Non-Medical): No   Physical Activity: Not on file   Stress: Not on file   Social Connections: Not on file   Intimate Partner Violence: Not on file   Housing Stability: Low Risk  (8/20/2024)    Housing Stability Vital Sign     Unable to Pay for Housing in the Last Year: No     Number of Times Moved in the Last Year: 1     Homeless in the Last Year: No     Past Medical History:   Diagnosis Date    BPH (benign prostatic hyperplasia)     Carpal tunnel syndrome     Chronic kidney disease     CPAP (continuous positive airway pressure) dependence     Diabetes mellitus (HCC)     Disease of thyroid gland     Hyperlipidemia     Hypertension     Sleep apnea     Type 2 diabetes mellitus with diabetic chronic kidney disease (HCC) 06/24/2021     Past Surgical History:   Procedure Laterality Date    COLONOSCOPY      2016    NE EGD TRANSORAL BIOPSY SINGLE/MULTIPLE N/A 5/17/2017    Procedure: ESOPHAGOGASTRODUODENOSCOPY (EGD) with bx;  Surgeon: Patel Chaudhary MD;  Location: [facility];  Service: Bariatrics    TONSILLECTOMY       Allergies   Allergen Reactions    Penicillins Syncope     SYNCOPE, but has taken amoxicillin/augmentin in past     Current Outpatient Medications on File Prior to Visit   Medication Sig Dispense Refill    amLODIPine (NORVASC) 10 mg tablet Take 1 tablet (10 mg total) by mouth daily 90 tablet 1    atorvastatin (LIPITOR) 20 mg tablet Take 1 tablet (20 mg total) by  mouth daily 90 tablet 0    carvedilol (COREG) 12.5 mg tablet Take 1 tablet (12.5 mg total) by mouth 2 (two) times a day with meals 180 tablet 1    Continuous Glucose Sensor (FreeStyle Paul 3 Sensor) MISC Apply every 2 weeks 2 each 5    furosemide (LASIX) 20 mg tablet Take 1 tablet (20 mg total) by mouth 3 (three) times a week 36 tablet 1    gabapentin (NEURONTIN) 300 mg capsule Take 1 capsule (300 mg total) by mouth 2 (two) times a day 60 capsule 5    glimepiride (AMARYL) 4 mg tablet Take 1 tablet with breakfast and 1 tablet with dinner. 180 tablet 1    HYDROcodone-acetaminophen (Norco) 5-325 mg per tablet Take 1 tablet by mouth daily as needed for pain Max Daily Amount: 1 tablet 30 tablet 0    Insulin Pen Needle ([employer] Unifine Pentips Plus) 31G X 5 MM MISC Inject under the skin every morning 100 each 0    Lantus SoloStar 100 units/mL SOPN Inject 26 units at bedtime 15 mL 2    levothyroxine 75 mcg tablet Take 1 tablet (75 mcg total) by mouth daily 90 tablet 1    losartan (COZAAR) 25 mg tablet Take 1 tablet (25 mg total) by mouth daily 90 tablet 1    metFORMIN (GLUCOPHAGE-XR) 500 mg 24 hr tablet Take 1 tablet (500 mg total) by mouth 2 (two) times a day with meals      methocarbamol (ROBAXIN) 750 mg tablet Take 1 tablet (750 mg total) by mouth 2 (two) times a day as needed for muscle spasms 60 tablet 2    sildenafil (VIAGRA) 50 MG tablet Take 1 tablet (50 mg total) by mouth as needed for erectile dysfunction 30 tablet 5     Current Facility-Administered Medications on File Prior to Visit   Medication Dose Route Frequency Provider Last Rate Last Admin    cyanocobalamin injection 1,000 mcg  1,000 mcg Intramuscular Q30 Days    1,000 mcg at 10/02/24 1316       Review of Systems   Constitutional:  Negative for chills and fever.   HENT:  Negative for ear pain and sore throat.    Eyes:  Negative for pain and visual disturbance.   Respiratory:  Negative for cough and shortness of breath.    Cardiovascular:  Negative for  "chest pain and palpitations.   Gastrointestinal:  Negative for abdominal pain and vomiting.   Genitourinary:  Negative for dysuria and hematuria.   Musculoskeletal:  Positive for arthralgias. Negative for back pain.   Skin:  Negative for color change and rash.   Neurological:  Negative for seizures and syncope.   All other systems reviewed and are negative.    See HPi    Objective:    There were no vitals filed for this visit.    Physical Exam  Vitals and nursing note reviewed.   Constitutional:       General: He is not in acute distress.     Appearance: He is well-developed.   HENT:      Head: Normocephalic and atraumatic.   Eyes:      Conjunctiva/sclera: Conjunctivae normal.   Cardiovascular:      Rate and Rhythm: Normal rate and regular rhythm.      Heart sounds: No murmur heard.  Pulmonary:      Effort: Pulmonary effort is normal. No respiratory distress.      Breath sounds: Normal breath sounds.   Abdominal:      Palpations: Abdomen is soft.      Tenderness: There is no abdominal tenderness.   Musculoskeletal:         General: No swelling.      Cervical back: Neck supple.   Skin:     General: Skin is warm and dry.      Capillary Refill: Capillary refill takes less than 2 seconds.   Neurological:      Mental Status: He is alert.   Psychiatric:         Mood and Affect: Mood normal.         Ortho Exam    Bilateral Carpal Tunnel Exam:    Negative thenar atrophy. Positive phalen's test. Positive carpal tunnel compression. Positive tinels over median nerve at the wrist.  Opposition strength 5/5.  Abduction strength 5/5.      EMG results consistent with bilateral carpal tunnel syndrome, severe.           Portions of the record may have been created with voice recognition software.  Occasional wrong word or \"sound a like\" substitutions may have occurred due to the inherent limitations of voice recognition software.  Read the chart carefully and recognize, using context, where substitutions have occurred.    "

## 2024-11-29 DIAGNOSIS — I10 ESSENTIAL HYPERTENSION: ICD-10-CM

## 2024-11-29 DIAGNOSIS — R60.0 PERIPHERAL EDEMA: ICD-10-CM

## 2024-11-29 DIAGNOSIS — E78.2 MIXED HYPERLIPIDEMIA: ICD-10-CM

## 2024-11-29 RX ORDER — ATORVASTATIN CALCIUM 20 MG/1
20 TABLET, FILM COATED ORAL DAILY
Qty: 30 TABLET | Refills: 0 | Status: SHIPPED | OUTPATIENT
Start: 2024-11-29

## 2024-11-29 RX ORDER — FUROSEMIDE 20 MG/1
20 TABLET ORAL 3 TIMES WEEKLY
Qty: 36 TABLET | Refills: 1 | Status: SHIPPED | OUTPATIENT
Start: 2024-11-29

## 2024-11-29 RX ORDER — CARVEDILOL 12.5 MG/1
12.5 TABLET ORAL 2 TIMES DAILY WITH MEALS
Qty: 180 TABLET | Refills: 1 | Status: SHIPPED | OUTPATIENT
Start: 2024-11-29

## 2024-12-05 DIAGNOSIS — R80.9 TYPE 2 DIABETES MELLITUS WITH PROTEINURIA  (HCC): ICD-10-CM

## 2024-12-05 DIAGNOSIS — E11.65 TYPE 2 DIABETES MELLITUS WITH HYPERGLYCEMIA, WITH LONG-TERM CURRENT USE OF INSULIN (HCC): Primary | ICD-10-CM

## 2024-12-05 DIAGNOSIS — Z79.4 TYPE 2 DIABETES MELLITUS WITH HYPERGLYCEMIA, WITH LONG-TERM CURRENT USE OF INSULIN (HCC): Primary | ICD-10-CM

## 2024-12-05 DIAGNOSIS — E11.29 TYPE 2 DIABETES MELLITUS WITH PROTEINURIA  (HCC): ICD-10-CM

## 2024-12-05 RX ORDER — ACYCLOVIR 800 MG/1
TABLET ORAL
Qty: 2 EACH | Refills: 5 | Status: SHIPPED | OUTPATIENT
Start: 2024-12-05

## 2024-12-05 NOTE — TELEPHONE ENCOUNTER
pt has refill but pharmacy cant get freestyle reji 3 . pharmacy has reji 2 in stock. can pt get a script for freestyle reji 2 sensors. he will use his phone as the device.     Please send to [facility] [address]/

## 2024-12-06 RX ORDER — HYDROCHLOROTHIAZIDE 12.5 MG/1
CAPSULE ORAL
Qty: 6 EACH | Refills: 1 | Status: SHIPPED | OUTPATIENT
Start: 2024-12-06

## 2024-12-06 NOTE — TELEPHONE ENCOUNTER
Pt calling stated he did not ask for freestyle reji 3 sensors he is asking for the 2 or 1. He can't get freestyle reji 3 sensors anywhere . Please send in new script for one of the sensors.

## 2024-12-13 ENCOUNTER — RA CDI HCC (OUTPATIENT)
Dept: OTHER | Facility: HOSPITAL | Age: 69
End: 2024-12-13

## 2024-12-13 NOTE — PROGRESS NOTES
HCC coding opportunities          Chart Reviewed number of suggestions sent to Provider: 1     Patients Insurance     Medicare Insurance: [employer]          
bilateral upper extremity Passive ROM was WFL (within functional limits)/bilateral lower extremity Passive ROM was WFL (within functional limits)

## 2024-12-18 ENCOUNTER — VBI (OUTPATIENT)
Dept: ADMINISTRATIVE | Facility: OTHER | Age: 69
End: 2024-12-18

## 2024-12-18 NOTE — TELEPHONE ENCOUNTER
12/18/24 12:52 PM     Chart reviewed for CRC: Colonoscopy was/were not submitted to the patient's insurance.     Cornell Phillips MA   [facility]

## 2024-12-19 ENCOUNTER — APPOINTMENT (OUTPATIENT)
Dept: LAB | Facility: CLINIC | Age: 69
End: 2024-12-19
Payer: COMMERCIAL

## 2024-12-19 DIAGNOSIS — R80.9 TYPE 2 DIABETES MELLITUS WITH PROTEINURIA  (HCC): ICD-10-CM

## 2024-12-19 DIAGNOSIS — E11.42 TYPE 2 DIABETES MELLITUS WITH DIABETIC POLYNEUROPATHY, WITHOUT LONG-TERM CURRENT USE OF INSULIN (HCC): ICD-10-CM

## 2024-12-19 DIAGNOSIS — E11.29 TYPE 2 DIABETES MELLITUS WITH PROTEINURIA  (HCC): ICD-10-CM

## 2024-12-19 DIAGNOSIS — Z01.818 PRE-OP EVALUATION: ICD-10-CM

## 2024-12-19 LAB
ALBUMIN SERPL BCG-MCNC: 4.1 G/DL (ref 3.5–5)
ALP SERPL-CCNC: 88 U/L (ref 34–104)
ALT SERPL W P-5'-P-CCNC: 13 U/L (ref 7–52)
ANION GAP SERPL CALCULATED.3IONS-SCNC: 6 MMOL/L (ref 4–13)
AST SERPL W P-5'-P-CCNC: 13 U/L (ref 13–39)
BASOPHILS # BLD AUTO: 0.07 THOUSANDS/ΜL (ref 0–0.1)
BASOPHILS NFR BLD AUTO: 1 % (ref 0–1)
BILIRUB SERPL-MCNC: 0.43 MG/DL (ref 0.2–1)
BUN SERPL-MCNC: 17 MG/DL (ref 5–25)
CALCIUM SERPL-MCNC: 9.4 MG/DL (ref 8.4–10.2)
CHLORIDE SERPL-SCNC: 103 MMOL/L (ref 96–108)
CO2 SERPL-SCNC: 27 MMOL/L (ref 21–32)
CREAT SERPL-MCNC: 0.74 MG/DL (ref 0.6–1.3)
EOSINOPHIL # BLD AUTO: 0.51 THOUSAND/ΜL (ref 0–0.61)
EOSINOPHIL NFR BLD AUTO: 6 % (ref 0–6)
ERYTHROCYTE [DISTWIDTH] IN BLOOD BY AUTOMATED COUNT: 12.6 % (ref 11.6–15.1)
EST. AVERAGE GLUCOSE BLD GHB EST-MCNC: 189 MG/DL
GFR SERPL CREATININE-BSD FRML MDRD: 94 ML/MIN/1.73SQ M
GLUCOSE P FAST SERPL-MCNC: 159 MG/DL (ref 65–99)
HBA1C MFR BLD: 8.2 %
HCT VFR BLD AUTO: 46.2 % (ref 36.5–49.3)
HGB BLD-MCNC: 15.1 G/DL (ref 12–17)
IMM GRANULOCYTES # BLD AUTO: 0.04 THOUSAND/UL (ref 0–0.2)
IMM GRANULOCYTES NFR BLD AUTO: 1 % (ref 0–2)
LYMPHOCYTES # BLD AUTO: 2.54 THOUSANDS/ΜL (ref 0.6–4.47)
LYMPHOCYTES NFR BLD AUTO: 31 % (ref 14–44)
MCH RBC QN AUTO: 29.7 PG (ref 26.8–34.3)
MCHC RBC AUTO-ENTMCNC: 32.7 G/DL (ref 31.4–37.4)
MCV RBC AUTO: 91 FL (ref 82–98)
MONOCYTES # BLD AUTO: 0.39 THOUSAND/ΜL (ref 0.17–1.22)
MONOCYTES NFR BLD AUTO: 5 % (ref 4–12)
NEUTROPHILS # BLD AUTO: 4.74 THOUSANDS/ΜL (ref 1.85–7.62)
NEUTS SEG NFR BLD AUTO: 56 % (ref 43–75)
NRBC BLD AUTO-RTO: 0 /100 WBCS
PLATELET # BLD AUTO: 232 THOUSANDS/UL (ref 149–390)
PMV BLD AUTO: 10.2 FL (ref 8.9–12.7)
POTASSIUM SERPL-SCNC: 4.6 MMOL/L (ref 3.5–5.3)
PROT SERPL-MCNC: 6.9 G/DL (ref 6.4–8.4)
RBC # BLD AUTO: 5.08 MILLION/UL (ref 3.88–5.62)
SODIUM SERPL-SCNC: 136 MMOL/L (ref 135–147)
WBC # BLD AUTO: 8.29 THOUSAND/UL (ref 4.31–10.16)

## 2024-12-19 PROCEDURE — 80053 COMPREHEN METABOLIC PANEL: CPT

## 2024-12-19 PROCEDURE — 85025 COMPLETE CBC W/AUTO DIFF WBC: CPT

## 2024-12-19 PROCEDURE — 83036 HEMOGLOBIN GLYCOSYLATED A1C: CPT

## 2024-12-19 PROCEDURE — 36415 COLL VENOUS BLD VENIPUNCTURE: CPT

## 2024-12-20 ENCOUNTER — RESULTS FOLLOW-UP (OUTPATIENT)
Age: 69
End: 2024-12-20

## 2024-12-20 ENCOUNTER — OFFICE VISIT (OUTPATIENT)
Dept: INTERNAL MEDICINE CLINIC | Facility: CLINIC | Age: 69
End: 2024-12-20
Payer: COMMERCIAL

## 2024-12-20 VITALS
TEMPERATURE: 96.1 F | SYSTOLIC BLOOD PRESSURE: 128 MMHG | DIASTOLIC BLOOD PRESSURE: 68 MMHG | HEIGHT: 70 IN | OXYGEN SATURATION: 96 % | HEART RATE: 84 BPM | WEIGHT: 287 LBS | BODY MASS INDEX: 41.09 KG/M2

## 2024-12-20 DIAGNOSIS — N18.2 CHRONIC KIDNEY DISEASE, STAGE 2 (MILD): ICD-10-CM

## 2024-12-20 DIAGNOSIS — G47.33 OSA (OBSTRUCTIVE SLEEP APNEA): ICD-10-CM

## 2024-12-20 DIAGNOSIS — F11.20 UNCOMPLICATED OPIOID DEPENDENCE (HCC): ICD-10-CM

## 2024-12-20 DIAGNOSIS — N18.2 TYPE 2 DIABETES MELLITUS WITH STAGE 2 CHRONIC KIDNEY DISEASE, WITHOUT LONG-TERM CURRENT USE OF INSULIN  (HCC): ICD-10-CM

## 2024-12-20 DIAGNOSIS — I10 ESSENTIAL HYPERTENSION: ICD-10-CM

## 2024-12-20 DIAGNOSIS — Z01.818 PRE-OP EVALUATION: Primary | ICD-10-CM

## 2024-12-20 DIAGNOSIS — G56.03 CARPAL TUNNEL SYNDROME, BILATERAL: ICD-10-CM

## 2024-12-20 DIAGNOSIS — D23.5 DERMOID CYST OF SKIN OF BACK: ICD-10-CM

## 2024-12-20 DIAGNOSIS — M54.41 CHRONIC MIDLINE LOW BACK PAIN WITH RIGHT-SIDED SCIATICA: ICD-10-CM

## 2024-12-20 DIAGNOSIS — E78.2 MIXED HYPERLIPIDEMIA: ICD-10-CM

## 2024-12-20 DIAGNOSIS — E03.9 ACQUIRED HYPOTHYROIDISM: ICD-10-CM

## 2024-12-20 DIAGNOSIS — E11.22 TYPE 2 DIABETES MELLITUS WITH STAGE 2 CHRONIC KIDNEY DISEASE, WITHOUT LONG-TERM CURRENT USE OF INSULIN  (HCC): ICD-10-CM

## 2024-12-20 DIAGNOSIS — G89.29 CHRONIC MIDLINE LOW BACK PAIN WITH RIGHT-SIDED SCIATICA: ICD-10-CM

## 2024-12-20 DIAGNOSIS — Z12.11 SCREENING FOR COLON CANCER: ICD-10-CM

## 2024-12-20 PROBLEM — G25.81 RESTLESS LEG SYNDROME: Status: RESOLVED | Noted: 2021-06-29 | Resolved: 2024-12-20

## 2024-12-20 PROCEDURE — 99214 OFFICE O/P EST MOD 30 MIN: CPT | Performed by: NURSE PRACTITIONER

## 2024-12-20 PROCEDURE — G2211 COMPLEX E/M VISIT ADD ON: HCPCS | Performed by: NURSE PRACTITIONER

## 2024-12-20 NOTE — ASSESSMENT & PLAN NOTE
He does not wear CPAP.  Encouraged him to schedule with sleep medicine.   Orders:    Ambulatory Referral to Sleep Medicine; Future

## 2024-12-20 NOTE — PROGRESS NOTES
Name: Raphael Pack      : 1955      MRN: [id number]  Encounter Provider: ROLAND Mendez  Encounter Date: 2024   Encounter department: [facility] INTERNAL MEDICINE  :  Assessment & Plan  Pre-op evaluation  Reviewed pre op labs- A1C slightly above goal, otherwise stable.   EKG pending.     Orders:    Ambulatory referral to Internal Medicine    Carpal tunnel syndrome, bilateral  Scheduled for right wrist surgery on .        Essential hypertension  Stable.  Continue amlodipine, carvedilol, furosemide, and losartan.        JOSE RAUL (obstructive sleep apnea)  He does not wear CPAP.  Encouraged him to schedule with sleep medicine.   Orders:    Ambulatory Referral to Sleep Medicine; Future    Type 2 diabetes mellitus with stage 2 chronic kidney disease, without long-term current use of insulin  (HCC)    Lab Results   Component Value Date    HGBA1C 8.2 (H) 2024     Slightly above goal.   On metformin and glimepiride.    Increase lantus to 30 units at HS. (He was taking 28 units instead of 26 units as prescribed).  Orders:    Hemoglobin A1C; Future    Comprehensive metabolic panel; Future    Lipid Panel with Direct LDL reflex; Future    Acquired hypothyroidism  Adequately replaced       Chronic kidney disease, stage 2 (mild)  Lab Results   Component Value Date    EGFR 94 2024    EGFR 92 2024    EGFR 94 2023    CREATININE 0.74 2024    CREATININE 0.79 2024    CREATININE 0.75 2023     Stable.  Avoid nsaids.        Uncomplicated opioid dependence (HCC)  On hydrocodone as needed. He does not take it daily.   Pain contract up to date.        Mixed hyperlipidemia  Continue statin.        Dermoid cyst of skin of back    Orders:    Ambulatory Referral to General Surgery; Future    Screening for colon cancer    Orders:    Cologuard    Chronic midline low back pain with right-sided sciatica  See above.  Takes hydrocodone as needed.  Also on  "gabapentin.  Sees pain management.               History of Present Illness     Scott is here today for follow up/pre op evaluation.  He is scheduled for right wrist carpal tunnel surgery on 1/21.  He denies any issues with anesthesia in the past.  No cardiopulmonary complaints.     He has a lump on his upper back. He's had it for over 20 years. He squeezes out white fluid but it fills back up.   Not painful.       Review of Systems   Constitutional:  Negative for activity change, appetite change, fatigue and unexpected weight change.   Eyes:  Negative for visual disturbance.   Respiratory:  Negative for cough and shortness of breath.    Cardiovascular:  Negative for chest pain, palpitations and leg swelling.   Gastrointestinal:  Negative for abdominal pain, blood in stool, constipation and diarrhea.   Genitourinary:  Negative for difficulty urinating.   Musculoskeletal:  Positive for arthralgias and back pain.   Skin:  Negative for rash.   Neurological:  Negative for dizziness, weakness, light-headedness and headaches.   Psychiatric/Behavioral:  Negative for sleep disturbance.        Objective   /68   Pulse 84   Temp (!) 96.1 °F (35.6 °C)   Ht 5' 10\" (1.778 m)   Wt 130 kg (287 lb)   SpO2 96%   BMI 41.18 kg/m²      Physical Exam  Vitals reviewed.   Constitutional:       Appearance: Normal appearance. He is well-developed.   HENT:      Head: Normocephalic and atraumatic.   Eyes:      Conjunctiva/sclera: Conjunctivae normal.   Cardiovascular:      Rate and Rhythm: Normal rate and regular rhythm.      Heart sounds: Normal heart sounds.   Pulmonary:      Effort: Pulmonary effort is normal.      Breath sounds: Normal breath sounds.   Abdominal:      General: Bowel sounds are normal.      Palpations: Abdomen is soft.   Musculoskeletal:         General: Normal range of motion.      Right lower leg: No edema.      Left lower leg: No edema.   Skin:     General: Skin is warm and dry.          Neurological:      " Mental Status: He is alert and oriented to person, place, and time.   Psychiatric:         Mood and Affect: Mood normal.         Behavior: Behavior normal.

## 2024-12-20 NOTE — ASSESSMENT & PLAN NOTE
Lab Results   Component Value Date    HGBA1C 8.2 (H) 12/19/2024     Slightly above goal.   On metformin and glimepiride.    Increase lantus to 30 units at HS. (He was taking 28 units instead of 26 units as prescribed).  Orders:    Hemoglobin A1C; Future    Comprehensive metabolic panel; Future    Lipid Panel with Direct LDL reflex; Future

## 2024-12-20 NOTE — ASSESSMENT & PLAN NOTE
Lab Results   Component Value Date    EGFR 94 12/19/2024    EGFR 92 08/19/2024    EGFR 94 12/04/2023    CREATININE 0.74 12/19/2024    CREATININE 0.79 08/19/2024    CREATININE 0.75 12/04/2023     Stable.  Avoid nsaids.

## 2025-01-08 ENCOUNTER — CONSULT (OUTPATIENT)
Dept: SURGERY | Facility: CLINIC | Age: 70
End: 2025-01-08
Payer: COMMERCIAL

## 2025-01-08 VITALS — HEIGHT: 70 IN | WEIGHT: 285 LBS | BODY MASS INDEX: 40.8 KG/M2

## 2025-01-08 DIAGNOSIS — D23.5 DERMOID CYST OF SKIN OF BACK: Primary | ICD-10-CM

## 2025-01-08 PROCEDURE — 88304 TISSUE EXAM BY PATHOLOGIST: CPT | Performed by: PATHOLOGY

## 2025-01-08 PROCEDURE — 99203 OFFICE O/P NEW LOW 30 MIN: CPT | Performed by: SURGERY

## 2025-01-08 PROCEDURE — 11403 EXC TR-EXT B9+MARG 2.1-3CM: CPT | Performed by: SURGERY

## 2025-01-08 NOTE — ASSESSMENT & PLAN NOTE
Orders:    Ambulatory Referral to General Surgery    Tissue Exam; Future    Removed under local here in the office with his permission.  4-0 nylon to close.  Wound care instructions given.  Will see back in 2 to 3 weeks for suture removal

## 2025-01-08 NOTE — PROGRESS NOTES
"Name: Raphael Pack      : 1955      MRN: [id number]  Encounter Provider: George Baron DO  Encounter Date: 2025   Encounter department: [facility] GENERAL SURGERY [facility]  :  Assessment & Plan  Dermoid cyst of skin of back    Orders:    Ambulatory Referral to General Surgery    Tissue Exam; Future    Removed under local here in the office with his permission.  4-0 nylon to close.  Wound care instructions given.  Will see back in 2 to 3 weeks for suture removal      History of Present Illness   HPI  Raphael Pack is a 69 y.o. male who presents for evaluation of a back cyst.  States he has had the cyst for 20 years.  Will occasionally squeeze the cyst getting sebaceous material out.  He desires removal.  History obtained from: patient    Review of Systems   All other systems reviewed and are negative.    Medical History Reviewed by provider this encounter:     .     Objective   Ht 5' 10\" (1.778 m)   Wt 129 kg (285 lb)   BMI 40.89 kg/m²      Physical Exam  Skin:     General: Skin is warm and dry.      Comments: 2 cm sebaceous cyst of his upper back.  Discussed risks and benefits of removal under local and he agrees to proceed     Skin excision    Date/Time: 2025 3:00 PM    Performed by: George Baron DO  Authorized by: George Baron DO  Universal Protocol:  Consent: Verbal consent obtained.  Risks and benefits: risks, benefits and alternatives were discussed  Consent given by: patient  Time out: Immediately prior to procedure a \"time out\" was called to verify the correct patient, procedure, equipment, support staff and site/side marked as required.  Patient understanding: patient states understanding of the procedure being performed  Required items: required blood products, implants, devices, and special equipment available  Patient identity confirmed: verbally with patient    Procedure Details - Skin Excision:     Number of Lesions:  1  Lesion 1:     Body area:  " Trunk    Trunk location:  Back    Malignancy: benign lesion            Final defect size (mm):  30    Closure complexity: intermediate       Repair Comments: Verbal consent was obtained from the patient.  Area of the back cyst was cleansed with alcohol.  1% lidocaine with epinephrine was utilized to anesthetize skin and surrounding subcutaneous tissues.  Elliptical skin incision was made to encompass the cyst.  This was removed with no specific margin.  Removed in its entirety into the subcutaneous fatty tissue.  3-0 Vicryl was used to close subcutaneous tissues.  4-0 nylon was used in a running mattress fashion to close skin.  Wound was washed and dried.  Sterile bandage was applied.

## 2025-01-13 ENCOUNTER — ANESTHESIA EVENT (OUTPATIENT)
Dept: PERIOP | Facility: HOSPITAL | Age: 70
End: 2025-01-13
Payer: COMMERCIAL

## 2025-01-13 PROCEDURE — 88304 TISSUE EXAM BY PATHOLOGIST: CPT | Performed by: PATHOLOGY

## 2025-01-13 NOTE — PRE-PROCEDURE INSTRUCTIONS
Pre-Surgery Instructions:   Medication Instructions    amLODIPine (NORVASC) 10 mg tablet Take Day of Surgery; unless usually taken at night     atorvastatin (LIPITOR) 20 mg tablet Take Day of Surgery; unless usually taken at night     carvedilol (COREG) 12.5 mg tablet Take Day of Surgery; unless usually taken at night     Continuous Glucose Sensor (FreeStyle Paul 3 Plus Sensor) MISC Glucose monitors may need to be removed for surgery. You can bring extra supplies with you. If you do not have enough supplies to accommodate removing a glucose monitor on the day of surgery and you want to discuss rescheduling, you can call your surgeon's office to coordinate.      furosemide (LASIX) 20 mg tablet Do not take day of surgery; continue as prescribed excluding DOS     gabapentin (NEURONTIN) 300 mg capsule Take Day of Surgery; unless usually taken at night     glimepiride (AMARYL) 4 mg tablet Do not take day of surgery; continue as prescribed excluding DOS     HYDROcodone-acetaminophen (Norco) 5-325 mg per tablet Take Day of Surgery; unless usually taken at night     Lantus SoloStar 100 units/mL SOPN Do not take day of surgery; continue as prescribed excluding DOS - takes at night    levothyroxine 75 mcg tablet Take Day of Surgery; unless usually taken at night     losartan (COZAAR) 25 mg tablet Do not take day of surgery; continue as prescribed excluding DOS     metFORMIN (GLUCOPHAGE-XR) 500 mg 24 hr tablet Do not take day of surgery; continue as prescribed excluding DOS     methocarbamol (ROBAXIN) 750 mg tablet Take Day of Surgery; unless usually taken at night -prn    sildenafil (VIAGRA) 50 MG tablet Do not take day of surgery; continue as prescribed excluding DOS -PRN    Medication instructions for day surgery reviewed. Please use only a sip of water to take your instructed medications. Avoid all over the counter vitamins, supplements and NSAIDS for one week prior to surgery per anesthesia guidelines. Tylenol is ok to  take as needed.     You will receive a call one business day prior to surgery with an arrival time and hospital directions. If your surgery is scheduled on a Monday, the hospital will be calling you on the Friday prior to your surgery. If you have not heard from anyone by 8pm, please call the hospital supervisor through the hospital  at [phone]. ([place] [phone] or [place] [phone]).    Do not eat or drink anything after midnight the night before your surgery, including candy, mints, lifesavers, or chewing gum. Do not drink alcohol 24hrs before your surgery. Try not to smoke at least 24hrs before your surgery.       Follow the pre surgery showering instructions as listed in the “My Surgical Experience Booklet” or otherwise provided by your surgeon's office. Do not use a blade to shave the surgical area 1 week before surgery. It is okay to use a clean electric clippers up to 24 hours before surgery. Do not apply any lotions, creams, including makeup, cologne, deodorant, or perfumes after showering on the day of your surgery. Do not use dry shampoo, hair spray, hair gel, or any type of hair products.     No contact lenses, eye make-up, or artificial eyelashes. Remove nail polish, including gel polish, and any artificial, gel, or acrylic nails if possible. Remove all jewelry including rings and body piercing jewelry.     Wear causal clothing that is easy to take on and off. Consider your type of surgery.    Keep any valuables, jewelry, piercings at home. Please bring any specially ordered equipment (sling, braces) if indicated.    Arrange for a responsible person to drive you to and from the hospital on the day of your surgery. Please confirm the visitor policy for the day of your procedure when you receive your phone call with an arrival time.     Call the surgeon's office with any new illnesses, exposures, or additional questions prior to surgery.    Please reference your “My Surgical  Experience Booklet” for additional information to prepare for your upcoming surgery.

## 2025-01-14 ENCOUNTER — OFFICE VISIT (OUTPATIENT)
Dept: LAB | Facility: CLINIC | Age: 70
End: 2025-01-14
Payer: COMMERCIAL

## 2025-01-14 DIAGNOSIS — M51.16 LUMBAR DISC DISEASE WITH RADICULOPATHY: ICD-10-CM

## 2025-01-14 DIAGNOSIS — E11.42 TYPE 2 DIABETES MELLITUS WITH DIABETIC POLYNEUROPATHY, WITHOUT LONG-TERM CURRENT USE OF INSULIN (HCC): ICD-10-CM

## 2025-01-14 DIAGNOSIS — Z01.818 PRE-OP EVALUATION: ICD-10-CM

## 2025-01-14 DIAGNOSIS — E78.2 MIXED HYPERLIPIDEMIA: ICD-10-CM

## 2025-01-14 PROCEDURE — 93005 ELECTROCARDIOGRAM TRACING: CPT

## 2025-01-15 LAB
ATRIAL RATE: 63 BPM
P AXIS: 48 DEGREES
PR INTERVAL: 176 MS
QRS AXIS: -2 DEGREES
QRSD INTERVAL: 68 MS
QT INTERVAL: 376 MS
QTC INTERVAL: 385 MS
T WAVE AXIS: 33 DEGREES
VENTRICULAR RATE: 63 BPM

## 2025-01-15 PROCEDURE — 93010 ELECTROCARDIOGRAM REPORT: CPT | Performed by: INTERNAL MEDICINE

## 2025-01-15 RX ORDER — GLIMEPIRIDE 4 MG/1
TABLET ORAL
Qty: 180 TABLET | Refills: 1 | Status: SHIPPED | OUTPATIENT
Start: 2025-01-15

## 2025-01-15 RX ORDER — HYDROCODONE BITARTRATE AND ACETAMINOPHEN 5; 325 MG/1; MG/1
1 TABLET ORAL DAILY PRN
Qty: 30 TABLET | Refills: 0 | Status: SHIPPED | OUTPATIENT
Start: 2025-01-15 | End: 2025-01-21

## 2025-01-15 RX ORDER — ATORVASTATIN CALCIUM 20 MG/1
20 TABLET, FILM COATED ORAL DAILY
Qty: 30 TABLET | Refills: 0 | Status: SHIPPED | OUTPATIENT
Start: 2025-01-15

## 2025-01-15 RX ORDER — GABAPENTIN 300 MG/1
300 CAPSULE ORAL 2 TIMES DAILY
Qty: 60 CAPSULE | Refills: 0 | OUTPATIENT
Start: 2025-01-15

## 2025-01-21 ENCOUNTER — ANESTHESIA (OUTPATIENT)
Dept: PERIOP | Facility: HOSPITAL | Age: 70
End: 2025-01-21
Payer: COMMERCIAL

## 2025-01-21 ENCOUNTER — HOSPITAL ENCOUNTER (OUTPATIENT)
Facility: HOSPITAL | Age: 70
Setting detail: OUTPATIENT SURGERY
Discharge: HOME | End: 2025-01-21
Attending: ORTHOPAEDIC SURGERY | Admitting: ORTHOPAEDIC SURGERY
Payer: COMMERCIAL

## 2025-01-21 VITALS
OXYGEN SATURATION: 100 % | WEIGHT: 281 LBS | HEIGHT: 70 IN | TEMPERATURE: 98 F | RESPIRATION RATE: 18 BRPM | SYSTOLIC BLOOD PRESSURE: 176 MMHG | DIASTOLIC BLOOD PRESSURE: 81 MMHG | HEART RATE: 71 BPM | BODY MASS INDEX: 40.23 KG/M2

## 2025-01-21 DIAGNOSIS — G56.01 CARPAL TUNNEL SYNDROME OF RIGHT WRIST: ICD-10-CM

## 2025-01-21 LAB
GLUCOSE SERPL-MCNC: 139 MG/DL (ref 65–140)
GLUCOSE SERPL-MCNC: 171 MG/DL (ref 65–140)

## 2025-01-21 PROCEDURE — 82948 REAGENT STRIP/BLOOD GLUCOSE: CPT

## 2025-01-21 PROCEDURE — NC001 PR NO CHARGE: Performed by: ORTHOPAEDIC SURGERY

## 2025-01-21 PROCEDURE — 64721 CARPAL TUNNEL SURGERY: CPT | Performed by: PHYSICIAN ASSISTANT

## 2025-01-21 PROCEDURE — 64721 CARPAL TUNNEL SURGERY: CPT | Performed by: ORTHOPAEDIC SURGERY

## 2025-01-21 RX ORDER — SODIUM CHLORIDE, SODIUM LACTATE, POTASSIUM CHLORIDE, CALCIUM CHLORIDE 600; 310; 30; 20 MG/100ML; MG/100ML; MG/100ML; MG/100ML
100 INJECTION, SOLUTION INTRAVENOUS CONTINUOUS
Status: DISCONTINUED | OUTPATIENT
Start: 2025-01-21 | End: 2025-01-21 | Stop reason: HOSPADM

## 2025-01-21 RX ORDER — LIDOCAINE HYDROCHLORIDE 10 MG/ML
INJECTION, SOLUTION EPIDURAL; INFILTRATION; INTRACAUDAL; PERINEURAL AS NEEDED
Status: DISCONTINUED | OUTPATIENT
Start: 2025-01-21 | End: 2025-01-21

## 2025-01-21 RX ORDER — CEFAZOLIN SODIUM 2 G/50ML
SOLUTION INTRAVENOUS AS NEEDED
Status: DISCONTINUED | OUTPATIENT
Start: 2025-01-21 | End: 2025-01-21

## 2025-01-21 RX ORDER — CEFAZOLIN SODIUM 1 G/50ML
1000 SOLUTION INTRAVENOUS ONCE
Status: COMPLETED | OUTPATIENT
Start: 2025-01-21 | End: 2025-01-21

## 2025-01-21 RX ORDER — SODIUM CHLORIDE, SODIUM LACTATE, POTASSIUM CHLORIDE, CALCIUM CHLORIDE 600; 310; 30; 20 MG/100ML; MG/100ML; MG/100ML; MG/100ML
100 INJECTION, SOLUTION INTRAVENOUS CONTINUOUS
Status: CANCELLED | OUTPATIENT
Start: 2025-01-21

## 2025-01-21 RX ORDER — OXYCODONE HYDROCHLORIDE 5 MG/1
5 TABLET ORAL EVERY 4 HOURS PRN
Refills: 0 | Status: DISCONTINUED | OUTPATIENT
Start: 2025-01-21 | End: 2025-01-21 | Stop reason: HOSPADM

## 2025-01-21 RX ORDER — DIPHENHYDRAMINE HYDROCHLORIDE 50 MG/ML
INJECTION INTRAMUSCULAR; INTRAVENOUS AS NEEDED
Status: DISCONTINUED | OUTPATIENT
Start: 2025-01-21 | End: 2025-01-21

## 2025-01-21 RX ORDER — MAGNESIUM HYDROXIDE 1200 MG/15ML
LIQUID ORAL AS NEEDED
Status: DISCONTINUED | OUTPATIENT
Start: 2025-01-21 | End: 2025-01-21 | Stop reason: HOSPADM

## 2025-01-21 RX ORDER — FENTANYL CITRATE/PF 50 MCG/ML
50 SYRINGE (ML) INJECTION
Status: DISCONTINUED | OUTPATIENT
Start: 2025-01-21 | End: 2025-01-21 | Stop reason: HOSPADM

## 2025-01-21 RX ORDER — CEFAZOLIN SODIUM 2 G/50ML
2000 SOLUTION INTRAVENOUS ONCE
Status: DISCONTINUED | OUTPATIENT
Start: 2025-01-21 | End: 2025-01-21 | Stop reason: HOSPADM

## 2025-01-21 RX ORDER — OXYCODONE HYDROCHLORIDE 5 MG/1
5 TABLET ORAL EVERY 4 HOURS PRN
Qty: 4 TABLET | Refills: 0 | Status: SHIPPED | OUTPATIENT
Start: 2025-01-21 | End: 2025-01-31

## 2025-01-21 RX ORDER — BUPIVACAINE HYDROCHLORIDE 2.5 MG/ML
INJECTION, SOLUTION EPIDURAL; INFILTRATION; INTRACAUDAL AS NEEDED
Status: DISCONTINUED | OUTPATIENT
Start: 2025-01-21 | End: 2025-01-21 | Stop reason: HOSPADM

## 2025-01-21 RX ORDER — ONDANSETRON 2 MG/ML
4 INJECTION INTRAMUSCULAR; INTRAVENOUS ONCE AS NEEDED
Status: DISCONTINUED | OUTPATIENT
Start: 2025-01-21 | End: 2025-01-21 | Stop reason: HOSPADM

## 2025-01-21 RX ORDER — MIDAZOLAM HYDROCHLORIDE 2 MG/2ML
INJECTION, SOLUTION INTRAMUSCULAR; INTRAVENOUS AS NEEDED
Status: DISCONTINUED | OUTPATIENT
Start: 2025-01-21 | End: 2025-01-21

## 2025-01-21 RX ORDER — CHLORHEXIDINE GLUCONATE ORAL RINSE 1.2 MG/ML
15 SOLUTION DENTAL ONCE
Status: COMPLETED | OUTPATIENT
Start: 2025-01-21 | End: 2025-01-21

## 2025-01-21 RX ORDER — PROPOFOL 10 MG/ML
INJECTION, EMULSION INTRAVENOUS CONTINUOUS PRN
Status: DISCONTINUED | OUTPATIENT
Start: 2025-01-21 | End: 2025-01-21

## 2025-01-21 RX ORDER — CHLORHEXIDINE GLUCONATE 40 MG/ML
SOLUTION TOPICAL DAILY PRN
Status: DISCONTINUED | OUTPATIENT
Start: 2025-01-21 | End: 2025-01-21 | Stop reason: HOSPADM

## 2025-01-21 RX ORDER — KETAMINE HCL IN NACL, ISO-OSM 100MG/10ML
SYRINGE (ML) INJECTION AS NEEDED
Status: DISCONTINUED | OUTPATIENT
Start: 2025-01-21 | End: 2025-01-21

## 2025-01-21 RX ADMIN — CEFAZOLIN SODIUM 2000 MG: 2 SOLUTION INTRAVENOUS at 11:43

## 2025-01-21 RX ADMIN — PROPOFOL 80 MCG/KG/MIN: 10 INJECTION, EMULSION INTRAVENOUS at 11:57

## 2025-01-21 RX ADMIN — CHLORHEXIDINE GLUCONATE 0.12% ORAL RINSE 15 ML: 1.2 LIQUID ORAL at 11:31

## 2025-01-21 RX ADMIN — LIDOCAINE HYDROCHLORIDE 50 MG: 10 INJECTION, SOLUTION EPIDURAL; INFILTRATION; INTRACAUDAL; PERINEURAL at 11:57

## 2025-01-21 RX ADMIN — SODIUM CHLORIDE, SODIUM LACTATE, POTASSIUM CHLORIDE, AND CALCIUM CHLORIDE 100 ML/HR: .6; .31; .03; .02 INJECTION, SOLUTION INTRAVENOUS at 11:31

## 2025-01-21 RX ADMIN — CEFAZOLIN SODIUM 1000 MG: 1 SOLUTION INTRAVENOUS at 11:49

## 2025-01-21 RX ADMIN — Medication 30 MG: at 11:57

## 2025-01-21 RX ADMIN — DIPHENHYDRAMINE HYDROCHLORIDE 25 MG: 50 INJECTION, SOLUTION INTRAMUSCULAR; INTRAVENOUS at 12:06

## 2025-01-21 RX ADMIN — MIDAZOLAM HYDROCHLORIDE 2 MG: 1 INJECTION, SOLUTION INTRAMUSCULAR; INTRAVENOUS at 11:50

## 2025-01-21 NOTE — OP NOTE
OPERATIVE REPORT  PATIENT NAME: Raphael Pack    :  1955  MRN: [id number]  Pt Location: [facility] OR ROOM 03    SURGERY DATE: 2025    Surgeons and Role:     * Jeff Dalal, DO - Primary  Sobeida Boyce PAC utilized during the case for assistance with positioning draping retraction closure and dressing application    Preop Diagnosis:  Carpal tunnel syndrome of right wrist [G56.01]    Post-Op Diagnosis Codes:     * Carpal tunnel syndrome of right wrist [G56.01]    Procedure(s):  Right - RELEASE CARPAL TUNNEL    Specimen(s):  * No specimens in log *    Estimated Blood Loss:   Minimal    Drains:  * No LDAs found *    Anesthesia Type:   IV Sedation with Anesthesia    Operative Indications:  Carpal tunnel syndrome of right wrist [G56.01]      Operative Findings:  Thickened transverse carpal ligament      Complications:   None    Procedure and Technique:  Patient was seen and examined in the preoperative area.  The right upper extremity was marked, the consent an H&P had been reviewed.  The patient was brought back to the operative suite.  The patient was intubated sedated. Tourniquet was applied to right upper extremity.  The right upper extremity was prepped and draped in a sterile fashion.   After proper timeout commenced and identified the right upper extremity as the operative site.  Esmarch was utilized to exsanguinate the extremity, the tourniquet was turned up to 250 mmHg.  Incision was made in reference with Valentin's cardinal lines.  Bovie was used to electrocauterize any bleeding.  We used Metzenbaum scissors to dissect down to the carpal tunnel ligament.  We used a 10 blade to incise into the carpal tunnel ligament and used a [place] elevator to protect the median nerve while dissecting proximally and distally the carpal tunnel ligament, while releasing the carpal tunnel ligament.  We confirmed proper release.  We irrigated wound the tourniquet was taken down after 3 minutes.  Bleeding was cauterized  incision was closed with 3-0 nylon.   Xeroform was applied to the skin a cut 4 x 4 and a Tegaderm were applied to the hand the patient was taken back to PACU after anesthesia was reversed.  Of note there was a hive on his right cheek this was before the surgery started it did not spread he also had 1 just posterior to his right ear we did not see any other hives on the rest of his body.  Uncertain the source of the hive as it was only on the right side.     I was present for the entire procedure. and A qualified resident physician was not available.    Patient Disposition:  PACU              SIGNATURE: Jeff Dalal DO  DATE: January 21, 2025  TIME: 11:24 AM

## 2025-01-21 NOTE — ANESTHESIA POSTPROCEDURE EVALUATION
Post-Op Assessment Note    CV Status:  Stable  Pain Score: 1    Pain management: adequate       Mental Status:  Alert and awake   Hydration Status:  Euvolemic and stable   PONV Controlled:  Controlled   Airway Patency:  Patent     Post Op Vitals Reviewed: Yes    No anethesia notable event occurred.    Staff: Anesthesiologist           Last Filed PACU Vitals:  Vitals Value Taken Time   Temp 98.4 °F (36.9 °C) 01/21/25 1250   Pulse 74 01/21/25 1250   /69 01/21/25 1250   Resp 18 01/21/25 1250   SpO2 96 % 01/21/25 1250       Modified Sue:     Vitals Value Taken Time   Activity 2 01/21/25 1250   Respiration 2 01/21/25 1250   Circulation 2 01/21/25 1250   Consciousness 2 01/21/25 1250   Oxygen Saturation 2 01/21/25 1250     Modified Sue Score: 10

## 2025-01-21 NOTE — ANESTHESIA POSTPROCEDURE EVALUATION
Post-Op Assessment Note    CV Status:  Stable  Pain Score: 0    Pain management: adequate       Mental Status:  Alert and awake   Hydration Status:  Euvolemic   PONV Controlled:  Controlled   Airway Patency:  Patent     Post Op Vitals Reviewed: Yes    No anethesia notable event occurred.    Staff: CRNA           Last Filed PACU Vitals:  Vitals Value Taken Time   Temp 97.2 °F (36.2 °C) 01/21/25 1221   Pulse 87 01/21/25 1221   /78 01/21/25 1221   Resp 20 01/21/25 1221   SpO2 97 % 01/21/25 1221

## 2025-01-21 NOTE — DISCHARGE INSTR - AVS FIRST PAGE
Discharge Instructions:    Weight bearing:  Do not put direct pressure over the incision. Do not lift with this hand until postoperative appointment.     Dressings:  Keep the dressings clean, dry, and intact for 3 days. May remove dressings after 3 days and shower. Please clean the incision with alcohol after showers until postoperative appointment. May apply band-aids as needed only if still bleeding or draining from incision after bandage removed post op day three. Try to keep the incision dry and clean with alcohol if getting wet and once a day after dressings removed.    DO NOT SOAK THE INCISION                              Pain:  You have been prescribed a narcotic. Please take this sparingly and only AS NEEDED for pain. Take alleve twice a day with food for two days. Ice the incision down for 20 minutes 4-5 times per day    Appt Instructions:   If you do not have your appointment, please call the clinic at [phone] to f/u with Dr. Dalal in 2 weeks from date of surgery.

## 2025-01-21 NOTE — H&P
Assessment:  1. Carpal tunnel syndrome of right wrist  Inpatient consult to Internal Medicine    Inpatient consult to Internal Medicine    oxyCODONE (ROXICODONE) 5 immediate release tablet        Patient Active Problem List   Diagnosis    Type 2 diabetes mellitus with diabetic polyneuropathy, without long-term current use of insulin ([facility])    JOSE RAUL (obstructive sleep apnea)    Essential hypertension    Mixed hyperlipidemia    Periodic limb movement    Hypothyroid    Chronic midline low back pain with right-sided sciatica    Obesity, morbid, BMI 40.0-49.9 (HCC)    Other male erectile dysfunction    Microalbuminuria    Chronic kidney disease, stage 2 (mild)    Type 2 diabetes mellitus with stage 2 chronic kidney disease, without long-term current use of insulin  ([facility])    Persistent proteinuria    Radiculopathy, cervical region    Intervertebral disc disorder with radiculopathy of lumbar region    Uncomplicated opioid dependence ([facility])    Peripheral edema    Type 2 diabetes mellitus with proteinuria  ([facility])    Vitamin B 12 deficiency    Carpal tunnel syndrome, bilateral    Dermoid cyst of skin of back       Plan:    69 y.o. male with right carpal tunnel syndrome    Surgical planning for right carpal tunnel release with Dr. Dalal 1/21/2025.          Subjective:   Patient ID: Raphael Pack is a 69 y.o. male .    HPI    Patient comes in today with regards to right carpal tunnel syndrome.  Patient with pain, numbness right thumb, index, long, and ring finger.  Patient with EMG findings of severe right carpal tunnel syndrome.      The following portions of the patient's history were reviewed and updated as appropriate: allergies, current medications, past family history, past social history, past surgical history and problem list.    Social History     Socioeconomic History    Marital status: /Civil Union     Spouse name: Not on file    Number of children: Not on file    Years of education: Not on file    Highest  education level: Not on file   Occupational History    Not on file   Tobacco Use    Smoking status: Former     Current packs/day: 0.00     Types: Cigarettes     Start date:      Quit date:      Years since quittin.0    Smokeless tobacco: Never   Vaping Use    Vaping status: Never Used   Substance and Sexual Activity    Alcohol use: Yes     Comment: social- less than 1 drink per week    Drug use: No    Sexual activity: Yes     Partners: Female     Birth control/protection: None   Other Topics Concern    Not on file   Social History Narrative         Social Drivers of Health     Financial Resource Strain: Low Risk  (2023)    Overall Financial Resource Strain (CARDIA)     Difficulty of Paying Living Expenses: Not very hard   Food Insecurity: No Food Insecurity (2024)    Nursing - Inadequate Food Risk Classification     Worried About Running Out of Food in the Last Year: Never true     Ran Out of Food in the Last Year: Never true     Ran Out of Food in the Last Year: Not on file   Transportation Needs: No Transportation Needs (2024)    PRAPARE - Transportation     Lack of Transportation (Medical): No     Lack of Transportation (Non-Medical): No   Physical Activity: Not on file   Stress: Not on file   Social Connections: Not on file   Intimate Partner Violence: Not on file   Housing Stability: Low Risk  (2024)    Housing Stability Vital Sign     Unable to Pay for Housing in the Last Year: No     Number of Times Moved in the Last Year: 1     Homeless in the Last Year: No     Past Medical History:   Diagnosis Date    BPH (benign prostatic hyperplasia)     Carpal tunnel syndrome     Chronic kidney disease     Diabetes mellitus (HCC)     Disease of thyroid gland     Hyperlipidemia     Hypertension     Sleep apnea     prescribed CPAP, does not use    Type 2 diabetes mellitus with diabetic chronic kidney disease (HCC) 2021     Past Surgical History:   Procedure Laterality Date     COLONOSCOPY      2016    CYST REMOVAL      back    MT EGD TRANSORAL BIOPSY SINGLE/MULTIPLE N/A 05/17/2017    Procedure: ESOPHAGOGASTRODUODENOSCOPY (EGD) with bx;  Surgeon: Patel Chaudhary MD;  Location: [facility];  Service: Bariatrics    TONSILLECTOMY       Allergies   Allergen Reactions    Penicillins Syncope     SYNCOPE, but has taken amoxicillin/augmentin in past     No current facility-administered medications on file prior to encounter.     Current Outpatient Medications on File Prior to Encounter   Medication Sig Dispense Refill    amLODIPine (NORVASC) 10 mg tablet Take 1 tablet (10 mg total) by mouth daily 90 tablet 1    gabapentin (NEURONTIN) 300 mg capsule Take 1 capsule (300 mg total) by mouth 2 (two) times a day (Patient taking differently: Take 300 mg by mouth 2 (two) times a day as needed) 60 capsule 5    Lantus SoloStar 100 units/mL SOPN Inject 26 units at bedtime (Patient taking differently: 30 Units Inject 30 units at bedtime) 15 mL 2    levothyroxine 75 mcg tablet Take 1 tablet (75 mcg total) by mouth daily 90 tablet 1    losartan (COZAAR) 25 mg tablet Take 1 tablet (25 mg total) by mouth daily 90 tablet 1    metFORMIN (GLUCOPHAGE-XR) 500 mg 24 hr tablet Take 1 tablet (500 mg total) by mouth 2 (two) times a day with meals      methocarbamol (ROBAXIN) 750 mg tablet Take 1 tablet (750 mg total) by mouth 2 (two) times a day as needed for muscle spasms 60 tablet 2    sildenafil (VIAGRA) 50 MG tablet Take 1 tablet (50 mg total) by mouth as needed for erectile dysfunction 30 tablet 5    Insulin Pen Needle (CareOne Unifine Pentips Plus) 31G X 5 MM MISC Inject under the skin every morning 100 each 0       Review of Systems   Constitutional:  Negative for chills and fever.   HENT:  Negative for ear pain and sore throat.    Eyes:  Negative for pain and visual disturbance.   Respiratory:  Negative for cough and shortness of breath.    Cardiovascular:  Negative for chest pain and palpitations.  "  Gastrointestinal:  Negative for abdominal pain and vomiting.   Genitourinary:  Negative for dysuria and hematuria.   Musculoskeletal:  Positive for arthralgias. Negative for back pain.   Skin:  Negative for color change and rash.   Neurological:  Positive for numbness. Negative for seizures and syncope.   All other systems reviewed and are negative.        Objective:    Vitals:    01/21/25 1125   BP: (!) 175/73   Pulse: 71   Resp: 18   Temp: 98.5 °F (36.9 °C)   SpO2: 96%       Physical Exam  Vitals and nursing note reviewed.   Constitutional:       General: He is not in acute distress.     Appearance: He is well-developed.   HENT:      Head: Normocephalic and atraumatic.   Eyes:      Conjunctiva/sclera: Conjunctivae normal.   Cardiovascular:      Rate and Rhythm: Normal rate and regular rhythm.      Heart sounds: No murmur heard.  Pulmonary:      Effort: Pulmonary effort is normal. No respiratory distress.      Breath sounds: Normal breath sounds.   Abdominal:      Palpations: Abdomen is soft.      Tenderness: There is no abdominal tenderness.   Musculoskeletal:         General: No swelling.      Cervical back: Neck supple.   Skin:     General: Skin is warm and dry.      Capillary Refill: Capillary refill takes less than 2 seconds.   Neurological:      Mental Status: He is alert.   Psychiatric:         Mood and Affect: Mood normal.         Right Hand Exam     Comments:  Right Carpal Tunnel Exam:    Positive thenar atrophy. Positive phalen's test. Positive carpal tunnel compression. Positive tinels over median nerve at the wrist.  Opposition strength 5/5.  Abduction strength 5/5.                 EMG - severe bilateral carpal tunnel syndrome          Portions of the record may have been created with voice recognition software.  Occasional wrong word or \"sound a like\" substitutions may have occurred due to the inherent limitations of voice recognition software.  Read the chart carefully and recognize, using context, " where substitutions have occurred.

## 2025-01-21 NOTE — ANESTHESIA PREPROCEDURE EVALUATION
Procedure:  RELEASE CARPAL TUNNEL (Right: Wrist)    Relevant Problems   ANESTHESIA (within normal limits)      CARDIO   (+) Essential hypertension   (+) Mixed hyperlipidemia      ENDO   (+) Hypothyroid   (+) Type 2 diabetes mellitus with diabetic polyneuropathy, without long-term current use of insulin (HCC)   (+) Type 2 diabetes mellitus with proteinuria  (HCC)   (+) Type 2 diabetes mellitus with stage 2 chronic kidney disease, without long-term current use of insulin  (HCC)      /RENAL   (+) Chronic kidney disease, stage 2 (mild)      MUSCULOSKELETAL   (+) Chronic midline low back pain with right-sided sciatica      NEURO/PSYCH   (+) Chronic midline low back pain with right-sided sciatica   (+) Type 2 diabetes mellitus with diabetic polyneuropathy, without long-term current use of insulin (HCC)      PULMONARY   (+) JOSE RAUL (obstructive sleep apnea)        Physical Exam    Airway    Mallampati score: II  TM Distance: >3 FB  Neck ROM: full     Dental   No notable dental hx     Cardiovascular  Rhythm: regular, Rate: normal    Pulmonary   Breath sounds clear to auscultation    Other Findings        Anesthesia Plan  ASA Score- 3     Anesthesia Type- IV sedation with anesthesia with ASA Monitors.         Additional Monitors:     Airway Plan:            Plan Factors-Exercise tolerance (METS): >4 METS.    Chart reviewed. EKG reviewed.  Existing labs reviewed. Patient summary reviewed.    Patient is not a current smoker.              Induction-     Postoperative Plan-         Informed Consent- Anesthetic plan and risks discussed with patient.  I personally reviewed this patient with the CRNA. Discussed and agreed on the Anesthesia Plan with the CRNA..      NPO Status:  Vitals Value Taken Time   Date of last liquid 01/21/25 01/21/25 1118   Time of last liquid 0330 01/21/25 1118   Date of last solid 01/20/25 01/21/25 1118   Time of last solid 2300 01/21/25 1118

## 2025-01-22 ENCOUNTER — OFFICE VISIT (OUTPATIENT)
Dept: SURGERY | Facility: CLINIC | Age: 70
End: 2025-01-22

## 2025-01-22 DIAGNOSIS — I10 ESSENTIAL HYPERTENSION: ICD-10-CM

## 2025-01-22 DIAGNOSIS — R80.1 PERSISTENT PROTEINURIA: ICD-10-CM

## 2025-01-22 DIAGNOSIS — D23.5 DERMOID CYST OF SKIN OF BACK: Primary | ICD-10-CM

## 2025-01-22 PROCEDURE — 99024 POSTOP FOLLOW-UP VISIT: CPT | Performed by: SURGERY

## 2025-01-22 RX ORDER — LOSARTAN POTASSIUM 25 MG/1
25 TABLET ORAL DAILY
Qty: 90 TABLET | Refills: 1 | Status: SHIPPED | OUTPATIENT
Start: 2025-01-22

## 2025-01-22 NOTE — LETTER
January 22, 2025     ROLAND Mendez  [address]  [address]  [place] [address]    Patient: Raphael Pack   YOB: 1955   Date of Visit: 1/22/2025       Dear Dr. Espitia:    Thank you for referring Raphael Pack to me for evaluation. Below are my notes for this consultation.    If you have questions, please do not hesitate to call me. I look forward to following your patient along with you.         Sincerely,        George Baron DO        CC: No Recipients    George Baron DO  1/22/2025  2:46 PM  Sign when Signing Visit  Assessment/Plan:    Diagnoses and all orders for this visit:    Dermoid cyst of skin of back    Status post excision of epidermal cyst of his upper back.  Healing well.  Sutures removed.  Pathology reviewed    Pathology: Reviewed with patient, all questions answered.       Postoperative restrictions reviewed. All questions answered.       ______________________________________________________  HPI: Patient presents post operatively.  Excision back cyst 1/8/2025  Final Diagnosis  A. Skin, back:  EPIDERMOID CYST                     ROS:  General ROS: negative for - chills, fatigue, fever or night sweats, weight loss  Respiratory ROS: no cough, shortness of breath, or wheezing  Cardiovascular ROS: no chest pain or dyspnea on exertion  Genito-Urinary ROS: no dysuria, trouble voiding, or hematuria  Musculoskeletal ROS: negative for - gait disturbance, joint pain or muscle pain  Neurological ROS: no TIA or stroke symptoms  GI ROS: see HPI  Skin ROS: no new rashes or lesions   Lymphatic ROS: no new adenopathy noted by pt.   GYN ROS: see HPI, no new GYN history or bleeding noted  Psy ROS: no new mental or behavioral disturbances         Patient Active Problem List   Diagnosis   • Type 2 diabetes mellitus with diabetic polyneuropathy, without long-term current use of insulin (HCC)   • JOSE RAUL (obstructive sleep apnea)   • Essential hypertension   • Mixed  hyperlipidemia   • Periodic limb movement   • Hypothyroid   • Chronic midline low back pain with right-sided sciatica   • Obesity, morbid, BMI 40.0-49.9 ([facility])   • Other male erectile dysfunction   • Microalbuminuria   • Chronic kidney disease, stage 2 (mild)   • Type 2 diabetes mellitus with stage 2 chronic kidney disease, without long-term current use of insulin  ([facility])   • Persistent proteinuria   • Radiculopathy, cervical region   • Intervertebral disc disorder with radiculopathy of lumbar region   • Uncomplicated opioid dependence ([facility])   • Peripheral edema   • Type 2 diabetes mellitus with proteinuria  ([facility])   • Vitamin B 12 deficiency   • Carpal tunnel syndrome, bilateral   • Dermoid cyst of skin of back       Allergies:  Penicillins      Current Outpatient Medications:   •  amLODIPine (NORVASC) 10 mg tablet, Take 1 tablet (10 mg total) by mouth daily, Disp: 90 tablet, Rfl: 1  •  atorvastatin (LIPITOR) 20 mg tablet, Take 1 tablet (20 mg total) by mouth daily, Disp: 30 tablet, Rfl: 0  •  carvedilol (COREG) 12.5 mg tablet, Take 1 tablet (12.5 mg total) by mouth 2 (two) times a day with meals, Disp: 180 tablet, Rfl: 1  •  Continuous Glucose Sensor (FreeStyle Paul 3 Plus Sensor) MISC, Change sensor every 15 days, Disp: 6 each, Rfl: 1  •  Continuous Glucose Sensor (FreeStyle Paul 3 Sensor) MISC, Apply every 2 weeks, Disp: 2 each, Rfl: 5  •  furosemide (LASIX) 20 mg tablet, Take 1 tablet (20 mg total) by mouth 3 (three) times a week, Disp: 36 tablet, Rfl: 1  •  gabapentin (NEURONTIN) 300 mg capsule, Take 1 capsule (300 mg total) by mouth 2 (two) times a day (Patient taking differently: Take 300 mg by mouth 2 (two) times a day as needed), Disp: 60 capsule, Rfl: 5  •  glimepiride (AMARYL) 4 mg tablet, Take 1 tablet with breakfast and 1 tablet with dinner., Disp: 180 tablet, Rfl: 1  •  Insulin Pen Needle (CareOne Unifine Pentips Plus) 31G X 5 MM MISC, Inject under the skin every morning, Disp: 100 each, Rfl: 0  •   Lantus SoloStar 100 units/mL SOPN, Inject 26 units at bedtime (Patient taking differently: 30 Units Inject 30 units at bedtime), Disp: 15 mL, Rfl: 2  •  levothyroxine 75 mcg tablet, Take 1 tablet (75 mcg total) by mouth daily, Disp: 90 tablet, Rfl: 1  •  losartan (COZAAR) 25 mg tablet, Take 1 tablet (25 mg total) by mouth daily, Disp: 90 tablet, Rfl: 1  •  metFORMIN (GLUCOPHAGE-XR) 500 mg 24 hr tablet, Take 1 tablet (500 mg total) by mouth 2 (two) times a day with meals, Disp: , Rfl:   •  methocarbamol (ROBAXIN) 750 mg tablet, Take 1 tablet (750 mg total) by mouth 2 (two) times a day as needed for muscle spasms, Disp: 60 tablet, Rfl: 2  •  oxyCODONE (ROXICODONE) 5 immediate release tablet, Take 1 tablet (5 mg total) by mouth every 4 (four) hours as needed for moderate pain for up to 10 days Max Daily Amount: 30 mg, Disp: 4 tablet, Rfl: 0  •  sildenafil (VIAGRA) 50 MG tablet, Take 1 tablet (50 mg total) by mouth as needed for erectile dysfunction, Disp: 30 tablet, Rfl: 5    Current Facility-Administered Medications:   •  cyanocobalamin injection 1,000 mcg, 1,000 mcg, Intramuscular, Q30 Days, , 1,000 mcg at 10/02/24 1316    Past Medical History:   Diagnosis Date   • BPH (benign prostatic hyperplasia)    • Carpal tunnel syndrome    • Chronic kidney disease    • Diabetes mellitus (HCC)    • Disease of thyroid gland    • Hyperlipidemia    • Hypertension    • Sleep apnea     prescribed CPAP, does not use   • Type 2 diabetes mellitus with diabetic chronic kidney disease (HCC) 06/24/2021       Past Surgical History:   Procedure Laterality Date   • COLONOSCOPY      2016   • CYST REMOVAL      back   • UT EGD TRANSORAL BIOPSY SINGLE/MULTIPLE N/A 05/17/2017    Procedure: ESOPHAGOGASTRODUODENOSCOPY (EGD) with bx;  Surgeon: Patel Chaudhary MD;  Location: [facility];  Service: Bariatrics   • UT NEUROPLASTY &/TRANSPOS MEDIAN NRV CARPAL TUNNE Right 1/21/2025    Procedure: RELEASE CARPAL TUNNEL;  Surgeon: Jeff Dalal DO;  Location:  [facility];  Service: Orthopedics   • TONSILLECTOMY         Family History   Problem Relation Age of Onset   • Lung cancer Mother    • Diabetes Father    • Cancer Brother    • Alcohol abuse Neg Hx    • Substance Abuse Neg Hx    • Mental illness Neg Hx    • Depression Neg Hx         reports that he quit smoking about 45 years ago. His smoking use included cigarettes. He started smoking about 45 years ago. He has never used smokeless tobacco. He reports current alcohol use. He reports that he does not use drugs.    PHYSICAL EXAM    There were no vitals taken for this visit.    General: normal, cooperative, no distress    Incision: clean, dry, and intact and healing well.  Sutures removed      George Baron, DO    Date: 1/22/2025 Time: 2:45 PM

## 2025-01-22 NOTE — PROGRESS NOTES
Assessment/Plan:    Diagnoses and all orders for this visit:    Dermoid cyst of skin of back    Status post excision of epidermal cyst of his upper back.  Healing well.  Sutures removed.  Pathology reviewed    Pathology: Reviewed with patient, all questions answered.       Postoperative restrictions reviewed. All questions answered.       ______________________________________________________  HPI: Patient presents post operatively.  Excision back cyst 1/8/2025  Final Diagnosis  A. Skin, back:  EPIDERMOID CYST                     ROS:  General ROS: negative for - chills, fatigue, fever or night sweats, weight loss  Respiratory ROS: no cough, shortness of breath, or wheezing  Cardiovascular ROS: no chest pain or dyspnea on exertion  Genito-Urinary ROS: no dysuria, trouble voiding, or hematuria  Musculoskeletal ROS: negative for - gait disturbance, joint pain or muscle pain  Neurological ROS: no TIA or stroke symptoms  GI ROS: see HPI  Skin ROS: no new rashes or lesions   Lymphatic ROS: no new adenopathy noted by pt.   GYN ROS: see HPI, no new GYN history or bleeding noted  Psy ROS: no new mental or behavioral disturbances         Patient Active Problem List   Diagnosis    Type 2 diabetes mellitus with diabetic polyneuropathy, without long-term current use of insulin ([facility])    JOSE RAUL (obstructive sleep apnea)    Essential hypertension    Mixed hyperlipidemia    Periodic limb movement    Hypothyroid    Chronic midline low back pain with right-sided sciatica    Obesity, morbid, BMI 40.0-49.9 ([facility])    Other male erectile dysfunction    Microalbuminuria    Chronic kidney disease, stage 2 (mild)    Type 2 diabetes mellitus with stage 2 chronic kidney disease, without long-term current use of insulin  ([facility])    Persistent proteinuria    Radiculopathy, cervical region    Intervertebral disc disorder with radiculopathy of lumbar region    Uncomplicated opioid dependence ([facility])    Peripheral edema    Type 2 diabetes mellitus with  proteinuria  (HCC)    Vitamin B 12 deficiency    Carpal tunnel syndrome, bilateral    Dermoid cyst of skin of back       Allergies:  Penicillins      Current Outpatient Medications:     amLODIPine (NORVASC) 10 mg tablet, Take 1 tablet (10 mg total) by mouth daily, Disp: 90 tablet, Rfl: 1    atorvastatin (LIPITOR) 20 mg tablet, Take 1 tablet (20 mg total) by mouth daily, Disp: 30 tablet, Rfl: 0    carvedilol (COREG) 12.5 mg tablet, Take 1 tablet (12.5 mg total) by mouth 2 (two) times a day with meals, Disp: 180 tablet, Rfl: 1    Continuous Glucose Sensor (FreeStyle Paul 3 Plus Sensor) MISC, Change sensor every 15 days, Disp: 6 each, Rfl: 1    Continuous Glucose Sensor (FreeStyle Paul 3 Sensor) MISC, Apply every 2 weeks, Disp: 2 each, Rfl: 5    furosemide (LASIX) 20 mg tablet, Take 1 tablet (20 mg total) by mouth 3 (three) times a week, Disp: 36 tablet, Rfl: 1    gabapentin (NEURONTIN) 300 mg capsule, Take 1 capsule (300 mg total) by mouth 2 (two) times a day (Patient taking differently: Take 300 mg by mouth 2 (two) times a day as needed), Disp: 60 capsule, Rfl: 5    glimepiride (AMARYL) 4 mg tablet, Take 1 tablet with breakfast and 1 tablet with dinner., Disp: 180 tablet, Rfl: 1    Insulin Pen Needle (CareOne Unifine Pentips Plus) 31G X 5 MM MISC, Inject under the skin every morning, Disp: 100 each, Rfl: 0    Lantus SoloStar 100 units/mL SOPN, Inject 26 units at bedtime (Patient taking differently: 30 Units Inject 30 units at bedtime), Disp: 15 mL, Rfl: 2    levothyroxine 75 mcg tablet, Take 1 tablet (75 mcg total) by mouth daily, Disp: 90 tablet, Rfl: 1    losartan (COZAAR) 25 mg tablet, Take 1 tablet (25 mg total) by mouth daily, Disp: 90 tablet, Rfl: 1    metFORMIN (GLUCOPHAGE-XR) 500 mg 24 hr tablet, Take 1 tablet (500 mg total) by mouth 2 (two) times a day with meals, Disp: , Rfl:     methocarbamol (ROBAXIN) 750 mg tablet, Take 1 tablet (750 mg total) by mouth 2 (two) times a day as needed for muscle spasms,  Disp: 60 tablet, Rfl: 2    oxyCODONE (ROXICODONE) 5 immediate release tablet, Take 1 tablet (5 mg total) by mouth every 4 (four) hours as needed for moderate pain for up to 10 days Max Daily Amount: 30 mg, Disp: 4 tablet, Rfl: 0    sildenafil (VIAGRA) 50 MG tablet, Take 1 tablet (50 mg total) by mouth as needed for erectile dysfunction, Disp: 30 tablet, Rfl: 5    Current Facility-Administered Medications:     cyanocobalamin injection 1,000 mcg, 1,000 mcg, Intramuscular, Q30 Days, , 1,000 mcg at 10/02/24 1316    Past Medical History:   Diagnosis Date    BPH (benign prostatic hyperplasia)     Carpal tunnel syndrome     Chronic kidney disease     Diabetes mellitus (HCC)     Disease of thyroid gland     Hyperlipidemia     Hypertension     Sleep apnea     prescribed CPAP, does not use    Type 2 diabetes mellitus with diabetic chronic kidney disease (HCC) 06/24/2021       Past Surgical History:   Procedure Laterality Date    COLONOSCOPY      2016    CYST REMOVAL      back    SD EGD TRANSORAL BIOPSY SINGLE/MULTIPLE N/A 05/17/2017    Procedure: ESOPHAGOGASTRODUODENOSCOPY (EGD) with bx;  Surgeon: Patel Chaudhary MD;  Location: [facility];  Service: Bariatrics    SD NEUROPLASTY &/TRANSPOS MEDIAN NRV CARPAL TUNNE Right 1/21/2025    Procedure: RELEASE CARPAL TUNNEL;  Surgeon: Jeff Dalal DO;  Location:  MAIN OR;  Service: Orthopedics    TONSILLECTOMY         Family History   Problem Relation Age of Onset    Lung cancer Mother     Diabetes Father     Cancer Brother     Alcohol abuse Neg Hx     Substance Abuse Neg Hx     Mental illness Neg Hx     Depression Neg Hx         reports that he quit smoking about 45 years ago. His smoking use included cigarettes. He started smoking about 45 years ago. He has never used smokeless tobacco. He reports current alcohol use. He reports that he does not use drugs.    PHYSICAL EXAM    There were no vitals taken for this visit.    General: normal, cooperative, no distress    Incision: clean,  dry, and intact and healing well.  Sutures removed      George Baron,     Date: 1/22/2025 Time: 2:45 PM

## 2025-01-26 DIAGNOSIS — E11.65 UNCONTROLLED TYPE 2 DIABETES MELLITUS WITH HYPERGLYCEMIA (HCC): ICD-10-CM

## 2025-01-27 ENCOUNTER — TELEPHONE (OUTPATIENT)
Age: 70
End: 2025-01-27

## 2025-01-27 DIAGNOSIS — Z79.4 TYPE 2 DIABETES MELLITUS WITH HYPERGLYCEMIA, WITH LONG-TERM CURRENT USE OF INSULIN (HCC): Primary | ICD-10-CM

## 2025-01-27 DIAGNOSIS — E11.65 TYPE 2 DIABETES MELLITUS WITH HYPERGLYCEMIA, WITH LONG-TERM CURRENT USE OF INSULIN (HCC): Primary | ICD-10-CM

## 2025-01-27 RX ORDER — PEN NEEDLE, DIABETIC 31 GX3/16"
NEEDLE, DISPOSABLE MISCELLANEOUS EVERY MORNING
Qty: 100 EACH | Refills: 1 | Status: SHIPPED | OUTPATIENT
Start: 2025-01-27

## 2025-01-27 NOTE — TELEPHONE ENCOUNTER
"Patient called in explained that he was using the Paul 1 sensors but now he is using the Paul 3 plus. Notes it works really good but it does not stay on his arm for more than 4 days. He just used his last sensor and it fell off 2 days ago. The pharmacy also only provided him 5 or the 6 he was prescribed because they are hard to get. He cannot get the paul 3 sensors until the end of Feb but he still has the script on file for the Paul 1 sensors and he can get them this friday so he wants to know if he can use the paul 1 sensors for now until he gets the paul 3 plus sensors.      He also mentioned that he was on Ozempic for about 2 years ago and it worked \"fabulous\". His blood sugars were great with weight loss except that the second year he hit his \"donut whole\" for medicare in Feb and it went from $80 to $1300 so he has not been on it for at least one year.     At this time the Ozempic will be $47 a month, so he wants to know if he can go back on it?   "

## 2025-01-28 NOTE — TELEPHONE ENCOUNTER
He can use the reji 1 sensor until he receives the reji 3+.  We can consider restarting Ozempic based on his current readings.  Are we able to download his CGM or is he able to send a recent blood glucose log?

## 2025-01-29 RX ORDER — FLASH GLUCOSE SENSOR
KIT MISCELLANEOUS
Qty: 2 EACH | Refills: 0 | Status: SHIPPED | OUTPATIENT
Start: 2025-01-29

## 2025-01-29 NOTE — TELEPHONE ENCOUNTER
BG levels are elevated on reji download.  I approved reji sensor prescription and Ozempic prescription.  He should start Ozempic at 0.25 mg weekly for 4 weeks and then increase to 0.5 mg weekly.

## 2025-02-03 ENCOUNTER — TELEPHONE (OUTPATIENT)
Age: 70
End: 2025-02-03

## 2025-02-03 NOTE — TELEPHONE ENCOUNTER
PA for Ozempic 0.25mg SUBMITTED to [facility]    via    []CMM-KEY:   [x]Surescripts-Case ID #   []Availity-Auth ID # NDC #   []Faxed to plan   []Other website   []Phone call Case ID #     [x]PA sent as URGENT    All office notes, labs and other pertaining documents and studies sent. Clinical questions answered. Awaiting determination from insurance company.     Turnaround time for your insurance to make a decision on your Prior Authorization can take 7-21 business days.

## 2025-02-04 NOTE — TELEPHONE ENCOUNTER
PA for Ozempic 0.25mg APPROVED     Date(s) approved 01/01/2025-02/03/2026      Patient advised by          []MyChart Message  []Phone call   [x]LMOM  []L/M to call office as no active Communication consent on file  []Unable to leave detailed message as VM not approved on Communication consent       Pharmacy advised by    [x]Fax  []Phone call    Approval letter scanned into Media Yes

## 2025-02-10 ENCOUNTER — APPOINTMENT (OUTPATIENT)
Dept: LAB | Facility: CLINIC | Age: 70
End: 2025-02-10
Payer: COMMERCIAL

## 2025-02-10 ENCOUNTER — RESULTS FOLLOW-UP (OUTPATIENT)
Dept: OTHER | Facility: HOSPITAL | Age: 70
End: 2025-02-10

## 2025-02-10 DIAGNOSIS — I10 ESSENTIAL HYPERTENSION: ICD-10-CM

## 2025-02-10 DIAGNOSIS — R80.9 TYPE 2 DIABETES MELLITUS WITH PROTEINURIA  (HCC): ICD-10-CM

## 2025-02-10 DIAGNOSIS — R80.1 PERSISTENT PROTEINURIA: ICD-10-CM

## 2025-02-10 DIAGNOSIS — E11.29 TYPE 2 DIABETES MELLITUS WITH PROTEINURIA  (HCC): ICD-10-CM

## 2025-02-10 DIAGNOSIS — N18.2 CHRONIC KIDNEY DISEASE, STAGE 2 (MILD): ICD-10-CM

## 2025-02-10 DIAGNOSIS — E66.01 OBESITY, MORBID, BMI 40.0-49.9 (HCC): ICD-10-CM

## 2025-02-10 LAB
ANION GAP SERPL CALCULATED.3IONS-SCNC: 6 MMOL/L (ref 4–13)
BACTERIA UR QL AUTO: ABNORMAL /HPF
BILIRUB UR QL STRIP: NEGATIVE
BUN SERPL-MCNC: 17 MG/DL (ref 5–25)
CALCIUM SERPL-MCNC: 9.6 MG/DL (ref 8.4–10.2)
CHLORIDE SERPL-SCNC: 102 MMOL/L (ref 96–108)
CLARITY UR: CLEAR
CO2 SERPL-SCNC: 30 MMOL/L (ref 21–32)
COLOR UR: ABNORMAL
CREAT SERPL-MCNC: 0.77 MG/DL (ref 0.6–1.3)
CREAT UR-MCNC: 89.1 MG/DL
GFR SERPL CREATININE-BSD FRML MDRD: 93 ML/MIN/1.73SQ M
GLUCOSE SERPL-MCNC: 248 MG/DL (ref 65–140)
GLUCOSE UR STRIP-MCNC: ABNORMAL MG/DL
HGB UR QL STRIP.AUTO: NEGATIVE
KETONES UR STRIP-MCNC: NEGATIVE MG/DL
LEUKOCYTE ESTERASE UR QL STRIP: NEGATIVE
MAGNESIUM SERPL-MCNC: 1.7 MG/DL (ref 1.9–2.7)
NITRITE UR QL STRIP: NEGATIVE
NON-SQ EPI CELLS URNS QL MICRO: ABNORMAL /HPF
PH UR STRIP.AUTO: 6.5 [PH]
POTASSIUM SERPL-SCNC: 4.7 MMOL/L (ref 3.5–5.3)
PROT UR STRIP-MCNC: ABNORMAL MG/DL
PROT UR-MCNC: 358.5 MG/DL
PROT/CREAT UR: 4 MG/G{CREAT} (ref 0–0.1)
RBC #/AREA URNS AUTO: ABNORMAL /HPF
SODIUM SERPL-SCNC: 138 MMOL/L (ref 135–147)
SP GR UR STRIP.AUTO: 1.02 (ref 1–1.03)
UROBILINOGEN UR STRIP-ACNC: <2 MG/DL
WBC #/AREA URNS AUTO: ABNORMAL /HPF

## 2025-02-10 PROCEDURE — 83735 ASSAY OF MAGNESIUM: CPT

## 2025-02-10 PROCEDURE — 81001 URINALYSIS AUTO W/SCOPE: CPT

## 2025-02-10 PROCEDURE — 84156 ASSAY OF PROTEIN URINE: CPT

## 2025-02-10 PROCEDURE — 36415 COLL VENOUS BLD VENIPUNCTURE: CPT

## 2025-02-10 PROCEDURE — 82570 ASSAY OF URINE CREATININE: CPT

## 2025-02-10 PROCEDURE — 80048 BASIC METABOLIC PNL TOTAL CA: CPT

## 2025-02-11 ENCOUNTER — OFFICE VISIT (OUTPATIENT)
Dept: NEPHROLOGY | Facility: CLINIC | Age: 70
End: 2025-02-11
Payer: COMMERCIAL

## 2025-02-11 VITALS
HEIGHT: 70 IN | HEART RATE: 63 BPM | OXYGEN SATURATION: 98 % | DIASTOLIC BLOOD PRESSURE: 66 MMHG | SYSTOLIC BLOOD PRESSURE: 124 MMHG | WEIGHT: 289 LBS | BODY MASS INDEX: 41.37 KG/M2

## 2025-02-11 DIAGNOSIS — R80.9 TYPE 2 DIABETES MELLITUS WITH PROTEINURIA  (HCC): Primary | ICD-10-CM

## 2025-02-11 DIAGNOSIS — I10 ESSENTIAL HYPERTENSION: ICD-10-CM

## 2025-02-11 DIAGNOSIS — R80.1 PERSISTENT PROTEINURIA: ICD-10-CM

## 2025-02-11 DIAGNOSIS — E78.2 MIXED HYPERLIPIDEMIA: ICD-10-CM

## 2025-02-11 DIAGNOSIS — E83.42 HYPOMAGNESEMIA: ICD-10-CM

## 2025-02-11 DIAGNOSIS — I10 BENIGN ESSENTIAL HYPERTENSION: ICD-10-CM

## 2025-02-11 DIAGNOSIS — E11.29 TYPE 2 DIABETES MELLITUS WITH PROTEINURIA  (HCC): Primary | ICD-10-CM

## 2025-02-11 DIAGNOSIS — R60.0 PERIPHERAL EDEMA: ICD-10-CM

## 2025-02-11 DIAGNOSIS — E66.01 OBESITY, MORBID, BMI 40.0-49.9 (HCC): ICD-10-CM

## 2025-02-11 PROCEDURE — 99214 OFFICE O/P EST MOD 30 MIN: CPT | Performed by: INTERNAL MEDICINE

## 2025-02-11 RX ORDER — MAGNESIUM OXIDE 400 MG/1
400 TABLET ORAL DAILY
Qty: 90 TABLET | Refills: 2 | Status: SHIPPED | OUTPATIENT
Start: 2025-02-11

## 2025-02-11 RX ORDER — AMLODIPINE BESYLATE 5 MG/1
5 TABLET ORAL DAILY
Qty: 90 TABLET | Refills: 3 | Status: SHIPPED | OUTPATIENT
Start: 2025-02-11

## 2025-02-11 RX ORDER — LOSARTAN POTASSIUM 50 MG/1
50 TABLET ORAL DAILY
Qty: 90 TABLET | Refills: 3 | Status: SHIPPED | OUTPATIENT
Start: 2025-02-11

## 2025-02-11 NOTE — PROGRESS NOTES
Name: Raphael Pack      : 1955      MRN: [id number]  Encounter Provider: Michael Rainey MD  Encounter Date: 2025   Encounter department: [facility] [place]  :  Assessment & Plan  Type 2 diabetes mellitus with proteinuria  (HCC)    Lab Results   Component Value Date    HGBA1C 8.2 (H) 2024   -recommend goal A1c less than 7.0 if possible  -stressed on diabetic diet and daily exercise as well as weight loss  -continue monitoring of A1c per PCP, management per PCP and follow-up with PCP  -discussed about risk of CKD progression if diabetes is not controlled well.  -currently on: Insulin,  Glimepiride, Ozempic, metformin. Recently started ozempic   -Last A1c: 8.2  -Okay from nephrology side to restart on Farxiga, recommend discussion with endocrine about Farxiga/SGLT2 inhibitor.  He was previously on Farxiga but it was stopped due to cost.   -started on Jardiance 10 mg daily and see cost would be okay. Discussed with endocrine and okay to start Jardiance     Orders:    Empagliflozin (Jardiance) 10 MG TABS tablet; Take 1 tablet (10 mg total) by mouth every morning    Basic metabolic panel; Future    Protein / creatinine ratio, urine; Future    Urinalysis with microscopic; Future    Persistent proteinuria  Proteinuria was 1.6 g in 2023.  Was previously worsening due to being off ACE inhibitor and spironolactone due to hyperkalemia.  Was restarted on losartan 25 mg daily in 2023.  Proteinuria continued to worsen.  Last UPC ratio was 4.0 g worsening from 2.7 g.  UA with urine microscopy showed only 1-2 RBC per high-power field so less likely GN  Proteinuria suspected to be due to diabetes mellitus type 2   peak A1c was 10.4 in 2020  A1c now improved to 8.2% but still on the higher side, recommend goal A1c less than 7.  Started Jardiance   Continue losartan but will increase dose to 50 mg daily and closely monitor for recurrence of hyperkalemia with  repeat BMP in 1 week, 2 weeks in 4 weeks.  If hyperkalemia recur, will decrease the dose of losartan.  Also started on SGLT2 inhibitors to  help proteinuria, check SPEP UPEP light chain ratio  Orders:    losartan (COZAAR) 50 mg tablet; Take 1 tablet (50 mg total) by mouth daily    Empagliflozin (Jardiance) 10 MG TABS tablet; Take 1 tablet (10 mg total) by mouth every morning    Basic metabolic panel; Future    Basic metabolic panel; Future    Basic metabolic panel; Future    Basic metabolic panel; Future    Protein / creatinine ratio, urine; Future    Urinalysis with microscopic; Future    Essential hypertension  Currently on amlodipine, Coreg, losartan 25 mg daily .  Blood pressure is stable and is at goal, recommend low-sodium diet.  Need to increase the dose of losartan to help with proteinuria.  Will increase losartan to 50 mg daily, decrease amlodipine to 5 mg daily and monitor.  Check blood work in a week, 2 weeks in 4 weeks.  continue home monitoring of blood pressure  Orders:    losartan (COZAAR) 50 mg tablet; Take 1 tablet (50 mg total) by mouth daily    Basic metabolic panel; Future    Basic metabolic panel; Future    Basic metabolic panel; Future    Basic metabolic panel; Future    Protein / creatinine ratio, urine; Future    Peripheral edema  Possibly due to amlodipine  Continue Lasix 20 mg 3 times a week Monday Wednesday Friday  Recommend limb elevation and compression stockings and okay to take extra Lasix if needed.  Recommend taking 4 times a week this week to get the lower extremity edema better and okay to take extra day if needed in future.  Has gained 9 pounds since last office visit in August  Orders:    Basic metabolic panel; Future    Hypomagnesemia  Magnesium level 1.7, started on magnesium oxide 400 mg daily  Orders:    magnesium oxide (MAG-OX) 400 mg tablet; Take 1 tablet (400 mg total) by mouth daily    Magnesium; Future    Obesity, morbid, BMI 40.0-49.9 ([facility])  -BMI: Body mass index is  41.47 kg/m².  -Stressed on daily exercise, reduced calorie diet and weight loss.  Discussed about need for lifestyle modification.  Recommend 150 minutes of exercise per week such as brisk walking.        Benign essential hypertension    Orders:    amLODIPine (NORVASC) 5 mg tablet; Take 1 tablet (5 mg total) by mouth daily        History of Present Illness   HPI  Raphael Pack is a 69 y.o. male who presents for follow-up of hypertension and proteinuria.  No new complaints  -Reviewed blood work from 2/10/2025: Creatinine 0.77 mg/dL.  Renal function is stable electrolytes were stable except for magnesium which was low at 1.7.  Prior hemoglobin was 15.1 in December 2024 proteinuria worsening to UPC ratio of 4.0 g per urine microscopy with 3+ protein, only 1-2 RBC per high-power field  Reviewed PCP note from December 2024-during that visit dose of Lantus insulin was increased        Review of Systems   Constitutional:  Negative for activity change, appetite change, chills, diaphoresis, fatigue and fever.   HENT:  Negative for congestion, ear pain, facial swelling, nosebleeds and sore throat.    Eyes:  Negative for pain and visual disturbance.   Respiratory:  Negative for cough, chest tightness and shortness of breath.    Cardiovascular:  Positive for leg swelling. Negative for chest pain and palpitations.   Gastrointestinal:  Negative for abdominal distention, abdominal pain, diarrhea, nausea and vomiting.   Genitourinary:  Negative for difficulty urinating, dysuria, flank pain, frequency and hematuria.   Musculoskeletal:  Negative for arthralgias, back pain and joint swelling.   Skin:  Negative for color change and rash.   Neurological:  Negative for dizziness, seizures, syncope, weakness and headaches.   Psychiatric/Behavioral:  Negative for agitation and confusion. The patient is not nervous/anxious.    All other systems reviewed and are negative.    Pertinent Medical History   Medical history: Diabetes mellitus  type 2 for more than 10 years with diabetic nephropathy, hypertension  Primary Hypertension:   -Current medication: Amlodipine 10 mg, Coreg 12.5 mg bid, losartan 25 mg daily   -was taken off aldactone and ACEI due to hyperkalemia on blood work in august 23 rd, 2021   -Also has Sleep apnea- could be contributing to elevated BP but not tolerating the mask and so not using it.    -renal duplex:  Showed less than 60% stenosis in the right main renal artery, no evidence of stenosis in the left main renal artery.         Medical History Reviewed by provider this encounter:  Tobacco  Allergies  Meds  Problems  Med Hx  Surg Hx  Fam Hx     .  Current Outpatient Medications on File Prior to Visit   Medication Sig Dispense Refill    atorvastatin (LIPITOR) 20 mg tablet Take 1 tablet (20 mg total) by mouth daily 30 tablet 0    carvedilol (COREG) 12.5 mg tablet Take 1 tablet (12.5 mg total) by mouth 2 (two) times a day with meals 180 tablet 1    Continuous Glucose Sensor (FreeStyle Paul 14 Day Sensor) MISC Change sensor every 14 days 2 each 0    Continuous Glucose Sensor (FreeStyle Paul 3 Plus Sensor) MISC Change sensor every 15 days 6 each 1    Continuous Glucose Sensor (FreeStyle Paul 3 Sensor) MISC Apply every 2 weeks 2 each 5    furosemide (LASIX) 20 mg tablet Take 1 tablet (20 mg total) by mouth 3 (three) times a week 36 tablet 1    gabapentin (NEURONTIN) 300 mg capsule Take 1 capsule (300 mg total) by mouth 2 (two) times a day 60 capsule 5    glimepiride (AMARYL) 4 mg tablet Take 1 tablet with breakfast and 1 tablet with dinner. 180 tablet 1    Insulin Pen Needle (CareOne Unifine Pentips Plus) 31G X 5 MM MISC Inject under the skin every morning 100 each 1    Lantus SoloStar 100 units/mL SOPN Inject 26 units at bedtime 15 mL 2    levothyroxine 75 mcg tablet Take 1 tablet (75 mcg total) by mouth daily 90 tablet 1    metFORMIN (GLUCOPHAGE-XR) 500 mg 24 hr tablet Take 1 tablet (500 mg total) by mouth 2 (two) times a  "day with meals      semaglutide, 0.25 or 0.5 mg/dose, (Ozempic, 0.25 or 0.5 MG/DOSE,) 2 mg/3 mL injection pen Inject 0.25 mg for 4 weeks and then increase to 0.5 mg weekly 3 mL 0    sildenafil (VIAGRA) 50 MG tablet Take 1 tablet (50 mg total) by mouth as needed for erectile dysfunction 30 tablet 5    [DISCONTINUED] amLODIPine (NORVASC) 10 mg tablet Take 1 tablet (10 mg total) by mouth daily 90 tablet 1    [DISCONTINUED] losartan (COZAAR) 25 mg tablet Take 1 tablet (25 mg total) by mouth daily 90 tablet 1    [DISCONTINUED] methocarbamol (ROBAXIN) 750 mg tablet Take 1 tablet (750 mg total) by mouth 2 (two) times a day as needed for muscle spasms (Patient not taking: Reported on 2/11/2025) 60 tablet 2     Current Facility-Administered Medications on File Prior to Visit   Medication Dose Route Frequency Provider Last Rate Last Admin    cyanocobalamin injection 1,000 mcg  1,000 mcg Intramuscular Q30 Days    1,000 mcg at 10/02/24 1316         Objective   /66 (BP Location: Left arm, Patient Position: Sitting, Cuff Size: Large)   Pulse 63   Ht 5' 10\" (1.778 m)   Wt 131 kg (289 lb)   SpO2 98%   BMI 41.47 kg/m²      Physical Exam  Vitals and nursing note reviewed.   Constitutional:       General: He is not in acute distress.     Appearance: He is well-developed.   HENT:      Head: Normocephalic and atraumatic.      Mouth/Throat:      Mouth: Mucous membranes are moist.   Eyes:      Conjunctiva/sclera: Conjunctivae normal.   Cardiovascular:      Rate and Rhythm: Normal rate and regular rhythm.      Heart sounds: No murmur heard.  Pulmonary:      Effort: Pulmonary effort is normal. No respiratory distress.      Breath sounds: Normal breath sounds.   Abdominal:      General: Abdomen is flat.      Palpations: Abdomen is soft.      Tenderness: There is no abdominal tenderness.   Musculoskeletal:         General: No swelling.      Cervical back: Neck supple.      Right lower leg: Edema present.      Left lower leg: Edema " present.   Skin:     General: Skin is warm and dry.      Capillary Refill: Capillary refill takes less than 2 seconds.      Coloration: Skin is not jaundiced.   Neurological:      Mental Status: He is alert and oriented to person, place, and time.   Psychiatric:         Mood and Affect: Mood normal.         Behavior: Behavior normal.

## 2025-02-11 NOTE — PATIENT INSTRUCTIONS
Renal function is stable, blood pressure is stable but due to worsening proteinuria, decreased amlodipine to 5 mg daily and increase losartan to 50 mg daily.  Due to past history of hyperkalemia, rechecking blood work in a week, 2 weeks and in 4 weeks to monitor potassium level.  Also started on Jardiance 10 mg daily to help decrease proteinuria.  Recommend following diabetic diet daily exercise and weight loss.    - list for high potassium diet to avoid provided   -started mag tablet    Follow up:  5  months with repeat Lab work within a week of the scheduled office visit. Will discuss the results of the previsit Labs during the office visit.

## 2025-02-11 NOTE — ASSESSMENT & PLAN NOTE
Currently on amlodipine, Coreg, losartan 25 mg daily .  Blood pressure is stable and is at goal, recommend low-sodium diet.  Need to increase the dose of losartan to help with proteinuria.  Will increase losartan to 50 mg daily, decrease amlodipine to 5 mg daily and monitor.  Check blood work in a week, 2 weeks in 4 weeks.  continue home monitoring of blood pressure  Orders:    losartan (COZAAR) 50 mg tablet; Take 1 tablet (50 mg total) by mouth daily    Basic metabolic panel; Future    Basic metabolic panel; Future    Basic metabolic panel; Future    Basic metabolic panel; Future    Protein / creatinine ratio, urine; Future

## 2025-02-11 NOTE — ASSESSMENT & PLAN NOTE
-BMI: Body mass index is 41.47 kg/m².  -Stressed on daily exercise, reduced calorie diet and weight loss.  Discussed about need for lifestyle modification.  Recommend 150 minutes of exercise per week such as brisk walking.         no

## 2025-02-11 NOTE — TELEPHONE ENCOUNTER
----- Message from Michael Rainey MD sent at 2/10/2025 10:12 PM EST -----  Low Mag, proteinuria worsening, will discuss during office visit.

## 2025-02-11 NOTE — ASSESSMENT & PLAN NOTE
Proteinuria was 1.6 g in December 2023.  Was previously worsening due to being off ACE inhibitor and spironolactone due to hyperkalemia.  Was restarted on losartan 25 mg daily in December 2023.  Proteinuria continued to worsen.  Last UPC ratio was 4.0 g worsening from 2.7 g.  UA with urine microscopy showed only 1-2 RBC per high-power field so less likely GN  Proteinuria suspected to be due to diabetes mellitus type 2   peak A1c was 10.4 in September 2020  A1c now improved to 8.2% but still on the higher side, recommend goal A1c less than 7.  Started Jardiance   Continue losartan but will increase dose to 50 mg daily and closely monitor for recurrence of hyperkalemia with repeat BMP in 1 week, 2 weeks in 4 weeks.  If hyperkalemia recur, will decrease the dose of losartan.  Also started on SGLT2 inhibitors to  help proteinuria, check SPEP UPEP light chain ratio  Orders:    losartan (COZAAR) 50 mg tablet; Take 1 tablet (50 mg total) by mouth daily    Empagliflozin (Jardiance) 10 MG TABS tablet; Take 1 tablet (10 mg total) by mouth every morning    Basic metabolic panel; Future    Basic metabolic panel; Future    Basic metabolic panel; Future    Basic metabolic panel; Future    Protein / creatinine ratio, urine; Future    Urinalysis with microscopic; Future

## 2025-02-11 NOTE — ASSESSMENT & PLAN NOTE
Lab Results   Component Value Date    HGBA1C 8.2 (H) 12/19/2024   -recommend goal A1c less than 7.0 if possible  -stressed on diabetic diet and daily exercise as well as weight loss  -continue monitoring of A1c per PCP, management per PCP and follow-up with PCP  -discussed about risk of CKD progression if diabetes is not controlled well.  -currently on: Insulin,  Glimepiride, Ozempic, metformin. Recently started ozempic   -Last A1c: 8.2  -Okay from nephrology side to restart on Farxiga, recommend discussion with endocrine about Farxiga/SGLT2 inhibitor.  He was previously on Farxiga but it was stopped due to cost.   -started on Jardiance 10 mg daily and see cost would be okay. Discussed with endocrine and okay to start Jardiance     Orders:    Empagliflozin (Jardiance) 10 MG TABS tablet; Take 1 tablet (10 mg total) by mouth every morning    Basic metabolic panel; Future    Protein / creatinine ratio, urine; Future    Urinalysis with microscopic; Future

## 2025-02-11 NOTE — ASSESSMENT & PLAN NOTE
Possibly due to amlodipine  Continue Lasix 20 mg 3 times a week Monday Wednesday Friday  Recommend limb elevation and compression stockings and okay to take extra Lasix if needed.  Recommend taking 4 times a week this week to get the lower extremity edema better and okay to take extra day if needed in future.  Has gained 9 pounds since last office visit in August  Orders:    Basic metabolic panel; Future

## 2025-02-12 RX ORDER — ATORVASTATIN CALCIUM 20 MG/1
20 TABLET, FILM COATED ORAL DAILY
Qty: 30 TABLET | Refills: 0 | Status: SHIPPED | OUTPATIENT
Start: 2025-02-12

## 2025-02-13 ENCOUNTER — OFFICE VISIT (OUTPATIENT)
Dept: OBGYN CLINIC | Facility: CLINIC | Age: 70
End: 2025-02-13

## 2025-02-13 ENCOUNTER — RESULTS FOLLOW-UP (OUTPATIENT)
Dept: INTERNAL MEDICINE CLINIC | Facility: CLINIC | Age: 70
End: 2025-02-13

## 2025-02-13 ENCOUNTER — APPOINTMENT (OUTPATIENT)
Dept: LAB | Facility: CLINIC | Age: 70
End: 2025-02-13
Payer: COMMERCIAL

## 2025-02-13 VITALS — WEIGHT: 289 LBS | HEIGHT: 70 IN | BODY MASS INDEX: 41.37 KG/M2

## 2025-02-13 DIAGNOSIS — Z98.890 S/P CARPAL TUNNEL RELEASE: Primary | ICD-10-CM

## 2025-02-13 DIAGNOSIS — N18.2 TYPE 2 DIABETES MELLITUS WITH STAGE 2 CHRONIC KIDNEY DISEASE, WITHOUT LONG-TERM CURRENT USE OF INSULIN  (HCC): ICD-10-CM

## 2025-02-13 DIAGNOSIS — E11.22 TYPE 2 DIABETES MELLITUS WITH STAGE 2 CHRONIC KIDNEY DISEASE, WITHOUT LONG-TERM CURRENT USE OF INSULIN  (HCC): ICD-10-CM

## 2025-02-13 LAB
ALBUMIN SERPL BCG-MCNC: 4 G/DL (ref 3.5–5)
ALP SERPL-CCNC: 80 U/L (ref 34–104)
ALT SERPL W P-5'-P-CCNC: 17 U/L (ref 7–52)
ANION GAP SERPL CALCULATED.3IONS-SCNC: 5 MMOL/L (ref 4–13)
AST SERPL W P-5'-P-CCNC: 14 U/L (ref 13–39)
BILIRUB SERPL-MCNC: 0.47 MG/DL (ref 0.2–1)
BUN SERPL-MCNC: 19 MG/DL (ref 5–25)
CALCIUM SERPL-MCNC: 9.1 MG/DL (ref 8.4–10.2)
CHLORIDE SERPL-SCNC: 103 MMOL/L (ref 96–108)
CHOLEST SERPL-MCNC: 156 MG/DL (ref ?–200)
CO2 SERPL-SCNC: 28 MMOL/L (ref 21–32)
CREAT SERPL-MCNC: 0.91 MG/DL (ref 0.6–1.3)
EST. AVERAGE GLUCOSE BLD GHB EST-MCNC: 192 MG/DL
GFR SERPL CREATININE-BSD FRML MDRD: 86 ML/MIN/1.73SQ M
GLUCOSE P FAST SERPL-MCNC: 114 MG/DL (ref 65–99)
HBA1C MFR BLD: 8.3 %
HDLC SERPL-MCNC: 38 MG/DL
LDLC SERPL CALC-MCNC: 63 MG/DL (ref 0–100)
POTASSIUM SERPL-SCNC: 4.6 MMOL/L (ref 3.5–5.3)
PROT SERPL-MCNC: 6.8 G/DL (ref 6.4–8.4)
SODIUM SERPL-SCNC: 136 MMOL/L (ref 135–147)
TRIGL SERPL-MCNC: 273 MG/DL (ref ?–150)

## 2025-02-13 PROCEDURE — 99024 POSTOP FOLLOW-UP VISIT: CPT | Performed by: ORTHOPAEDIC SURGERY

## 2025-02-13 PROCEDURE — 83036 HEMOGLOBIN GLYCOSYLATED A1C: CPT

## 2025-02-13 PROCEDURE — 80053 COMPREHEN METABOLIC PANEL: CPT

## 2025-02-13 PROCEDURE — 80061 LIPID PANEL: CPT

## 2025-02-13 PROCEDURE — 36415 COLL VENOUS BLD VENIPUNCTURE: CPT

## 2025-02-13 NOTE — PROGRESS NOTES
Assessment:   Status Post  Release Carpal Tunnel - Right on 1/21/2025     Plan:   Weight bearing: WBAT RUE  Activities as tolerated  Wound care discussed  Follow-up in 4 weeks for reevaluation.    Follow Up:  4  week(s)        CHIEF COMPLAINT:  Chief Complaint   Patient presents with    Right Hand - Post-op         SUBJECTIVE:  Raphael Pack is a 69 y.o. year old male who presents for follow up after Release Carpal Tunnel - Right. Today patient has unchanged numbness. Pt denies any fevers or chills.     PHYSICAL EXAMINATION:    MUSCULOSKELETAL EXAMINATION:   General: Alert and oriented x 3, WNWD, NAD  Incision: wound with superficial dehiscence. No evidence of infectious process.  Range of Motion: As expected          PROCEDURES PERFORMED:  Suture removal    Date/Time: 2/13/2025 7:45 AM    Performed by: Sobeida Boyce PA-C  Authorized by: Jeff Dalal DO  Universal Protocol:  Consent: Verbal consent obtained.  Risks and benefits: risks, benefits and alternatives were discussed  Consent given by: patient  Patient understanding: patient states understanding of the procedure being performed      Patient location:  Clinic  Location:     Laterality:  Right    Location:  Upper extremity    Upper extremity location:  Hand    Hand location:  R hand  Procedure details:     Tools used:  Suture removal kit    Wound appearance:  No sign(s) of infection  Post-procedure details:     Patient tolerance of procedure:  Tolerated well, no immediate complications

## 2025-02-26 DIAGNOSIS — Z79.4 TYPE 2 DIABETES MELLITUS WITH HYPERGLYCEMIA, WITH LONG-TERM CURRENT USE OF INSULIN (HCC): ICD-10-CM

## 2025-02-26 DIAGNOSIS — R80.9 TYPE 2 DIABETES MELLITUS WITH PROTEINURIA  (HCC): ICD-10-CM

## 2025-02-26 DIAGNOSIS — E11.65 TYPE 2 DIABETES MELLITUS WITH HYPERGLYCEMIA, WITH LONG-TERM CURRENT USE OF INSULIN (HCC): ICD-10-CM

## 2025-02-26 DIAGNOSIS — E11.29 TYPE 2 DIABETES MELLITUS WITH PROTEINURIA  (HCC): ICD-10-CM

## 2025-02-27 RX ORDER — INSULIN GLARGINE 100 [IU]/ML
INJECTION, SOLUTION SUBCUTANEOUS
Qty: 15 ML | Refills: 1 | Status: SHIPPED | OUTPATIENT
Start: 2025-02-27

## 2025-02-27 RX ORDER — METFORMIN HYDROCHLORIDE 500 MG/1
500 TABLET, EXTENDED RELEASE ORAL 2 TIMES DAILY WITH MEALS
Qty: 180 TABLET | Refills: 1 | Status: SHIPPED | OUTPATIENT
Start: 2025-02-27

## 2025-02-27 RX ORDER — HYDROCHLOROTHIAZIDE 12.5 MG/1
CAPSULE ORAL
Qty: 6 EACH | Refills: 1 | Status: SHIPPED | OUTPATIENT
Start: 2025-02-27

## 2025-03-08 DIAGNOSIS — R60.0 PERIPHERAL EDEMA: ICD-10-CM

## 2025-03-08 DIAGNOSIS — R80.9 TYPE 2 DIABETES MELLITUS WITH PROTEINURIA  (HCC): ICD-10-CM

## 2025-03-08 DIAGNOSIS — M51.16 LUMBAR DISC DISEASE WITH RADICULOPATHY: ICD-10-CM

## 2025-03-08 DIAGNOSIS — E11.29 TYPE 2 DIABETES MELLITUS WITH PROTEINURIA  (HCC): ICD-10-CM

## 2025-03-08 DIAGNOSIS — I10 ESSENTIAL HYPERTENSION: ICD-10-CM

## 2025-03-08 DIAGNOSIS — R80.1 PERSISTENT PROTEINURIA: ICD-10-CM

## 2025-03-08 RX ORDER — GABAPENTIN 300 MG/1
300 CAPSULE ORAL 2 TIMES DAILY
Qty: 60 CAPSULE | Refills: 0 | Status: SHIPPED | OUTPATIENT
Start: 2025-03-08

## 2025-03-10 RX ORDER — CARVEDILOL 12.5 MG/1
12.5 TABLET ORAL 2 TIMES DAILY WITH MEALS
Qty: 180 TABLET | Refills: 0 | Status: SHIPPED | OUTPATIENT
Start: 2025-03-10

## 2025-03-10 RX ORDER — FUROSEMIDE 20 MG/1
20 TABLET ORAL 3 TIMES WEEKLY
Qty: 36 TABLET | Refills: 0 | Status: SHIPPED | OUTPATIENT
Start: 2025-03-10

## 2025-03-13 ENCOUNTER — OFFICE VISIT (OUTPATIENT)
Dept: OBGYN CLINIC | Facility: CLINIC | Age: 70
End: 2025-03-13

## 2025-03-13 VITALS — BODY MASS INDEX: 41.37 KG/M2 | WEIGHT: 289 LBS | HEIGHT: 70 IN

## 2025-03-13 DIAGNOSIS — M54.12 RADICULOPATHY, CERVICAL REGION: ICD-10-CM

## 2025-03-13 DIAGNOSIS — Z98.890 S/P CARPAL TUNNEL RELEASE: Primary | ICD-10-CM

## 2025-03-13 PROCEDURE — 99024 POSTOP FOLLOW-UP VISIT: CPT | Performed by: ORTHOPAEDIC SURGERY

## 2025-03-13 NOTE — PROGRESS NOTES
Assessment & Plan  S/P carpal tunnel release  Activity as tolerated  Consider repeat EMG at 6 months s/p surgery if symptoms persist  Recommend Spine and Pain eval for cervical radiculopathy  Radiculopathy, cervical region  Orders:    Ambulatory referral to Spine & Pain Management; Future        Assessment:   Status Post  Release Carpal Tunnel - Right on 1/21/2025     Plan:   Weight bearing: WBAT RUE  Pain control OTC PRN  Discussed pillar pain can last up to 6 months or longer  If symptoms are maintained 6 months after surgery, can consider repeat EMG  Referral placed today for Scott to follow up with Spine and Pain due to continued arm numbness and weakness    Follow Up:  PRN    To Do Next Visit:   Re-evalaution      CHIEF COMPLAINT:  Chief Complaint   Patient presents with    Right Hand - Post-op         SUBJECTIVE:  Raphael Pack is a 70 y.o. year old male who presents for follow up after Release Carpal Tunnel - Right. Today patient has continued numbness. He is unsure if his paraesthesias are improved as he has not done much with his hands since the surgery. Pt denies any fevers or chills.       PHYSICAL EXAMINATION:    MUSCULOSKELETAL EXAMINATION:   General: Alert and oriented x 3, WNWD, NAD  Incision: wound healed with scar tissue development, no evidence of infection  Range of Motion: As expected      STUDIES REVIEWED:  I have personally reviewed pertinent films in PACS.      PROCEDURES PERFORMED:  Procedures  No Procedures performed today      Scribe Attestation      I,:  Elizabeth Yusuf am acting as a scribe while in the presence of the attending physician.:       I,:  Jeff Dalal DO personally performed the services described in this documentation    as scribed in my presence.:

## 2025-03-13 NOTE — ASSESSMENT & PLAN NOTE
Activity as tolerated  Consider repeat EMG at 6 months s/p surgery if symptoms persist  Recommend Spine and Pain eval for cervical radiculopathy

## 2025-03-14 DIAGNOSIS — E11.65 TYPE 2 DIABETES MELLITUS WITH HYPERGLYCEMIA, WITH LONG-TERM CURRENT USE OF INSULIN (HCC): ICD-10-CM

## 2025-03-14 DIAGNOSIS — Z79.4 TYPE 2 DIABETES MELLITUS WITH HYPERGLYCEMIA, WITH LONG-TERM CURRENT USE OF INSULIN (HCC): ICD-10-CM

## 2025-03-14 RX ORDER — SEMAGLUTIDE 0.68 MG/ML
INJECTION, SOLUTION SUBCUTANEOUS
Qty: 3 ML | Refills: 1 | Status: SHIPPED | OUTPATIENT
Start: 2025-03-14

## 2025-03-18 ENCOUNTER — OFFICE VISIT (OUTPATIENT)
Dept: ENDOCRINOLOGY | Facility: CLINIC | Age: 70
End: 2025-03-18
Payer: COMMERCIAL

## 2025-03-18 VITALS
SYSTOLIC BLOOD PRESSURE: 124 MMHG | WEIGHT: 273 LBS | OXYGEN SATURATION: 98 % | HEART RATE: 76 BPM | BODY MASS INDEX: 39.08 KG/M2 | DIASTOLIC BLOOD PRESSURE: 76 MMHG | HEIGHT: 70 IN

## 2025-03-18 DIAGNOSIS — E03.9 ACQUIRED HYPOTHYROIDISM: ICD-10-CM

## 2025-03-18 DIAGNOSIS — I10 ESSENTIAL HYPERTENSION: ICD-10-CM

## 2025-03-18 DIAGNOSIS — E78.2 MIXED HYPERLIPIDEMIA: ICD-10-CM

## 2025-03-18 DIAGNOSIS — Z79.4 TYPE 2 DIABETES MELLITUS WITH HYPERGLYCEMIA, WITH LONG-TERM CURRENT USE OF INSULIN (HCC): Primary | ICD-10-CM

## 2025-03-18 DIAGNOSIS — E11.65 TYPE 2 DIABETES MELLITUS WITH HYPERGLYCEMIA, WITH LONG-TERM CURRENT USE OF INSULIN (HCC): Primary | ICD-10-CM

## 2025-03-18 PROCEDURE — 99214 OFFICE O/P EST MOD 30 MIN: CPT | Performed by: PHYSICIAN ASSISTANT

## 2025-03-18 PROCEDURE — 95251 CONT GLUC MNTR ANALYSIS I&R: CPT | Performed by: PHYSICIAN ASSISTANT

## 2025-03-18 RX ORDER — INSULIN GLARGINE 100 [IU]/ML
INJECTION, SOLUTION SUBCUTANEOUS
Qty: 15 ML | Refills: 1 | Status: SHIPPED | OUTPATIENT
Start: 2025-03-18 | End: 2025-03-18

## 2025-03-18 RX ORDER — INSULIN GLARGINE 100 [IU]/ML
INJECTION, SOLUTION SUBCUTANEOUS
Qty: 15 ML | Refills: 1 | Status: SHIPPED | OUTPATIENT
Start: 2025-03-18

## 2025-03-18 NOTE — PROGRESS NOTES
Name: Raphael Pack      : 1955      MRN: [id number]  Encounter Provider: Perla Velasquez PA-C  Encounter Date: 3/18/2025   Encounter department: [facility] [place]    CC: DM    Assessment & Plan  Essential hypertension  Blood pressure adequately controlled, continue current treatment        Mixed hyperlipidemia  LDL 63  On statin therapy  Managed by PCP       Type 2 diabetes mellitus with hyperglycemia, with long-term current use of insulin (HCC)  HGA1C 8.3%.   Treatment regimen: CGM report is not consistent with an A1c of 8.3%.  Patient reports with recent changes to medication his readings are trending much better.  Decrease Lantus to 18 units as morning blood glucose levels are sometimes low /low normal.  Stop metformin due to symptoms of diarrhea.  Continue Ozempic.  If he continues to have diarrhea on Ozempic without the metformin then we may need to consider discontinuing and possibly trying another GLP-1 agonist.  Continue Jardiance, glimepiride.  Discussed intensive insulin regimen does increase risk for hypoglycemia. Episodes of hypoglycemia can lead to permanent disability and death.  Discussed risks/complications associated with uncontrolled diabetes.    Advised to adhere to diabetic diet, and recommended staying active/exercising routinely as tolerated.  Keep carbohydrates consistent to limit blood glucose fluctuations.  Advised to call if blood sugars less than 70 mg/dl or over 300 mg/dl.   Check blood glucose 3+ times a day  Discussed symptoms and treatment of hypoglycemia.   Discussed use of CGM to collect additional blood glucose data to reveal trends and patterns that can be used to optimize treatment plan.   Recommended routine follow-up with podiatry and ophthalmology.   Call to download CGM in 2 weeks  Ordered blood work to complete prior to next visit.    Lab Results   Component Value Date    HGBA1C 8.3 (H) 2025       Orders:    Lantus  SoloStar 100 units/mL SOPN; Inject 18 units at bedtime    Hemoglobin A1C; Future    Albumin / creatinine urine ratio; Future    Basic metabolic panel; Future    Acquired hypothyroidism  TSH previously 3.234 and free T4 0.83  Continue current dose of levothyroxine  Monitor labs  Orders:    T4, free; Future    TSH, 3rd generation; Future        History of Present Illness     Raphael Pack is a 70 y.o. male with a history of type 2 diabetes with long term use of insulin. Diabetes course has been stable. Complications of DM: CKD. Denies recent illness or hospitalizations. Denies recent severe hypoglycemic or severe hyperglycemic episodes. Denies any issues with his current regimen. Home glucose monitoring: are performed regularly, using Freestyle Paul.      Raphael Pack   Device used  Home use     Indication   Type 2 Diabetes    More than 72 hours of data was reviewed. Report to be scanned to chart.     Date Range: 3/5-3/18    Analysis of data:   Average Glucose: 132 mg/dl  Coefficient of Variation: 37.2%   Time in Target Range: 81%   Time Above Range: 18%   Time Below Range: 1%     Interpretation of data: readings are well controlled and lower in the mornings / during the day. Occasionally there is an evening spike.        Current regimen: Lantus 26 units QHS (using 20 units), glimepiride 4 mg BID with meals, Ozempic 0.5 mg weekly, Jardiance 10 mg daily (prescribed by [facility]), metformin 500 mg BID (only using it once a day)  Not taking metformin due to diarrhea  Not taking Trulicity and Jardiance due to cost.   He has noted some diarrhea with the Ozempic but he wants to give it a little longer. He also is taking the metformin. In the past he did well on Ozempic.   compliant most of the time, denies any side effects from medications.  Injects in: abdomen. Rotates sites: Yes  Hypoglycemic episodes: Yes, rare  H/o of hypoglycemia causing hospitalization or Intervention such as glucagon injection or  ambulance call : No  Hypoglycemia symptoms: jitteriness  Treatment of hypoglycemia: soda, glucose tablets     Medic alert tag: recommended: Yes     Diabetes education: No  Diet: 2 meals per day, 2-3 snacks per day. Timing of meals is predictable.   Diabetic diet compliance: compliant most of the time  Activity: Daily activity is predictable: Yes. He has been swimming 5 times a week.     Ophthamology: May 2024. [facility].  Podiatry: April 2024     Has hypertension: on ACE inhibitor/ARB  Has hyperlipidemia: on statin - tolerating well, no myalgias. compliant most of the time, denies any side effects from medications.  Thyroid disorders: Hypothyroidism. Is taking levothyroxine 75 mcg daily.  History of pancreatitis: No    Review of Systems   Constitutional:  Negative for activity change, appetite change, fatigue and unexpected weight change.   HENT:  Positive for trouble swallowing (has seen GI previously).    Eyes:  Negative for visual disturbance.   Respiratory:  Positive for shortness of breath (occasionally on exertion).    Cardiovascular:  Negative for chest pain and palpitations.   Gastrointestinal:  Positive for diarrhea. Negative for constipation.   Endocrine: Negative for polydipsia and polyuria.   Musculoskeletal:  Positive for arthralgias and gait problem.   Skin:  Negative for wound.   Neurological:  Positive for numbness.   Psychiatric/Behavioral: Negative.      as per HPI  Current Outpatient Medications on File Prior to Visit   Medication Sig Dispense Refill    amLODIPine (NORVASC) 5 mg tablet Take 1 tablet (5 mg total) by mouth daily 90 tablet 3    atorvastatin (LIPITOR) 20 mg tablet TAKE ONE TABLET BY MOUTH EVERY DAY 30 tablet 0    carvedilol (COREG) 12.5 mg tablet Take 1 tablet (12.5 mg total) by mouth 2 (two) times a day with meals 180 tablet 0    Continuous Glucose Sensor (FreeStyle Paul 14 Day Sensor) MISC Change sensor every 14 days 2 each 0    Continuous Glucose Sensor (FreeStyle  "Paul 3 Plus Sensor) MISC Change sensor every 15 days 6 each 1    Continuous Glucose Sensor (FreeStyle Paul 3 Sensor) MISC Apply every 2 weeks 2 each 5    Empagliflozin (Jardiance) 10 MG TABS tablet Take 1 tablet (10 mg total) by mouth every morning 90 tablet 0    furosemide (LASIX) 20 mg tablet Take 1 tablet (20 mg total) by mouth 3 (three) times a week 36 tablet 0    gabapentin (NEURONTIN) 300 mg capsule Take 1 capsule (300 mg total) by mouth 2 (two) times a day 60 capsule 0    glimepiride (AMARYL) 4 mg tablet Take 1 tablet with breakfast and 1 tablet with dinner. 180 tablet 1    Insulin Pen Needle ([employer] Unifine Pentips Plus) 31G X 5 MM MISC Inject under the skin every morning 100 each 1    levothyroxine 75 mcg tablet Take 1 tablet (75 mcg total) by mouth daily 90 tablet 1    losartan (COZAAR) 50 mg tablet Take 1 tablet (50 mg total) by mouth daily 90 tablet 3    magnesium oxide (MAG-OX) 400 mg tablet Take 1 tablet (400 mg total) by mouth daily 90 tablet 2    semaglutide, 0.25 or 0.5 mg/dose, (Ozempic, 0.25 or 0.5 MG/DOSE,) 2 mg/3 mL injection pen INJECT 0.25MG UNDER THE SKIN FOR 4 WEEKS AND THEN INCREASE TO 0.5MG WEEKLY 3 mL 1    sildenafil (VIAGRA) 50 MG tablet Take 1 tablet (50 mg total) by mouth as needed for erectile dysfunction 30 tablet 5    [DISCONTINUED] Lantus SoloStar 100 units/mL SOPN Inject 26 units at bedtime 15 mL 1    [DISCONTINUED] metFORMIN (GLUCOPHAGE-XR) 500 mg 24 hr tablet Take 1 tablet (500 mg total) by mouth 2 (two) times a day with meals 180 tablet 1     Current Facility-Administered Medications on File Prior to Visit   Medication Dose Route Frequency Provider Last Rate Last Admin    cyanocobalamin injection 1,000 mcg  1,000 mcg Intramuscular Q30 Days    1,000 mcg at 10/02/24 1316         Medical History Reviewed by provider this encounter:  Tobacco  Allergies  Meds  Problems  Med Hx  Surg Hx  Fam Hx     .    Objective   /76   Pulse 76   Ht 5' 10\" (1.778 m)   Wt 124 kg " (273 lb)   SpO2 98%   BMI 39.17 kg/m²      Body mass index is 39.17 kg/m².  Wt Readings from Last 3 Encounters:   03/18/25 124 kg (273 lb)   03/13/25 131 kg (289 lb)   02/13/25 131 kg (289 lb)     Physical Exam  Vitals and nursing note reviewed.   Constitutional:       Appearance: He is well-developed.   HENT:      Head: Normocephalic.   Eyes:      General: No scleral icterus.  Neck:      Thyroid: No thyromegaly.   Cardiovascular:      Rate and Rhythm: Normal rate and regular rhythm.      Pulses:           Radial pulses are 2+ on the right side and 2+ on the left side.      Heart sounds: No murmur heard.  Pulmonary:      Effort: Pulmonary effort is normal. No respiratory distress.      Breath sounds: Normal breath sounds. No wheezing.   Musculoskeletal:      Cervical back: Neck supple.   Skin:     General: Skin is warm and dry.   Neurological:      Mental Status: He is alert.         Labs:   Component      Latest Ref Rng 12/19/2024 2/13/2025   Sodium      135 - 147 mmol/L 136  136    Potassium      3.5 - 5.3 mmol/L 4.6  4.6    Chloride      96 - 108 mmol/L 103  103    Carbon Dioxide      21 - 32 mmol/L 27  28    ANION GAP      4 - 13 mmol/L 6  5    BUN      5 - 25 mg/dL 17  19    Creatinine      0.60 - 1.30 mg/dL 0.74  0.91    GLUCOSE, FASTING      65 - 99 mg/dL 159 (H)  114 (H)    Calcium      8.4 - 10.2 mg/dL 9.4  9.1    AST      13 - 39 U/L 13  14    ALT      7 - 52 U/L 13  17    ALK PHOS      34 - 104 U/L 88  80    Total Protein      6.4 - 8.4 g/dL 6.9  6.8    Albumin      3.5 - 5.0 g/dL 4.1  4.0    Total Bilirubin      0.20 - 1.00 mg/dL 0.43  0.47    GFR, Calculated      ml/min/1.73sq m 94  86    Cholesterol      See Comment mg/dL  156    Triglycerides      See Comment mg/dL  273 (H)    HDL      >=40 mg/dL  38 (L)    LDL Calculated      0 - 100 mg/dL  63    Hemoglobin A1C      Normal 4.0-5.6%; PreDiabetic 5.7-6.4%; Diabetic >=6.5%; Glycemic control for adults with diabetes <7.0% % 8.2 (H)  8.3 (H)    eAG, EST  AVG Glucose      mg/dl 189  192       Legend:  (H) High  (L) Low    Patient Instructions     Hypoglycemia instructions   Raphael Pack  3/18/2025  [id number]    Low Blood Sugar    Steps to treat low blood sugar.    1. Test blood sugar if you have symptoms of low blood sugar:   Low Blood Sugar Symptoms:  o Sweaty  o Dizzy  o Rapid heartbeat  o Shaky  o Bad mood  o Hungry      2. Treat blood sugar less than 70 with 15 grams of fast-acting carbohydrate:   Examples of 15 grams Fast-Acting Carbohydrate:  o 4 oz juice  o 4 oz regular soda  o 3-4 glucose tablets (chew)  o 3-4 hard candies (chew)          3.  Wait 15 minutes and test your blood sugar again     4. If blood sugar is less than 100, repeat steps 2-3.    5. When your blood sugar is 100 or more, eat a snack if it will be longer than one hour until your next meal. The snack should be 15 grams of carbohydrate and a protein:   Examples of snacks:  o ½ sandwich  o 6 crackers with cheese  o Piece of fruit with cheese or peanut butter  o 6 crackers with peanut butter      Discussed with the patient and all questioned fully answered. He will call me if any problems arise.    Administrative Statements   I have spent a total time of 30 minutes in caring for this patient on the day of the visit/encounter including Importance of tx compliance, Documenting in the medical record, Reviewing/placing orders in the medical record (including tests, medications, and/or procedures), and Obtaining or reviewing history  .

## 2025-03-18 NOTE — ASSESSMENT & PLAN NOTE
HGA1C 8.3%.   Treatment regimen: CGM report is not consistent with an A1c of 8.3%.  Patient reports with recent changes to medication his readings are trending much better.  Decrease Lantus to 18 units as morning blood glucose levels are sometimes low /low normal.  Stop metformin due to symptoms of diarrhea.  Continue Ozempic.  If he continues to have diarrhea on Ozempic without the metformin then we may need to consider discontinuing and possibly trying another GLP-1 agonist.  Continue Jardiance, glimepiride.  Discussed intensive insulin regimen does increase risk for hypoglycemia. Episodes of hypoglycemia can lead to permanent disability and death.  Discussed risks/complications associated with uncontrolled diabetes.    Advised to adhere to diabetic diet, and recommended staying active/exercising routinely as tolerated.  Keep carbohydrates consistent to limit blood glucose fluctuations.  Advised to call if blood sugars less than 70 mg/dl or over 300 mg/dl.   Check blood glucose 3+ times a day  Discussed symptoms and treatment of hypoglycemia.   Discussed use of CGM to collect additional blood glucose data to reveal trends and patterns that can be used to optimize treatment plan.   Recommended routine follow-up with podiatry and ophthalmology.   Call to download CGM in 2 weeks  Ordered blood work to complete prior to next visit.    Lab Results   Component Value Date    HGBA1C 8.3 (H) 02/13/2025       Orders:    Lantus SoloStar 100 units/mL SOPN; Inject 18 units at bedtime    Hemoglobin A1C; Future    Albumin / creatinine urine ratio; Future    Basic metabolic panel; Future

## 2025-03-18 NOTE — PATIENT INSTRUCTIONS
Hypoglycemia instructions   Raphael Pack  3/18/2025  [id number]    Low Blood Sugar    Steps to treat low blood sugar.    1. Test blood sugar if you have symptoms of low blood sugar:   Low Blood Sugar Symptoms:  o Sweaty  o Dizzy  o Rapid heartbeat  o Shaky  o Bad mood  o Hungry      2. Treat blood sugar less than 70 with 15 grams of fast-acting carbohydrate:   Examples of 15 grams Fast-Acting Carbohydrate:  o 4 oz juice  o 4 oz regular soda  o 3-4 glucose tablets (chew)  o 3-4 hard candies (chew)          3.  Wait 15 minutes and test your blood sugar again     4. If blood sugar is less than 100, repeat steps 2-3.    5. When your blood sugar is 100 or more, eat a snack if it will be longer than one hour until your next meal. The snack should be 15 grams of carbohydrate and a protein:   Examples of snacks:  o ½ sandwich  o 6 crackers with cheese  o Piece of fruit with cheese or peanut butter  o 6 crackers with peanut butter

## 2025-03-18 NOTE — ASSESSMENT & PLAN NOTE
TSH previously 3.234 and free T4 0.83  Continue current dose of levothyroxine  Monitor labs  Orders:    T4, free; Future    TSH, 3rd generation; Future

## 2025-03-21 ENCOUNTER — TELEPHONE (OUTPATIENT)
Dept: INTERNAL MEDICINE CLINIC | Facility: CLINIC | Age: 70
End: 2025-03-21

## 2025-03-21 DIAGNOSIS — M51.16 LUMBAR DISC DISEASE WITH RADICULOPATHY: ICD-10-CM

## 2025-03-21 DIAGNOSIS — N52.9 ERECTILE DYSFUNCTION, UNSPECIFIED ERECTILE DYSFUNCTION TYPE: ICD-10-CM

## 2025-03-21 RX ORDER — HYDROCODONE BITARTRATE AND ACETAMINOPHEN 5; 325 MG/1; MG/1
1 TABLET ORAL DAILY PRN
Qty: 30 TABLET | Refills: 0 | Status: SHIPPED | OUTPATIENT
Start: 2025-03-21

## 2025-03-21 RX ORDER — SILDENAFIL 50 MG/1
50 TABLET, FILM COATED ORAL AS NEEDED
Qty: 30 TABLET | Refills: 0 | Status: SHIPPED | OUTPATIENT
Start: 2025-03-21

## 2025-03-21 NOTE — TELEPHONE ENCOUNTER
Patient called the RX Refill Line. Message is being forwarded to the office.     Patient is requesting a refill for norco 5-325, 1 qd, 30 prn. Script not on active med list.    [facility]    Please contact patient at [phone]

## 2025-03-24 ENCOUNTER — OFFICE VISIT (OUTPATIENT)
Dept: PAIN MEDICINE | Facility: CLINIC | Age: 70
End: 2025-03-24
Payer: COMMERCIAL

## 2025-03-24 DIAGNOSIS — M54.2 NECK PAIN: ICD-10-CM

## 2025-03-24 DIAGNOSIS — M54.12 RADICULOPATHY, CERVICAL REGION: ICD-10-CM

## 2025-03-24 DIAGNOSIS — M25.551 RIGHT HIP PAIN: ICD-10-CM

## 2025-03-24 DIAGNOSIS — G89.4 CHRONIC PAIN SYNDROME: ICD-10-CM

## 2025-03-24 DIAGNOSIS — M54.12 CERVICAL RADICULOPATHY: Primary | ICD-10-CM

## 2025-03-24 PROCEDURE — 99214 OFFICE O/P EST MOD 30 MIN: CPT

## 2025-03-24 NOTE — PROGRESS NOTES
Assessment:  1. Cervical radiculopathy    2. Right hip pain    3. Chronic pain syndrome    4. Radiculopathy, cervical region    5. Neck pain        Plan:  The patient is a 70-year-old male with a history of chronic pain secondary to low back pain, lumbar intervertebral disc disorder with radiculopathy, neck pain and cervical radiculopathy who presents to the office with ongoing pain in his right hip and neck pain with pain and numbness that radiates into bilateral upper extremity stopping at bilateral hands.  He states his neck pain started to worsen within the last year.    I did advise patient he may benefit from a formal physical therapy program to help with his neck pain and numbness to bilateral upper extremities.  I did advise patient to complete physical therapy and if  there is no improvement of his neck pain and numbness into bilateral upper extremities, we can proceed with MRI of cervical spine in anticipation of cervical epidural steroid injection.    I will also x-ray his right hip for further evaluation of his right hip pain.  I did instruct patient I will call once I receive the results.    My impressions and treatment recommendations were discussed in detail with the patient who verbalized understanding and had no further questions.  Discharge instructions were provided. I personally saw and examined the patient and I agree with the above discussed plan of care.    Orders Placed This Encounter   Procedures    XR hip/pelv 2-3 vws right if performed     Standing Status:   Future     Expected Date:   3/24/2025     Expiration Date:   3/24/2029     Scheduling Instructions:      Bring along any outside films relating to this procedure.          Ambulatory referral to Physical Therapy     Standing Status:   Future     Expiration Date:   3/24/2026     Referral Priority:   Routine     Referral Type:   Physical Therapy     Referral Reason:   Specialty Services Required     Requested Specialty:   Physical  Therapy     Number of Visits Requested:   1     Expiration Date:   3/24/2026     No orders of the defined types were placed in this encounter.      History of Present Illness:  Raphael Pack is a 70 y.o. male with a history of chronic pain secondary to low back pain, lumbar intervertebral disc disorder with radiculopathy, neck pain and cervical radiculopathy.  He was last seen on 9/18/2024 where he underwent a right-sided L5-S1 TFESI which provided him moderate to significant relief of his pain.  He presents to the office with ongoing pain in his right hip and neck pain with pain and numbness that radiates into bilateral upper extremity stopping at bilateral hands.  He did undergo right carpal tunnel surgery on 1/21/2025 and has not noticed any appreciable improvement in the numbness into his hand.    He states his pain is the same since the last office visit and constant.  He rates the quality of his pain as sharp, cramping, numbness and is currently rating his pain a 7/10 on a numeric scale.    Current pain medications include gabapentin 300mg 1 tablet twice a day and Norco 5/325 mg as prescribed by his PCP.     I have personally reviewed and/or updated the patient's past medical history, past surgical history, family history, social history, current medications, allergies, and vital signs today.     Review of Systems   Respiratory:  Negative for shortness of breath.    Cardiovascular:  Negative for chest pain.   Gastrointestinal:  Negative for constipation, diarrhea, nausea and vomiting.   Musculoskeletal:  Positive for joint swelling, neck pain and neck stiffness. Negative for arthralgias, gait problem and myalgias.        Bilateral shoulder and arm pain   Skin:  Negative for rash.   Neurological:  Positive for weakness (Muscle). Negative for dizziness and seizures.   All other systems reviewed and are negative.      Patient Active Problem List   Diagnosis    Type 2 diabetes mellitus with diabetic  polyneuropathy, without long-term current use of insulin ([facility])    JOSE RAUL (obstructive sleep apnea)    Essential hypertension    Mixed hyperlipidemia    Periodic limb movement    Hypothyroid    Chronic midline low back pain with right-sided sciatica    Obesity, morbid, BMI 40.0-49.9 ([facility])    Other male erectile dysfunction    Microalbuminuria    Chronic kidney disease, stage 2 (mild)    Type 2 diabetes mellitus with hyperglycemia, with long-term current use of insulin (H[facility])    Type 2 diabetes mellitus with stage 2 chronic kidney disease, without long-term current use of insulin  ([facility])    Persistent proteinuria    Radiculopathy, cervical region    Intervertebral disc disorder with radiculopathy of lumbar region    Uncomplicated opioid dependence ([facility])    Peripheral edema    Type 2 diabetes mellitus with proteinuria  ([facility])    Vitamin B 12 deficiency    Carpal tunnel syndrome, bilateral    Dermoid cyst of skin of back    S/P carpal tunnel release       Past Medical History:   Diagnosis Date    BPH (benign prostatic hyperplasia)     Carpal tunnel syndrome     Chronic kidney disease     Diabetes mellitus (HCC)     Disease of thyroid gland     Hyperlipidemia     Hypertension     Sleep apnea     prescribed CPAP, does not use    Type 2 diabetes mellitus with diabetic chronic kidney disease ([facility]) 06/24/2021       Past Surgical History:   Procedure Laterality Date    CARPAL TUNNEL RELEASE Left     COLONOSCOPY      2016    CYST REMOVAL      back    WI EGD TRANSORAL BIOPSY SINGLE/MULTIPLE N/A 05/17/2017    Procedure: ESOPHAGOGASTRODUODENOSCOPY (EGD) with bx;  Surgeon: Patel Chaudhary MD;  Location: [facility];  Service: Bariatrics    WI NEUROPLASTY &/TRANSPOS MEDIAN NRV CARPAL TUNNE Right 01/21/2025    Procedure: RELEASE CARPAL TUNNEL;  Surgeon: Jeff Dalal DO;  Location:  MAIN OR;  Service: Orthopedics    TONSILLECTOMY         Family History   Problem Relation Age of Onset    Lung cancer Mother     Diabetes Father     Cancer  Brother     Alcohol abuse Neg Hx     Substance Abuse Neg Hx     Mental illness Neg Hx     Depression Neg Hx        Social History     Occupational History    Not on file   Tobacco Use    Smoking status: Former     Current packs/day: 0.00     Types: Cigarettes     Start date:      Quit date:      Years since quittin.2    Smokeless tobacco: Never   Vaping Use    Vaping status: Never Used   Substance and Sexual Activity    Alcohol use: Yes     Comment: social- less than 1 drink per week    Drug use: No    Sexual activity: Yes     Partners: Female     Birth control/protection: None       Current Outpatient Medications on File Prior to Visit   Medication Sig    amLODIPine (NORVASC) 5 mg tablet Take 1 tablet (5 mg total) by mouth daily    atorvastatin (LIPITOR) 20 mg tablet TAKE ONE TABLET BY MOUTH EVERY DAY    carvedilol (COREG) 12.5 mg tablet Take 1 tablet (12.5 mg total) by mouth 2 (two) times a day with meals    Continuous Glucose Sensor (FreeStyle Paul 14 Day Sensor) MISC Change sensor every 14 days    Continuous Glucose Sensor (FreeStyle Paul 3 Plus Sensor) MISC Change sensor every 15 days    Continuous Glucose Sensor (FreeStyle Paul 3 Sensor) MISC Apply every 2 weeks    Empagliflozin (Jardiance) 10 MG TABS tablet Take 1 tablet (10 mg total) by mouth every morning    furosemide (LASIX) 20 mg tablet Take 1 tablet (20 mg total) by mouth 3 (three) times a week    gabapentin (NEURONTIN) 300 mg capsule Take 1 capsule (300 mg total) by mouth 2 (two) times a day    glimepiride (AMARYL) 4 mg tablet Take 1 tablet with breakfast and 1 tablet with dinner.    HYDROcodone-acetaminophen (Norco) 5-325 mg per tablet Take 1 tablet by mouth daily as needed for pain Max Daily Amount: 1 tablet    Insulin Pen Needle ([employer]One Unifine Pentips Plus) 31G X 5 MM MISC Inject under the skin every morning    Lantus SoloStar 100 units/mL SOPN Inject 18 units at bedtime    levothyroxine 75 mcg tablet Take 1 tablet (75 mcg total)  by mouth daily    losartan (COZAAR) 50 mg tablet Take 1 tablet (50 mg total) by mouth daily    magnesium oxide (MAG-OX) 400 mg tablet Take 1 tablet (400 mg total) by mouth daily    semaglutide, 0.25 or 0.5 mg/dose, (Ozempic, 0.25 or 0.5 MG/DOSE,) 2 mg/3 mL injection pen INJECT 0.25MG UNDER THE SKIN FOR 4 WEEKS AND THEN INCREASE TO 0.5MG WEEKLY    sildenafil (VIAGRA) 50 MG tablet Take 1 tablet (50 mg total) by mouth as needed for erectile dysfunction     Current Facility-Administered Medications on File Prior to Visit   Medication    cyanocobalamin injection 1,000 mcg       Allergies   Allergen Reactions    Penicillins Syncope     SYNCOPE, but has taken amoxicillin/augmentin in past       Physical Exam:    There were no vitals taken for this visit.    Constitutional:normal, well developed, well nourished, alert, in no distress and non-toxic and no overt pain behavior.  Eyes:anicteric  HEENT:grossly intact  Neck:supple, symmetric, trachea midline and no masses   Pulmonary:even and unlabored  Cardiovascular:No edema or pitting edema present  Skin:Normal without rashes or lesions and well hydrated  Psychiatric:Mood and affect appropriate  Neurologic:Cranial Nerves II-XII grossly intact  Musculoskeletal:antalgic    Cervical Spine Exam    Appearance:  Normal lordosis  Palpation/Tenderness:  left cervical paraspinal tenderness  right cervical paraspinal tenderness  Sensory:  no sensory deficits noted  Range of Motion:  Flexion:  Minimally limited  with pain  Extension:  Minimally limited  with pain  Rotation - Left:  Minimally limited  with pain  Rotation - Right:  Minimally limited  with pain  Motor Strength:  Left Arm Flexion  5/5  Left Arm Extension  5/5  Right Arm Flexion  5/5  Right Arm Extension  5/5  Left    5/5  Right   5/5    Imaging

## 2025-04-01 ENCOUNTER — EVALUATION (OUTPATIENT)
Dept: PHYSICAL THERAPY | Facility: REHABILITATION | Age: 70
End: 2025-04-01
Payer: COMMERCIAL

## 2025-04-01 DIAGNOSIS — M54.12 CERVICAL RADICULOPATHY: Primary | ICD-10-CM

## 2025-04-01 DIAGNOSIS — M54.2 NECK PAIN: ICD-10-CM

## 2025-04-01 PROCEDURE — 97162 PT EVAL MOD COMPLEX 30 MIN: CPT | Performed by: PHYSICAL THERAPIST

## 2025-04-01 PROCEDURE — 97530 THERAPEUTIC ACTIVITIES: CPT | Performed by: PHYSICAL THERAPIST

## 2025-04-01 NOTE — PROGRESS NOTES
PT Evaluation     Today's date: 2025  Patient name: Raphael Pack  : 1955  MRN: [id number]  Referring provider: Isabel Francisco CRNP  Dx:   Encounter Diagnosis     ICD-10-CM    1. Cervical radiculopathy  M54.12 Ambulatory referral to Physical Therapy      2. Neck pain  M54.2 Ambulatory referral to Physical Therapy          Start Time: 1403  Stop Time: 1457  Total time in clinic (min): 54 minutes    Assessment  Impairments: abnormal or restricted ROM, activity intolerance, impaired physical strength, lacks appropriate home exercise program and pain with function    Assessment details: Raphael Pack is a 70 y.o. year old male who presents to  with Cervical radiculopathy  (primary encounter diagnosis)  Neck pain.  Patient presents with limitations to cervical and shoulder ROM, UE strength deficits, poor posture and postural strength deficits likely contributing to pain with function including turning his head while driving and swimming. However, sensation testing and neural tension testing is negative at this time with main sensation limitations being at the third digit more likely due to compression at the carpal tunnel. Raphael presents with the impairments as listed above and would benefit from Physical Therapy to address these impairments, improved quality of life and to maximize function.     Barriers to therapy: Financial     Goals  Functional Goals  1. 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  2. Patient will demonstrate improved cervical ROM in all directions for driving and swimming.  3. Patient independent with HEP at time of discharge.       Plan  Patient would benefit from: skilled physical therapy    Frequency: 1x week (1-2x/week)  Duration in weeks: 6  Plan of Care beginning date: 2025  Plan of Care expiration date: 2025  Treatment plan discussed with: patient        Subjective Evaluation    History of  Present Illness  Mechanism of injury: Patient reports about a year ago experiencing constant numbness in both arms. He was found to be positive for carpal tunnel on both hands and had a carpal tunnel release on the right hand with Dr. Dalal in winter 2025. He has noticed a minimal difference in numbness in the hands because both of his arms are numb. He feels limited in turning his head while driving and to breathe while swimming. He also has trouble working as a  and buttoning buttons due to the numbness in his hands. He has difficulty pushing the button on his grandson's car seat.    Patient reports a history of back pain and sciatica for years that has worsened. He has a referral to get an x-ray of the right hip secondary to pain preventing him from standing, sitting, walking and sleeping comfortably.   Patient Goals  Patient goals for therapy: decreased pain, increased motion, increased strength, independence with ADLs/IADLs and return to sport/leisure activities  Patient goal: improve numbness down the arms to use hands for daily tasks  Pain  Current pain rating: 3  At best pain ratin  At worst pain ratin  Relieving factors: medications (Tylenol prn)  Exacerbated by: looking up, looking down, turning.    Social Support    Employment status: working ()  Hand dominance: right    Treatments  Current treatment: physical therapy        Objective     Concurrent Complaints  Positive for disturbed sleep. Negative for night pain, dizziness, headaches and visual change    Postural Observations  Seated posture: poor  Standing posture: poor      Neurological Testing     Sensation     Shoulder   Left Shoulder   Diminished: light touch    Right Shoulder   Diminished: Light touch    Comments   Left light touch: C7  Right light touch: C7    Active Range of Motion   Cervical/Thoracic Spine       Cervical    Flexion: 65 degrees  with pain  Extension: 15 degrees     with pain  Left lateral  flexion: 20 degrees     with pain  Right lateral flexion: 10 degrees     with pain  Left rotation: 55 degrees with pain  Right rotation: 55 degrees     Left Shoulder   External rotation BTH: C7   Internal rotation BTB: lumbar     Right Shoulder   External rotation BTH: C7   Internal rotation BTB: lumbar     Additional Active Range of Motion Details  Seated AROM   Flexion 75% - pain shoulders to elbow  Abd 75% - pain shoulders to elbow    Joint Play     Comments: Lateral glides WNL bilaterally    Strength/Myotome Testing     Left Shoulder     Planes of Motion   Flexion: 4+   Abduction: 4-   External rotation at 0°: 5   Internal rotation at 0°: 5     Isolated Muscles   Biceps: 4+   Triceps: 4+     Right Shoulder     Planes of Motion   Flexion: 4+   Abduction: 4   External rotation at 0°: 5   Internal rotation at 0°: 5     Isolated Muscles   Biceps: 4+   Triceps: 4+     Tests   Cervical   Positive cervical distraction test.  Negative vertical compression.     Left   Positive Spurling's Test A.     Right   Positive Spurling's Test A.     Left Shoulder   Negative ULTT1.     Right Shoulder   Negative ULTT1.     Lumbar   Negative vertical compression.   Neuro Exam:     Headaches   Patient reports headaches: No.              Precautions:   Past Medical History:   Diagnosis Date    BPH (benign prostatic hyperplasia)     Carpal tunnel syndrome     Chronic kidney disease     Diabetes mellitus (HCC)     Disease of thyroid gland     Hyperlipidemia     Hypertension     Sleep apnea     prescribed CPAP, does not use    Type 2 diabetes mellitus with diabetic chronic kidney disease ([facility]CC) 06/24/2021             Daily Treatment Diary:  Main complaint numbness to b/l hands -- known carpal tunnel with release done in R in January 2025  Manuals 4/1/2025           Cervical distraction **                                   Neuromuscular Re-education            TB low rows nv           TB mid rows nv           TB bilateral ER w retraction nv            TB horizontal abd            Chin tucks nv                                   Therapeutic Exercise            UT st nv           Cervical ext st w towel nv           Cervical SNAGs            UBE             Prone I’s            Prone T’s                                                            Therapeutic Activity            Pt edu Done - POC, dx           Wall Ball rolls            Standing Scaption                                    * = on HEP

## 2025-04-03 ENCOUNTER — OFFICE VISIT (OUTPATIENT)
Dept: PHYSICAL THERAPY | Facility: REHABILITATION | Age: 70
End: 2025-04-03
Payer: COMMERCIAL

## 2025-04-03 DIAGNOSIS — M54.2 NECK PAIN: ICD-10-CM

## 2025-04-03 DIAGNOSIS — M54.12 CERVICAL RADICULOPATHY: Primary | ICD-10-CM

## 2025-04-03 PROCEDURE — 97140 MANUAL THERAPY 1/> REGIONS: CPT | Performed by: PHYSICAL THERAPIST

## 2025-04-03 PROCEDURE — 97112 NEUROMUSCULAR REEDUCATION: CPT | Performed by: PHYSICAL THERAPIST

## 2025-04-03 PROCEDURE — 97110 THERAPEUTIC EXERCISES: CPT | Performed by: PHYSICAL THERAPIST

## 2025-04-03 NOTE — PROGRESS NOTES
"Daily Note     Today's date: 4/3/2025  Patient name: Raphael Pack  : 1955  MRN: [id number]  Referring provider: Isabel Francisco CRNP  Dx:   Encounter Diagnosis     ICD-10-CM    1. Cervical radiculopathy  M54.12       2. Neck pain  M54.2           Start Time: 0853  Stop Time: 09  Total time in clinic (min): 38 minutes    Subjective: Patient reports feeling pretty good today.       Objective: See treatment diary below      Assessment: Tolerated treatment well. Initiated plan of care this visit with a focus on cervical distraction, range of motion and gentle postural strengthening with fair tolerance. No change in hand numbness throughout session. Patient reports muscle fatigue and soreness post-treatment. Provided updated HEP. Patient demonstrated fatigue post treatment, exhibited good technique with therapeutic exercises, and would benefit from continued PT.      Plan: Continue per plan of care.      Precautions:   Past Medical History:   Diagnosis Date    BPH (benign prostatic hyperplasia)     Carpal tunnel syndrome     Chronic kidney disease     Diabetes mellitus (HCC)     Disease of thyroid gland     Hyperlipidemia     Hypertension     Sleep apnea     prescribed CPAP, does not use    Type 2 diabetes mellitus with diabetic chronic kidney disease (HCC) 2021             Daily Treatment Diary:  Main complaint numbness to b/l hands -- known carpal tunnel with release done in R in 2025  Manuals 2025 4/3          Cervical distraction ** Done                                  Neuromuscular Re-education            TB low rows nv Prpl 2x10          TB mid rows nv Yellow 2x10          TB bilateral ER w retraction nv GTB 2x10          TB horizontal abd            Chin tucks nv 2x10x5\"          Nerve glides  **                      Therapeutic Exercise            UT st nv 3x20\" b/l          Cervical ext st w towel nv 10x5\"          Cervical SNAGs            UBE             Prone I’s         "    Prone T’s                                                            Therapeutic Activity            Pt edu Done - POC, dx           Wall Ball rolls            Standing Scaption                                    * = on HEP

## 2025-04-11 ENCOUNTER — OFFICE VISIT (OUTPATIENT)
Dept: PHYSICAL THERAPY | Facility: REHABILITATION | Age: 70
End: 2025-04-11
Payer: COMMERCIAL

## 2025-04-11 DIAGNOSIS — M54.2 NECK PAIN: ICD-10-CM

## 2025-04-11 DIAGNOSIS — M54.12 CERVICAL RADICULOPATHY: Primary | ICD-10-CM

## 2025-04-11 PROCEDURE — 97140 MANUAL THERAPY 1/> REGIONS: CPT | Performed by: PHYSICAL MEDICINE & REHABILITATION

## 2025-04-11 PROCEDURE — 97112 NEUROMUSCULAR REEDUCATION: CPT | Performed by: PHYSICAL MEDICINE & REHABILITATION

## 2025-04-11 PROCEDURE — 97110 THERAPEUTIC EXERCISES: CPT | Performed by: PHYSICAL MEDICINE & REHABILITATION

## 2025-04-11 NOTE — PROGRESS NOTES
"Daily Note     Today's date: 2025  Patient name: Raphael Pack  : 1955  MRN: [id number]  Referring provider: Isabel Francisco CRNP  Dx:   Encounter Diagnosis     ICD-10-CM    1. Cervical radiculopathy  M54.12       2. Neck pain  M54.2                    Subjective: Patient reports poor sleep last night. Continued UE sxs.     Objective: See treatment diary below    Assessment: Tolerated treatment well. No change in UE sxs within session. Patient demonstrated fatigue post treatment, exhibited good technique with therapeutic exercises, and would benefit from continued PT.    Plan: Continue per plan of care.      Precautions:   Past Medical History:   Diagnosis Date    BPH (benign prostatic hyperplasia)     Carpal tunnel syndrome     Chronic kidney disease     Diabetes mellitus (HCC)     Disease of thyroid gland     Hyperlipidemia     Hypertension     Sleep apnea     prescribed CPAP, does not use    Type 2 diabetes mellitus with diabetic chronic kidney disease (HCC) 2021       Daily Treatment Diary:  Main complaint numbness to b/l hands -- known carpal tunnel with release done in R in 2025  Manuals 2025 4/3 4/11         Cervical distraction ** Done                                  Neuromuscular Re-education            TB low rows nv Prpl 2x10 Purple 3x10         TB mid rows nv Yellow 2x10 Yellow 3x10         TB bilateral ER w retraction nv GTB 2x10 GTB 2x10         TB horizontal abd            Chin tucks nv 2x10x5\" 2x10x5\"         Nerve glides  **                      Therapeutic Exercise            UT st nv 3x20\" b/l 3x20\" ea         Cervical ext st w towel nv 10x5\" 10x5\"         Cervical SNAGs            UBE    5' retro         Prone I’s            Prone T’s                                                            Therapeutic Activity            Pt edu Done - POC, dx           Wall Ball rolls            Standing Scaption                                    * = on " HEP

## 2025-04-16 ENCOUNTER — OFFICE VISIT (OUTPATIENT)
Dept: PHYSICAL THERAPY | Facility: REHABILITATION | Age: 70
End: 2025-04-16
Payer: COMMERCIAL

## 2025-04-16 DIAGNOSIS — M54.2 NECK PAIN: ICD-10-CM

## 2025-04-16 DIAGNOSIS — M54.12 CERVICAL RADICULOPATHY: Primary | ICD-10-CM

## 2025-04-16 PROCEDURE — 97112 NEUROMUSCULAR REEDUCATION: CPT | Performed by: PHYSICAL THERAPIST

## 2025-04-16 PROCEDURE — 97110 THERAPEUTIC EXERCISES: CPT | Performed by: PHYSICAL THERAPIST

## 2025-04-17 ENCOUNTER — RA CDI HCC (OUTPATIENT)
Dept: OTHER | Facility: HOSPITAL | Age: 70
End: 2025-04-17

## 2025-04-17 NOTE — PROGRESS NOTES
Please review if this dx is applicable to the patient's condition and assess and document, if applicable in next visit    [id number]        HCC coding opportunities          Chart Reviewed number of suggestions sent to Provider: 1     Patients Insurance     Medicare Insurance: [employer]

## 2025-04-18 ENCOUNTER — APPOINTMENT (OUTPATIENT)
Dept: PHYSICAL THERAPY | Facility: REHABILITATION | Age: 70
End: 2025-04-18
Payer: COMMERCIAL

## 2025-04-22 ENCOUNTER — OFFICE VISIT (OUTPATIENT)
Dept: INTERNAL MEDICINE CLINIC | Facility: CLINIC | Age: 70
End: 2025-04-22
Payer: COMMERCIAL

## 2025-04-22 VITALS
DIASTOLIC BLOOD PRESSURE: 70 MMHG | SYSTOLIC BLOOD PRESSURE: 134 MMHG | HEART RATE: 74 BPM | WEIGHT: 272 LBS | HEIGHT: 70 IN | TEMPERATURE: 97.4 F | BODY MASS INDEX: 38.94 KG/M2 | OXYGEN SATURATION: 97 %

## 2025-04-22 DIAGNOSIS — N18.2 CHRONIC KIDNEY DISEASE, STAGE 2 (MILD): ICD-10-CM

## 2025-04-22 DIAGNOSIS — E66.01 OBESITY, MORBID, BMI 40.0-49.9 (HCC): ICD-10-CM

## 2025-04-22 DIAGNOSIS — E11.42 TYPE 2 DIABETES MELLITUS WITH DIABETIC POLYNEUROPATHY, WITHOUT LONG-TERM CURRENT USE OF INSULIN (HCC): Primary | ICD-10-CM

## 2025-04-22 DIAGNOSIS — E78.2 MIXED HYPERLIPIDEMIA: ICD-10-CM

## 2025-04-22 DIAGNOSIS — Z79.4 TYPE 2 DIABETES MELLITUS WITH HYPERGLYCEMIA, WITH LONG-TERM CURRENT USE OF INSULIN (HCC): ICD-10-CM

## 2025-04-22 DIAGNOSIS — G47.33 OSA (OBSTRUCTIVE SLEEP APNEA): ICD-10-CM

## 2025-04-22 DIAGNOSIS — M54.41 CHRONIC MIDLINE LOW BACK PAIN WITH RIGHT-SIDED SCIATICA: ICD-10-CM

## 2025-04-22 DIAGNOSIS — M54.12 CERVICAL RADICULOPATHY: ICD-10-CM

## 2025-04-22 DIAGNOSIS — E11.22 TYPE 2 DIABETES MELLITUS WITH STAGE 2 CHRONIC KIDNEY DISEASE, WITHOUT LONG-TERM CURRENT USE OF INSULIN  (HCC): ICD-10-CM

## 2025-04-22 DIAGNOSIS — E11.65 TYPE 2 DIABETES MELLITUS WITH HYPERGLYCEMIA, WITH LONG-TERM CURRENT USE OF INSULIN (HCC): ICD-10-CM

## 2025-04-22 DIAGNOSIS — E03.9 ACQUIRED HYPOTHYROIDISM: ICD-10-CM

## 2025-04-22 DIAGNOSIS — I10 ESSENTIAL HYPERTENSION: ICD-10-CM

## 2025-04-22 DIAGNOSIS — E11.29 TYPE 2 DIABETES MELLITUS WITH PROTEINURIA  (HCC): ICD-10-CM

## 2025-04-22 DIAGNOSIS — G89.29 CHRONIC MIDLINE LOW BACK PAIN WITH RIGHT-SIDED SCIATICA: ICD-10-CM

## 2025-04-22 DIAGNOSIS — N18.2 TYPE 2 DIABETES MELLITUS WITH STAGE 2 CHRONIC KIDNEY DISEASE, WITHOUT LONG-TERM CURRENT USE OF INSULIN  (HCC): ICD-10-CM

## 2025-04-22 DIAGNOSIS — F11.20 UNCOMPLICATED OPIOID DEPENDENCE (HCC): ICD-10-CM

## 2025-04-22 DIAGNOSIS — R80.9 TYPE 2 DIABETES MELLITUS WITH PROTEINURIA  (HCC): ICD-10-CM

## 2025-04-22 PROCEDURE — 99214 OFFICE O/P EST MOD 30 MIN: CPT | Performed by: NURSE PRACTITIONER

## 2025-04-22 PROCEDURE — G2211 COMPLEX E/M VISIT ADD ON: HCPCS | Performed by: NURSE PRACTITIONER

## 2025-04-22 NOTE — PROGRESS NOTES
Name: Raphael Pack      : 1955      MRN: [id number]  Encounter Provider: ROLAND Mendez  Encounter Date: 2025   Encounter department: [facility] INTERNAL MEDICINE  :  Assessment & Plan  Type 2 diabetes mellitus with diabetic polyneuropathy, without long-term current use of insulin (Formerly [facility])    Lab Results   Component Value Date    HGBA1C 8.3 (H) 2025     Above goal.  On jardiance, glimepiride, ozempic, and lantus.   Sees endocrine.   Orders:    Diabetic foot exam; Future    Essential hypertension  Stable.   Continue amlodipine, carvedilol, and losartan.        JOSE RAUL (obstructive sleep apnea)  Not wearing cpap.        Acquired hypothyroidism  On levothyroxine.        Type 2 diabetes mellitus with hyperglycemia, with long-term current use of insulin (Formerly [facility])    Lab Results   Component Value Date    HGBA1C 8.3 (H) 2025   As above.          Type 2 diabetes mellitus with proteinuria  (Formerly [facility])    Lab Results   Component Value Date    HGBA1C 8.3 (H) 2025   As above.          Type 2 diabetes mellitus with stage 2 chronic kidney disease, without long-term current use of insulin  (Formerly [facility])    Lab Results   Component Value Date    HGBA1C 8.3 (H) 2025   Renal function is stable.   Sees nephrology.          Chronic midline low back pain with right-sided sciatica  In physical therapy.   Referral provided for chiropractic care.   Takes norco as needed.   Orders:    Ambulatory Referral to Chiropractic; Future    Chronic kidney disease, stage 2 (mild)  Lab Results   Component Value Date    EGFR 86 2025    EGFR 93 02/10/2025    EGFR 94 2024    CREATININE 0.91 2025    CREATININE 0.77 02/10/2025    CREATININE 0.74 2024     Sees nephrology.        Uncomplicated opioid dependence (HCC)  Pain contract up to date.   Takes norco as needed.        Mixed hyperlipidemia  On a statin.        Obesity, morbid, BMI 40.0-49.9 (HCC)  He is down about 15 lbs.  Continue regular  "exercise and low fat diet.          Cervical radiculopathy    Orders:    Ambulatory Referral to Chiropractic; Future           History of Present Illness   Scott is here today for follow up.  He is doing well.   He is in physical therapy for his neck and back. He continues to have numbness in his arms. He doesn't have any pain. He would like to see a chiropractor.   He has been swimming 3-5 times per week which has helped.       Review of Systems   Constitutional:  Negative for activity change, appetite change, fatigue and unexpected weight change.   Eyes:  Negative for visual disturbance.   Respiratory:  Negative for cough and shortness of breath.    Cardiovascular:  Negative for chest pain, palpitations and leg swelling.   Gastrointestinal:  Negative for abdominal pain, blood in stool, constipation and diarrhea.   Genitourinary:  Negative for difficulty urinating.   Musculoskeletal:  Positive for arthralgias, back pain and neck pain.   Skin:  Negative for rash.   Neurological:  Negative for dizziness, weakness, light-headedness and headaches.   Psychiatric/Behavioral:  Negative for sleep disturbance.        Objective   /70   Pulse 74   Temp (!) 97.4 °F (36.3 °C)   Ht 5' 10\" (1.778 m)   Wt 123 kg (272 lb)   SpO2 97%   BMI 39.03 kg/m²      Physical Exam  Vitals reviewed.   Constitutional:       Appearance: Normal appearance. He is well-developed.   HENT:      Head: Normocephalic and atraumatic.   Eyes:      Conjunctiva/sclera: Conjunctivae normal.   Cardiovascular:      Rate and Rhythm: Normal rate and regular rhythm.      Pulses: no weak pulses.           Dorsalis pedis pulses are 2+ on the right side and 2+ on the left side.        Posterior tibial pulses are 2+ on the right side and 2+ on the left side.      Heart sounds: Normal heart sounds.   Pulmonary:      Effort: Pulmonary effort is normal.      Breath sounds: Normal breath sounds.   Abdominal:      General: Bowel sounds are normal.      " Palpations: Abdomen is soft.   Musculoskeletal:         General: Normal range of motion.      Right lower leg: No edema.      Left lower leg: No edema.   Feet:      Right foot:      Skin integrity: No ulcer, skin breakdown, erythema, warmth, callus or dry skin.      Left foot:      Skin integrity: No ulcer, skin breakdown, erythema, warmth, callus or dry skin.   Skin:     General: Skin is warm and dry.   Neurological:      Mental Status: He is alert and oriented to person, place, and time.   Psychiatric:         Mood and Affect: Mood normal.         Behavior: Behavior normal.       Diabetic Foot Exam    Patient's shoes and socks removed.    Right Foot/Ankle   Right Foot Inspection  Skin Exam: skin normal and skin intact. No dry skin, no warmth, no callus, no erythema, no maceration, no abnormal color, no pre-ulcer, no ulcer and no callus.     Toe Exam: ROM and strength within normal limits.     Sensory   Monofilament testing: intact    Vascular  Capillary refills: < 3 seconds  The right DP pulse is 2+. The right PT pulse is 2+.     Left Foot/Ankle  Left Foot Inspection  Skin Exam: skin normal and skin intact. No dry skin, no warmth, no erythema, no maceration, normal color, no pre-ulcer, no ulcer and no callus.     Toe Exam: ROM and strength within normal limits.     Sensory   Monofilament testing: intact    Vascular  Capillary refills: < 3 seconds  The left DP pulse is 2+. The left PT pulse is 2+.     Assign Risk Category  No deformity present  No loss of protective sensation  No weak pulses  Risk: 0

## 2025-04-22 NOTE — ASSESSMENT & PLAN NOTE
Lab Results   Component Value Date    EGFR 86 02/13/2025    EGFR 93 02/10/2025    EGFR 94 12/19/2024    CREATININE 0.91 02/13/2025    CREATININE 0.77 02/10/2025    CREATININE 0.74 12/19/2024     Sees nephrology.

## 2025-04-22 NOTE — ASSESSMENT & PLAN NOTE
Lab Results   Component Value Date    HGBA1C 8.3 (H) 02/13/2025     Above goal.  On jardiance, glimepiride, ozempic, and lantus.   Sees endocrine.   Orders:    Diabetic foot exam; Future

## 2025-04-22 NOTE — ASSESSMENT & PLAN NOTE
In physical therapy.   Referral provided for chiropractic care.   Takes norco as needed.   Orders:    Ambulatory Referral to Chiropractic; Future

## 2025-04-22 NOTE — ASSESSMENT & PLAN NOTE
Lab Results   Component Value Date    HGBA1C 8.3 (H) 02/13/2025   Renal function is stable.   Sees nephrology.

## 2025-04-24 ENCOUNTER — OFFICE VISIT (OUTPATIENT)
Dept: PHYSICAL THERAPY | Facility: REHABILITATION | Age: 70
End: 2025-04-24
Payer: COMMERCIAL

## 2025-04-24 DIAGNOSIS — M54.12 CERVICAL RADICULOPATHY: Primary | ICD-10-CM

## 2025-04-24 DIAGNOSIS — M54.2 NECK PAIN: ICD-10-CM

## 2025-04-24 PROCEDURE — 97112 NEUROMUSCULAR REEDUCATION: CPT

## 2025-04-24 PROCEDURE — 97110 THERAPEUTIC EXERCISES: CPT

## 2025-04-24 PROCEDURE — 97140 MANUAL THERAPY 1/> REGIONS: CPT

## 2025-04-24 NOTE — PROGRESS NOTES
"Daily Note     Today's date: 2025  Patient name: Raphael Pack  : 1955  MRN: [id number]  Referring provider: Isabel Francisco CRNP  Dx:   Encounter Diagnosis     ICD-10-CM    1. Cervical radiculopathy  M54.12       2. Neck pain  M54.2           Start Time: 0930  Stop Time: 1020  Total time in clinic (min): 50 minutes    Subjective: Patient reports things are \"the same\" at start of session. He reports no change in symptoms, no complaints noted after PT.      Objective: See treatment diary below      Assessment: Tolerated treatment well. Patient demonstrated fatigue post treatment, exhibited good technique with therapeutic exercises, and would benefit from continued PT Trialed additional TE today with appropriate challenge, no increased discomfort noted throughout session.       Plan: Continue per plan of care.  Progress treatment as tolerated.       Precautions:   Past Medical History:   Diagnosis Date    BPH (benign prostatic hyperplasia)     Carpal tunnel syndrome     Chronic kidney disease     Diabetes mellitus (HCC)     Disease of thyroid gland     Hyperlipidemia     Hypertension     Sleep apnea     prescribed CPAP, does not use    Type 2 diabetes mellitus with diabetic chronic kidney disease (HCC) 2021       Daily Treatment Diary:  Main complaint numbness to b/l hands -- known carpal tunnel with release done in R in 2025  Manuals 2025 4/3 4/11 4/16 4/24       Cervical distraction ** Done LH Done Done                                Neuromuscular Re-education            TB low rows nv Prpl 2x10 Purple 3x10         TB mid rows nv Yellow 2x10 Yellow 3x10 Yellow 3x10 Yellow 3x12       TB bilateral ER w retraction nv GTB 2x10 GTB 2x10 GTB 3x10 GTB 3x12       TB horizontal abd            Chin tucks nv 2x10x5\" 2x10x5\" Nods 2x10x5\" Nods 2x10x5''       50% DNF    2x10 2x10       Nerve glides  **          Median nerve glides     10x b/l       Therapeutic Exercise            UT " "st nv 3x20\" b/l 3x20\" ea 3x20\" ea 3x20'' ea       Cervical ext st w towel nv 10x5\" 10x5\"         Cervical SNAGs            UBE    5' retro 5' retro 5' retro       Standing I's    5# 3x10 ea 5# 3x10 ea       Supine serratus punches    2x5# 3x10 nv       Standing rows     5# 3x10 ea                                                       Therapeutic Activity            Pt edu Done - POC, dx           Wall Ball rolls            Standing Scaption                                    * = on HEP             "

## 2025-04-28 ENCOUNTER — OFFICE VISIT (OUTPATIENT)
Dept: PHYSICAL THERAPY | Facility: REHABILITATION | Age: 70
End: 2025-04-28
Payer: COMMERCIAL

## 2025-04-28 DIAGNOSIS — M54.12 CERVICAL RADICULOPATHY: Primary | ICD-10-CM

## 2025-04-28 DIAGNOSIS — M54.2 NECK PAIN: ICD-10-CM

## 2025-04-28 PROCEDURE — 97110 THERAPEUTIC EXERCISES: CPT | Performed by: PHYSICAL THERAPIST

## 2025-04-28 PROCEDURE — 97112 NEUROMUSCULAR REEDUCATION: CPT | Performed by: PHYSICAL THERAPIST

## 2025-04-28 NOTE — PROGRESS NOTES
Daily Note     Today's date: 2025  Patient name: Raphael Pack  : 1955  MRN: [id number]  Referring provider: Isabel Francisco CRNP  Dx:   Encounter Diagnosis     ICD-10-CM    1. Cervical radiculopathy  M54.12       2. Neck pain  M54.2           Start Time: 933  Stop Time: 102  Total time in clinic (min): 49 minutes    Subjective: Patient reporting increased discomfort at start of session, reporting not only neck and UE, however back and legs as well stating he had a very busy weekend with work. No complaints noted after previous session. 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      Objective: See treatment diary below      Assessment: Tolerated treatment well. Overall fair tolerance to TE. No changes in hand numbness throughout session. Discuss plan for discharge next visit with plan to continue HEP independently for cervical and postural stabilization. However, recommend referral to OT for greater focus on hand numbness secondary to carpal tunnel and fine motor control with patient in agreement. Patient demonstrated fatigue post treatment, exhibited good technique with therapeutic exercises, and would benefit from continued PT      Plan: Potential discharge next visit.     Precautions:   Past Medical History:   Diagnosis Date    BPH (benign prostatic hyperplasia)     Carpal tunnel syndrome     Chronic kidney disease     Diabetes mellitus (HCC)     Disease of thyroid gland     Hyperlipidemia     Hypertension     Sleep apnea     prescribed CPAP, does not use    Type 2 diabetes mellitus with diabetic chronic kidney disease (HCC) 2021       Daily Treatment Diary:  Main complaint numbness to b/l hands -- known carpal tunnel with release done in R in 2025  Manuals 2025 4/3 4/11 4/16 4/24 4/28      Cervical distraction ** Done LH Done Done                                Neuromuscular  "Re-education   4/11         TB low rows nv Prpl 2x10 Purple 3x10         TB mid rows nv Yellow 2x10 Yellow 3x10 Yellow 3x10 Yellow 3x12 Yellow 3x12      TB bilateral ER w retraction nv GTB 2x10 GTB 2x10 GTB 3x10 GTB 3x12 GTB 3x12      TB horizontal abd            Chin tucks nv 2x10x5\" 2x10x5\" Nods 2x10x5\" Nods 2x10x5'' Nods 3x10x5\"      50% DNF    2x10 2x10 3x10      Nerve glides  **          Median nerve glides     10x b/l 2x10 b/l      Therapeutic Exercise   4/11         UT st nv 3x20\" b/l 3x20\" ea 3x20\" ea 3x20'' ea 3x20\" ea      Cervical ext st w towel nv 10x5\" 10x5\"         Cervical SNAGs            UBE    5' retro 5' retro 5' retro 5' retro      Standing I's    5# 3x10 ea 5# 3x10 ea 5# 3x10 ea      Supine serratus punches    2x5# 3x10 nv 2x6# 3x10      Standing rows     5# 3x10 ea 5# 3x10 ea                                                      Therapeutic Activity            Pt edu Done - POC, dx           Wall Ball rolls            Standing Scaption                                    * = on HEP               "

## 2025-04-30 ENCOUNTER — TELEPHONE (OUTPATIENT)
Age: 70
End: 2025-04-30

## 2025-04-30 NOTE — TELEPHONE ENCOUNTER
Called patient to go over medicare guide lines and consult fee estimate. Left message to call back if patient has any questions

## 2025-05-02 DIAGNOSIS — Z79.4 TYPE 2 DIABETES MELLITUS WITH HYPERGLYCEMIA, WITH LONG-TERM CURRENT USE OF INSULIN (HCC): ICD-10-CM

## 2025-05-02 DIAGNOSIS — M51.16 LUMBAR DISC DISEASE WITH RADICULOPATHY: ICD-10-CM

## 2025-05-02 DIAGNOSIS — E11.42 TYPE 2 DIABETES MELLITUS WITH DIABETIC POLYNEUROPATHY, WITHOUT LONG-TERM CURRENT USE OF INSULIN (HCC): ICD-10-CM

## 2025-05-02 DIAGNOSIS — E11.65 TYPE 2 DIABETES MELLITUS WITH HYPERGLYCEMIA, WITH LONG-TERM CURRENT USE OF INSULIN (HCC): ICD-10-CM

## 2025-05-02 NOTE — TELEPHONE ENCOUNTER
I spoke to the patient and he is aware he needs to have labs done before his appointment   He will go to [facility]/ Will route to Dr. Ward since she is prescribing Humira regarding side effects    LISS will see patient  in the office on 5/12/25 to discuss prediabetes

## 2025-05-05 ENCOUNTER — CONSULT (OUTPATIENT)
Age: 70
End: 2025-05-05
Payer: COMMERCIAL

## 2025-05-05 VITALS
BODY MASS INDEX: 38.94 KG/M2 | SYSTOLIC BLOOD PRESSURE: 132 MMHG | HEIGHT: 70 IN | DIASTOLIC BLOOD PRESSURE: 80 MMHG | WEIGHT: 272 LBS

## 2025-05-05 DIAGNOSIS — G89.29 CHRONIC MIDLINE LOW BACK PAIN WITH RIGHT-SIDED SCIATICA: ICD-10-CM

## 2025-05-05 DIAGNOSIS — M99.03 SEGMENTAL DYSFUNCTION OF LUMBAR REGION: ICD-10-CM

## 2025-05-05 DIAGNOSIS — M54.41 CHRONIC MIDLINE LOW BACK PAIN WITH RIGHT-SIDED SCIATICA: ICD-10-CM

## 2025-05-05 DIAGNOSIS — M54.12 CERVICAL RADICULOPATHY: ICD-10-CM

## 2025-05-05 DIAGNOSIS — M99.04 SEGMENTAL DYSFUNCTION OF SACRAL REGION: ICD-10-CM

## 2025-05-05 DIAGNOSIS — M99.05 SEGMENTAL DYSFUNCTION OF PELVIC REGION: Primary | ICD-10-CM

## 2025-05-05 DIAGNOSIS — M54.2 NECK PAIN: ICD-10-CM

## 2025-05-05 DIAGNOSIS — M99.01 SEGMENTAL DYSFUNCTION OF CERVICAL REGION: ICD-10-CM

## 2025-05-05 PROCEDURE — 98940 CHIROPRACT MANJ 1-2 REGIONS: CPT | Performed by: CHIROPRACTOR

## 2025-05-05 PROCEDURE — 99202 OFFICE O/P NEW SF 15 MIN: CPT | Performed by: CHIROPRACTOR

## 2025-05-05 RX ORDER — HYDROCODONE BITARTRATE AND ACETAMINOPHEN 5; 325 MG/1; MG/1
1 TABLET ORAL DAILY PRN
Qty: 30 TABLET | Refills: 0 | Status: SHIPPED | OUTPATIENT
Start: 2025-05-05

## 2025-05-05 RX ORDER — GLIMEPIRIDE 4 MG/1
TABLET ORAL
Qty: 180 TABLET | Refills: 1 | Status: SHIPPED | OUTPATIENT
Start: 2025-05-05

## 2025-05-05 RX ORDER — HYDROCHLOROTHIAZIDE 12.5 MG/1
CAPSULE ORAL
Qty: 6 EACH | Refills: 1 | Status: SHIPPED | OUTPATIENT
Start: 2025-05-05

## 2025-05-06 ENCOUNTER — OFFICE VISIT (OUTPATIENT)
Dept: PHYSICAL THERAPY | Facility: REHABILITATION | Age: 70
End: 2025-05-06
Payer: COMMERCIAL

## 2025-05-06 DIAGNOSIS — M54.2 NECK PAIN: ICD-10-CM

## 2025-05-06 DIAGNOSIS — M54.12 CERVICAL RADICULOPATHY: Primary | ICD-10-CM

## 2025-05-06 PROCEDURE — 97112 NEUROMUSCULAR REEDUCATION: CPT

## 2025-05-06 PROCEDURE — 97110 THERAPEUTIC EXERCISES: CPT

## 2025-05-06 NOTE — PROGRESS NOTES
"Daily Note     Today's date: 2025  Patient name: Raphael Pack  : 1955  MRN: [id number]  Referring provider: Isabel Francisco CRNP  Dx:   Encounter Diagnosis     ICD-10-CM    1. Cervical radiculopathy  M54.12       2. Neck pain  M54.2           Start Time: 09  Stop Time: 1010  Total time in clinic (min): 45 minutes    Subjective: Patient reports things as \"the same\" continues to report N/T. He states he went to chiropractor yesterday, no change in symptoms. Patient states he will continue to follow up with chiropractor and will follow back up with PT if any further assistance is needed.       Objective: See treatment diary below      Assessment: Tolerated treatment well. Patient demonstrated fatigue post treatment and exhibited good technique with therapeutic exercises      Plan:  Patient given updated HEP this session with patient reporting understanding. Patient to transition to HEP and will continue to follow up with patient as appropriate.      Precautions:   Past Medical History:   Diagnosis Date    BPH (benign prostatic hyperplasia)     Carpal tunnel syndrome     Chronic kidney disease     Diabetes mellitus (HCC)     Disease of thyroid gland     Hyperlipidemia     Hypertension     Sleep apnea     prescribed CPAP, does not use    Type 2 diabetes mellitus with diabetic chronic kidney disease (HCC) 2021       Daily Treatment Diary:  Main complaint numbness to b/l hands -- known carpal tunnel with release done in R in 2025  Manuals 2025 4/3 4/11 4/16 4/24 4/28 5/6     Cervical distraction ** Done LH Done Done                                Neuromuscular Re-education            TB low rows nv Prpl 2x10 Purple 3x10         TB mid rows nv Yellow 2x10 Yellow 3x10 Yellow 3x10 Yellow 3x12 Yellow 3x12 Yellow 3x12     TB bilateral ER w retraction nv GTB 2x10 GTB 2x10 GTB 3x10 GTB 3x12 GTB 3x12 GTB 3x15     TB horizontal abd            Chin tucks nv 2x10x5\" 2x10x5\" Nods 2x10x5\" " "Nods 2x10x5'' Nods 3x10x5\" Nods 3x10x5''     50% DNF    2x10 2x10 3x10 3x10     Nerve glides  **          Median nerve glides     10x b/l 2x10 b/l 2x10 b/l     Therapeutic Exercise   4/11         UT st nv 3x20\" b/l 3x20\" ea 3x20\" ea 3x20'' ea 3x20\" ea 3x20'' ea     Cervical ext st w towel nv 10x5\" 10x5\"         Cervical SNAGs            UBE    5' retro 5' retro 5' retro 5' retro 5' retro     Standing I's    5# 3x10 ea 5# 3x10 ea 5# 3x10 ea 5# 3x10 ea     Supine serratus punches    2x5# 3x10 nv 2x6# 3x10 2x6# 3x10     Standing rows     5# 3x10 ea 5# 3x10 ea 5# 3x10 ea                                                     Therapeutic Activity            Pt edu Done - POC, dx           Wall Ball rolls            Standing Scaption                                    * = on HEP                 "

## 2025-05-08 DIAGNOSIS — E78.2 MIXED HYPERLIPIDEMIA: ICD-10-CM

## 2025-05-08 DIAGNOSIS — E11.65 UNCONTROLLED TYPE 2 DIABETES MELLITUS WITH HYPERGLYCEMIA (HCC): ICD-10-CM

## 2025-05-09 RX ORDER — ATORVASTATIN CALCIUM 20 MG/1
20 TABLET, FILM COATED ORAL DAILY
Qty: 30 TABLET | Refills: 5 | Status: SHIPPED | OUTPATIENT
Start: 2025-05-09

## 2025-05-09 RX ORDER — PEN NEEDLE, DIABETIC 31 GX3/16"
NEEDLE, DISPOSABLE MISCELLANEOUS EVERY MORNING
Qty: 100 EACH | Refills: 1 | Status: SHIPPED | OUTPATIENT
Start: 2025-05-09

## 2025-05-13 ENCOUNTER — PROCEDURE VISIT (OUTPATIENT)
Age: 70
End: 2025-05-13
Payer: COMMERCIAL

## 2025-05-13 VITALS
WEIGHT: 272 LBS | SYSTOLIC BLOOD PRESSURE: 130 MMHG | DIASTOLIC BLOOD PRESSURE: 81 MMHG | HEIGHT: 70 IN | BODY MASS INDEX: 38.94 KG/M2 | HEART RATE: 80 BPM

## 2025-05-13 DIAGNOSIS — M54.2 NECK PAIN: ICD-10-CM

## 2025-05-13 DIAGNOSIS — M54.41 CHRONIC MIDLINE LOW BACK PAIN WITH RIGHT-SIDED SCIATICA: ICD-10-CM

## 2025-05-13 DIAGNOSIS — M99.04 SEGMENTAL DYSFUNCTION OF SACRAL REGION: ICD-10-CM

## 2025-05-13 DIAGNOSIS — M99.05 SEGMENTAL DYSFUNCTION OF PELVIC REGION: Primary | ICD-10-CM

## 2025-05-13 DIAGNOSIS — G89.29 CHRONIC MIDLINE LOW BACK PAIN WITH RIGHT-SIDED SCIATICA: ICD-10-CM

## 2025-05-13 DIAGNOSIS — M99.01 SEGMENTAL DYSFUNCTION OF CERVICAL REGION: ICD-10-CM

## 2025-05-13 DIAGNOSIS — M54.12 CERVICAL RADICULOPATHY: ICD-10-CM

## 2025-05-13 DIAGNOSIS — M99.03 SEGMENTAL DYSFUNCTION OF LUMBAR REGION: ICD-10-CM

## 2025-05-13 PROCEDURE — 98940 CHIROPRACT MANJ 1-2 REGIONS: CPT | Performed by: CHIROPRACTOR

## 2025-05-14 ENCOUNTER — VBI (OUTPATIENT)
Dept: ADMINISTRATIVE | Facility: OTHER | Age: 70
End: 2025-05-14

## 2025-05-14 NOTE — TELEPHONE ENCOUNTER
05/14/25 9:53 AM     Chart reviewed for CRC: Colonoscopy was/were not submitted to the patient's insurance.     Cornell Phillips MA   [facility]

## 2025-05-15 DIAGNOSIS — Z79.4 TYPE 2 DIABETES MELLITUS WITH HYPERGLYCEMIA, WITH LONG-TERM CURRENT USE OF INSULIN (HCC): ICD-10-CM

## 2025-05-15 DIAGNOSIS — E11.65 TYPE 2 DIABETES MELLITUS WITH HYPERGLYCEMIA, WITH LONG-TERM CURRENT USE OF INSULIN (HCC): ICD-10-CM

## 2025-05-16 RX ORDER — SEMAGLUTIDE 0.68 MG/ML
INJECTION, SOLUTION SUBCUTANEOUS
Qty: 3 ML | Refills: 1 | Status: SHIPPED | OUTPATIENT
Start: 2025-05-16

## 2025-05-19 NOTE — PROGRESS NOTES
Date of first visit: 5/5/2025    Assessment:   Diagnosis ICD-10-CM Associated Orders   1. Segmental dysfunction of pelvic region  M99.05       2. Chronic midline low back pain with right-sided sciatica  M54.41     G89.29       3. Segmental dysfunction of sacral region  M99.04       4. Segmental dysfunction of lumbar region  M99.03       5. Cervical radiculopathy  M54.12       6. Segmental dysfunction of cervical region  M99.01       7. Neck pain  M54.2                No follow-ups on file.    Discussion:  Continue course of treatment.  Reviewed home program.    Treatment: [id number]  Manipulation C5-C6 C1-C2 via seated stairstepping technique pretreat MFR.        HPI:  Scott returns for treatment today reports ongoing symptoms bilateral upper extremity numbness and tingling of 5 on a pain scale.      The following portions of the patient's history were reviewed and updated as appropriate: allergies, current medications, past family history, past medical history, past social history, past surgical history, and problem list.    Review of Systems    Physical Exam:  Exam reveals paracervical hypertonicity noted decreased cervical range of motion lateral bending left greater than right left and right rotation 25 to 50%.  Biomechanically joint dysfunction C5-C6 C6-C7 active triggers left shoulder girdle.

## 2025-05-19 NOTE — PROGRESS NOTES
Date of First Visit: 5/5/2025  Visit number: 1    Assessment:  Diagnoses and all orders for this visit:    Segmental dysfunction of pelvic region    Chronic midline low back pain with right-sided sciatica  -     Ambulatory Referral to Chiropractic    Segmental dysfunction of sacral region    Segmental dysfunction of lumbar region    Cervical radiculopathy  -     Ambulatory Referral to Chiropractic    Segmental dysfunction of cervical region    Neck pain       No follow-ups on file.    Discussion:  Neurologically stable with mechanical spinal findings.  Brief period of conservative care along with physical therapy reassessment 4 weeks further diagnostics if still symptomatic    Treatment:  29859  Spinal manipulation C5-C6 via seated stairstepping technique no HVLA.  Pretreat extensive MFR left shoulder girdle.        HPI:  Back Pain  This is a chronic problem. The current episode started more than 1 year ago. The problem occurs intermittently. The problem has been waxing and waning since onset. The pain is present in the lumbar spine and sacro-iliac. The quality of the pain is described as aching. The pain does not radiate. The pain is at a severity of 5/10. The pain is moderate. The symptoms are aggravated by bending and position. Associated symptoms include numbness and tingling. He has tried heat and home exercises for the symptoms. The treatment provided mild relief.   Neck Pain   This is a new problem. The current episode started more than 1 year ago. The problem occurs intermittently. The problem has been unchanged. The pain is associated with nothing. The pain is present in the left side, midline and right side. The quality of the pain is described as aching and burning. The pain is at a severity of 3/10. The pain is moderate. The symptoms are aggravated by position. Associated symptoms include numbness and tingling. He has tried heat and home exercises for the symptoms. The treatment provided mild relief.      Raphael Pack is a 70 y.o. male who presents for evaluation of possible treatment at the request of his PCP ROLAND Covarrubias.  Raphael describes a chief complaint of neck pain and bilateral arm numbness and tingling's been ongoing for about a year now he reports it is a 3 on a pain scale.  Denies any trauma or injury symptoms seem to bother him the most in seated postures.  They are affecting his activities of daily living.  He is a  and notes that his dexterity does not seem the same because of symptoms into his upper extremities.  He denies any trauma reports no significant headaches, dizziness, known cognitive changes.    In addition he describes low back pain which has been chronic a 5-8 on a pain scale denies any significant radicular symptoms.  Denies any yellow flag symptoms today fever chills night sweats infection pain upon recumbency changes in bowel bladder status.    He has been in physical therapy along with regular swimming routine 3-5 times per week.  Ongoing comorbid conditions of diabetes, hypertension, and hypothyroidism.      Chief Complaint   Patient presents with   • Arm Pain     Numbness and tingling in arms constant going on for about a year about about a 3 referred by pcp states this is the primary issue    • Back Pain     Low back pain constant for many years about a 5 currently but can be an 8      Past Medical History:   Diagnosis Date   • BPH (benign prostatic hyperplasia)    • Carpal tunnel syndrome    • Chronic kidney disease    • Diabetes mellitus (HCC)    • Disease of thyroid gland    • Hyperlipidemia    • Hypertension    • Sleep apnea     prescribed CPAP, does not use   • Type 2 diabetes mellitus with diabetic chronic kidney disease (HCC) 06/24/2021      Past Surgical History:   Procedure Laterality Date   • CARPAL TUNNEL RELEASE Left    • COLONOSCOPY      2016   • CYST REMOVAL      back   • MS EGD TRANSORAL BIOPSY SINGLE/MULTIPLE N/A 05/17/2017     Procedure: ESOPHAGOGASTRODUODENOSCOPY (EGD) with bx;  Surgeon: Patel Chaudhary MD;  Location: [facility];  Service: Bariatrics   • MO NEUROPLASTY &/TRANSPOS MEDIAN NRV CARPAL TUNNE Right 2025    Procedure: RELEASE CARPAL TUNNEL;  Surgeon: Jeff Dalal DO;  Location:  MAIN OR;  Service: Orthopedics   • TONSILLECTOMY       Family History   Problem Relation Age of Onset   • Lung cancer Mother    • Diabetes Father    • Cancer Brother    • Alcohol abuse Neg Hx    • Substance Abuse Neg Hx    • Mental illness Neg Hx    • Depression Neg Hx      Social History     Socioeconomic History   • Marital status: /Civil Union     Spouse name: None   • Number of children: None   • Years of education: None   • Highest education level: None   Occupational History   • None   Tobacco Use   • Smoking status: Former     Current packs/day: 0.00     Types: Cigarettes     Start date:      Quit date:      Years since quittin.4   • Smokeless tobacco: Never   Vaping Use   • Vaping status: Never Used   Substance and Sexual Activity   • Alcohol use: Yes     Comment: social- less than 1 drink per week   • Drug use: No   • Sexual activity: Yes     Partners: Female     Birth control/protection: None   Other Topics Concern   • None   Social History Narrative         Social Drivers of Health     Financial Resource Strain: Low Risk  (2023)    Overall Financial Resource Strain (CARDIA)    • Difficulty of Paying Living Expenses: Not very hard   Food Insecurity: No Food Insecurity (2024)    Nursing - Inadequate Food Risk Classification    • Worried About Running Out of Food in the Last Year: Never true    • Ran Out of Food in the Last Year: Never true    • Ran Out of Food in the Last Year: Not on file   Transportation Needs: No Transportation Needs (2024)    PRAPARE - Transportation    • Lack of Transportation (Medical): No    • Lack of Transportation (Non-Medical): No   Physical Activity: Not on file    Stress: Not on file   Social Connections: Not on file   Intimate Partner Violence: Not on file   Housing Stability: Low Risk  (8/20/2024)    Housing Stability Vital Sign    • Unable to Pay for Housing in the Last Year: No    • Number of Times Moved in the Last Year: 1    • Homeless in the Last Year: No     Current Medications[1]  Allergies as of 05/05/2025 - Reviewed 05/05/2025   Allergen Reaction Noted   • Penicillins Syncope 10/30/2015       The following portions of the patient's history were reviewed and updated as appropriate: allergies, current medications, past family history, past medical history, past social history, past surgical history, and problem list.    Review of Systems   Constitutional: Negative.    Musculoskeletal:  Positive for back pain, myalgias, neck pain and neck stiffness.   Neurological:  Positive for tingling and numbness.   Psychiatric/Behavioral: Negative.         Physical Exam:  Physical Exam  Vitals reviewed.   Constitutional:       Appearance: Normal appearance.     Cardiovascular:      Rate and Rhythm: Normal rate.      Pulses: Normal pulses.   Pulmonary:      Effort: Pulmonary effort is normal.     Musculoskeletal:      Cervical back: Spasms, tenderness and crepitus present. Pain with movement present. Decreased range of motion.      Thoracic back: Tenderness present. Decreased range of motion.      Lumbar back: Spasms and tenderness present. Decreased range of motion. Negative right straight leg raise test and negative left straight leg raise test.        Back:      Skin:     General: Skin is warm and dry.     Neurological:      General: No focal deficit present.      Mental Status: He is alert and oriented to person, place, and time.      Sensory: Sensation is intact.      Motor: Motor function is intact.      Deep Tendon Reflexes: Reflexes are normal and symmetric.      Comments: Negative Spurling's   Psychiatric:         Mood and Affect: Mood normal.         Behavior: Behavior  normal.         Thought Content: Thought content normal.         Data Reviewed:  I reviewed past medical notes.               [1]    Current Outpatient Medications:   •  amLODIPine (NORVASC) 5 mg tablet, Take 1 tablet (5 mg total) by mouth daily, Disp: 90 tablet, Rfl: 3  •  atorvastatin (LIPITOR) 20 mg tablet, Take 1 tablet (20 mg total) by mouth daily, Disp: 30 tablet, Rfl: 5  •  carvedilol (COREG) 12.5 mg tablet, Take 1 tablet (12.5 mg total) by mouth 2 (two) times a day with meals, Disp: 180 tablet, Rfl: 0  •  Continuous Glucose Sensor (FreeStyle Paul 14 Day Sensor) MISC, Change sensor every 14 days, Disp: 2 each, Rfl: 0  •  Continuous Glucose Sensor (FreeStyle Paul 3 Plus Sensor) MISC, Change sensor every 15 days, Disp: 6 each, Rfl: 1  •  Continuous Glucose Sensor (FreeStyle Paul 3 Sensor) MISC, Apply every 2 weeks, Disp: 2 each, Rfl: 5  •  Empagliflozin (Jardiance) 10 MG TABS tablet, Take 1 tablet (10 mg total) by mouth every morning, Disp: 90 tablet, Rfl: 0  •  furosemide (LASIX) 20 mg tablet, Take 1 tablet (20 mg total) by mouth 3 (three) times a week, Disp: 36 tablet, Rfl: 0  •  gabapentin (NEURONTIN) 300 mg capsule, Take 1 capsule (300 mg total) by mouth 2 (two) times a day, Disp: 60 capsule, Rfl: 0  •  glimepiride (AMARYL) 4 mg tablet, Take 1 tablet with breakfast and 1 tablet with dinner., Disp: 180 tablet, Rfl: 1  •  HYDROcodone-acetaminophen (Norco) 5-325 mg per tablet, Take 1 tablet by mouth daily as needed for pain Max Daily Amount: 1 tablet, Disp: 30 tablet, Rfl: 0  •  Insulin Pen Needle (CareOne Unifine Pentips Plus) 31G X 5 MM MISC, Inject under the skin every morning, Disp: 100 each, Rfl: 1  •  Lantus SoloStar 100 units/mL SOPN, Inject 18 units at bedtime, Disp: 15 mL, Rfl: 1  •  levothyroxine 75 mcg tablet, Take 1 tablet (75 mcg total) by mouth daily, Disp: 90 tablet, Rfl: 1  •  losartan (COZAAR) 50 mg tablet, Take 1 tablet (50 mg total) by mouth daily, Disp: 90 tablet, Rfl: 3  •  magnesium  oxide (MAG-OX) 400 mg tablet, Take 1 tablet (400 mg total) by mouth daily, Disp: 90 tablet, Rfl: 2  •  Ozempic, 0.25 or 0.5 MG/DOSE, 2 MG/3ML injection pen, INJECT 0.5MG (0.75ML) UNDER THE SKIN ONCE A WEEK, Disp: 3 mL, Rfl: 1  •  sildenafil (VIAGRA) 50 MG tablet, Take 1 tablet (50 mg total) by mouth as needed for erectile dysfunction, Disp: 30 tablet, Rfl: 0    Current Facility-Administered Medications:   •  cyanocobalamin injection 1,000 mcg, 1,000 mcg, Intramuscular, Q30 Days, , 1,000 mcg at 10/02/24 0042

## 2025-05-20 ENCOUNTER — PROCEDURE VISIT (OUTPATIENT)
Age: 70
End: 2025-05-20
Payer: COMMERCIAL

## 2025-05-20 VITALS
HEART RATE: 63 BPM | SYSTOLIC BLOOD PRESSURE: 122 MMHG | HEIGHT: 70 IN | BODY MASS INDEX: 38.94 KG/M2 | WEIGHT: 272 LBS | DIASTOLIC BLOOD PRESSURE: 74 MMHG

## 2025-05-20 DIAGNOSIS — M99.01 SEGMENTAL DYSFUNCTION OF CERVICAL REGION: ICD-10-CM

## 2025-05-20 DIAGNOSIS — M54.12 CERVICAL RADICULOPATHY: ICD-10-CM

## 2025-05-20 DIAGNOSIS — M54.2 NECK PAIN: ICD-10-CM

## 2025-05-20 DIAGNOSIS — G89.29 CHRONIC MIDLINE LOW BACK PAIN WITH RIGHT-SIDED SCIATICA: ICD-10-CM

## 2025-05-20 DIAGNOSIS — M99.05 SEGMENTAL DYSFUNCTION OF PELVIC REGION: Primary | ICD-10-CM

## 2025-05-20 DIAGNOSIS — M54.41 CHRONIC MIDLINE LOW BACK PAIN WITH RIGHT-SIDED SCIATICA: ICD-10-CM

## 2025-05-20 DIAGNOSIS — M99.04 SEGMENTAL DYSFUNCTION OF SACRAL REGION: ICD-10-CM

## 2025-05-20 DIAGNOSIS — M99.03 SEGMENTAL DYSFUNCTION OF LUMBAR REGION: ICD-10-CM

## 2025-05-20 PROCEDURE — 98940 CHIROPRACT MANJ 1-2 REGIONS: CPT | Performed by: CHIROPRACTOR

## 2025-05-27 ENCOUNTER — PROCEDURE VISIT (OUTPATIENT)
Age: 70
End: 2025-05-27
Payer: COMMERCIAL

## 2025-05-27 VITALS
SYSTOLIC BLOOD PRESSURE: 133 MMHG | DIASTOLIC BLOOD PRESSURE: 77 MMHG | HEIGHT: 70 IN | WEIGHT: 272 LBS | BODY MASS INDEX: 38.94 KG/M2

## 2025-05-27 DIAGNOSIS — M99.01 SEGMENTAL DYSFUNCTION OF CERVICAL REGION: ICD-10-CM

## 2025-05-27 DIAGNOSIS — M54.12 CERVICAL RADICULOPATHY: ICD-10-CM

## 2025-05-27 DIAGNOSIS — M99.04 SEGMENTAL DYSFUNCTION OF SACRAL REGION: ICD-10-CM

## 2025-05-27 DIAGNOSIS — M54.41 CHRONIC MIDLINE LOW BACK PAIN WITH RIGHT-SIDED SCIATICA: ICD-10-CM

## 2025-05-27 DIAGNOSIS — M54.2 NECK PAIN: ICD-10-CM

## 2025-05-27 DIAGNOSIS — M99.03 SEGMENTAL DYSFUNCTION OF LUMBAR REGION: ICD-10-CM

## 2025-05-27 DIAGNOSIS — G89.29 CHRONIC MIDLINE LOW BACK PAIN WITH RIGHT-SIDED SCIATICA: ICD-10-CM

## 2025-05-27 DIAGNOSIS — M99.05 SEGMENTAL DYSFUNCTION OF PELVIC REGION: Primary | ICD-10-CM

## 2025-05-27 PROCEDURE — 98940 CHIROPRACT MANJ 1-2 REGIONS: CPT | Performed by: CHIROPRACTOR

## 2025-05-27 NOTE — PROGRESS NOTES
Date of first visit: 5/5/2025    Assessment:   Diagnosis ICD-10-CM Associated Orders   1. Segmental dysfunction of pelvic region  M99.05       2. Chronic midline low back pain with right-sided sciatica  M54.41     G89.29       3. Segmental dysfunction of sacral region  M99.04       4. Segmental dysfunction of lumbar region  M99.03       5. Cervical radiculopathy  M54.12       6. Segmental dysfunction of cervical region  M99.01       7. Neck pain  M54.2                No follow-ups on file.    Discussion:  Continue course of treatment.  Reviewed home program.    Treatment: 37772  Manipulation C5-C6 C1-C2 via seated stairstepping technique pretreat MFR.        HPI:  Scott returns for treatment today reports ongoing symptoms bilateral upper extremity numbness and tingling of 8 on a pain scale.      The following portions of the patient's history were reviewed and updated as appropriate: allergies, current medications, past family history, past medical history, past social history, past surgical history, and problem list.    Review of Systems    Physical Exam:  Exam reveals paracervical hypertonicity noted decreased cervical range of motion lateral bending left greater than right left and right rotation 25 to 50%.  Biomechanically joint dysfunction C5-C6 C6-C7 active triggers left shoulder girdle.

## 2025-05-27 NOTE — PROGRESS NOTES
Date of first visit: 5/5/2025    Assessment:   Diagnosis ICD-10-CM Associated Orders   1. Segmental dysfunction of pelvic region  M99.05       2. Chronic midline low back pain with right-sided sciatica  M54.41     G89.29       3. Segmental dysfunction of sacral region  M99.04       4. Segmental dysfunction of lumbar region  M99.03       5. Cervical radiculopathy  M54.12       6. Segmental dysfunction of cervical region  M99.01       7. Neck pain  M54.2                No follow-ups on file.    Discussion:  Continue course of treatment.  Reviewed home program.    Treatment: 80757  Manipulation C5-C6 C1-C2 via seated stairstepping technique pretreat MFR.        HPI:  Scott returns for treatment today reports ongoing symptoms bilateral upper extremity numbness and tingling of 5-6 on a pain scale.      The following portions of the patient's history were reviewed and updated as appropriate: allergies, current medications, past family history, past medical history, past social history, past surgical history, and problem list.    Review of Systems    Physical Exam:  Exam reveals paracervical hypertonicity noted decreased cervical range of motion lateral bending left greater than right left and right rotation 25 to 50%.  Biomechanically joint dysfunction C5-C6 C6-C7 active triggers left shoulder girdle.

## 2025-06-01 DIAGNOSIS — M51.16 LUMBAR DISC DISEASE WITH RADICULOPATHY: ICD-10-CM

## 2025-06-01 DIAGNOSIS — E83.42 HYPOMAGNESEMIA: ICD-10-CM

## 2025-06-01 DIAGNOSIS — E78.2 MIXED HYPERLIPIDEMIA: ICD-10-CM

## 2025-06-01 DIAGNOSIS — Z79.4 TYPE 2 DIABETES MELLITUS WITH HYPERGLYCEMIA, WITH LONG-TERM CURRENT USE OF INSULIN (HCC): ICD-10-CM

## 2025-06-01 DIAGNOSIS — E11.65 TYPE 2 DIABETES MELLITUS WITH HYPERGLYCEMIA, WITH LONG-TERM CURRENT USE OF INSULIN (HCC): ICD-10-CM

## 2025-06-01 DIAGNOSIS — I10 ESSENTIAL HYPERTENSION: ICD-10-CM

## 2025-06-01 DIAGNOSIS — R80.1 PERSISTENT PROTEINURIA: ICD-10-CM

## 2025-06-01 DIAGNOSIS — R60.0 PERIPHERAL EDEMA: ICD-10-CM

## 2025-06-02 RX ORDER — ATORVASTATIN CALCIUM 20 MG/1
20 TABLET, FILM COATED ORAL DAILY
Qty: 30 TABLET | Refills: 0 | Status: SHIPPED | OUTPATIENT
Start: 2025-06-02

## 2025-06-02 RX ORDER — FUROSEMIDE 20 MG/1
20 TABLET ORAL 3 TIMES WEEKLY
Qty: 36 TABLET | Refills: 1 | Status: SHIPPED | OUTPATIENT
Start: 2025-06-02

## 2025-06-02 RX ORDER — GABAPENTIN 300 MG/1
300 CAPSULE ORAL 2 TIMES DAILY
Qty: 60 CAPSULE | Refills: 0 | Status: SHIPPED | OUTPATIENT
Start: 2025-06-02

## 2025-06-02 RX ORDER — MAGNESIUM OXIDE 400 MG/1
400 TABLET ORAL DAILY
Qty: 90 TABLET | Refills: 1 | Status: SHIPPED | OUTPATIENT
Start: 2025-06-02

## 2025-06-02 RX ORDER — LOSARTAN POTASSIUM 50 MG/1
50 TABLET ORAL DAILY
Qty: 90 TABLET | Refills: 1 | Status: SHIPPED | OUTPATIENT
Start: 2025-06-02

## 2025-06-02 RX ORDER — INSULIN GLARGINE 100 [IU]/ML
INJECTION, SOLUTION SUBCUTANEOUS
Qty: 15 ML | Refills: 1 | Status: SHIPPED | OUTPATIENT
Start: 2025-06-02

## 2025-06-09 DIAGNOSIS — E03.9 HYPOTHYROIDISM, UNSPECIFIED TYPE: ICD-10-CM

## 2025-06-09 RX ORDER — LEVOTHYROXINE SODIUM 75 UG/1
75 TABLET ORAL DAILY
Qty: 90 TABLET | Refills: 1 | Status: SHIPPED | OUTPATIENT
Start: 2025-06-09

## 2025-06-15 DIAGNOSIS — Z79.4 TYPE 2 DIABETES MELLITUS WITH HYPERGLYCEMIA, WITH LONG-TERM CURRENT USE OF INSULIN (HCC): ICD-10-CM

## 2025-06-15 DIAGNOSIS — E11.65 TYPE 2 DIABETES MELLITUS WITH HYPERGLYCEMIA, WITH LONG-TERM CURRENT USE OF INSULIN (HCC): ICD-10-CM

## 2025-06-16 DIAGNOSIS — I10 BENIGN ESSENTIAL HYPERTENSION: ICD-10-CM

## 2025-06-16 DIAGNOSIS — R80.9 TYPE 2 DIABETES MELLITUS WITH PROTEINURIA  (HCC): ICD-10-CM

## 2025-06-16 DIAGNOSIS — R80.1 PERSISTENT PROTEINURIA: ICD-10-CM

## 2025-06-16 DIAGNOSIS — E11.29 TYPE 2 DIABETES MELLITUS WITH PROTEINURIA  (HCC): ICD-10-CM

## 2025-06-17 RX ORDER — AMLODIPINE BESYLATE 5 MG/1
5 TABLET ORAL DAILY
Qty: 90 TABLET | Refills: 1 | Status: SHIPPED | OUTPATIENT
Start: 2025-06-17

## 2025-06-25 DIAGNOSIS — I10 ESSENTIAL HYPERTENSION: ICD-10-CM

## 2025-06-25 DIAGNOSIS — M51.16 LUMBAR DISC DISEASE WITH RADICULOPATHY: ICD-10-CM

## 2025-06-25 DIAGNOSIS — N52.9 ERECTILE DYSFUNCTION, UNSPECIFIED ERECTILE DYSFUNCTION TYPE: ICD-10-CM

## 2025-06-25 RX ORDER — CARVEDILOL 12.5 MG/1
12.5 TABLET ORAL 2 TIMES DAILY WITH MEALS
Qty: 180 TABLET | Refills: 0 | Status: SHIPPED | OUTPATIENT
Start: 2025-06-25

## 2025-06-26 RX ORDER — HYDROCODONE BITARTRATE AND ACETAMINOPHEN 5; 325 MG/1; MG/1
1 TABLET ORAL DAILY PRN
Qty: 30 TABLET | Refills: 0 | Status: SHIPPED | OUTPATIENT
Start: 2025-06-26

## 2025-06-26 RX ORDER — SILDENAFIL 50 MG/1
50 TABLET, FILM COATED ORAL AS NEEDED
Qty: 30 TABLET | Refills: 0 | Status: SHIPPED | OUTPATIENT
Start: 2025-06-26

## 2025-06-26 RX ORDER — GABAPENTIN 300 MG/1
300 CAPSULE ORAL 2 TIMES DAILY
Qty: 60 CAPSULE | Refills: 0 | Status: SHIPPED | OUTPATIENT
Start: 2025-06-26

## 2025-07-01 ENCOUNTER — TELEPHONE (OUTPATIENT)
Dept: NEPHROLOGY | Facility: CLINIC | Age: 70
End: 2025-07-01

## 2025-07-07 ENCOUNTER — APPOINTMENT (OUTPATIENT)
Dept: LAB | Facility: CLINIC | Age: 70
End: 2025-07-07
Attending: INTERNAL MEDICINE
Payer: COMMERCIAL

## 2025-07-07 DIAGNOSIS — R60.0 PERIPHERAL EDEMA: ICD-10-CM

## 2025-07-07 DIAGNOSIS — R80.1 PERSISTENT PROTEINURIA: ICD-10-CM

## 2025-07-07 DIAGNOSIS — E03.9 ACQUIRED HYPOTHYROIDISM: ICD-10-CM

## 2025-07-07 DIAGNOSIS — R80.9 TYPE 2 DIABETES MELLITUS WITH PROTEINURIA  (HCC): ICD-10-CM

## 2025-07-07 DIAGNOSIS — E83.42 HYPOMAGNESEMIA: ICD-10-CM

## 2025-07-07 DIAGNOSIS — E11.65 TYPE 2 DIABETES MELLITUS WITH HYPERGLYCEMIA, WITH LONG-TERM CURRENT USE OF INSULIN (HCC): ICD-10-CM

## 2025-07-07 DIAGNOSIS — E11.29 TYPE 2 DIABETES MELLITUS WITH PROTEINURIA  (HCC): ICD-10-CM

## 2025-07-07 DIAGNOSIS — Z79.4 TYPE 2 DIABETES MELLITUS WITH HYPERGLYCEMIA, WITH LONG-TERM CURRENT USE OF INSULIN (HCC): ICD-10-CM

## 2025-07-07 DIAGNOSIS — I10 ESSENTIAL HYPERTENSION: ICD-10-CM

## 2025-07-07 LAB
ANION GAP SERPL CALCULATED.3IONS-SCNC: 5 MMOL/L (ref 4–13)
BACTERIA UR QL AUTO: ABNORMAL /HPF
BILIRUB UR QL STRIP: NEGATIVE
BUN SERPL-MCNC: 18 MG/DL (ref 5–25)
CALCIUM SERPL-MCNC: 9.2 MG/DL (ref 8.4–10.2)
CHLORIDE SERPL-SCNC: 105 MMOL/L (ref 96–108)
CLARITY UR: CLEAR
CO2 SERPL-SCNC: 26 MMOL/L (ref 21–32)
COLOR UR: ABNORMAL
CREAT SERPL-MCNC: 0.85 MG/DL (ref 0.6–1.3)
CREAT UR-MCNC: 47.8 MG/DL
CREAT UR-MCNC: 48.5 MG/DL
EST. AVERAGE GLUCOSE BLD GHB EST-MCNC: 157 MG/DL
GFR SERPL CREATININE-BSD FRML MDRD: 88 ML/MIN/1.73SQ M
GLUCOSE P FAST SERPL-MCNC: 153 MG/DL (ref 65–99)
GLUCOSE UR STRIP-MCNC: ABNORMAL MG/DL
HBA1C MFR BLD: 7.1 %
HGB UR QL STRIP.AUTO: NEGATIVE
KETONES UR STRIP-MCNC: NEGATIVE MG/DL
LEUKOCYTE ESTERASE UR QL STRIP: ABNORMAL
MAGNESIUM SERPL-MCNC: 2.2 MG/DL (ref 1.9–2.7)
MICROALBUMIN UR-MCNC: 807.9 MG/L
MICROALBUMIN/CREAT 24H UR: 1666 MG/G CREATININE (ref 0–30)
NITRITE UR QL STRIP: NEGATIVE
NON-SQ EPI CELLS URNS QL MICRO: ABNORMAL /HPF
PH UR STRIP.AUTO: 6 [PH]
POTASSIUM SERPL-SCNC: 4.5 MMOL/L (ref 3.5–5.3)
PROT UR STRIP-MCNC: ABNORMAL MG/DL
PROT UR-MCNC: 124.6 MG/DL
PROT/CREAT UR: 2.6 MG/G{CREAT}
RBC #/AREA URNS AUTO: ABNORMAL /HPF
SODIUM SERPL-SCNC: 136 MMOL/L (ref 135–147)
SP GR UR STRIP.AUTO: 1.02 (ref 1–1.03)
T4 FREE SERPL-MCNC: 0.77 NG/DL (ref 0.61–1.12)
TSH SERPL DL<=0.05 MIU/L-ACNC: 2.02 UIU/ML (ref 0.45–4.5)
UROBILINOGEN UR STRIP-ACNC: <2 MG/DL
WBC #/AREA URNS AUTO: ABNORMAL /HPF

## 2025-07-07 PROCEDURE — 81001 URINALYSIS AUTO W/SCOPE: CPT

## 2025-07-07 PROCEDURE — 84439 ASSAY OF FREE THYROXINE: CPT

## 2025-07-07 PROCEDURE — 84443 ASSAY THYROID STIM HORMONE: CPT

## 2025-07-07 PROCEDURE — 84156 ASSAY OF PROTEIN URINE: CPT

## 2025-07-07 PROCEDURE — 36415 COLL VENOUS BLD VENIPUNCTURE: CPT

## 2025-07-07 PROCEDURE — 82570 ASSAY OF URINE CREATININE: CPT

## 2025-07-07 PROCEDURE — 83735 ASSAY OF MAGNESIUM: CPT

## 2025-07-07 PROCEDURE — 83036 HEMOGLOBIN GLYCOSYLATED A1C: CPT

## 2025-07-07 PROCEDURE — 82043 UR ALBUMIN QUANTITATIVE: CPT

## 2025-07-07 PROCEDURE — 80048 BASIC METABOLIC PNL TOTAL CA: CPT

## 2025-07-08 LAB — COLOGUARD RESULT REPORTABLE: NEGATIVE

## 2025-07-10 NOTE — PROGRESS NOTES
Name: Raphael Pack      : 1955      MRN: [id number]  Encounter Provider: ROLAND Martinez  Encounter Date: 2025   Encounter department: [facility] [place]  :  Assessment & Plan  Essential hypertension  Goal blood pressure less than 130/80  Medication: Carvedilol 12.5 mg twice a day, losartan 50 mg daily, amlodipine 5 mg daily, furosemide 20 mg 3 times weekly  Weight today 270 pounds.  Weight at last office visit 289 pounds  Minimal/no edema  BP at target  No changes-- consider discontinuing amlodipine if BP remains at target and increasing losartan which will help will albuminuria  Albuminuria improving still above target.    Overall patient is doing very well with diet control and weight loss.  Will follow-up in 6 months with labs and urine studies.  If albuminuria remains above target may need adjustments as noted above  Orders:    Albumin / creatinine urine ratio; Future    CBC; Future    Magnesium; Future    Urinalysis with microscopic; Future    Comprehensive metabolic panel; Future    Type 2 diabetes mellitus with proteinuria  (HCC)    Lab Results   Component Value Date    HGBA1C 7.1 (H) 2025   Hemoglobin A1c near/at target  Management: On Lantus, glimepiride, Ozempic, Jardiance    Orders:    Albumin / creatinine urine ratio; Future    CBC; Future    Magnesium; Future    Urinalysis with microscopic; Future    Comprehensive metabolic panel; Future    Persistent proteinuria  Chronic kidney disease, stage 2 (mild)  Type 2 diabetes mellitus with stage 2 chronic kidney disease, without long-term current use of insulin  (HCC)      Lab Results   Component Value Date    EGFR 88 2025    EGFR 86 2025    EGFR 93 02/10/2025    CREATININE 0.85 2025    CREATININE 0.91 2025    CREATININE 0.77 02/10/2025   Follows with Dr. Rainey  Baseline creatinine less than 1  Current creatinine at baseline with EGFR generally in the 80s  Persistent proteinuria..   Previously uncontrolled diabetes mellitus with creatinine up to 10.4 September 2020.  A1c has been improving and most recently quite acceptable at 7.1.  Etiology: Diabetic kidney disease      Lab Results   Component Value Date    HGBA1C 7.1 (H) 07/07/2025       Orders:    Albumin / creatinine urine ratio; Future    CBC; Future    Magnesium; Future    Urinalysis with microscopic; Future    Comprehensive metabolic panel; Future    Hypomagnesemia  Resolved.  Current magnesium 2.2  On magnesium oxide 400 mg daily  Orders:    Albumin / creatinine urine ratio; Future    CBC; Future    Magnesium; Future    Urinalysis with microscopic; Future    Comprehensive metabolic panel; Future    Mixed hyperlipidemia  On Lipitor  LDL at target.  Elevated triglycerides  Orders:    Albumin / creatinine urine ratio; Future    CBC; Future    Magnesium; Future    Urinalysis with microscopic; Future    Comprehensive metabolic panel; Future    Acquired hypothyroidism  On levothyroxine  Orders:    Albumin / creatinine urine ratio; Future    CBC; Future    Magnesium; Future    Urinalysis with microscopic; Future    Comprehensive metabolic panel; Future        Patient Instructions   Thank you for your kind visit today.  You were seen for continued monitoring of kidney function and blood pressure.  In order to keep your kidneys as healthy as possible control things that are modifiable such as blood sugar and blood pressure.    Recommendations:  Goal blood pressure less than 130/80.  If you can monitor pressure at home that would be helpful.  Please call if blood pressure remains consistently greater than 140/80  Last hemoglobin A1c was at target.  Keep up the good work control of blood sugar is essential for maintaining kidney function  Medication changes:  Other important things to remember do not use NSAIDs such as Motrin Aleve ibuprofen and Advil.  Other NSAIDs are also naproxen and diclofenac.  There are many on the market.  If you are  unsure about a medication call our office or asked the pharmacist.  If you have pain you can take Tylenol.  Follow package directions for dosing  Follow up in 6 months  Blood work and urine studies prior to the appointment      History of Present Illness   HPI  Raphael Pack is a 70 y.o. male who presents for follow-up management of hypertension and proteinuria.  Patient has a past medical history of diabetes with proteinuria.   Scott is followed by Dr. Farmer for management.  He was last seen in the nephrology clinic February 2025.  It appears that diabetic management has improved significantly.  At the last office visit he had gained 9 pounds.  Lower extremity edema with calcium channel blocker likely contributing.  Since last visit: working out with swimming, losing weight. Feeling Ok -- back pain- chronic. Not taking NSAIDS.       Review of Systems   Constitutional:  Positive for appetite change. Negative for activity change, chills, fatigue, fever and unexpected weight change.   HENT:  Negative for congestion and sore throat.    Eyes:  Negative for visual disturbance.   Respiratory:  Negative for cough and shortness of breath.    Cardiovascular:  Negative for chest pain, palpitations and leg swelling.   Gastrointestinal:  Negative for abdominal pain, constipation, diarrhea, nausea and vomiting.   Genitourinary:  Negative for dysuria and hematuria.   Musculoskeletal:  Negative for arthralgias and back pain.   Skin:  Negative for color change and rash.   Allergic/Immunologic: Negative for immunocompromised state.   Neurological:  Negative for dizziness, seizures, syncope, weakness and headaches.   Hematological:  Does not bruise/bleed easily.   Psychiatric/Behavioral:  The patient is not nervous/anxious.    All other systems reviewed and are negative.    Medical History Reviewed by provider this encounter:  Tobacco  Allergies  Meds  Problems  Med Hx  Surg Hx  Fam Hx     .       Medications Ordered  "Prior to Encounter[1]  Objective   /74 Comment: after resting 5-10 min  Pulse 69   Ht 5' 10\" (1.778 m)   Wt 122 kg (270 lb)   BMI 38.74 kg/m²      Physical Exam  Vitals and nursing note reviewed.   Constitutional:       General: He is not in acute distress.     Appearance: He is well-developed. He is not ill-appearing, toxic-appearing or diaphoretic.   HENT:      Head: Normocephalic and atraumatic.      Nose: No congestion.      Mouth/Throat:      Mouth: Mucous membranes are moist.      Pharynx: No oropharyngeal exudate.     Eyes:      General: No scleral icterus.        Right eye: No discharge.         Left eye: No discharge.      Extraocular Movements: Extraocular movements intact.      Conjunctiva/sclera: Conjunctivae normal.     Neck:      Vascular: No carotid bruit.     Cardiovascular:      Rate and Rhythm: Normal rate and regular rhythm.      Heart sounds: No murmur heard.     No friction rub. No gallop.   Pulmonary:      Effort: Pulmonary effort is normal. No respiratory distress.      Breath sounds: Normal breath sounds.   Abdominal:      General: There is no distension.      Palpations: Abdomen is soft.      Tenderness: There is no abdominal tenderness.     Musculoskeletal:         General: No swelling, tenderness or signs of injury.      Cervical back: Neck supple. No rigidity or tenderness.      Right lower leg: No edema.      Left lower leg: No edema.     Skin:     General: Skin is warm and dry.      Capillary Refill: Capillary refill takes less than 2 seconds.      Coloration: Skin is not jaundiced or pale.      Findings: No erythema.     Neurological:      Mental Status: He is alert and oriented to person, place, and time.     Psychiatric:         Mood and Affect: Mood normal.         Behavior: Behavior normal.         Thought Content: Thought content normal.         Judgment: Judgment normal.           Laboratory Results:  Results from last 7 days   Lab Units 07/07/25  1049   POTASSIUM " mmol/L 4.5   CHLORIDE mmol/L 105   CO2 mmol/L 26   BUN mg/dL 18   CREATININE mg/dL 0.85   CALCIUM mg/dL 9.2   MAGNESIUM mg/dL 2.2       Results for orders placed or performed in visit on 07/07/25   Basic metabolic panel   Result Value Ref Range    Sodium 136 135 - 147 mmol/L    Potassium 4.5 3.5 - 5.3 mmol/L    Chloride 105 96 - 108 mmol/L    CO2 26 21 - 32 mmol/L    ANION GAP 5 4 - 13 mmol/L    BUN 18 5 - 25 mg/dL    Creatinine 0.85 0.60 - 1.30 mg/dL    Glucose, Fasting 153 (H) 65 - 99 mg/dL    Calcium 9.2 8.4 - 10.2 mg/dL    eGFR 88 ml/min/1.73sq m   Protein / creatinine ratio, urine   Result Value Ref Range    Creatinine, Ur 47.8 Reference range not established. mg/dL    Protein Urine Random 124.6 Reference range not established. mg/dL    Prot/Creat Ratio, Ur 2.6 (H) <=0.2   Urinalysis with microscopic   Result Value Ref Range    Color, UA Light Yellow     Clarity, UA Clear     Specific Gravity, UA 1.022 1.003 - 1.030    pH, UA 6.0 4.5, 5.0, 5.5, 6.0, 6.5, 7.0, 7.5, 8.0    Leukocytes, UA (A) Negative     Elevated glucose may cause decreased leukocyte values. See urine microscopic for UWBC result    Nitrite, UA Negative Negative    Protein, UA 70 (1+) (A) Negative mg/dl    Glucose, UA >=1000 (1%) (A) Negative mg/dl    Ketones, UA Negative Negative mg/dl    Urobilinogen, UA <2.0 <2.0 mg/dl mg/dl    Bilirubin, UA Negative Negative    Occult Blood, UA Negative Negative    RBC, UA None Seen None Seen, 1-2 /hpf    WBC, UA 1-2 None Seen, 1-2 /hpf    Epithelial Cells None Seen None Seen, Occasional /hpf    Bacteria, UA None Seen None Seen, Occasional /hpf   Magnesium   Result Value Ref Range    Magnesium 2.2 1.9 - 2.7 mg/dL   Hemoglobin A1C   Result Value Ref Range    Hemoglobin A1C 7.1 (H) Normal 4.0-5.6%; PreDiabetic 5.7-6.4%; Diabetic >=6.5%; Glycemic control for adults with diabetes <7.0% %     mg/dl   Albumin / creatinine urine ratio   Result Value Ref Range    Creatinine, Ur 48.5 Reference range not  established. mg/dL    Albumin,U,Random 807.9 (H) <20.0 mg/L    Albumin Creat Ratio 1,666 (H) 0 - 30 mg/g creatinine   T4, free   Result Value Ref Range    Free T4 0.77 0.61 - 1.12 ng/dL   TSH, 3rd generation   Result Value Ref Range    TSH 3RD GENERATION 2.016 0.450 - 4.500 uIU/mL     *Note: Due to a large number of results and/or encounters for the requested time period, some results have not been displayed. A complete set of results can be found in Results Review.                [1]   Current Outpatient Medications on File Prior to Visit   Medication Sig Dispense Refill    amLODIPine (NORVASC) 5 mg tablet Take 1 tablet (5 mg total) by mouth daily 90 tablet 1    atorvastatin (LIPITOR) 20 mg tablet Take 1 tablet (20 mg total) by mouth daily 30 tablet 0    carvedilol (COREG) 12.5 mg tablet Take 1 tablet (12.5 mg total) by mouth 2 (two) times a day with meals 180 tablet 0    Continuous Glucose Sensor (FreeStyle Paul 14 Day Sensor) MISC Change sensor every 14 days 2 each 0    Continuous Glucose Sensor (FreeStyle Paul 3 Plus Sensor) MISC Change sensor every 15 days 6 each 1    Continuous Glucose Sensor (FreeStyle Paul 3 Sensor) MISC Apply every 2 weeks 2 each 5    Empagliflozin (Jardiance) 10 MG TABS tablet Take 1 tablet (10 mg total) by mouth every morning 90 tablet 1    furosemide (LASIX) 20 mg tablet Take 1 tablet (20 mg total) by mouth 3 (three) times a week 36 tablet 1    gabapentin (NEURONTIN) 300 mg capsule Take 1 capsule (300 mg total) by mouth 2 (two) times a day 60 capsule 0    glimepiride (AMARYL) 4 mg tablet Take 1 tablet with breakfast and 1 tablet with dinner. 180 tablet 1    HYDROcodone-acetaminophen (Norco) 5-325 mg per tablet Take 1 tablet by mouth daily as needed for pain Max Daily Amount: 1 tablet 30 tablet 0    Insulin Pen Needle ([employer]One Unifine Pentips Plus) 31G X 5 MM MISC Inject under the skin every morning 100 each 1    Lantus SoloStar 100 units/mL SOPN Inject 18 units at bedtime 15 mL 1     levothyroxine 75 mcg tablet Take 1 tablet (75 mcg total) by mouth daily 90 tablet 1    losartan (COZAAR) 50 mg tablet Take 1 tablet (50 mg total) by mouth daily 90 tablet 1    magnesium oxide (MAG-OX) 400 mg tablet Take 1 tablet (400 mg total) by mouth daily 90 tablet 1    semaglutide, 0.25 or 0.5 mg/dose, (Ozempic, 0.25 or 0.5 MG/DOSE,) 2 mg/3 mL injection pen Inject 0.75 mL (0.5 mg total) under the skin every 7 days 3 mL 2    sildenafil (VIAGRA) 50 MG tablet Take 1 tablet (50 mg total) by mouth as needed for erectile dysfunction 30 tablet 0     Current Facility-Administered Medications on File Prior to Visit   Medication Dose Route Frequency Provider Last Rate Last Admin    cyanocobalamin injection 1,000 mcg  1,000 mcg Intramuscular Q30 Days    1,000 mcg at 10/02/24 8769

## 2025-07-10 NOTE — ASSESSMENT & PLAN NOTE
On levothyroxine  Orders:    Albumin / creatinine urine ratio; Future    CBC; Future    Magnesium; Future    Urinalysis with microscopic; Future    Comprehensive metabolic panel; Future

## 2025-07-10 NOTE — ASSESSMENT & PLAN NOTE
Lab Results   Component Value Date    HGBA1C 7.1 (H) 07/07/2025   Hemoglobin A1c near/at target  Management: On Lantus, glimepiride, Ozempic, Jardiance    Orders:    Albumin / creatinine urine ratio; Future    CBC; Future    Magnesium; Future    Urinalysis with microscopic; Future    Comprehensive metabolic panel; Future

## 2025-07-10 NOTE — ASSESSMENT & PLAN NOTE
Goal blood pressure less than 130/80  Medication: Carvedilol 12.5 mg twice a day, losartan 50 mg daily, amlodipine 5 mg daily, furosemide 20 mg 3 times weekly  Weight today 270 pounds.  Weight at last office visit 289 pounds  Minimal/no edema  BP at target  No changes-- consider discontinuing amlodipine if BP remains at target and increasing losartan which will help will albuminuria  Albuminuria improving still above target.    Overall patient is doing very well with diet control and weight loss.  Will follow-up in 6 months with labs and urine studies.  If albuminuria remains above target may need adjustments as noted above  Orders:    Albumin / creatinine urine ratio; Future    CBC; Future    Magnesium; Future    Urinalysis with microscopic; Future    Comprehensive metabolic panel; Future

## 2025-07-10 NOTE — ASSESSMENT & PLAN NOTE
Lab Results   Component Value Date    EGFR 88 07/07/2025    EGFR 86 02/13/2025    EGFR 93 02/10/2025    CREATININE 0.85 07/07/2025    CREATININE 0.91 02/13/2025    CREATININE 0.77 02/10/2025   Follows with Dr. Rainey  Baseline creatinine less than 1  Current creatinine at baseline with EGFR generally in the 80s  Persistent proteinuria..  Previously uncontrolled diabetes mellitus with creatinine up to 10.4 September 2020.  A1c has been improving and most recently quite acceptable at 7.1.  Etiology: Diabetic kidney disease      Lab Results   Component Value Date    HGBA1C 7.1 (H) 07/07/2025       Orders:    Albumin / creatinine urine ratio; Future    CBC; Future    Magnesium; Future    Urinalysis with microscopic; Future    Comprehensive metabolic panel; Future

## 2025-07-10 NOTE — ASSESSMENT & PLAN NOTE
On Lipitor  LDL at target.  Elevated triglycerides  Orders:    Albumin / creatinine urine ratio; Future    CBC; Future    Magnesium; Future    Urinalysis with microscopic; Future    Comprehensive metabolic panel; Future

## 2025-07-11 ENCOUNTER — PROCEDURE VISIT (OUTPATIENT)
Age: 70
End: 2025-07-11
Payer: COMMERCIAL

## 2025-07-11 ENCOUNTER — OFFICE VISIT (OUTPATIENT)
Dept: NEPHROLOGY | Facility: CLINIC | Age: 70
End: 2025-07-11
Payer: COMMERCIAL

## 2025-07-11 VITALS
WEIGHT: 272 LBS | SYSTOLIC BLOOD PRESSURE: 138 MMHG | DIASTOLIC BLOOD PRESSURE: 83 MMHG | HEART RATE: 84 BPM | BODY MASS INDEX: 38.94 KG/M2 | HEIGHT: 70 IN

## 2025-07-11 VITALS
WEIGHT: 270 LBS | SYSTOLIC BLOOD PRESSURE: 124 MMHG | BODY MASS INDEX: 38.65 KG/M2 | HEIGHT: 70 IN | DIASTOLIC BLOOD PRESSURE: 74 MMHG | HEART RATE: 69 BPM

## 2025-07-11 DIAGNOSIS — M99.03 SEGMENTAL DYSFUNCTION OF LUMBAR REGION: ICD-10-CM

## 2025-07-11 DIAGNOSIS — G89.29 CHRONIC MIDLINE LOW BACK PAIN WITH RIGHT-SIDED SCIATICA: ICD-10-CM

## 2025-07-11 DIAGNOSIS — M54.41 CHRONIC MIDLINE LOW BACK PAIN WITH RIGHT-SIDED SCIATICA: ICD-10-CM

## 2025-07-11 DIAGNOSIS — N18.2 TYPE 2 DIABETES MELLITUS WITH STAGE 2 CHRONIC KIDNEY DISEASE, WITHOUT LONG-TERM CURRENT USE OF INSULIN  (HCC): ICD-10-CM

## 2025-07-11 DIAGNOSIS — E03.9 ACQUIRED HYPOTHYROIDISM: ICD-10-CM

## 2025-07-11 DIAGNOSIS — E78.2 MIXED HYPERLIPIDEMIA: ICD-10-CM

## 2025-07-11 DIAGNOSIS — M54.2 NECK PAIN: ICD-10-CM

## 2025-07-11 DIAGNOSIS — E11.29 TYPE 2 DIABETES MELLITUS WITH PROTEINURIA  (HCC): ICD-10-CM

## 2025-07-11 DIAGNOSIS — R80.9 TYPE 2 DIABETES MELLITUS WITH PROTEINURIA  (HCC): ICD-10-CM

## 2025-07-11 DIAGNOSIS — M99.04 SEGMENTAL DYSFUNCTION OF SACRAL REGION: ICD-10-CM

## 2025-07-11 DIAGNOSIS — M54.12 CERVICAL RADICULOPATHY: ICD-10-CM

## 2025-07-11 DIAGNOSIS — E11.22 TYPE 2 DIABETES MELLITUS WITH STAGE 2 CHRONIC KIDNEY DISEASE, WITHOUT LONG-TERM CURRENT USE OF INSULIN  (HCC): ICD-10-CM

## 2025-07-11 DIAGNOSIS — I10 ESSENTIAL HYPERTENSION: Primary | ICD-10-CM

## 2025-07-11 DIAGNOSIS — R80.1 PERSISTENT PROTEINURIA: ICD-10-CM

## 2025-07-11 DIAGNOSIS — M99.05 SEGMENTAL DYSFUNCTION OF PELVIC REGION: Primary | ICD-10-CM

## 2025-07-11 DIAGNOSIS — N18.2 CHRONIC KIDNEY DISEASE, STAGE 2 (MILD): ICD-10-CM

## 2025-07-11 DIAGNOSIS — E83.42 HYPOMAGNESEMIA: ICD-10-CM

## 2025-07-11 DIAGNOSIS — M99.01 SEGMENTAL DYSFUNCTION OF CERVICAL REGION: ICD-10-CM

## 2025-07-11 PROCEDURE — 99214 OFFICE O/P EST MOD 30 MIN: CPT | Performed by: NURSE PRACTITIONER

## 2025-07-11 PROCEDURE — 98940 CHIROPRACT MANJ 1-2 REGIONS: CPT | Performed by: CHIROPRACTOR

## 2025-07-11 NOTE — PATIENT INSTRUCTIONS
Thank you for your kind visit today.  You were seen for continued monitoring of kidney function and blood pressure.  In order to keep your kidneys as healthy as possible control things that are modifiable such as blood sugar and blood pressure.    Recommendations:  Goal blood pressure less than 130/80.  If you can monitor pressure at home that would be helpful.  Please call if blood pressure remains consistently greater than 140/80  Last hemoglobin A1c was at target.  Keep up the good work control of blood sugar is essential for maintaining kidney function  Medication changes:  Other important things to remember do not use NSAIDs such as Motrin Aleve ibuprofen and Advil.  Other NSAIDs are also naproxen and diclofenac.  There are many on the market.  If you are unsure about a medication call our office or asked the pharmacist.  If you have pain you can take Tylenol.  Follow package directions for dosing  Follow up in 6 months  Blood work and urine studies prior to the appointment

## 2025-07-11 NOTE — PROGRESS NOTES
Date of first visit: 5/2/2025    Assessment:   Diagnosis ICD-10-CM Associated Orders   1. Segmental dysfunction of pelvic region  M99.05       2. Chronic midline low back pain with right-sided sciatica  M54.41     G89.29       3. Segmental dysfunction of sacral region  M99.04       4. Segmental dysfunction of lumbar region  M99.03       5. Cervical radiculopathy  M54.12       6. Segmental dysfunction of cervical region  M99.01       7. Neck pain  M54.2                No follow-ups on file.    Discussion:  Discussed case in detail with the patient today.  We will continue to trial myofascial and mobilization techniques to the cervical thoracic spine as well applying myofascial techniques of bilateral upper extremity.  Discussed cervical radiculopathy versus upper extremity issues.  I would suggest next imaging of the cervical spine to rule out radiculopathy from discogenic pathology and/or stenosis.  We will trial conservative treatment reassess in 3 to 4 weeks.    Treatment: 98896  Manipulation to the C5-C6 C6-C7 C1-C2 levels utilizing the seated stairstepping technique no HVLA post treat MFR/GT neck bilateral upper extremities        HPI:  Scott returns for treatment today reports overall improvement in his cervical spine range of motion however he still has ongoing numbness and tingling in bilateral upper extremities distal occur sitting in the chair for too long or even sleeping at night.  In addition he mentions ongoing low back issues.  Currently has upcoming imaging for his spine and hip.  His symptoms are affecting him on a daily basis.    Denies any other significant medical history changes.      The following portions of the patient's history were reviewed and updated as appropriate: allergies, current medications, past family history, past medical history, past social history, past surgical history, and problem list.    Review of Systems    Physical Exam:  Exam reveals paracervical hypertonicity noted with  reduced cervical range of motion and right rotation extension and lateral bending bilaterally.  Negative Spurling's maneuver.  Stable focal exam neurologically upper extremities.  Myofascial involvement bilateral shoulder girdles as well as the forearm extensor and flexor musculature.  Joint dysfunction C5-C6 C1-C2.

## 2025-07-15 LAB
LEFT EYE DIABETIC RETINOPATHY: NORMAL
RIGHT EYE DIABETIC RETINOPATHY: NORMAL

## 2025-07-23 DIAGNOSIS — E78.2 MIXED HYPERLIPIDEMIA: ICD-10-CM

## 2025-07-24 DIAGNOSIS — E78.2 MIXED HYPERLIPIDEMIA: ICD-10-CM

## 2025-07-25 ENCOUNTER — PROCEDURE VISIT (OUTPATIENT)
Age: 70
End: 2025-07-25
Payer: COMMERCIAL

## 2025-07-25 VITALS
HEIGHT: 70 IN | DIASTOLIC BLOOD PRESSURE: 84 MMHG | SYSTOLIC BLOOD PRESSURE: 148 MMHG | HEART RATE: 84 BPM | WEIGHT: 270 LBS | BODY MASS INDEX: 38.65 KG/M2

## 2025-07-25 DIAGNOSIS — M99.04 SEGMENTAL DYSFUNCTION OF SACRAL REGION: ICD-10-CM

## 2025-07-25 DIAGNOSIS — M54.2 NECK PAIN: ICD-10-CM

## 2025-07-25 DIAGNOSIS — G89.29 CHRONIC MIDLINE LOW BACK PAIN WITH RIGHT-SIDED SCIATICA: ICD-10-CM

## 2025-07-25 DIAGNOSIS — M54.12 CERVICAL RADICULOPATHY: ICD-10-CM

## 2025-07-25 DIAGNOSIS — M99.03 SEGMENTAL DYSFUNCTION OF LUMBAR REGION: ICD-10-CM

## 2025-07-25 DIAGNOSIS — M99.05 SEGMENTAL DYSFUNCTION OF PELVIC REGION: Primary | ICD-10-CM

## 2025-07-25 DIAGNOSIS — M99.01 SEGMENTAL DYSFUNCTION OF CERVICAL REGION: ICD-10-CM

## 2025-07-25 DIAGNOSIS — M54.41 CHRONIC MIDLINE LOW BACK PAIN WITH RIGHT-SIDED SCIATICA: ICD-10-CM

## 2025-07-25 PROCEDURE — 98941 CHIROPRACT MANJ 3-4 REGIONS: CPT | Performed by: CHIROPRACTOR

## 2025-07-25 RX ORDER — ATORVASTATIN CALCIUM 20 MG/1
20 TABLET, FILM COATED ORAL DAILY
Qty: 30 TABLET | Refills: 5 | Status: SHIPPED | OUTPATIENT
Start: 2025-07-25

## 2025-07-25 NOTE — PROGRESS NOTES
Date of first visit: 5/2/2025    Assessment:   Diagnosis ICD-10-CM Associated Orders   1. Segmental dysfunction of pelvic region  M99.05       2. Chronic midline low back pain with right-sided sciatica  M54.41     G89.29       3. Segmental dysfunction of sacral region  M99.04       4. Segmental dysfunction of lumbar region  M99.03       5. Cervical radiculopathy  M54.12       6. Segmental dysfunction of cervical region  M99.01       7. Neck pain  M54.2                No follow-ups on file.    Discussion:  Discussed case in detail with the patient today.  We will continue to trial myofascial and mobilization techniques to the cervical thoracic spine as well applying myofascial techniques of bilateral upper extremity.  Discussed cervical radiculopathy versus upper extremity issues.  I would suggest next imaging of the cervical spine to rule out radiculopathy from discogenic pathology and/or stenosis.  We will trial conservative treatment reassess in 3 to 4 weeks.    Treatment: 94533  Manipulation to the C5-C6 C6-C7 C1-C2 levels utilizing the seated stairstepping technique no HVLA post treat MFR/GT neck bilateral upper extremities    Flexion distraction L5-S1 L1-L2 L2-L3 manipulation right SI via flexion distraction.        HPI:  Scott returns for treatment today reports overall improvement in his cervical spine range of motion however he still has ongoing numbness and tingling in bilateral upper extremities distal occur sitting in the chair for too long or even sleeping at night.  In addition he mentions ongoing low back issues.  Currently has upcoming imaging for his spine and hip.  His symptoms are affecting him on a daily basis.    Denies any other significant medical history changes.      The following portions of the patient's history were reviewed and updated as appropriate: allergies, current medications, past family history, past medical history, past social history, past surgical history, and problem  list.    Review of Systems    Physical Exam:  Exam reveals paracervical hypertonicity noted with reduced cervical range of motion and right rotation extension and lateral bending bilaterally.  Negative Spurling's maneuver.  Stable focal exam neurologically upper extremities.  Myofascial involvement bilateral shoulder girdles as well as the forearm extensor and flexor musculature.  Joint dysfunction C5-C6 C1-C2.    Exam reveals reduced range of motion lumbar spine point tenderness upon palpation of the right parasacral region misalignment of the right innominate on sacrum L5 on S1.  Involvement L1-L2 L2-L3.  Neurologically stable straight leg raising exacerbates low back pain on the right.  Did review prior MRI indicating significant degenerative change L5-S1 as well as disc bulging at L1-L2 L2-L3

## 2025-07-28 RX ORDER — ATORVASTATIN CALCIUM 20 MG/1
20 TABLET, FILM COATED ORAL DAILY
Qty: 30 TABLET | Refills: 0 | OUTPATIENT
Start: 2025-07-28

## 2025-08-03 DIAGNOSIS — Z79.4 TYPE 2 DIABETES MELLITUS WITH HYPERGLYCEMIA, WITH LONG-TERM CURRENT USE OF INSULIN (HCC): ICD-10-CM

## 2025-08-03 DIAGNOSIS — E11.65 TYPE 2 DIABETES MELLITUS WITH HYPERGLYCEMIA, WITH LONG-TERM CURRENT USE OF INSULIN (HCC): ICD-10-CM

## 2025-08-08 ENCOUNTER — PROCEDURE VISIT (OUTPATIENT)
Age: 70
End: 2025-08-08
Payer: COMMERCIAL

## 2025-08-20 DIAGNOSIS — E11.65 UNCONTROLLED TYPE 2 DIABETES MELLITUS WITH HYPERGLYCEMIA (HCC): ICD-10-CM

## 2025-08-20 DIAGNOSIS — R60.0 PERIPHERAL EDEMA: ICD-10-CM

## 2025-08-20 DIAGNOSIS — E11.65 TYPE 2 DIABETES MELLITUS WITH HYPERGLYCEMIA, WITH LONG-TERM CURRENT USE OF INSULIN (HCC): ICD-10-CM

## 2025-08-20 DIAGNOSIS — I10 ESSENTIAL HYPERTENSION: ICD-10-CM

## 2025-08-20 DIAGNOSIS — Z79.4 TYPE 2 DIABETES MELLITUS WITH HYPERGLYCEMIA, WITH LONG-TERM CURRENT USE OF INSULIN (HCC): ICD-10-CM

## 2025-08-21 RX ORDER — FUROSEMIDE 20 MG/1
20 TABLET ORAL 3 TIMES WEEKLY
Qty: 36 TABLET | Refills: 1 | Status: SHIPPED | OUTPATIENT
Start: 2025-08-22

## 2025-08-22 ENCOUNTER — OFFICE VISIT (OUTPATIENT)
Dept: INTERNAL MEDICINE CLINIC | Facility: CLINIC | Age: 70
End: 2025-08-22
Payer: COMMERCIAL

## 2025-08-22 ENCOUNTER — PROCEDURE VISIT (OUTPATIENT)
Age: 70
End: 2025-08-22
Payer: COMMERCIAL

## 2025-08-22 VITALS
HEART RATE: 85 BPM | SYSTOLIC BLOOD PRESSURE: 130 MMHG | HEIGHT: 70 IN | WEIGHT: 268 LBS | BODY MASS INDEX: 38.37 KG/M2 | DIASTOLIC BLOOD PRESSURE: 82 MMHG

## 2025-08-22 VITALS
WEIGHT: 272.4 LBS | DIASTOLIC BLOOD PRESSURE: 72 MMHG | BODY MASS INDEX: 39 KG/M2 | TEMPERATURE: 97.7 F | HEIGHT: 70 IN | SYSTOLIC BLOOD PRESSURE: 108 MMHG | OXYGEN SATURATION: 97 % | HEART RATE: 67 BPM

## 2025-08-22 DIAGNOSIS — F11.20 UNCOMPLICATED OPIOID DEPENDENCE (HCC): ICD-10-CM

## 2025-08-22 DIAGNOSIS — R80.1 PERSISTENT PROTEINURIA: ICD-10-CM

## 2025-08-22 DIAGNOSIS — M54.12 CERVICAL RADICULOPATHY: Primary | ICD-10-CM

## 2025-08-22 DIAGNOSIS — E78.2 MIXED HYPERLIPIDEMIA: ICD-10-CM

## 2025-08-22 DIAGNOSIS — N18.2 TYPE 2 DIABETES MELLITUS WITH STAGE 2 CHRONIC KIDNEY DISEASE, WITHOUT LONG-TERM CURRENT USE OF INSULIN  (HCC): ICD-10-CM

## 2025-08-22 DIAGNOSIS — N18.2 CHRONIC KIDNEY DISEASE, STAGE 2 (MILD): ICD-10-CM

## 2025-08-22 DIAGNOSIS — Z00.00 MEDICARE ANNUAL WELLNESS VISIT, SUBSEQUENT: Primary | ICD-10-CM

## 2025-08-22 DIAGNOSIS — G89.29 CHRONIC MIDLINE LOW BACK PAIN WITH RIGHT-SIDED SCIATICA: ICD-10-CM

## 2025-08-22 DIAGNOSIS — M54.2 NECK PAIN: ICD-10-CM

## 2025-08-22 DIAGNOSIS — E11.22 TYPE 2 DIABETES MELLITUS WITH STAGE 2 CHRONIC KIDNEY DISEASE, WITHOUT LONG-TERM CURRENT USE OF INSULIN  (HCC): ICD-10-CM

## 2025-08-22 DIAGNOSIS — M99.01 SEGMENTAL DYSFUNCTION OF CERVICAL REGION: ICD-10-CM

## 2025-08-22 DIAGNOSIS — M54.41 CHRONIC MIDLINE LOW BACK PAIN WITH RIGHT-SIDED SCIATICA: ICD-10-CM

## 2025-08-22 DIAGNOSIS — E03.9 ACQUIRED HYPOTHYROIDISM: ICD-10-CM

## 2025-08-22 DIAGNOSIS — I10 ESSENTIAL HYPERTENSION: ICD-10-CM

## 2025-08-22 PROCEDURE — G0439 PPPS, SUBSEQ VISIT: HCPCS | Performed by: NURSE PRACTITIONER

## 2025-08-22 PROCEDURE — G2211 COMPLEX E/M VISIT ADD ON: HCPCS | Performed by: NURSE PRACTITIONER

## 2025-08-22 PROCEDURE — 98940 CHIROPRACT MANJ 1-2 REGIONS: CPT | Performed by: CHIROPRACTOR

## 2025-08-22 PROCEDURE — 99214 OFFICE O/P EST MOD 30 MIN: CPT | Performed by: NURSE PRACTITIONER

## 2025-08-22 RX ORDER — PEN NEEDLE, DIABETIC 31 GX3/16"
NEEDLE, DISPOSABLE MISCELLANEOUS EVERY MORNING
Qty: 100 EACH | Refills: 1 | Status: SHIPPED | OUTPATIENT
Start: 2025-08-22

## (undated) DEVICE — SINGLE-USE BIOPSY FORCEPS: Brand: RADIAL JAW 4

## (undated) DEVICE — CHLORAPREP HI-LITE 26ML ORANGE

## (undated) DEVICE — GLOVE SRG BIOGEL ECLIPSE 8

## (undated) DEVICE — GAUZE SPONGES,16 PLY: Brand: CURITY

## (undated) DEVICE — 3M™ TEGADERM™ TRANSPARENT FILM DRESSING FRAME STYLE, 1624W, 2-3/8 IN X 2-3/4 IN (6 CM X 7 CM), 100/CT 4CT/CASE: Brand: 3M™ TEGADERM™

## (undated) DEVICE — NEEDLE 25G X 1 1/2

## (undated) DEVICE — SUT ETHILON 3-0 PS-1 18 IN 1663G

## (undated) DEVICE — GLOVE INDICATOR PI UNDERGLOVE SZ 8 BLUE

## (undated) DEVICE — INTENDED FOR TISSUE SEPARATION, AND OTHER PROCEDURES THAT REQUIRE A SHARP SURGICAL BLADE TO PUNCTURE OR CUT.: Brand: BARD-PARKER ® CARBON RIB-BACK BLADES

## (undated) DEVICE — OCCLUSIVE GAUZE STRIP,3% BISMUTH TRIBROMOPHENATE IN PETROLATUM BLEND: Brand: XEROFORM

## (undated) DEVICE — 3M™ TEGADERM™ TRANSPARENT FILM DRESSING FRAME STYLE, 1626W, 4 IN X 4-3/4 IN (10 CM X 12 CM), 50/CT 4CT/CASE: Brand: 3M™ TEGADERM™

## (undated) DEVICE — CUFF TOURNIQUET 18 X 4 IN QUICK CONNECT DISP 1 BLADDER

## (undated) DEVICE — STERILE BETHLEHEM PLASTIC HAND: Brand: CARDINAL HEALTH

## (undated) DEVICE — KNIFE LIGHT 10,PK: Brand: KNIFELIGHT